# Patient Record
Sex: FEMALE | Race: WHITE | Employment: OTHER
[De-identification: names, ages, dates, MRNs, and addresses within clinical notes are randomized per-mention and may not be internally consistent; named-entity substitution may affect disease eponyms.]

---

## 2017-01-10 ENCOUNTER — PRIOR ORIGINAL RECORDS (OUTPATIENT)
Dept: OTHER | Age: 82
End: 2017-01-10

## 2017-01-17 ENCOUNTER — TELEPHONE (OUTPATIENT)
Dept: INTERNAL MEDICINE CLINIC | Facility: CLINIC | Age: 82
End: 2017-01-17

## 2017-01-17 RX ORDER — POTASSIUM CHLORIDE 750 MG/1
10 TABLET, FILM COATED, EXTENDED RELEASE ORAL 2 TIMES DAILY
Qty: 180 TABLET | Refills: 3 | Status: SHIPPED | OUTPATIENT
Start: 2017-01-17 | End: 2017-12-04

## 2017-01-26 PROBLEM — M75.80 ROTATOR CUFF TENDINITIS, UNSPECIFIED LATERALITY: Status: ACTIVE | Noted: 2017-01-26

## 2017-01-27 RX ORDER — LEVOTHYROXINE SODIUM 0.07 MG/1
75 TABLET ORAL DAILY
Qty: 90 TABLET | Refills: 0 | Status: SHIPPED | OUTPATIENT
Start: 2017-01-27 | End: 2017-04-27

## 2017-02-01 ENCOUNTER — HOSPITAL ENCOUNTER (OUTPATIENT)
Dept: LAB | Facility: HOSPITAL | Age: 82
Discharge: HOME | End: 2017-02-01
Attending: INTERNAL MEDICINE
Payer: MEDICARE

## 2017-02-01 DIAGNOSIS — I48.20 CHRONIC ATRIAL FIBRILLATION (HCC): ICD-10-CM

## 2017-02-01 LAB — POC INR: 2.1 (ref 0.8–1.3)

## 2017-02-01 PROCEDURE — 85610 PROTHROMBIN TIME: CPT

## 2017-02-17 ENCOUNTER — MED REC SCAN ONLY (OUTPATIENT)
Dept: INTERNAL MEDICINE CLINIC | Facility: CLINIC | Age: 82
End: 2017-02-17

## 2017-02-24 ENCOUNTER — TELEPHONE (OUTPATIENT)
Dept: INTERNAL MEDICINE CLINIC | Facility: CLINIC | Age: 82
End: 2017-02-24

## 2017-02-24 NOTE — TELEPHONE ENCOUNTER
Ashwin Bone has to go for some lab work, and she would like to know what the tests are for.   Please call back [phone]

## 2017-02-27 ENCOUNTER — HOSPITAL ENCOUNTER (OUTPATIENT)
Dept: LAB | Facility: HOSPITAL | Age: 82
Discharge: HOME | End: 2017-02-27
Attending: INTERNAL MEDICINE
Payer: MEDICARE

## 2017-02-27 DIAGNOSIS — I48.20 CHRONIC ATRIAL FIBRILLATION (HCC): ICD-10-CM

## 2017-02-27 LAB — POC INR: 2.5 (ref 0.8–1.3)

## 2017-02-27 PROCEDURE — 85610 PROTHROMBIN TIME: CPT

## 2017-03-01 PROBLEM — M75.81 ROTATOR CUFF TENDINITIS, RIGHT: Status: ACTIVE | Noted: 2017-03-01

## 2017-03-01 PROBLEM — M75.82 ROTATOR CUFF TENDONITIS, LEFT: Status: ACTIVE | Noted: 2017-03-01

## 2017-03-09 PROBLEM — R26.2 DIFFICULTY WALKING: Status: ACTIVE | Noted: 2017-03-09

## 2017-03-09 PROBLEM — B35.1 ONYCHOMYCOSIS: Status: ACTIVE | Noted: 2017-03-09

## 2017-03-13 RX ORDER — LOSARTAN POTASSIUM 100 MG/1
TABLET ORAL
Qty: 30 TABLET | Refills: 3 | Status: SHIPPED | OUTPATIENT
Start: 2017-03-13 | End: 2018-04-05

## 2017-03-14 ENCOUNTER — TELEPHONE (OUTPATIENT)
Dept: INTERNAL MEDICINE CLINIC | Facility: CLINIC | Age: 82
End: 2017-03-14

## 2017-03-14 NOTE — TELEPHONE ENCOUNTER
Patient picked up Losartan at [facility] and received 30 pills. She wants to know if Dr. Saravia Few changed something because she has always gotten 90 and that is what she prefers.

## 2017-03-31 ENCOUNTER — HOSPITAL ENCOUNTER (OUTPATIENT)
Dept: LAB | Facility: HOSPITAL | Age: 82
Discharge: HOME | End: 2017-03-31
Attending: INTERNAL MEDICINE
Payer: MEDICARE

## 2017-03-31 DIAGNOSIS — I48.20 CHRONIC ATRIAL FIBRILLATION (HCC): ICD-10-CM

## 2017-03-31 PROCEDURE — 85610 PROTHROMBIN TIME: CPT

## 2017-04-27 RX ORDER — LEVOTHYROXINE SODIUM 0.07 MG/1
TABLET ORAL
Qty: 90 TABLET | Refills: 1 | Status: SHIPPED | OUTPATIENT
Start: 2017-04-27 | End: 2017-11-01

## 2017-05-02 ENCOUNTER — HOSPITAL ENCOUNTER (OUTPATIENT)
Dept: LAB | Facility: HOSPITAL | Age: 82
Discharge: HOME | End: 2017-05-02
Attending: INTERNAL MEDICINE
Payer: MEDICARE

## 2017-05-02 DIAGNOSIS — I48.20 CHRONIC ATRIAL FIBRILLATION (HCC): ICD-10-CM

## 2017-05-02 PROCEDURE — 85610 PROTHROMBIN TIME: CPT

## 2017-05-17 ENCOUNTER — APPOINTMENT (OUTPATIENT)
Dept: LAB | Age: 82
End: 2017-05-17
Attending: PHARMACIST
Payer: MEDICARE

## 2017-05-17 ENCOUNTER — PRIOR ORIGINAL RECORDS (OUTPATIENT)
Dept: OTHER | Age: 82
End: 2017-05-17

## 2017-05-17 DIAGNOSIS — Z00.00 ROUTINE GENERAL MEDICAL EXAMINATION AT A HEALTH CARE FACILITY: ICD-10-CM

## 2017-05-17 DIAGNOSIS — I48.19 PERSISTENT ATRIAL FIBRILLATION (HCC): ICD-10-CM

## 2017-05-17 DIAGNOSIS — I25.2 HISTORY OF MYOCARDIAL INFARCTION: ICD-10-CM

## 2017-05-17 PROCEDURE — 80076 HEPATIC FUNCTION PANEL: CPT

## 2017-05-17 PROCEDURE — 80048 BASIC METABOLIC PNL TOTAL CA: CPT

## 2017-05-17 PROCEDURE — 80061 LIPID PANEL: CPT

## 2017-05-17 PROCEDURE — 36415 COLL VENOUS BLD VENIPUNCTURE: CPT

## 2017-05-22 RX ORDER — SYRINGE-NEEDLE,INSULIN,0.5 ML 31 GX5/16"
SYRINGE, EMPTY DISPOSABLE MISCELLANEOUS
Qty: 100 EACH | Refills: 3 | Status: SHIPPED | OUTPATIENT
Start: 2017-05-22 | End: 2018-06-18

## 2017-06-02 ENCOUNTER — HOSPITAL ENCOUNTER (OUTPATIENT)
Dept: LAB | Facility: HOSPITAL | Age: 82
Discharge: HOME | End: 2017-06-02
Attending: INTERNAL MEDICINE
Payer: MEDICARE

## 2017-06-02 DIAGNOSIS — I48.20 CHRONIC ATRIAL FIBRILLATION (HCC): ICD-10-CM

## 2017-06-02 PROCEDURE — 85610 PROTHROMBIN TIME: CPT

## 2017-06-19 ENCOUNTER — PRIOR ORIGINAL RECORDS (OUTPATIENT)
Dept: OTHER | Age: 82
End: 2017-06-19

## 2017-06-30 ENCOUNTER — HOSPITAL ENCOUNTER (OUTPATIENT)
Dept: LAB | Facility: HOSPITAL | Age: 82
Discharge: HOME | End: 2017-06-30
Attending: INTERNAL MEDICINE
Payer: MEDICARE

## 2017-06-30 DIAGNOSIS — I48.20 CHRONIC ATRIAL FIBRILLATION (HCC): ICD-10-CM

## 2017-06-30 LAB — POC INR: 3.2 (ref 0.8–1.3)

## 2017-06-30 PROCEDURE — 85610 PROTHROMBIN TIME: CPT

## 2017-06-30 RX ORDER — LANCETS
EACH MISCELLANEOUS
Qty: 200 EACH | Refills: 3 | Status: SHIPPED | OUTPATIENT
Start: 2017-06-30 | End: 2018-06-22

## 2017-07-14 ENCOUNTER — HOSPITAL ENCOUNTER (OUTPATIENT)
Dept: LAB | Facility: HOSPITAL | Age: 82
Discharge: HOME | End: 2017-07-14
Attending: INTERNAL MEDICINE
Payer: MEDICARE

## 2017-07-14 DIAGNOSIS — I48.20 CHRONIC ATRIAL FIBRILLATION (HCC): ICD-10-CM

## 2017-07-14 LAB — POC INR: 2.4 (ref 0.8–1.3)

## 2017-07-14 PROCEDURE — 85610 PROTHROMBIN TIME: CPT

## 2017-07-17 ENCOUNTER — MED REC SCAN ONLY (OUTPATIENT)
Dept: INTERNAL MEDICINE CLINIC | Facility: CLINIC | Age: 82
End: 2017-07-17

## 2017-07-18 ENCOUNTER — PRIOR ORIGINAL RECORDS (OUTPATIENT)
Dept: OTHER | Age: 82
End: 2017-07-18

## 2017-08-02 RX ORDER — INSULIN GLARGINE 100 [IU]/ML
INJECTION, SOLUTION SUBCUTANEOUS
Qty: 30 ML | Refills: 3 | Status: SHIPPED | OUTPATIENT
Start: 2017-08-02 | End: 2018-07-06

## 2017-08-10 ENCOUNTER — HOSPITAL ENCOUNTER (OUTPATIENT)
Dept: LAB | Facility: HOSPITAL | Age: 82
Discharge: HOME | End: 2017-08-10
Attending: INTERNAL MEDICINE
Payer: MEDICARE

## 2017-08-10 DIAGNOSIS — I48.20 CHRONIC ATRIAL FIBRILLATION (HCC): ICD-10-CM

## 2017-08-10 LAB — POC INR: 1.9 (ref 0.8–1.3)

## 2017-08-10 PROCEDURE — 85610 PROTHROMBIN TIME: CPT

## 2017-08-14 ENCOUNTER — MED REC SCAN ONLY (OUTPATIENT)
Dept: INTERNAL MEDICINE CLINIC | Facility: CLINIC | Age: 82
End: 2017-08-14

## 2017-08-25 ENCOUNTER — HOSPITAL ENCOUNTER (OUTPATIENT)
Dept: LAB | Facility: HOSPITAL | Age: 82
Discharge: HOME | End: 2017-08-25
Attending: INTERNAL MEDICINE
Payer: MEDICARE

## 2017-08-25 DIAGNOSIS — I48.20 CHRONIC ATRIAL FIBRILLATION (HCC): ICD-10-CM

## 2017-08-25 LAB — POC INR: 2.6 (ref 0.8–1.3)

## 2017-08-25 PROCEDURE — 85610 PROTHROMBIN TIME: CPT

## 2017-09-12 RX ORDER — BLOOD SUGAR DIAGNOSTIC
STRIP MISCELLANEOUS
Qty: 100 EACH | Refills: 3 | Status: SHIPPED | OUTPATIENT
Start: 2017-09-12 | End: 2018-10-26

## 2017-09-19 ENCOUNTER — PRIOR ORIGINAL RECORDS (OUTPATIENT)
Dept: OTHER | Age: 82
End: 2017-09-19

## 2017-09-20 ENCOUNTER — TELEPHONE (OUTPATIENT)
Dept: INTERNAL MEDICINE CLINIC | Facility: CLINIC | Age: 82
End: 2017-09-20

## 2017-09-20 DIAGNOSIS — E11.8 TYPE 2 DIABETES MELLITUS WITH COMPLICATION, WITH LONG-TERM CURRENT USE OF INSULIN (HCC): Primary | ICD-10-CM

## 2017-09-20 DIAGNOSIS — Z79.4 TYPE 2 DIABETES MELLITUS WITH COMPLICATION, WITH LONG-TERM CURRENT USE OF INSULIN (HCC): Primary | ICD-10-CM

## 2017-09-20 NOTE — TELEPHONE ENCOUNTER
Patient tried to make an appointment at the diabetic center but they told her they need a referral from Dr. Edy Pineda first.  Please send referral and then contact patient so she can call them again.

## 2017-09-28 ENCOUNTER — DIABETIC EDUCATION (OUTPATIENT)
Dept: ENDOCRINOLOGY CLINIC | Facility: CLINIC | Age: 82
End: 2017-09-28

## 2017-09-28 VITALS — WEIGHT: 165 LBS | BODY MASS INDEX: 32.39 KG/M2 | HEIGHT: 60 IN

## 2017-09-28 DIAGNOSIS — E11.8 TYPE 2 DIABETES MELLITUS WITH COMPLICATION, WITH LONG-TERM CURRENT USE OF INSULIN (HCC): Primary | ICD-10-CM

## 2017-09-28 DIAGNOSIS — Z79.4 TYPE 2 DIABETES MELLITUS WITH COMPLICATION, WITH LONG-TERM CURRENT USE OF INSULIN (HCC): Primary | ICD-10-CM

## 2017-09-28 PROCEDURE — G0108 DIAB MANAGE TRN  PER INDIV: HCPCS | Performed by: DIETITIAN, REGISTERED

## 2017-09-28 NOTE — PATIENT INSTRUCTIONS
Tomorrow morning decrease Lantus to 22 units. Glucose goal (first thing in the morning) is 130 or less.     Every 2 days, decrease Lantus by 1 more unit until Fasting Blood Glucose consisently ranges between 120-130 mg/dl

## 2017-09-28 NOTE — PROGRESS NOTES
Radha Sommer  : 5/3/1930 attended Diabetic Education:    Date: 2017   Start time: 1:00   End time: 1:30    Ht 60\"   Wt 165 lb   BMI 32.22 kg/m²       HGBA1C (%)   Date Value   2013 8.4 (H)   ----------  HgbA1C (%)   Date Value   2016

## 2017-09-29 ENCOUNTER — TELEPHONE (OUTPATIENT)
Dept: INTERNAL MEDICINE CLINIC | Facility: CLINIC | Age: 82
End: 2017-09-29

## 2017-09-29 ENCOUNTER — HOSPITAL ENCOUNTER (OUTPATIENT)
Dept: LAB | Facility: HOSPITAL | Age: 82
Discharge: HOME | End: 2017-09-29
Attending: INTERNAL MEDICINE
Payer: MEDICARE

## 2017-09-29 DIAGNOSIS — Z78.0 MENOPAUSE: ICD-10-CM

## 2017-09-29 DIAGNOSIS — Z12.39 SCREENING BREAST EXAMINATION: Primary | ICD-10-CM

## 2017-09-29 DIAGNOSIS — I48.20 CHRONIC ATRIAL FIBRILLATION (HCC): ICD-10-CM

## 2017-09-29 PROCEDURE — 85610 PROTHROMBIN TIME: CPT

## 2017-09-29 NOTE — TELEPHONE ENCOUNTER
Patient needs order for mammogram placed in Epic. She also wants to know if Dr. Mumtaz Sibley wants her to have a DEXA. Please let patient know when order(s) is placed so she can schedule.

## 2017-10-18 ENCOUNTER — TELEPHONE (OUTPATIENT)
Dept: ENDOCRINOLOGY CLINIC | Facility: CLINIC | Age: 82
End: 2017-10-18

## 2017-10-18 NOTE — TELEPHONE ENCOUNTER
Marcia Marina called to report her glucose control. Since reducing her Lantus, she is having no hypoglycemia. Currently taking 15 units Lantus. Glucose at 2 AM is 105-110 mg/dl.   Eats b'fast at 5 AM; lunch 11:30; dinner 5 PM.  Has snack approximately 1 hr bef

## 2017-10-26 ENCOUNTER — TELEPHONE (OUTPATIENT)
Dept: INTERNAL MEDICINE CLINIC | Facility: CLINIC | Age: 82
End: 2017-10-26

## 2017-10-26 ENCOUNTER — HOSPITAL ENCOUNTER (OUTPATIENT)
Dept: MAMMOGRAPHY | Age: 82
Discharge: HOME | End: 2017-10-26
Attending: INTERNAL MEDICINE
Payer: MEDICARE

## 2017-10-26 ENCOUNTER — HOSPITAL ENCOUNTER (OUTPATIENT)
Dept: BONE DENSITY | Age: 82
Discharge: HOME | End: 2017-10-26
Attending: INTERNAL MEDICINE
Payer: MEDICARE

## 2017-10-26 DIAGNOSIS — Z78.0 MENOPAUSE: ICD-10-CM

## 2017-10-26 DIAGNOSIS — Z12.39 SCREENING BREAST EXAMINATION: ICD-10-CM

## 2017-10-26 PROCEDURE — 77080 DXA BONE DENSITY AXIAL: CPT | Performed by: INTERNAL MEDICINE

## 2017-10-26 PROCEDURE — 77067 SCR MAMMO BI INCL CAD: CPT | Performed by: INTERNAL MEDICINE

## 2017-10-26 PROCEDURE — 77063 BREAST TOMOSYNTHESIS BI: CPT | Performed by: INTERNAL MEDICINE

## 2017-10-27 NOTE — TELEPHONE ENCOUNTER
Phone call. Spoke with patient. Reminded overdue for Reclast infusion. Order faxed over again to infusion center. Patient states wants to wait until gets CPE labs drawn here in November.

## 2017-11-01 ENCOUNTER — HOSPITAL ENCOUNTER (OUTPATIENT)
Dept: LAB | Facility: HOSPITAL | Age: 82
Discharge: HOME | End: 2017-11-01
Attending: INTERNAL MEDICINE
Payer: MEDICARE

## 2017-11-01 DIAGNOSIS — I48.20 CHRONIC ATRIAL FIBRILLATION (HCC): ICD-10-CM

## 2017-11-01 PROCEDURE — 85610 PROTHROMBIN TIME: CPT

## 2017-11-01 RX ORDER — LEVOTHYROXINE SODIUM 0.07 MG/1
TABLET ORAL
Qty: 90 TABLET | Refills: 0 | Status: SHIPPED | OUTPATIENT
Start: 2017-11-01 | End: 2018-02-27

## 2017-11-09 RX ORDER — FUROSEMIDE 20 MG/1
TABLET ORAL
Qty: 180 TABLET | Refills: 3 | Status: SHIPPED | OUTPATIENT
Start: 2017-11-09 | End: 2017-12-04

## 2017-11-29 ENCOUNTER — APPOINTMENT (OUTPATIENT)
Dept: LAB | Age: 82
End: 2017-11-29
Attending: INTERNAL MEDICINE
Payer: MEDICARE

## 2017-11-29 ENCOUNTER — NURSE ONLY (OUTPATIENT)
Dept: INTERNAL MEDICINE CLINIC | Facility: CLINIC | Age: 82
End: 2017-11-29

## 2017-11-29 DIAGNOSIS — Z00.00 LABORATORY EXAMINATION ORDERED AS PART OF A ROUTINE GENERAL MEDICAL EXAMINATION: Primary | ICD-10-CM

## 2017-11-29 PROCEDURE — 92551 PURE TONE HEARING TEST AIR: CPT | Performed by: INTERNAL MEDICINE

## 2017-11-29 PROCEDURE — 93000 ELECTROCARDIOGRAM COMPLETE: CPT | Performed by: INTERNAL MEDICINE

## 2017-11-29 PROCEDURE — 99173 VISUAL ACUITY SCREEN: CPT | Performed by: INTERNAL MEDICINE

## 2017-11-29 PROCEDURE — 94010 BREATHING CAPACITY TEST: CPT | Performed by: INTERNAL MEDICINE

## 2017-11-29 PROCEDURE — 81001 URINALYSIS AUTO W/SCOPE: CPT | Performed by: INTERNAL MEDICINE

## 2017-11-29 NOTE — PROGRESS NOTES
*LOOKIN' BODY RESULTS    YES:       NO: X      REASON TEST NOT PERFORMED: WAIVED DUE TO BALANCE       HEIGHT: 4'11.5 WITH SHOES    WEIGHT: 169 WITH SHOES  _____________________________________________________________________________  Cecilia Godinez    YES:

## 2017-12-01 ENCOUNTER — HOSPITAL ENCOUNTER (OUTPATIENT)
Dept: LAB | Facility: HOSPITAL | Age: 82
Discharge: HOME | End: 2017-12-01
Attending: INTERNAL MEDICINE
Payer: MEDICARE

## 2017-12-01 DIAGNOSIS — I48.20 CHRONIC ATRIAL FIBRILLATION (HCC): ICD-10-CM

## 2017-12-01 PROCEDURE — 85610 PROTHROMBIN TIME: CPT

## 2017-12-04 ENCOUNTER — TELEPHONE (OUTPATIENT)
Dept: INTERNAL MEDICINE CLINIC | Facility: CLINIC | Age: 82
End: 2017-12-04

## 2017-12-04 ENCOUNTER — LAB ENCOUNTER (OUTPATIENT)
Dept: LAB | Facility: HOSPITAL | Age: 82
End: 2017-12-04
Attending: INTERNAL MEDICINE
Payer: MEDICARE

## 2017-12-04 ENCOUNTER — OFFICE VISIT (OUTPATIENT)
Dept: INTERNAL MEDICINE CLINIC | Facility: CLINIC | Age: 82
End: 2017-12-04

## 2017-12-04 VITALS
BODY MASS INDEX: 33.87 KG/M2 | DIASTOLIC BLOOD PRESSURE: 70 MMHG | HEIGHT: 59 IN | OXYGEN SATURATION: 96 % | TEMPERATURE: 99 F | HEART RATE: 88 BPM | RESPIRATION RATE: 16 BRPM | SYSTOLIC BLOOD PRESSURE: 135 MMHG | WEIGHT: 168 LBS

## 2017-12-04 DIAGNOSIS — E11.8 TYPE 2 DIABETES MELLITUS WITH COMPLICATION, WITH LONG-TERM CURRENT USE OF INSULIN (HCC): ICD-10-CM

## 2017-12-04 DIAGNOSIS — Z79.4 TYPE 2 DIABETES MELLITUS WITH COMPLICATION, WITH LONG-TERM CURRENT USE OF INSULIN (HCC): ICD-10-CM

## 2017-12-04 DIAGNOSIS — R50.9 FEVER, UNSPECIFIED FEVER CAUSE: ICD-10-CM

## 2017-12-04 DIAGNOSIS — E11.43 TYPE 2 DIABETES MELLITUS WITH DIABETIC AUTONOMIC NEUROPATHY, WITHOUT LONG-TERM CURRENT USE OF INSULIN (HCC): Primary | ICD-10-CM

## 2017-12-04 PROCEDURE — 87502 INFLUENZA DNA AMP PROBE: CPT

## 2017-12-04 PROCEDURE — 87999 UNLISTED MICROBIOLOGY PX: CPT

## 2017-12-04 PROCEDURE — 99213 OFFICE O/P EST LOW 20 MIN: CPT | Performed by: INTERNAL MEDICINE

## 2017-12-04 PROCEDURE — 87798 DETECT AGENT NOS DNA AMP: CPT

## 2017-12-04 RX ORDER — OSELTAMIVIR PHOSPHATE 75 MG/1
75 CAPSULE ORAL 2 TIMES DAILY
Qty: 10 CAPSULE | Refills: 0 | Status: SHIPPED | OUTPATIENT
Start: 2017-12-04 | End: 2017-12-11

## 2017-12-04 RX ORDER — INFLUENZA A VIRUSA/MICHIGAN/45/2015 X-275 (H1N1) ANTIGEN (FORMALDEHYDE INACTIVATED), INFLUENZA A VIRUS A/HONG KONG/4801/2014 X-263B (H3N2) ANTIGEN (FORMALDEHYDE INACTIVATED), AND INFLUENZA B VIRUS B/BRISBANE/60/2008 ANTIGEN (FORMALDEHYDE INACTIVATED) 60; 60; 60 UG/.5ML; UG/.5ML; UG/.5ML
INJECTION, SUSPENSION INTRAMUSCULAR
Refills: 0 | COMMUNITY
Start: 2017-09-22 | End: 2017-12-11 | Stop reason: ALTCHOICE

## 2017-12-04 RX ORDER — AZITHROMYCIN 200 MG/5ML
POWDER, FOR SUSPENSION ORAL
Qty: 30 ML | Refills: 0 | Status: SHIPPED | OUTPATIENT
Start: 2017-12-04 | End: 2017-12-11 | Stop reason: ALTCHOICE

## 2017-12-04 NOTE — PROGRESS NOTES
Patient presents with:  Cough: day 3  Body ache and/or chills  Fever      HPI: Please see above. Some sweats. Muscle aches really bother Tylenol she is taking for the fever.   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  Disp: 1 kit Rfl: 0   Needles & Syringes Does not apply Misc 31 g .3cc  #100(1 box) inject daily BD insulin syringes-ultra fi no organomegaly or hernias. Musculoskeletal: Normal range of motion. No edema and no tenderness. No effusions. Lymphadenopathy: No cervical adenopathy. Neurological:No  Confusion  Normal reflexes. No cranial nerve deficit or sensory deficit.  Nor

## 2017-12-04 NOTE — TELEPHONE ENCOUNTER
Left message for patient. Tested positive for Influenza A. Start Tamiflu and Zithromax and call tomorrow with update.

## 2017-12-05 ENCOUNTER — MED REC SCAN ONLY (OUTPATIENT)
Dept: INTERNAL MEDICINE CLINIC | Facility: CLINIC | Age: 82
End: 2017-12-05

## 2017-12-05 NOTE — TELEPHONE ENCOUNTER
Spoke with patient. Started Tamiflu. Feeling slightly better. Discussed rest, fluids, completing meds.

## 2017-12-06 ENCOUNTER — APPOINTMENT (OUTPATIENT)
Dept: HEMATOLOGY/ONCOLOGY | Facility: HOSPITAL | Age: 82
End: 2017-12-06
Attending: INTERNAL MEDICINE
Payer: MEDICARE

## 2017-12-08 NOTE — PROGRESS NOTES
HEALTH MAINTENANCE:        Immunization History  Administered            Date(s) Administered    HIGH DOSE FLU 65 YRS AND OLDER PRSV FREE SINGLE D (88490) FLU CLINIC                          09/13/2016 09/22/2017      Influenza             11/17/2000  10/ hypertrophy  ST & T wave abnormality, consider inferior ischemia  ST & T wave anormality    SPIROMETRY: Normal    HEARING: Patient wears hearing aids

## 2017-12-11 ENCOUNTER — OFFICE VISIT (OUTPATIENT)
Dept: INTERNAL MEDICINE CLINIC | Facility: CLINIC | Age: 82
End: 2017-12-11

## 2017-12-11 VITALS
RESPIRATION RATE: 14 BRPM | SYSTOLIC BLOOD PRESSURE: 135 MMHG | OXYGEN SATURATION: 96 % | TEMPERATURE: 98 F | HEIGHT: 59.5 IN | DIASTOLIC BLOOD PRESSURE: 70 MMHG | HEART RATE: 68 BPM | WEIGHT: 169 LBS | BODY MASS INDEX: 33.62 KG/M2

## 2017-12-11 DIAGNOSIS — Z86.010 HISTORY OF COLON POLYPS: ICD-10-CM

## 2017-12-11 DIAGNOSIS — E78.5 INCREASED SERUM LIPIDS: ICD-10-CM

## 2017-12-11 DIAGNOSIS — Z78.9 STATIN INTOLERANCE: ICD-10-CM

## 2017-12-11 DIAGNOSIS — F43.9 STRESS AT HOME: ICD-10-CM

## 2017-12-11 DIAGNOSIS — Z79.4 TYPE 2 DIABETES MELLITUS WITH COMPLICATION, WITH LONG-TERM CURRENT USE OF INSULIN (HCC): ICD-10-CM

## 2017-12-11 DIAGNOSIS — K22.2 ESOPHAGEAL STRICTURE: ICD-10-CM

## 2017-12-11 DIAGNOSIS — I10 ESSENTIAL HYPERTENSION: ICD-10-CM

## 2017-12-11 DIAGNOSIS — Z79.01 WARFARIN ANTICOAGULATION: ICD-10-CM

## 2017-12-11 DIAGNOSIS — K57.30 DIVERTICULOSIS OF LARGE INTESTINE WITHOUT HEMORRHAGE: ICD-10-CM

## 2017-12-11 DIAGNOSIS — Z86.718 HISTORY OF DVT (DEEP VEIN THROMBOSIS): ICD-10-CM

## 2017-12-11 DIAGNOSIS — Z00.00 ROUTINE GENERAL MEDICAL EXAMINATION AT A HEALTH CARE FACILITY: Primary | ICD-10-CM

## 2017-12-11 DIAGNOSIS — I48.19 PERSISTENT ATRIAL FIBRILLATION (HCC): ICD-10-CM

## 2017-12-11 DIAGNOSIS — E03.9 ACQUIRED HYPOTHYROIDISM: ICD-10-CM

## 2017-12-11 DIAGNOSIS — E11.8 TYPE 2 DIABETES MELLITUS WITH COMPLICATION, WITH LONG-TERM CURRENT USE OF INSULIN (HCC): ICD-10-CM

## 2017-12-11 DIAGNOSIS — I50.32 CHRONIC DIASTOLIC HEART FAILURE (HCC): ICD-10-CM

## 2017-12-11 DIAGNOSIS — M75.82 ROTATOR CUFF TENDONITIS, LEFT: ICD-10-CM

## 2017-12-11 DIAGNOSIS — I25.2 HISTORY OF MYOCARDIAL INFARCTION: ICD-10-CM

## 2017-12-11 DIAGNOSIS — Z85.828 PERSONAL HISTORY OF OTHER MALIGNANT NEOPLASM OF SKIN: ICD-10-CM

## 2017-12-11 DIAGNOSIS — R63.8 INCREASED BMI (BODY MASS INDEX): ICD-10-CM

## 2017-12-11 DIAGNOSIS — D50.9 IRON DEFICIENCY ANEMIA, UNSPECIFIED IRON DEFICIENCY ANEMIA TYPE: ICD-10-CM

## 2017-12-11 PROBLEM — M75.81 ROTATOR CUFF TENDINITIS, RIGHT: Status: RESOLVED | Noted: 2017-03-01 | Resolved: 2017-12-11

## 2017-12-11 PROCEDURE — G0439 PPPS, SUBSEQ VISIT: HCPCS | Performed by: INTERNAL MEDICINE

## 2017-12-11 RX ORDER — ROSUVASTATIN CALCIUM 10 MG/1
10 TABLET, COATED ORAL
Qty: 30 TABLET | Refills: 6 | Status: SHIPPED | OUTPATIENT
Start: 2017-12-11 | End: 2018-04-04 | Stop reason: ALTCHOICE

## 2017-12-11 RX ORDER — FUROSEMIDE 20 MG/1
20 TABLET ORAL 2 TIMES DAILY
Qty: 30 TABLET | Refills: 0 | COMMUNITY
End: 2018-12-13

## 2017-12-11 NOTE — PROGRESS NOTES
HPI:    Patient ID: Fransisco Rizvi is a 80year old female. HPI  DEAR Denae    IT WAS NICE SEEING YOU FOR YOUR WELLNESS VISIT ON 12/11/2017            Review of Systems   HPI:    Patient ID: Fransisco Rizvi is a 80year old female.     History of Present ?: Yes    Do you have a living will?: Yes     Please go to \"Cognitive Assessment\" under Medicare Assessment section in Charting, test patient and document. Then, refresh your progress note to see your input here.   Cognitive Assessment     What complaints. No significant hot flashes. Denies vaginal discharge or bleeding.    Mammogram normal October 2017 will continue to repeat at least through age 80 depending on patient's overall health    Musculoskeletal: Significant degenerative lumbar spine a Pulse: 68   Resp: 14   Temp: 97.9 °F (36.6 °C)   TempSrc: Oral   SpO2: 96%   Weight: 169 lb   Height: 59.5\"     Body mass index is 33.56 kg/m².     Active Problems:  Patient Active Problem List:     Personal history of other malignant neoplasm of skin mild, w/ mildly increased pulmonary vascularity, increased interstitial markings in the mid to lower lung fields, and B/L perihilar opacities is concerning for congestive failure   • Cataract    • Chest pain 8/26/2010   • Constipation 4/26/2012    worsenin Ischemic heart disease    • LAE (left atrial enlargement) 7/13/2010   • Lipid screening 4/19/2012   • LVH (left ventricular hypertrophy) 4/26/2012    hx LVH by ECG, hypertensive hx, sodium restriction   • Menopause    • Mild mitral regurgitation 7/13/2010 MANAGEMENT  3/27/2014: FLUOR GID & [place] Dates NDL/CATH SPI DX/THER POP      Comment: Procedure: LUMBAR EPIDURAL;  Surgeon:                Melvin Pruett MD;  Location: [facility]: FOOT/TOES SURGERY PROC UNLISTED LUMBAR EPIDURAL;  Surgeon:                Margaret Yu MD;  Location: [place] [address] MANAGEMENT  5/19/11: SKIN SURGERY      Comment: 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 See Attached    End of Life Planning:   Still need copy of advance directives      PHYSICAL EXAM:      Vital signs reviewed. BMI 32. Constitutional: Oriented to person, place, and time. No distress.      HENT:  Normocephalic and atrauma any changes. ASSESSMENT/PLAN:       #1.  Wellness visit completed discussed concepts of inflammation. Myeloperoxidase normal CRP elevated 2.7 associated with BMI that CRP is being made in abdominal fat.   Importance of trying to increase activity lo Coumadin. #5.  Coronary artery disease myocardial infarction followed by [facility] no specific issue regarding heart failure. #6.  Stress at home.   Son unstable in state [facility] facilities facilities are poorly run dirty and staff stenosis after interventions. Currently using a cane but does decrease your ability to be active. #18. History of atrial fibrillation. Diastolic heart failure.   Followed by [facility] there approaches not changed too much on your meds generally doing digoxin 0.125 MG Oral Tab 125 mcg 3 (three) times a week. Disp:  Rfl:    metoprolol Tartrate 25 MG Oral Tab 20 mg 2 (two) times daily.    Disp:  Rfl:    Potassium Chloride ER (K-DUR) 10 MEQ Oral Tab CR  Disp:  Rfl:    Fluticasone Propionate (FLONASE) 5 vomiting  Glucophage [Metform*        Comment:\"problematic\"  Meperidine              Nausea only  Statins                 Pain    Comment:Reaction: muscle aches  Sulfa Drugs Cross R*    Nausea and vomiting    Comment:\"Sulfa\"   PHYSICAL EXAM:   Physical

## 2017-12-18 ENCOUNTER — TELEPHONE (OUTPATIENT)
Dept: INTERNAL MEDICINE CLINIC | Facility: CLINIC | Age: 82
End: 2017-12-18

## 2017-12-19 ENCOUNTER — HOSPITAL ENCOUNTER (OUTPATIENT)
Dept: LAB | Facility: HOSPITAL | Age: 82
Discharge: HOME | End: 2017-12-19
Attending: INTERNAL MEDICINE
Payer: MEDICARE

## 2017-12-19 DIAGNOSIS — I48.0 PAROXYSMAL ATRIAL FIBRILLATION (HCC): ICD-10-CM

## 2017-12-19 PROCEDURE — 85610 PROTHROMBIN TIME: CPT

## 2017-12-31 ENCOUNTER — PRIOR ORIGINAL RECORDS (OUTPATIENT)
Dept: OTHER | Age: 82
End: 2017-12-31

## 2018-01-08 ENCOUNTER — PRIOR ORIGINAL RECORDS (OUTPATIENT)
Dept: OTHER | Age: 83
End: 2018-01-08

## 2018-01-09 ENCOUNTER — MED REC SCAN ONLY (OUTPATIENT)
Dept: INTERNAL MEDICINE CLINIC | Facility: CLINIC | Age: 83
End: 2018-01-09

## 2018-01-11 ENCOUNTER — TELEPHONE (OUTPATIENT)
Dept: INTERNAL MEDICINE CLINIC | Facility: CLINIC | Age: 83
End: 2018-01-11

## 2018-01-11 RX ORDER — BLOOD SUGAR DIAGNOSTIC
STRIP MISCELLANEOUS
Qty: 200 STRIP | Refills: 1 | Status: SHIPPED | OUTPATIENT
Start: 2018-01-11 | End: 2018-07-12

## 2018-01-11 NOTE — TELEPHONE ENCOUNTER
Pt calling for refill of her accuchek tg test stripes she tests multiple times a day she needs more then #100 present script says ter 1 to 3 times it needs to be changed she is on insulin daily. Please send to [facility] and tell them to deliver.   Pt ca

## 2018-01-18 ENCOUNTER — DIABETIC EDUCATION (OUTPATIENT)
Dept: ENDOCRINOLOGY CLINIC | Facility: CLINIC | Age: 83
End: 2018-01-18

## 2018-01-18 VITALS — HEIGHT: 59.5 IN | BODY MASS INDEX: 33.42 KG/M2 | WEIGHT: 168 LBS

## 2018-01-18 DIAGNOSIS — E11.8 TYPE 2 DIABETES MELLITUS WITH COMPLICATION, WITH LONG-TERM CURRENT USE OF INSULIN (HCC): Primary | ICD-10-CM

## 2018-01-18 DIAGNOSIS — Z79.4 TYPE 2 DIABETES MELLITUS WITH COMPLICATION, WITH LONG-TERM CURRENT USE OF INSULIN (HCC): Primary | ICD-10-CM

## 2018-01-18 PROCEDURE — 97802 MEDICAL NUTRITION INDIV IN: CPT | Performed by: DIETITIAN, REGISTERED

## 2018-01-18 NOTE — PROGRESS NOTES
Fransisco Rizvi  : 5/3/1930 attended Diabetic Education:    Date: 2018   Start time:  12:45 End time: 1:30    Ht 59.5\"   Wt 168 lb   BMI 33.36 kg/m²       HGBA1C (%)   Date Value   2013 8.4 (H)   ----------  HgbA1C (%)   Date Value   20

## 2018-01-19 ENCOUNTER — HOSPITAL ENCOUNTER (OUTPATIENT)
Dept: LAB | Facility: HOSPITAL | Age: 83
Discharge: HOME | End: 2018-01-19
Attending: INTERNAL MEDICINE
Payer: MEDICARE

## 2018-01-19 DIAGNOSIS — I48.0 PAROXYSMAL ATRIAL FIBRILLATION (HCC): ICD-10-CM

## 2018-01-19 LAB
ALBUMIN: 3.4 G/DL
ALKALINE PHOSPHATATE(ALK PHOS): 68 IU/L
BILIRUBIN TOTAL: 0.4 MG/DL
BUN: 9 MG/DL
CALCIUM: 9.2 MG/DL
CHLORIDE: 107 MEQ/L
CHOLESTEROL, TOTAL: 142 MG/DL
CREATININE, SERUM: 0.82 MG/DL
GLUCOSE: 140 MG/DL
HDL CHOLESTEROL: 37 MG/DL
LDL CHOLESTEROL: 66 MG/DL
POC INR: 2 (ref 0.8–1.3)
POTASSIUM, SERUM: 4.2 MEQ/L
PROTEIN, TOTAL: 6.6 G/DL
SGOT (AST): 13 IU/L
SGPT (ALT): 17 IU/L
SODIUM: 142 MEQ/L
TRIGLYCERIDES: 195 MG/DL

## 2018-01-19 PROCEDURE — 85610 PROTHROMBIN TIME: CPT

## 2018-01-23 ENCOUNTER — PRIOR ORIGINAL RECORDS (OUTPATIENT)
Dept: OTHER | Age: 83
End: 2018-01-23

## 2018-02-06 ENCOUNTER — MED REC SCAN ONLY (OUTPATIENT)
Dept: INTERNAL MEDICINE CLINIC | Facility: CLINIC | Age: 83
End: 2018-02-06

## 2018-02-21 ENCOUNTER — HOSPITAL ENCOUNTER (OUTPATIENT)
Dept: LAB | Facility: HOSPITAL | Age: 83
Discharge: HOME | End: 2018-02-21
Attending: INTERNAL MEDICINE
Payer: MEDICARE

## 2018-02-21 DIAGNOSIS — I48.0 PAROXYSMAL ATRIAL FIBRILLATION (HCC): ICD-10-CM

## 2018-02-21 LAB — POC INR: 1.6 (ref 0.8–1.3)

## 2018-02-21 PROCEDURE — 85610 PROTHROMBIN TIME: CPT

## 2018-02-22 PROBLEM — L84 CALLUS OF FOOT: Status: ACTIVE | Noted: 2018-02-22

## 2018-02-27 RX ORDER — LEVOTHYROXINE SODIUM 0.07 MG/1
TABLET ORAL
Qty: 90 TABLET | Refills: 3 | Status: SHIPPED | OUTPATIENT
Start: 2018-02-27 | End: 2018-10-26

## 2018-03-02 ENCOUNTER — HOSPITAL ENCOUNTER (OUTPATIENT)
Dept: LAB | Facility: HOSPITAL | Age: 83
Discharge: HOME | End: 2018-03-02
Attending: INTERNAL MEDICINE
Payer: MEDICARE

## 2018-03-02 DIAGNOSIS — I48.0 PAROXYSMAL ATRIAL FIBRILLATION (HCC): ICD-10-CM

## 2018-03-02 LAB — POC INR: 2.3 (ref 0.8–1.3)

## 2018-03-02 PROCEDURE — 85610 PROTHROMBIN TIME: CPT

## 2018-03-14 ENCOUNTER — HOSPITAL ENCOUNTER (OUTPATIENT)
Dept: LAB | Facility: HOSPITAL | Age: 83
Discharge: HOME | End: 2018-03-14
Attending: INTERNAL MEDICINE
Payer: MEDICARE

## 2018-03-14 DIAGNOSIS — I48.0 PAROXYSMAL ATRIAL FIBRILLATION (HCC): ICD-10-CM

## 2018-03-14 LAB — POC INR: 2.4 (ref 0.8–1.3)

## 2018-03-14 PROCEDURE — 85610 PROTHROMBIN TIME: CPT

## 2018-03-23 ENCOUNTER — TELEPHONE (OUTPATIENT)
Dept: INTERNAL MEDICINE CLINIC | Facility: CLINIC | Age: 83
End: 2018-03-23

## 2018-03-23 NOTE — TELEPHONE ENCOUNTER
Shawna Mom (patient's daughter) called to let Dr. Vania Boyd know that the patient's son Sergey Wynn  last weekend. The  was yesterday at [facility].  The family is taking good care of Denae.

## 2018-04-04 ENCOUNTER — HOSPITAL ENCOUNTER (OUTPATIENT)
Dept: CT IMAGING | Facility: HOSPITAL | Age: 83
Discharge: HOME | End: 2018-04-04
Attending: INTERNAL MEDICINE
Payer: MEDICARE

## 2018-04-04 ENCOUNTER — OFFICE VISIT (OUTPATIENT)
Dept: INTERNAL MEDICINE CLINIC | Facility: CLINIC | Age: 83
End: 2018-04-04

## 2018-04-04 VITALS
DIASTOLIC BLOOD PRESSURE: 60 MMHG | HEIGHT: 59 IN | SYSTOLIC BLOOD PRESSURE: 148 MMHG | TEMPERATURE: 98 F | RESPIRATION RATE: 14 BRPM | OXYGEN SATURATION: 98 % | BODY MASS INDEX: 33.87 KG/M2 | HEART RATE: 67 BPM | WEIGHT: 168 LBS

## 2018-04-04 DIAGNOSIS — Z87.898 HX OF VERTIGO: ICD-10-CM

## 2018-04-04 DIAGNOSIS — Z79.01 WARFARIN ANTICOAGULATION: ICD-10-CM

## 2018-04-04 DIAGNOSIS — G45.9 TRANSIENT CEREBRAL ISCHEMIA, UNSPECIFIED TYPE: ICD-10-CM

## 2018-04-04 DIAGNOSIS — E11.8 TYPE 2 DIABETES MELLITUS WITH COMPLICATION, WITH LONG-TERM CURRENT USE OF INSULIN (HCC): ICD-10-CM

## 2018-04-04 DIAGNOSIS — M85.80 OSTEOPENIA, UNSPECIFIED LOCATION: ICD-10-CM

## 2018-04-04 DIAGNOSIS — I25.2 HISTORY OF MYOCARDIAL INFARCTION: Primary | ICD-10-CM

## 2018-04-04 DIAGNOSIS — M47.816 ARTHRITIS, LUMBAR SPINE: ICD-10-CM

## 2018-04-04 DIAGNOSIS — I10 ESSENTIAL HYPERTENSION: ICD-10-CM

## 2018-04-04 DIAGNOSIS — Z79.4 TYPE 2 DIABETES MELLITUS WITH COMPLICATION, WITH LONG-TERM CURRENT USE OF INSULIN (HCC): ICD-10-CM

## 2018-04-04 DIAGNOSIS — F43.21 GRIEF: ICD-10-CM

## 2018-04-04 DIAGNOSIS — I48.19 PERSISTENT ATRIAL FIBRILLATION (HCC): ICD-10-CM

## 2018-04-04 PROCEDURE — 99214 OFFICE O/P EST MOD 30 MIN: CPT | Performed by: INTERNAL MEDICINE

## 2018-04-04 PROCEDURE — 70450 CT HEAD/BRAIN W/O DYE: CPT | Performed by: INTERNAL MEDICINE

## 2018-04-04 PROCEDURE — 93000 ELECTROCARDIOGRAM COMPLETE: CPT | Performed by: INTERNAL MEDICINE

## 2018-04-04 RX ORDER — MECLIZINE HCL 12.5 MG/1
12.5 TABLET ORAL 3 TIMES DAILY PRN
COMMUNITY
End: 2018-12-13

## 2018-04-04 NOTE — PROGRESS NOTES
Reviewed EKG because of previous myocardial infarction consistent with atrial fibrillation well controlled diffuse ST changes consider ischemia consistent with ischemia but unchanged from November 2017. Left axis deviation.   ST changes include 1 aVL V4 th

## 2018-04-04 NOTE — PROGRESS NOTES
Patient presents with:  Vertigo      HPI: On Monday was coming downstairs she is she lives at [address] [place] [address]. [place] lightheaded. She had that there was some degree of vertigo which she has had this in the past.  Denies sweats chest pain or syncope.     Sym abdominal pain, diarrhea, blood in stool and abdominal distention. Genitourinary: Negative for dysuria, hematuria and difficulty urinating. Musculoskeletal: Negative for myalgias, back pain, joint swelling, arthralgias and gait problem.    Skin: Neg TO 3 TIMES DAILY Disp: 100 each Rfl: 3   LANTUS 100 UNIT/ML Subcutaneous Solution INJECT 36 UNITS INTO THE SKIN NIGHTLY.  Disp: 30 mL Rfl: 3   ACCU-CHEK SOFTCLIX LANCETS Does not apply Misc USE 1 TO 3 TIMES DAILY Disp: 200 each Rfl: 3   BD INSULIN SYR ULTRA time. No distress. HEENT:  Normocephalic and atraumatic. Hearing and tympanic membranes normal.  Nose normal. Oropharynx is clear and moist.     Eyes: Conjunctivae and EOM are normal. PERRLA. No scleral icterus  . Neck: Normal range of motion.  Neck s encounter.       Meds & Refills for this Visit:  No prescriptions requested or ordered in this encounter       Imaging & Consults:  ELECTROCARDIOGRAM, COMPLETE  OP REFERRAL TO [facility]  CT BRAIN OR HEAD (22634)    No Follow-up on file

## 2018-04-05 RX ORDER — LOSARTAN POTASSIUM 100 MG/1
TABLET ORAL
Qty: 90 TABLET | Refills: 3 | Status: SHIPPED | OUTPATIENT
Start: 2018-04-05 | End: 2018-10-26

## 2018-04-13 ENCOUNTER — TELEPHONE (OUTPATIENT)
Dept: INTERNAL MEDICINE CLINIC | Facility: CLINIC | Age: 83
End: 2018-04-13

## 2018-04-13 ENCOUNTER — HOSPITAL ENCOUNTER (OUTPATIENT)
Dept: LAB | Facility: HOSPITAL | Age: 83
Discharge: HOME | End: 2018-04-13
Attending: INTERNAL MEDICINE
Payer: MEDICARE

## 2018-04-13 DIAGNOSIS — I48.0 PAROXYSMAL ATRIAL FIBRILLATION (HCC): ICD-10-CM

## 2018-04-13 PROCEDURE — 85610 PROTHROMBIN TIME: CPT

## 2018-04-13 NOTE — TELEPHONE ENCOUNTER
Patient was told to have a shingles vaccination, but the pharmacy won't give her the original medication, they want to give her the new brand. Patient would like to know how to proceed.   Please call

## 2018-04-16 ENCOUNTER — HOSPITAL ENCOUNTER (OUTPATIENT)
Dept: PHYSICAL THERAPY | Facility: HOSPITAL | Age: 83
Setting detail: THERAPIES SERIES
Discharge: HOME | End: 2018-04-16
Attending: INTERNAL MEDICINE
Payer: MEDICARE

## 2018-04-16 DIAGNOSIS — Z87.898 HX OF VERTIGO: ICD-10-CM

## 2018-04-16 PROCEDURE — 97110 THERAPEUTIC EXERCISES: CPT

## 2018-04-16 PROCEDURE — 97162 PT EVAL MOD COMPLEX 30 MIN: CPT

## 2018-04-16 NOTE — PROGRESS NOTES
VESTIBULAR AND FALLS EVALUATION:   Referring Physician: Dr. Edy Pineda  Date of Onset: Ongoing for several years, recent flare-up early April, 2018 Date of Service: 4/16/2018   Diagnosis: Vertigo, Imbalance, Gait Disturbance         PATIENT SUMMARY   Ney White antibiotics: None, denies    Dizziness: Denies dizziness, symptoms described as \"off balance\" only. Headache: None, denies.   Pain: reports hx of cervical pain intermittently, lumbar stenosis with constant back and LE pain - has been seen for PT with a in the mid to lower lung fields, and B/L perihilar opacities is concerning for congestive failure   • Cataract    • Chest pain 8/26/2010   • Constipation 4/26/2012    worsening   • Decreased left ventricular function 7/13/2010   • [facility] 4 screening 4/19/2012   • LVH (left ventricular hypertrophy) 4/26/2012    hx LVH by ECG, hypertensive hx, sodium restriction   • Menopause    • Mild mitral regurgitation 7/13/2010   • Neck pain 3/31/2011    head and neck discomfort   • OA (osteoarthritis) that diminishes with smaller HARRIS. LE strength deficits noted throughout.  In agreement with FOTO score and clinical rationale, this evaluation involved Moderate Complexity decision making due to 3+ personal factors/comorbidities, 4+ body structures involved Risk: yes    Gait: Patient ambulates with single point cane with flared base; dec speed, dec step length LAYNE, min fwd lean, slow around turns and in crowded areas.      TUG (AD, time): 21 sec with cane   Fall Risk: yes  [Performance exceeding the upper limi Frequency / Duration: Patient will be seen for 2 x/week or a total of 10 visits over a 90 day period.  Treatment will include: home exercise program development and instruction, patient/family education, balance training, eye/head coordination exercis

## 2018-04-19 RX ORDER — POTASSIUM CHLORIDE 750 MG/1
10 TABLET, EXTENDED RELEASE ORAL 2 TIMES DAILY
Qty: 180 TABLET | Refills: 3 | Status: SHIPPED | OUTPATIENT
Start: 2018-04-19 | End: 2018-10-26

## 2018-04-25 ENCOUNTER — HOSPITAL ENCOUNTER (OUTPATIENT)
Dept: PHYSICAL THERAPY | Facility: HOSPITAL | Age: 83
Setting detail: THERAPIES SERIES
Discharge: HOME | End: 2018-04-25
Attending: INTERNAL MEDICINE
Payer: MEDICARE

## 2018-04-25 PROCEDURE — 97110 THERAPEUTIC EXERCISES: CPT

## 2018-04-25 PROCEDURE — 97112 NEUROMUSCULAR REEDUCATION: CPT

## 2018-04-25 NOTE — PROGRESS NOTES
Dx: Vertigo, Imbalance, Gait Disturbance            Authorized # of Visits:  10 (Medicare)         Next MD visit: none scheduled  Fall Risk: Moderate-High         Precautions: Fall Risk             Subjective: Patient reports that since last seen, she has indicated. Initiate ambulation without obstacle navigation.    Date: 4/25/2018 TX#: 2/10 Date:               TX#: 3/   Date:               TX#: 4/ Date:               TX#: 5/ Date:               TX#: 6/ Date:               TX#: 7/ Date:               TX#: 6

## 2018-04-27 ENCOUNTER — HOSPITAL ENCOUNTER (OUTPATIENT)
Dept: PHYSICAL THERAPY | Facility: HOSPITAL | Age: 83
Setting detail: THERAPIES SERIES
Discharge: HOME | End: 2018-04-27
Attending: INTERNAL MEDICINE
Payer: MEDICARE

## 2018-04-27 PROCEDURE — 97112 NEUROMUSCULAR REEDUCATION: CPT

## 2018-04-27 PROCEDURE — 97110 THERAPEUTIC EXERCISES: CPT

## 2018-04-27 NOTE — ADDENDUM NOTE
Encounter addended by: Marcus Kinney PT on: 4/27/2018 12:18 PM<BR>    Actions taken: Sign clinical note

## 2018-04-27 NOTE — PROGRESS NOTES
Dx: Vertigo, Imbalance, Gait Disturbance            Authorized # of Visits:  10 (Medicare)         Next MD visit: none scheduled  Fall Risk: Moderate-High         Precautions: Fall Risk             Subjective: Patient reports that she did not take her Mecl progress  6. Patient will be independent and compliant in HEP to maintain progress made in PT. - issued     Plan: Continue PT with progression as indicated. Continue ambulation outside // bars. Attempt obstacle course.     Date: 4/25/2018 TX#: 2/10 Date: 4

## 2018-04-30 ENCOUNTER — HOSPITAL ENCOUNTER (OUTPATIENT)
Dept: PHYSICAL THERAPY | Facility: HOSPITAL | Age: 83
Setting detail: THERAPIES SERIES
Discharge: HOME | End: 2018-04-30
Attending: INTERNAL MEDICINE
Payer: MEDICARE

## 2018-04-30 PROCEDURE — 97112 NEUROMUSCULAR REEDUCATION: CPT

## 2018-04-30 PROCEDURE — 97110 THERAPEUTIC EXERCISES: CPT

## 2018-04-30 NOTE — PROGRESS NOTES
Dx: Vertigo, Imbalance, Gait Disturbance            Authorized # of Visits:  10 (Medicare)         Next MD visit: none scheduled  Fall Risk: Moderate-High         Precautions: Fall Risk             Subjective: Patient reports that she was somewhat tired af risk for falls with small HARRIS, on uneven surfaces, and per goal with showering. - progress  4. Patient will demonstrate ability to retrieve small items from the floor without LOB to improve ability to perform household tasks.    5. Patient will demonstrate ankle wts  - fwd march walk x 3 laps  - side step x 3 laps       - - Rockerboard  - AP 2 x 10       - - 6\" step up, 1UE assist x 10 R/L       - - -       Skilled Services: Therex progression as tolerated.  NR functional balance training on uneven surfaces

## 2018-05-04 ENCOUNTER — HOSPITAL ENCOUNTER (OUTPATIENT)
Dept: PHYSICAL THERAPY | Facility: HOSPITAL | Age: 83
Setting detail: THERAPIES SERIES
Discharge: HOME | End: 2018-05-04
Attending: INTERNAL MEDICINE
Payer: MEDICARE

## 2018-05-04 PROCEDURE — 97110 THERAPEUTIC EXERCISES: CPT

## 2018-05-04 PROCEDURE — 97112 NEUROMUSCULAR REEDUCATION: CPT

## 2018-05-04 PROCEDURE — 97530 THERAPEUTIC ACTIVITIES: CPT

## 2018-05-04 NOTE — PROGRESS NOTES
Dx: Vertigo, Imbalance, Gait Disturbance            Authorized # of Visits:  10 (Medicare)         Next MD visit: none scheduled  Fall Risk: Moderate-High         Precautions: Fall Risk             Subjective: Patient reports that she is having a relativel showering. - progress  4. Patient will demonstrate ability to retrieve small items from the floor without LOB to improve ability to perform household tasks.    5. Patient will demonstrate ability to ambulate at least 1000 ft with LRAD IND to return to [facility] - BUEs x 1  - 1UE x 2   - very light 1UE x 1  Curb training, 4\" curb with cane, progressing to no HHA ascent, very difficult/unable descent      DLS feet together with HT  - R/L x 10  - up/down x 10 -1/4 turns in // bars x 5 R/L  -1/2 turn in // bars x

## 2018-05-09 ENCOUNTER — HOSPITAL ENCOUNTER (OUTPATIENT)
Dept: PHYSICAL THERAPY | Facility: HOSPITAL | Age: 83
Setting detail: THERAPIES SERIES
Discharge: HOME | End: 2018-05-09
Attending: INTERNAL MEDICINE
Payer: MEDICARE

## 2018-05-09 ENCOUNTER — APPOINTMENT (OUTPATIENT)
Dept: PHYSICAL THERAPY | Facility: HOSPITAL | Age: 83
End: 2018-05-09
Attending: INTERNAL MEDICINE
Payer: MEDICARE

## 2018-05-09 PROCEDURE — 97530 THERAPEUTIC ACTIVITIES: CPT

## 2018-05-09 PROCEDURE — 97112 NEUROMUSCULAR REEDUCATION: CPT

## 2018-05-09 PROCEDURE — 97110 THERAPEUTIC EXERCISES: CPT

## 2018-05-09 NOTE — PROGRESS NOTES
Dx: Vertigo, Imbalance, Gait Disturbance            Authorized # of Visits:  10 (Medicare)         Next MD visit: none scheduled  Fall Risk: Moderate-High         Precautions: Fall Risk             Subjective: Patient reports that after last session she wa items from the floor without LOB to improve ability to perform household tasks. - progressing, on airex from 8\" step  5. Patient will demonstrate ability to ambulate at least 1000 ft with LRAD IND to return to walking for exercise.  - progress, 350ft, no A with AD, SBA  -fwd with leteral HT x 2 laps  -fwd with verticel HT x 2 laps  -retro x 2 laps Large amplitude walking with cues  - level ground x 50ft CGA  - 150ft lap fading to SBA, dec cueing  - over hurdles (3) CGA x 4 Large amplitude walking without AD 39 mi

## 2018-05-14 ENCOUNTER — PRIOR ORIGINAL RECORDS (OUTPATIENT)
Dept: OTHER | Age: 83
End: 2018-05-14

## 2018-05-14 ENCOUNTER — HOSPITAL ENCOUNTER (OUTPATIENT)
Dept: LAB | Facility: HOSPITAL | Age: 83
Discharge: HOME | End: 2018-05-14
Attending: INTERNAL MEDICINE
Payer: MEDICARE

## 2018-05-14 DIAGNOSIS — I48.0 PAROXYSMAL ATRIAL FIBRILLATION (HCC): ICD-10-CM

## 2018-05-14 PROCEDURE — 85610 PROTHROMBIN TIME: CPT

## 2018-05-15 LAB — INR: 3.2

## 2018-05-23 ENCOUNTER — HOSPITAL ENCOUNTER (OUTPATIENT)
Dept: PHYSICAL THERAPY | Facility: HOSPITAL | Age: 83
Setting detail: THERAPIES SERIES
Discharge: HOME | End: 2018-05-23
Attending: INTERNAL MEDICINE
Payer: MEDICARE

## 2018-05-23 PROCEDURE — 97112 NEUROMUSCULAR REEDUCATION: CPT

## 2018-05-23 PROCEDURE — 97530 THERAPEUTIC ACTIVITIES: CPT

## 2018-05-23 NOTE — PROGRESS NOTES
Dx: Vertigo, Imbalance, Gait Disturbance            Authorized # of Visits:  10 (Medicare)         Next MD visit: none scheduled  Fall Risk: Moderate-High         Precautions: Fall Risk             Subjective: Patient returns after requested 2 week hold fr to patient report room spinning dizziness in bed, assessed Natanael-Hallpike and Supine Roll. Patient was positive for presence of apogeotropic nystagmus with Supine Roll Test, however, she was asymptomatic in regards to dizziness.  Discussed this with patient a TX#: 8/   NuStep L5 x 5 min NuStep L5 x 5 min NuStep L5 x 5 min NuStep L5 x 5 min NuStep L5 x 5 min NuStep L5 x 5 min    Airex  - marching fading BUEs to 1UE x 3 min  - step ups 1UE x 10 R/L  - step ups, no UEs x 5 R/L, CGA  - side step ups, 1U ascent, very difficult/unable descent 4\" curbs in gait, no AD; 3 curbs x 6 SBA    DLS feet together with HT  - R/L x 10  - up/down x 10 -1/4 turns in // bars x 5 R/L  -1/2 turn in // bars x 5 R/L  -full turns in // bars x 2 R/L (mild dizzy) Ballet bar, 3#

## 2018-05-29 ENCOUNTER — HOSPITAL ENCOUNTER (OUTPATIENT)
Dept: LAB | Facility: HOSPITAL | Age: 83
Discharge: HOME | End: 2018-05-29
Attending: INTERNAL MEDICINE
Payer: MEDICARE

## 2018-05-29 DIAGNOSIS — I48.0 PAROXYSMAL ATRIAL FIBRILLATION (HCC): ICD-10-CM

## 2018-05-29 PROCEDURE — 85610 PROTHROMBIN TIME: CPT

## 2018-05-30 ENCOUNTER — HOSPITAL ENCOUNTER (OUTPATIENT)
Dept: PHYSICAL THERAPY | Facility: HOSPITAL | Age: 83
Setting detail: THERAPIES SERIES
Discharge: HOME | End: 2018-05-30
Attending: INTERNAL MEDICINE
Payer: MEDICARE

## 2018-05-30 PROCEDURE — 97112 NEUROMUSCULAR REEDUCATION: CPT

## 2018-05-30 PROCEDURE — 97110 THERAPEUTIC EXERCISES: CPT

## 2018-05-30 PROCEDURE — 97530 THERAPEUTIC ACTIVITIES: CPT

## 2018-05-30 NOTE — PROGRESS NOTES
Progress/Discharge Summary  Dx:  Vertigo, Imbalance, Gait Disturbance            Authorized # of Visits:  10 (Medicare)         Next MD visit: none scheduled  Fall Risk: Low        Precautions: N/A      Pt has attended 8, cancelled 0, and no shown 0 visits forward bend to retrieve items from the floor (limited only by back pain) without LOB, and able to negotiate small obstacles without AD or assist. At this time, she has met all goals and feels confident in hold from PT as noted per plan below.      Objectiv independent and compliant in HEP to maintain progress made in PT. - MET      Discharge G Code: Mobility: Walking and Moving Around CJ: 20-39% impaired, limited, or restricted  Projected G Code:  Mobility: Walking and Moving Around CJ: 20-39% impaired, limit at UEs for truncal control x 2 min   Airex SLS tap to cones (3) x 8 R/L Hurdles in // bars  -fwd x 3 laps  -lateral x 2 laps Hurdles out of // bars (3)  - fwd CGA x 4 Hurdles out of // bars (4)  - fwd CGA progressed to SBA x 6 Obstacle course hurdles, 2\" staggered stance EC 2 x 20s R/L Airex  - DLS feet apart 3 x 13I  - DLS alt head tap x 10 R/L - -   - - 6\" step up, 1UE assist x 10 R/L 4\" fwd step down, retro return x 10 R/L With airex as listed above - -   - - - - Educated on stepping strategy x 5 min

## 2018-06-12 ENCOUNTER — HOSPITAL ENCOUNTER (OUTPATIENT)
Dept: LAB | Facility: HOSPITAL | Age: 83
Discharge: HOME | End: 2018-06-12
Attending: INTERNAL MEDICINE
Payer: MEDICARE

## 2018-06-12 DIAGNOSIS — I48.0 PAROXYSMAL ATRIAL FIBRILLATION (HCC): ICD-10-CM

## 2018-06-12 PROCEDURE — 85610 PROTHROMBIN TIME: CPT

## 2018-06-22 ENCOUNTER — HOSPITAL ENCOUNTER (OUTPATIENT)
Dept: CT IMAGING | Facility: HOSPITAL | Age: 83
Discharge: HOME | End: 2018-06-22
Attending: PHYSICAL MEDICINE & REHABILITATION
Payer: MEDICARE

## 2018-06-22 DIAGNOSIS — M54.50 ACUTE BILATERAL LOW BACK PAIN: ICD-10-CM

## 2018-06-22 PROCEDURE — 72131 CT LUMBAR SPINE W/O DYE: CPT | Performed by: PHYSICAL MEDICINE & REHABILITATION

## 2018-06-22 RX ORDER — LANCETS
EACH MISCELLANEOUS
Qty: 200 EACH | Refills: 3 | Status: SHIPPED | OUTPATIENT
Start: 2018-06-22 | End: 2018-10-26

## 2018-06-26 ENCOUNTER — TELEPHONE (OUTPATIENT)
Dept: INTERNAL MEDICINE CLINIC | Facility: CLINIC | Age: 83
End: 2018-06-26

## 2018-06-26 NOTE — TELEPHONE ENCOUNTER
Patient received an EOB from [facility] that states she had services rendered by Dr. Lou Worthington from 4-16-18 thru 4-30-18, and it lists an amount of $2630.02.   Patient understands she had PT for vertigo, but doesn't understand why it's listed under Dr. Marco Spencer

## 2018-06-26 NOTE — TELEPHONE ENCOUNTER
nsg supervisor spoke with pt regarding billing question.  This is an EOB not a bill Dr. Sujata Hickey did not bill pt. nsg instructed pt to wait for an actual bill then feel free to call if questions pt verbalized understanding

## 2018-07-06 ENCOUNTER — PRIOR ORIGINAL RECORDS (OUTPATIENT)
Dept: OTHER | Age: 83
End: 2018-07-06

## 2018-07-06 ENCOUNTER — TELEPHONE (OUTPATIENT)
Dept: INTERNAL MEDICINE CLINIC | Facility: CLINIC | Age: 83
End: 2018-07-06

## 2018-07-12 RX ORDER — BLOOD SUGAR DIAGNOSTIC
STRIP MISCELLANEOUS
Qty: 200 STRIP | Refills: 3 | Status: SHIPPED | OUTPATIENT
Start: 2018-07-12 | End: 2018-10-26

## 2018-07-13 ENCOUNTER — HOSPITAL ENCOUNTER (OUTPATIENT)
Dept: LAB | Facility: HOSPITAL | Age: 83
Discharge: HOME | End: 2018-07-13
Attending: INTERNAL MEDICINE
Payer: MEDICARE

## 2018-07-13 DIAGNOSIS — I48.0 PAROXYSMAL ATRIAL FIBRILLATION (HCC): ICD-10-CM

## 2018-07-13 LAB — POC INR: 2.8 (ref 0.8–1.3)

## 2018-07-13 PROCEDURE — 85610 PROTHROMBIN TIME: CPT

## 2018-07-16 ENCOUNTER — OFFICE VISIT (OUTPATIENT)
Dept: HEMATOLOGY/ONCOLOGY | Facility: HOSPITAL | Age: 83
End: 2018-07-16
Attending: PHARMACIST
Payer: MEDICARE

## 2018-07-16 VITALS
HEART RATE: 76 BPM | SYSTOLIC BLOOD PRESSURE: 156 MMHG | TEMPERATURE: 98 F | WEIGHT: 169.5 LBS | BODY MASS INDEX: 34.63 KG/M2 | RESPIRATION RATE: 16 BRPM | DIASTOLIC BLOOD PRESSURE: 85 MMHG | HEIGHT: 58.5 IN | OXYGEN SATURATION: 95 %

## 2018-07-16 DIAGNOSIS — M85.80 OSTEOPENIA, UNSPECIFIED LOCATION: Primary | ICD-10-CM

## 2018-07-16 LAB
ALBUMIN SERPL-MCNC: 3.6 G/DL (ref 3.5–4.8)
CALCIUM BLD-MCNC: 9.3 MG/DL (ref 8.3–10.3)
CREAT BLD-MCNC: 0.91 MG/DL (ref 0.55–1.02)

## 2018-07-16 PROCEDURE — 82565 ASSAY OF CREATININE: CPT

## 2018-07-16 PROCEDURE — 36415 COLL VENOUS BLD VENIPUNCTURE: CPT

## 2018-07-16 PROCEDURE — 82040 ASSAY OF SERUM ALBUMIN: CPT

## 2018-07-16 PROCEDURE — 82310 ASSAY OF CALCIUM: CPT

## 2018-07-16 PROCEDURE — 96365 THER/PROPH/DIAG IV INF INIT: CPT

## 2018-07-16 RX ORDER — ZOLEDRONIC ACID 5 MG/100ML
5 INJECTION, SOLUTION INTRAVENOUS ONCE
Status: COMPLETED | OUTPATIENT
Start: 2018-07-16 | End: 2018-07-16

## 2018-07-16 RX ORDER — ZOLEDRONIC ACID 5 MG/100ML
5 INJECTION, SOLUTION INTRAVENOUS ONCE
Status: CANCELLED | OUTPATIENT
Start: 2018-07-16

## 2018-07-16 RX ADMIN — ZOLEDRONIC ACID 5 MG: 5 INJECTION, SOLUTION INTRAVENOUS at 12:26:00

## 2018-07-23 ENCOUNTER — PRIOR ORIGINAL RECORDS (OUTPATIENT)
Dept: OTHER | Age: 83
End: 2018-07-23

## 2018-07-24 ENCOUNTER — PRIOR ORIGINAL RECORDS (OUTPATIENT)
Dept: OTHER | Age: 83
End: 2018-07-24

## 2018-07-26 ENCOUNTER — HOSPITAL ENCOUNTER (OUTPATIENT)
Dept: LAB | Facility: HOSPITAL | Age: 83
Discharge: HOME | End: 2018-07-26
Attending: INTERNAL MEDICINE
Payer: MEDICARE

## 2018-07-26 DIAGNOSIS — I48.0 PAROXYSMAL ATRIAL FIBRILLATION (HCC): ICD-10-CM

## 2018-07-26 LAB — POC INR: 2.5 (ref 0.8–1.3)

## 2018-07-26 PROCEDURE — 85610 PROTHROMBIN TIME: CPT

## 2018-07-30 ENCOUNTER — TELEPHONE (OUTPATIENT)
Dept: INTERNAL MEDICINE CLINIC | Facility: CLINIC | Age: 83
End: 2018-07-30

## 2018-07-30 NOTE — TELEPHONE ENCOUNTER
Spoke with patient. Discussed condition. Feeling well. Just checked glucose level 115. About to have a snack. Will call tomorrow with an update.

## 2018-07-30 NOTE — TELEPHONE ENCOUNTER
Phone call. Spoke with patient. States had upset stomach last night after taking insulin. Feels she should cut back today. Glucose this am 130. Advised to cut back by 3 units. Will call back later today with update.

## 2018-07-31 ENCOUNTER — TELEPHONE (OUTPATIENT)
Dept: INTERNAL MEDICINE CLINIC | Facility: CLINIC | Age: 83
End: 2018-07-31

## 2018-07-31 ENCOUNTER — MED REC SCAN ONLY (OUTPATIENT)
Dept: INTERNAL MEDICINE CLINIC | Facility: CLINIC | Age: 83
End: 2018-07-31

## 2018-07-31 NOTE — TELEPHONE ENCOUNTER
Patient called just wanting to let Leeann know that:    Her blood sugar was at 91 this morning    Her blood pressure was at 138/77 this morning    She also says she is feeling much better

## 2018-08-07 ENCOUNTER — MED REC SCAN ONLY (OUTPATIENT)
Dept: INTERNAL MEDICINE CLINIC | Facility: CLINIC | Age: 83
End: 2018-08-07

## 2018-08-20 ENCOUNTER — TELEPHONE (OUTPATIENT)
Dept: INTERNAL MEDICINE CLINIC | Facility: CLINIC | Age: 83
End: 2018-08-20

## 2018-08-24 ENCOUNTER — HOSPITAL ENCOUNTER (OUTPATIENT)
Dept: LAB | Facility: HOSPITAL | Age: 83
Discharge: HOME | End: 2018-08-24
Attending: INTERNAL MEDICINE
Payer: MEDICARE

## 2018-08-24 LAB
INR BLD: 2.65 (ref 0.9–1.1)
PSA SERPL DL<=0.01 NG/ML-MCNC: 29.1 SECONDS (ref 12.4–14.7)

## 2018-08-24 PROCEDURE — 36415 COLL VENOUS BLD VENIPUNCTURE: CPT | Performed by: INTERNAL MEDICINE

## 2018-08-24 PROCEDURE — 85610 PROTHROMBIN TIME: CPT | Performed by: INTERNAL MEDICINE

## 2018-09-25 RX ORDER — INSULIN GLARGINE 100 [IU]/ML
INJECTION, SOLUTION SUBCUTANEOUS
Qty: 10 ML | Refills: 3 | Status: SHIPPED | OUTPATIENT
Start: 2018-09-25 | End: 2018-09-26

## 2018-09-26 ENCOUNTER — TELEPHONE (OUTPATIENT)
Dept: INTERNAL MEDICINE CLINIC | Facility: CLINIC | Age: 83
End: 2018-09-26

## 2018-09-26 NOTE — TELEPHONE ENCOUNTER
PC to pt. Pt taking Lantus 20 units daily. MAR and future refill scripts to reflect this.    Pt verbalized understanding

## 2018-09-26 NOTE — TELEPHONE ENCOUNTER
Patient called and says she just got a refill on her insulin but the directions are incorrect. She says the directions should be 20 units daily rather than 36 units daily.  She'd like it corrected just in case any family has to give it to her and she doesn'

## 2018-09-27 ENCOUNTER — HOSPITAL ENCOUNTER (OUTPATIENT)
Dept: LAB | Facility: HOSPITAL | Age: 83
Discharge: HOME | End: 2018-09-27
Attending: INTERNAL MEDICINE
Payer: MEDICARE

## 2018-09-27 ENCOUNTER — PRIOR ORIGINAL RECORDS (OUTPATIENT)
Dept: OTHER | Age: 83
End: 2018-09-27

## 2018-09-27 LAB — INR: 2.16

## 2018-09-27 PROCEDURE — 85610 PROTHROMBIN TIME: CPT | Performed by: INTERNAL MEDICINE

## 2018-09-27 PROCEDURE — 36415 COLL VENOUS BLD VENIPUNCTURE: CPT | Performed by: INTERNAL MEDICINE

## 2018-10-03 ENCOUNTER — TELEPHONE (OUTPATIENT)
Dept: INTERNAL MEDICINE CLINIC | Facility: CLINIC | Age: 83
End: 2018-10-03

## 2018-10-03 DIAGNOSIS — Z12.39 SCREENING FOR BREAST CANCER: Primary | ICD-10-CM

## 2018-10-03 DIAGNOSIS — Z12.31 VISIT FOR SCREENING MAMMOGRAM: ICD-10-CM

## 2018-10-03 NOTE — TELEPHONE ENCOUNTER
Patient called and says she received a message stating she is due for her mammogram. She asks that a nurse please look into it and enter order if she is due.

## 2018-10-25 ENCOUNTER — HOSPITAL ENCOUNTER (OUTPATIENT)
Dept: LAB | Facility: HOSPITAL | Age: 83
Discharge: HOME | End: 2018-10-25
Attending: INTERNAL MEDICINE
Payer: MEDICARE

## 2018-10-25 DIAGNOSIS — I48.0 PAROXYSMAL ATRIAL FIBRILLATION (HCC): ICD-10-CM

## 2018-10-25 LAB — INR: 2.7

## 2018-10-25 PROCEDURE — 85610 PROTHROMBIN TIME: CPT

## 2018-10-26 ENCOUNTER — APPOINTMENT (OUTPATIENT)
Dept: CT IMAGING | Facility: HOSPITAL | Age: 83
End: 2018-10-26
Attending: EMERGENCY MEDICINE
Payer: MEDICARE

## 2018-10-26 ENCOUNTER — APPOINTMENT (OUTPATIENT)
Dept: GENERAL RADIOLOGY | Facility: HOSPITAL | Age: 83
End: 2018-10-26
Attending: EMERGENCY MEDICINE
Payer: MEDICARE

## 2018-10-26 ENCOUNTER — APPOINTMENT (OUTPATIENT)
Dept: CV DIAGNOSTICS | Facility: HOSPITAL | Age: 83
End: 2018-10-26
Attending: INTERNAL MEDICINE
Payer: MEDICARE

## 2018-10-26 ENCOUNTER — HOSPITAL ENCOUNTER (OUTPATIENT)
Facility: HOSPITAL | Age: 83
Setting detail: OBSERVATION
Discharge: HOME | End: 2018-10-27
Attending: EMERGENCY MEDICINE | Admitting: INTERNAL MEDICINE
Payer: MEDICARE

## 2018-10-26 ENCOUNTER — PRIOR ORIGINAL RECORDS (OUTPATIENT)
Dept: OTHER | Age: 83
End: 2018-10-26

## 2018-10-26 DIAGNOSIS — R10.13 EPIGASTRIC PAIN: ICD-10-CM

## 2018-10-26 DIAGNOSIS — R14.0 BLOATING: ICD-10-CM

## 2018-10-26 DIAGNOSIS — R07.89 CHEST PAIN, ATYPICAL: Primary | ICD-10-CM

## 2018-10-26 DIAGNOSIS — R19.7 DIARRHEA, UNSPECIFIED TYPE: ICD-10-CM

## 2018-10-26 PROCEDURE — 93306 TTE W/DOPPLER COMPLETE: CPT | Performed by: INTERNAL MEDICINE

## 2018-10-26 PROCEDURE — 71045 X-RAY EXAM CHEST 1 VIEW: CPT | Performed by: EMERGENCY MEDICINE

## 2018-10-26 PROCEDURE — 99220 INITIAL OBSERVATION CARE,LEVL III: CPT | Performed by: INTERNAL MEDICINE

## 2018-10-26 PROCEDURE — 74177 CT ABD & PELVIS W/CONTRAST: CPT | Performed by: EMERGENCY MEDICINE

## 2018-10-26 RX ORDER — ACETAMINOPHEN 500 MG
500 TABLET ORAL EVERY 6 HOURS PRN
Status: DISCONTINUED | OUTPATIENT
Start: 2018-10-26 | End: 2018-10-27

## 2018-10-26 RX ORDER — LEVOTHYROXINE SODIUM 0.07 MG/1
75 TABLET ORAL
Status: DISCONTINUED | OUTPATIENT
Start: 2018-10-26 | End: 2018-10-27

## 2018-10-26 RX ORDER — FUROSEMIDE 20 MG/1
20 TABLET ORAL
Status: DISCONTINUED | OUTPATIENT
Start: 2018-10-26 | End: 2018-10-27

## 2018-10-26 RX ORDER — SODIUM CHLORIDE 9 MG/ML
INJECTION, SOLUTION INTRAVENOUS CONTINUOUS
Status: DISCONTINUED | OUTPATIENT
Start: 2018-10-26 | End: 2018-10-26

## 2018-10-26 RX ORDER — LOSARTAN POTASSIUM 100 MG/1
100 TABLET ORAL DAILY
COMMUNITY
End: 2020-03-16

## 2018-10-26 RX ORDER — ASPIRIN 81 MG/1
81 TABLET, CHEWABLE ORAL DAILY
COMMUNITY
End: 2021-04-23

## 2018-10-26 RX ORDER — WARFARIN SODIUM 2.5 MG/1
2.5 TABLET ORAL
Status: DISCONTINUED | OUTPATIENT
Start: 2018-10-28 | End: 2018-10-27

## 2018-10-26 RX ORDER — LOSARTAN POTASSIUM 100 MG/1
100 TABLET ORAL DAILY
Status: DISCONTINUED | OUTPATIENT
Start: 2018-10-26 | End: 2018-10-27

## 2018-10-26 RX ORDER — SIMETHICONE 80 MG
80 TABLET,CHEWABLE ORAL 4 TIMES DAILY PRN
Status: DISCONTINUED | OUTPATIENT
Start: 2018-10-26 | End: 2018-10-27

## 2018-10-26 RX ORDER — MECLIZINE HCL 12.5 MG/1
12.5 TABLET ORAL 3 TIMES DAILY PRN
Status: DISCONTINUED | OUTPATIENT
Start: 2018-10-26 | End: 2018-10-27

## 2018-10-26 RX ORDER — PANTOPRAZOLE SODIUM 40 MG/1
40 TABLET, DELAYED RELEASE ORAL DAILY
Status: DISCONTINUED | OUTPATIENT
Start: 2018-10-26 | End: 2018-10-26

## 2018-10-26 RX ORDER — ACETAMINOPHEN 500 MG
500 TABLET ORAL EVERY 6 HOURS PRN
Status: ON HOLD | COMMUNITY
End: 2018-10-26

## 2018-10-26 RX ORDER — SIMETHICONE 80 MG
80 TABLET,CHEWABLE ORAL
Status: DISCONTINUED | OUTPATIENT
Start: 2018-10-26 | End: 2018-10-27

## 2018-10-26 RX ORDER — ASPIRIN 81 MG/1
81 TABLET, CHEWABLE ORAL DAILY
Status: DISCONTINUED | OUTPATIENT
Start: 2018-10-27 | End: 2018-10-27

## 2018-10-26 RX ORDER — LEVOTHYROXINE SODIUM 0.07 MG/1
75 TABLET ORAL
COMMUNITY
End: 2020-03-31

## 2018-10-26 RX ORDER — WARFARIN SODIUM 5 MG/1
5 TABLET ORAL
Status: DISCONTINUED | OUTPATIENT
Start: 2018-10-26 | End: 2018-10-27

## 2018-10-26 RX ORDER — POTASSIUM CHLORIDE 750 MG/1
10 TABLET, EXTENDED RELEASE ORAL 2 TIMES DAILY
Status: DISCONTINUED | OUTPATIENT
Start: 2018-10-26 | End: 2018-10-27

## 2018-10-26 RX ORDER — WARFARIN SODIUM 2.5 MG/1
2.5 TABLET ORAL SEE ADMIN INSTRUCTIONS
COMMUNITY
End: 2021-06-22 | Stop reason: DRUGHIGH

## 2018-10-26 RX ORDER — DIGOXIN 125 MCG
125 TABLET ORAL
Status: DISCONTINUED | OUTPATIENT
Start: 2018-10-26 | End: 2018-10-27

## 2018-10-26 RX ORDER — ASPIRIN 81 MG/1
324 TABLET, CHEWABLE ORAL ONCE
Status: COMPLETED | OUTPATIENT
Start: 2018-10-26 | End: 2018-10-26

## 2018-10-26 RX ORDER — DEXTROSE MONOHYDRATE 25 G/50ML
50 INJECTION, SOLUTION INTRAVENOUS
Status: DISCONTINUED | OUTPATIENT
Start: 2018-10-26 | End: 2018-10-27

## 2018-10-26 RX ORDER — ONDANSETRON 2 MG/ML
4 INJECTION INTRAMUSCULAR; INTRAVENOUS EVERY 4 HOURS PRN
Status: DISCONTINUED | OUTPATIENT
Start: 2018-10-26 | End: 2018-10-27

## 2018-10-26 NOTE — CONSULTS
[facility]  Cardiology Consultation    Angeles Gaona Patient Status:  Emergency    5/3/1930 MRN [id number]   Location [address] [facility] Attending Niesha Ace MD   Hosp Day # 0 PCP Malorie Olivera MD     [facility] for 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   • Anemia     microcytic   • Anxiety 12/7/2010   • Arrhythmia     atrial fib History of bone density study 5/10/2011   • History of colon polyps    • History of DVT (deep vein thrombosis) 4/26/2012   • History of hemorrhoids    • History of hypercholesterolemia 8/26/2010    hx hyperlipidemia   • History of Meniere's disease 4/26/20 • ANGIOPLASTY (CORONARY)  7/14/2010    PTCA, bare-metal stent to the R coronary artery, catheterization, subtotal stenosis of the mid to distal R coronary artery   • APPENDECTOMY     • CATARACT      B/L   • CATH BARE METAL STENT (BMS)  2010   • CHOLECYST JOINT INJECTION;  Surgeon: Ariana Mitchell MD;  Location: [address] [place] [address]   • PATIENT DOCUMENTED NOT TO HAVE EXPERIENCED ANY OF THE FOLLOWING EVENTS  3/27/2014    Procedure: LUMBAR EPIDURAL;  Surgeon: Ariana Mitchell MD;  Location: [facility] has never smoked. she has never used smokeless tobacco. She reports that she does not drink alcohol.     Allergies:    Penicillins             HIVES  Rosuvastatin            OTHER (SEE COMMENTS)    Comment:Reaction: muscle aches  Acyclovir               KEVIN pulses are 2+. Neurologic: Alert and oriented, normal affect. Skin: Warm and dry.      Laboratory Data:  Lab Results   Component Value Date    WBC 12.2 10/26/2018    HGB 14.3 10/26/2018    HCT 43.1 10/26/2018    .0 10/26/2018    CREATSERUM 0.82 10/

## 2018-10-26 NOTE — PROGRESS NOTES
[facility]:    I spoke with the patient and reviewed her consult notes from 2010 when she had her inferior mi out of hospital. She had severe chest pressure with burning and nausea then.  She has had only abdominal bloating today- in the periumbilic

## 2018-10-26 NOTE — ED INITIAL ASSESSMENT (HPI)
Pt here with mid abdominal pain since 0100, +nausea but no vomiting, zofran given per EMS. Pt denies vomiting or fevers.

## 2018-10-26 NOTE — H&P
History and Physical    Silvana Ridu Patient Status:  Observation    5/3/1930 MRN [id number]   [facility] 8NE-A Attending Saige Allen MD   Hosp Day # 0 PCP Ricardo Ford MD         Assessment and Plan:    1.   Abdominal discomfort secondary to normal LV function   • Abnormal ventricular wall motion 7/13/2010    severe wall motion abnormality of the inferior wall   • AC joint arthropathy 12/1/2011   • Anemia     microcytic   • Anxiety 12/7/2010   • Arrhythmia     atrial fib   • Ather bone density study 5/10/2011   • History of colon polyps    • History of DVT (deep vein thrombosis) 4/26/2012   • History of hemorrhoids    • History of hypercholesterolemia 8/26/2010    hx hyperlipidemia   • History of Meniere's disease 4/26/2012   • Hist ANGIOPLASTY (CORONARY)  7/14/2010    PTCA, bare-metal stent to the R coronary artery, catheterization, subtotal stenosis of the mid to distal R coronary artery   • APPENDECTOMY     • CATARACT      B/L   • CATH BARE METAL STENT (BMS)  2010   • CHOLECYSTECTO INJECTION;  Surgeon: Guerline Dhillon MD;  Location: [address] [place] [address]   • PATIENT DOCUMENTED NOT TO HAVE EXPERIENCED ANY OF THE FOLLOWING EVENTS  3/27/2014    Procedure: LUMBAR EPIDURAL;  Surgeon: Guerline Dhillon MD;  Location: [address] [place] [address]  OTHER (SEE COMMENTS)    Comment:Reaction: muscle aches  Acyclovir               DIARRHEA  Allergy                     Comment:BETA LACTAMS  Carbapenems             UNKNOWN  Cardizem                RASH  Cephalosporins          UNKNOWN  Codeine irregular heart beats, syncope, orthopnea, paroxysmal nocturnal dyspnea, lower extremity edema. Gastrointestinal please see above otherwise note: Negative for dysphagia, reflux symptoms, nausea, vomiting, change in bowel habits.   No  diarrhea, constipat 12. 2   HGB  14.3   PLT  174.0       Chem:  Recent Labs   Lab  10/26/18   0629   NA  141   K  4.0   CL  106   CO2  24.0   BUN  10   CREATSERUM  0.82   CA  9.2   GLU  191*       Recent Labs   Lab  10/26/18   0629   ALT  23   AST  14*   ALB  3.7         Recen

## 2018-10-26 NOTE — PROGRESS NOTES
10/26/18 1806   Clinical Encounter Type   Visited With Patient   Routine Visit Introduction   Continue Visiting No   Patient's Supportive Strategies/Resources ( honored patient by acknowledging her spiritual resources.)   Voodoo Encounters

## 2018-10-26 NOTE — HISTORICAL OFFICE NOTE
Naomi Goss  : 1930  ACCOUNT:  [id number]  [phone]  PCP: Dr. Kenzie Medina     TODAY'S DATE: 2018  DICTATED BY:  [Dr. José Hogue      CHIEF COMPLAINT: [Followup of .  CAD, of native vessels, Followup of Atrial fibrillation, chronic, F cataract surgery and knee replacement L 2007    PAST CV HISTORY: 2011 cars normal; 2017 no further stress tests unless symps, afib first episode 2009, hypertension, inf mi as outpt  july 2010 and PTCA w/bare metal stent to RCA 7/10    FAMILY HISTORY: Signi comfortable remaining on warfarin.]    ASSESSMENT:  1. . CAD, of native vessels  2. Atrial fibrillation, chronic  3. Coumadin Management,MHS  4. Heart failure, diastolic, chronic left  5. History of PCI  6.  Hypertension, benign      PLAN:  [1.  Okay to hol

## 2018-10-26 NOTE — ED PROVIDER NOTES
Patient Seen in: [facility] Emergency Department    History   Patient presents with:  Abdomen/Flank Pain (GI/)    Stated Complaint: abd pain, nausea    HPI    The patient is an 51-year-old female who presents emergency room with a history of mid a interstitial markings in the mid to lower lung fields, and B/L perihilar opacities is concerning for congestive failure   • Cataract    • Chest pain 8/26/2010   • Constipation 4/26/2012    worsening   • Decreased left ventricular function 7/13/2010   • Jeanne 7/13/2010   • Lipid screening 4/19/2012   • LVH (left ventricular hypertrophy) 4/26/2012    hx LVH by ECG, hypertensive hx, sodium restriction   • Menopause    • Mild mitral regurgitation 7/13/2010   • Neck pain 3/31/2011    head and neck discomfort   • OA MANAGEMENT   • FOOT/TOES SURGERY PROC UNLISTED  1991, 1998    salina B/L, anitha B/L   • GASTROSCOPY N/A 8/27/2013    Performed by Jojo Weir MD at [place] [address]    partial   • INJECTION, W/WO CONTRAST, DX/THERAPEUTIC SUBS LUMBAR EPIDURAL;  Surgeon: Jose Parada MD;  Location: [facility]  PAIN MANAGEMENT   • SKIN SURGERY  5/19/11    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 EXTREMITIES: There is no cyanosis, clubbing, or edema appreciated. Pulses are 2+ and equal in all 4 extremities. NEURO: Patient is awake, alert and oriented to time place and person. Motor strength is 5 over 5 in all 4 extremities.  There are no g cardiomegaly with probable mild volume overload. If clinical symptoms persist then consider dedicated chest CT.      Dictated by: Fabio Herring MD on 10/26/2018 at 6:42     Approved by: Fabio Herring MD            Ct Abdomen Pelvis Iv Contrast, No Oral (er)    R ER.  Patient was admitted for further care at this time.           Disposition and Plan     Clinical Impression:  Chest pain, atypical  (primary encounter diagnosis)  Epigastric pain    Disposition:  Admit  10/26/2018  9:09 am    Follow-up:  No follow-up pr

## 2018-10-27 VITALS
BODY MASS INDEX: 35 KG/M2 | DIASTOLIC BLOOD PRESSURE: 75 MMHG | TEMPERATURE: 98 F | RESPIRATION RATE: 18 BRPM | WEIGHT: 168 LBS | OXYGEN SATURATION: 98 % | HEART RATE: 56 BPM | SYSTOLIC BLOOD PRESSURE: 144 MMHG

## 2018-10-27 PROCEDURE — 99217 OBSERVATION CARE DISCHARGE: CPT | Performed by: INTERNAL MEDICINE

## 2018-10-27 NOTE — DISCHARGE SUMMARY
[facility]  Discharge Summary    Charissa Cisneros Patient Status:  Observation    5/3/1930 MRN [id number]   [facility] 8NE-A Attending Julián Arellano MD   Hosp Day # 0 PCP Collette Richmond, MD     Date of Admission: 10/26/2018    Date 18 Units into the skin every morning. Qty: 30 mL Refills: 3    furosemide 20 MG Oral Tab  Take 20 mg by mouth 2 (two) times daily.  If weight >4 pound call md   Qty: 30 tablet Refills: 0  Associated Diagnoses:Chronic diastolic heart failure (HCC)    digo may have some IBS issues and going to address this at home and at their office. Regarding diabetes was placed on sliding scale. Lantus was held but given 8 units on day of discharge.       CT suggested or cannot rule out enterocolitis but clinical sym

## 2018-10-27 NOTE — PLAN OF CARE
Pt a/ox4. RA. Lungs clear. No cough. A-fib. Rate controlled. Pt is on Coumadin. INR is 1.81 today. Per Dr. Eric Yeboah, patient is read for DC and will continue to take her current dose of Coumadin. Pt will re-check her INR on Monday. Ef = 75-80%. UA negative.

## 2018-10-27 NOTE — PROGRESS NOTES
Gastroenterology Progress Note  Patient Name: Cheryle Richter  Chief Complaint: abdominal pain  S: Pt denies abdominal pain today.  She reports that she had a loose stool this am.   O: /75   Pulse 56   Temp 98 °F (36.7 °C) (Oral)   Resp 18   Wt 168 lb

## 2018-10-27 NOTE — PROGRESS NOTES
NURSING DISCHARGE NOTE    Discharged Home via Wheelchair. Accompanied by Family member and Support staff  Belongings Taken by patient/family. VSS. Telemetry monitoring and saline lock removed. [id number] Edu[address] beth DC per cardiology and Dr. Sandra Hardy.  Per Dr. Sidra Fothergill

## 2018-10-29 ENCOUNTER — HOSPITAL ENCOUNTER (OUTPATIENT)
Dept: LAB | Facility: HOSPITAL | Age: 83
Discharge: HOME | End: 2018-10-29
Attending: INTERNAL MEDICINE
Payer: MEDICARE

## 2018-10-29 ENCOUNTER — PATIENT OUTREACH (OUTPATIENT)
Dept: CASE MANAGEMENT | Age: 83
End: 2018-10-29

## 2018-10-29 ENCOUNTER — PRIOR ORIGINAL RECORDS (OUTPATIENT)
Dept: OTHER | Age: 83
End: 2018-10-29

## 2018-10-29 ENCOUNTER — OFFICE VISIT (OUTPATIENT)
Dept: INTERNAL MEDICINE CLINIC | Facility: CLINIC | Age: 83
End: 2018-10-29
Payer: MEDICARE

## 2018-10-29 VITALS
TEMPERATURE: 98 F | WEIGHT: 169 LBS | DIASTOLIC BLOOD PRESSURE: 70 MMHG | HEART RATE: 70 BPM | OXYGEN SATURATION: 95 % | BODY MASS INDEX: 34.53 KG/M2 | SYSTOLIC BLOOD PRESSURE: 130 MMHG | HEIGHT: 58.5 IN | RESPIRATION RATE: 16 BRPM

## 2018-10-29 DIAGNOSIS — R10.9 ABDOMINAL DISTRESS: Primary | ICD-10-CM

## 2018-10-29 DIAGNOSIS — E11.8 TYPE 2 DIABETES MELLITUS WITH COMPLICATION, WITH LONG-TERM CURRENT USE OF INSULIN (HCC): ICD-10-CM

## 2018-10-29 DIAGNOSIS — Z79.01 WARFARIN ANTICOAGULATION: ICD-10-CM

## 2018-10-29 DIAGNOSIS — I48.0 PAROXYSMAL ATRIAL FIBRILLATION (HCC): ICD-10-CM

## 2018-10-29 DIAGNOSIS — Z02.9 ENCOUNTERS FOR ADMINISTRATIVE PURPOSE: ICD-10-CM

## 2018-10-29 DIAGNOSIS — Z79.4 TYPE 2 DIABETES MELLITUS WITH COMPLICATION, WITH LONG-TERM CURRENT USE OF INSULIN (HCC): ICD-10-CM

## 2018-10-29 PROBLEM — R93.5 ABNORMAL ABDOMINAL CT SCAN: Status: ACTIVE | Noted: 2018-10-29

## 2018-10-29 LAB — INR: 2.8

## 2018-10-29 PROCEDURE — 99496 TRANSJ CARE MGMT HIGH F2F 7D: CPT | Performed by: INTERNAL MEDICINE

## 2018-10-29 PROCEDURE — 85610 PROTHROMBIN TIME: CPT

## 2018-10-29 PROCEDURE — 1111F DSCHRG MED/CURRENT MED MERGE: CPT | Performed by: INTERNAL MEDICINE

## 2018-10-29 NOTE — PROGRESS NOTES
Patient presents with:  TCM (Transition Of Care Management)  Hospital F/U: chest pain      HPI: Patient admitted Friday discharge Saturday, October 27 she had nonspecific abdominal distress CAT scans suggested perhaps some enterocolitis.   GI service looked weakness, numbness, tingling and headaches. Hematological: Negative for adenopathy. Does not bruise/bleed easily. Psychiatric/Behavioral: The patient is not nervous/anxious. No depression.     Patient Active Problem List:     Personal history of oth times daily. If weight >4 pound call md  Disp: 30 tablet Rfl: 0   digoxin 0.125 MG Oral Tab 125 mcg 3 (three) times a week.  Monday, Wednesday and Friday  Disp:  Rfl:    Potassium Chloride ER (K-DUR) 10 MEQ Oral Tab CR Take 10 mEq by mouth 2 (two) times tia See above.   Patient back to baseline with her insulin warfarin and will follow up with GI service regarding her low-grade abdominal issues which seem to be IBS related the abnormal abdominal CT issue we do not think fits with any active symptoms that she h

## 2018-10-31 ENCOUNTER — TELEPHONE (OUTPATIENT)
Dept: INTERNAL MEDICINE CLINIC | Facility: CLINIC | Age: 83
End: 2018-10-31

## 2018-10-31 ENCOUNTER — HOSPITAL ENCOUNTER (OUTPATIENT)
Dept: MAMMOGRAPHY | Facility: HOSPITAL | Age: 83
Discharge: HOME | End: 2018-10-31
Attending: INTERNAL MEDICINE
Payer: MEDICARE

## 2018-10-31 DIAGNOSIS — Z12.39 SCREENING FOR BREAST CANCER: ICD-10-CM

## 2018-10-31 DIAGNOSIS — Z12.31 VISIT FOR SCREENING MAMMOGRAM: ICD-10-CM

## 2018-10-31 PROCEDURE — 77063 BREAST TOMOSYNTHESIS BI: CPT | Performed by: INTERNAL MEDICINE

## 2018-10-31 PROCEDURE — 77067 SCR MAMMO BI INCL CAD: CPT | Performed by: INTERNAL MEDICINE

## 2018-10-31 NOTE — TELEPHONE ENCOUNTER
PC to pt to review tet results.  Pt verbalized understanding.       ----- Message from Melanie Kilpatrick MD sent at 10/31/2018  2:59 PM CDT -----  NORMAL CALL  PATIENT  WITH  RESULTS

## 2018-11-19 ENCOUNTER — MED REC SCAN ONLY (OUTPATIENT)
Dept: INTERNAL MEDICINE CLINIC | Facility: CLINIC | Age: 83
End: 2018-11-19

## 2018-11-30 ENCOUNTER — HOSPITAL ENCOUNTER (OUTPATIENT)
Dept: LAB | Facility: HOSPITAL | Age: 83
Discharge: HOME | End: 2018-11-30
Attending: INTERNAL MEDICINE
Payer: MEDICARE

## 2018-11-30 ENCOUNTER — APPOINTMENT (OUTPATIENT)
Dept: LAB | Facility: HOSPITAL | Age: 83
End: 2018-11-30
Attending: INTERNAL MEDICINE
Payer: MEDICARE

## 2018-11-30 ENCOUNTER — PRIOR ORIGINAL RECORDS (OUTPATIENT)
Dept: OTHER | Age: 83
End: 2018-11-30

## 2018-11-30 DIAGNOSIS — I48.0 PAROXYSMAL ATRIAL FIBRILLATION (HCC): ICD-10-CM

## 2018-11-30 DIAGNOSIS — R19.7 DIARRHEA, UNSPECIFIED TYPE: ICD-10-CM

## 2018-11-30 LAB — INR: 3

## 2018-11-30 PROCEDURE — 85610 PROTHROMBIN TIME: CPT

## 2018-11-30 PROCEDURE — 87493 C DIFF AMPLIFIED PROBE: CPT

## 2018-12-05 ENCOUNTER — LAB ENCOUNTER (OUTPATIENT)
Dept: LAB | Age: 83
End: 2018-12-05
Attending: INTERNAL MEDICINE
Payer: MEDICARE

## 2018-12-05 ENCOUNTER — NURSE ONLY (OUTPATIENT)
Dept: INTERNAL MEDICINE CLINIC | Facility: CLINIC | Age: 83
End: 2018-12-05

## 2018-12-05 DIAGNOSIS — E11.8 DIABETIC COMPLICATION (HCC): ICD-10-CM

## 2018-12-05 DIAGNOSIS — Z00.00 ROUTINE GENERAL MEDICAL EXAMINATION AT A HEALTH CARE FACILITY: Primary | ICD-10-CM

## 2018-12-05 DIAGNOSIS — R69 DIAGNOSIS UNKNOWN: Primary | ICD-10-CM

## 2018-12-05 PROCEDURE — 99173 VISUAL ACUITY SCREEN: CPT | Performed by: INTERNAL MEDICINE

## 2018-12-05 PROCEDURE — 82570 ASSAY OF URINE CREATININE: CPT | Performed by: INTERNAL MEDICINE

## 2018-12-05 PROCEDURE — 94010 BREATHING CAPACITY TEST: CPT | Performed by: INTERNAL MEDICINE

## 2018-12-05 PROCEDURE — 82043 UR ALBUMIN QUANTITATIVE: CPT | Performed by: INTERNAL MEDICINE

## 2018-12-05 PROCEDURE — 92551 PURE TONE HEARING TEST AIR: CPT | Performed by: INTERNAL MEDICINE

## 2018-12-05 PROCEDURE — 81001 URINALYSIS AUTO W/SCOPE: CPT | Performed by: INTERNAL MEDICINE

## 2018-12-05 PROCEDURE — 93000 ELECTROCARDIOGRAM COMPLETE: CPT | Performed by: INTERNAL MEDICINE

## 2018-12-05 NOTE — PROGRESS NOTES
.*LOOKIN' BODY RESULTS    YES:       NO: X      REASON TEST NOT PERFORMED:UNSTEADY        HEIGHT: 4'11  WEIGHT:170  _____________________________________________________________________________  *VENIPUNCTURE    YES:       NO:  X      REASON VENIPUNCTURE N

## 2018-12-11 ENCOUNTER — MED REC SCAN ONLY (OUTPATIENT)
Dept: INTERNAL MEDICINE CLINIC | Facility: CLINIC | Age: 83
End: 2018-12-11

## 2018-12-11 NOTE — PROGRESS NOTES
HEALTH MAINTENANCE:      Immunization History   Administered Date(s) Administered   • Depo-Medrol 40mg Inj 11/17/2016, 01/26/2017   • FLU VACC High Dose 65 YRS & Older PRSV Free (59680) 10/17/2014   • HIGH DOSE FLU 65 YRS AND OLDER PRSV FREE SINGLE D (3142

## 2018-12-13 ENCOUNTER — OFFICE VISIT (OUTPATIENT)
Dept: INTERNAL MEDICINE CLINIC | Facility: CLINIC | Age: 83
End: 2018-12-13
Payer: MEDICARE

## 2018-12-13 VITALS
BODY MASS INDEX: 35.04 KG/M2 | HEIGHT: 58.5 IN | DIASTOLIC BLOOD PRESSURE: 84 MMHG | RESPIRATION RATE: 16 BRPM | OXYGEN SATURATION: 96 % | TEMPERATURE: 97 F | SYSTOLIC BLOOD PRESSURE: 138 MMHG | HEART RATE: 85 BPM | WEIGHT: 171.5 LBS

## 2018-12-13 DIAGNOSIS — Z78.9 STATIN INTOLERANCE: ICD-10-CM

## 2018-12-13 DIAGNOSIS — E78.5 INCREASED SERUM LIPIDS: ICD-10-CM

## 2018-12-13 DIAGNOSIS — I51.7 LVH (LEFT VENTRICULAR HYPERTROPHY): ICD-10-CM

## 2018-12-13 DIAGNOSIS — Z86.718 HISTORY OF DVT (DEEP VEIN THROMBOSIS): ICD-10-CM

## 2018-12-13 DIAGNOSIS — I87.9 VENOUS DISEASE: ICD-10-CM

## 2018-12-13 DIAGNOSIS — Z23 VACCINE FOR DIPHTHERIA-TETANUS: ICD-10-CM

## 2018-12-13 DIAGNOSIS — K22.2 ESOPHAGEAL STRICTURE: ICD-10-CM

## 2018-12-13 DIAGNOSIS — I10 ESSENTIAL HYPERTENSION: ICD-10-CM

## 2018-12-13 DIAGNOSIS — Z79.4 TYPE 2 DIABETES MELLITUS WITH COMPLICATION, WITH LONG-TERM CURRENT USE OF INSULIN (HCC): ICD-10-CM

## 2018-12-13 DIAGNOSIS — Z98.61 HISTORY OF PTCA: ICD-10-CM

## 2018-12-13 DIAGNOSIS — D50.9 IRON DEFICIENCY ANEMIA, UNSPECIFIED IRON DEFICIENCY ANEMIA TYPE: ICD-10-CM

## 2018-12-13 DIAGNOSIS — E11.8 TYPE 2 DIABETES MELLITUS WITH COMPLICATION, WITH LONG-TERM CURRENT USE OF INSULIN (HCC): ICD-10-CM

## 2018-12-13 DIAGNOSIS — Z86.010 HISTORY OF COLON POLYPS: ICD-10-CM

## 2018-12-13 DIAGNOSIS — E03.9 ACQUIRED HYPOTHYROIDISM: ICD-10-CM

## 2018-12-13 DIAGNOSIS — K57.30 DIVERTICULOSIS OF LARGE INTESTINE WITHOUT HEMORRHAGE: ICD-10-CM

## 2018-12-13 DIAGNOSIS — Z00.00 ROUTINE GENERAL MEDICAL EXAMINATION AT A HEALTH CARE FACILITY: Primary | ICD-10-CM

## 2018-12-13 DIAGNOSIS — L84 CALLUS OF FOOT: ICD-10-CM

## 2018-12-13 DIAGNOSIS — Z79.01 WARFARIN ANTICOAGULATION: ICD-10-CM

## 2018-12-13 DIAGNOSIS — I50.32 CHRONIC DIASTOLIC HEART FAILURE (HCC): ICD-10-CM

## 2018-12-13 DIAGNOSIS — M48.061 SPINAL STENOSIS OF LUMBAR REGION, UNSPECIFIED WHETHER NEUROGENIC CLAUDICATION PRESENT: ICD-10-CM

## 2018-12-13 DIAGNOSIS — I25.2 HISTORY OF MYOCARDIAL INFARCTION: ICD-10-CM

## 2018-12-13 DIAGNOSIS — M47.816 ARTHRITIS, LUMBAR SPINE: ICD-10-CM

## 2018-12-13 DIAGNOSIS — R10.13 EPIGASTRIC PAIN: ICD-10-CM

## 2018-12-13 PROBLEM — E53.8 VITAMIN B 12 DEFICIENCY: Status: ACTIVE | Noted: 2018-12-13

## 2018-12-13 PROCEDURE — G0439 PPPS, SUBSEQ VISIT: HCPCS | Performed by: INTERNAL MEDICINE

## 2018-12-13 PROCEDURE — 90471 IMMUNIZATION ADMIN: CPT | Performed by: INTERNAL MEDICINE

## 2018-12-13 PROCEDURE — 90714 TD VACC NO PRESV 7 YRS+ IM: CPT | Performed by: INTERNAL MEDICINE

## 2018-12-13 RX ORDER — FUROSEMIDE 20 MG/1
TABLET ORAL
Qty: 30 TABLET | Refills: 0 | COMMUNITY
Start: 2018-12-13 | End: 2018-12-19

## 2018-12-13 NOTE — PROGRESS NOTES
HPI:    Patient ID: Vitaliy Matthews is a 80year old female. HPI DEAR Denae    IT WAS NICE SEEING YOU FOR YOUR WELLNESS VISIT ON 12/13/2018           Review of Systems    HPI:    Patient ID: Vitaliy Matthews is a 80year old female.     History of Present ?: Yes    Do you have a living will?: Yes     Please go to \"Cognitive Assessment\" under Medicare Assessment section in Charting, test patient and document. Then, refresh your progress note to see your input here.   Cognitive Assessment     What Negative for cough, chest tightness, shortness of breath and wheezing. No active smoking. Cardiovascular: Please see history of A. fib Coumadin therapy history of MI [facility] cardiology. Negative for chest pain, palpitations, syncope, and edema.   No s basically hospitalized poor facilities etc.  My patient is continued to readjust.  The patient is not nervous. .    Endocrine: Reclast yearly. On Lantus. Currently down to 18 units. No hypoglycemia insulin dependent osteopenia thyroid replacement. joint arthropathy 12/1/2011   • Anemia     microcytic   • Anxiety 12/7/2010   • Arrhythmia     atrial fib   • Atherosclerosis of coronary artery    • Autonomic neuropathy 2/27/2012   • Back pain 6/28/2012   • Basal cell carcinoma    • Bilateral leg pain 12 History of hypercholesterolemia 8/26/2010    hx hyperlipidemia   • History of Meniere's disease 4/26/2012   • History of myocardial infarction    • History of PTCA 4/26/2012   • HTN (hypertension) 4/26/2012   • Hypothyroidism 4/26/2012   • IBS (irritable b of the mid to distal R coronary artery   • APPENDECTOMY     • CATARACT      B/L   • CATH BARE METAL STENT (BMS)  2010   • CHOLECYSTECTOMY  1994   • DIAGNOSTIC ANOSCOPY     • FOOT/TOES SURGERY PROC UNLISTED  1991, 1998    salina B/L, anitha B/L   • MARCOS Disorder Other         heart condition, mat grandparen   • Heart Attack Other         mat grandparent   • Stroke Other         mat grandparent   • Other (Other) Other    • Gastro-Intestinal Disorder Other         IBS, fam hx   • Blood Disorder Other Hearing and tympanic membranes normal.  Nose normal. Oropharynx is clear and moist. Dentition adequate. Hearing aids minor wax bilateral     Eyes: Conjunctivae and EOM are normal. PERRLA. No scleral icterus.    Visual Acuity                           Neck He remained healthy through spirituality. Currently in [place] and we did talk about loneliness as being a risk factor for her health. You do read and again very active with your Mormon at [facility].     We did talk about processed food a l unchanged. #6.  Did on diabetic eye visits. But being watched carefully by a possible retinal issue could be a macular pucker.     #7.  Osteopenia please see ongoing bone density test.  Completed August 2017 bone mineral density -2.3 on Reclast. therapy not really requiring any meclizine at the present time. #22 Gas-X for irritable bowel syndrome. .                 Current Outpatient Medications:  Levothyroxine Sodium 75 MCG Oral Tab Take 75 mcg by mouth before breakfast. Disp:  Rfl:    brad UNKNOWN  Cardizem                RASH  Cephalosporins          UNKNOWN  Codeine                 NAUSEA ONLY  Codeine [Opioid Courtney*    NAUSEA AND VOMITING  Demerol                 NAUSEA AND VOMITING  Glucophage [Metform*        Comment:\"problematic\"  Mepe

## 2018-12-19 RX ORDER — FUROSEMIDE 20 MG/1
TABLET ORAL
Qty: 180 TABLET | Refills: 3 | Status: SHIPPED | OUTPATIENT
Start: 2018-12-19 | End: 2019-12-23

## 2018-12-31 ENCOUNTER — HOSPITAL ENCOUNTER (OUTPATIENT)
Dept: LAB | Facility: HOSPITAL | Age: 83
Discharge: HOME | End: 2018-12-31
Attending: INTERNAL MEDICINE
Payer: MEDICARE

## 2018-12-31 ENCOUNTER — PRIOR ORIGINAL RECORDS (OUTPATIENT)
Dept: OTHER | Age: 83
End: 2018-12-31

## 2018-12-31 LAB — INR: 2.5

## 2019-01-01 ENCOUNTER — EXTERNAL RECORD (OUTPATIENT)
Dept: CARDIOLOGY | Age: 84
End: 2019-01-01

## 2019-01-02 ENCOUNTER — LABORATORY ENCOUNTER (OUTPATIENT)
Dept: LAB | Age: 84
End: 2019-01-02
Attending: INTERNAL MEDICINE
Payer: MEDICARE

## 2019-01-02 DIAGNOSIS — D50.9 IRON DEFICIENCY ANEMIA, UNSPECIFIED IRON DEFICIENCY ANEMIA TYPE: ICD-10-CM

## 2019-01-02 LAB — HAV AB SERPL IA-ACNC: 260 PG/ML (ref 193–986)

## 2019-01-02 PROCEDURE — 86340 INTRINSIC FACTOR ANTIBODY: CPT

## 2019-01-02 PROCEDURE — 83921 ORGANIC ACID SINGLE QUANT: CPT

## 2019-01-02 PROCEDURE — 82607 VITAMIN B-12: CPT

## 2019-01-02 PROCEDURE — 36415 COLL VENOUS BLD VENIPUNCTURE: CPT

## 2019-01-02 RX ORDER — INSULIN GLARGINE 100 [IU]/ML
INJECTION, SOLUTION SUBCUTANEOUS
Qty: 10 ML | Refills: 3 | Status: SHIPPED | OUTPATIENT
Start: 2019-01-02 | End: 2019-04-01

## 2019-01-02 NOTE — TELEPHONE ENCOUNTER
Requesting Lantus   LOV: 12/13/18 (CPX)   Last Relevant Labs: 12/5/18 scanned   Filled: 7/6/18 #30 with 3 refills    Future Appointments   Date Time Provider Seth Mcqueen   1/2/2019  1:15 PM REF MOB REF MOB EDW Ref MOB   2/14/2019  1:30 PM Kelvin

## 2019-01-03 LAB
INTRINSIC FACTOR BLOCKING AB: NEGATIVE
MMA: 0.62 UMOL/L

## 2019-01-04 DIAGNOSIS — E53.8 CYANOCOBALAMIN DEFICIENCY: Primary | ICD-10-CM

## 2019-01-04 RX ORDER — CYANOCOBALAMIN 1000 UG/ML
1000 INJECTION INTRAMUSCULAR; SUBCUTANEOUS WEEKLY
Qty: 6 ML | Refills: 0 | Status: SHIPPED | OUTPATIENT
Start: 2019-01-04 | End: 2019-04-04

## 2019-01-04 NOTE — PROGRESS NOTES
Begin B12 shots.   10 mcg weekly times 4 weeks then every other week for 3 weeks and check a B12 level right before shot is due

## 2019-01-10 NOTE — PROGRESS NOTES
Begin vitamin B12 shots thousand micrograms every week for 4 weeks after 1 or 2 times consider having daughter give her the B12 the daughter is a diabetic.   And I believe the patient may also have expertise being a diabetic also that we can do most of thes

## 2019-01-15 ENCOUNTER — PRIOR ORIGINAL RECORDS (OUTPATIENT)
Dept: OTHER | Age: 84
End: 2019-01-15

## 2019-01-21 ENCOUNTER — NURSE ONLY (OUTPATIENT)
Dept: INTERNAL MEDICINE CLINIC | Facility: CLINIC | Age: 84
End: 2019-01-21
Payer: MEDICARE

## 2019-01-21 ENCOUNTER — TELEPHONE (OUTPATIENT)
Dept: INTERNAL MEDICINE CLINIC | Facility: CLINIC | Age: 84
End: 2019-01-21

## 2019-01-21 PROCEDURE — 96372 THER/PROPH/DIAG INJ SC/IM: CPT | Performed by: INTERNAL MEDICINE

## 2019-01-21 RX ORDER — CYANOCOBALAMIN 1000 UG/ML
1000 INJECTION INTRAMUSCULAR; SUBCUTANEOUS
Status: DISCONTINUED | OUTPATIENT
Start: 2019-01-21 | End: 2021-05-21

## 2019-01-21 RX ADMIN — CYANOCOBALAMIN 1000 MCG: 1000 INJECTION INTRAMUSCULAR; SUBCUTANEOUS at 14:11:00

## 2019-01-21 NOTE — TELEPHONE ENCOUNTER
Called patient back to discuss, she states her hematologist checked her levels and they were normal.     #[phone] Dr Mary Cobos. Called and requested copy of results. Vitamin B12 265.  Notified patient that is considered normal range but we would

## 2019-01-21 NOTE — TELEPHONE ENCOUNTER
Patient says she is supposed to come into office so she can learn how to give B12 shot. She would like a nurse to return her call regarding it. She also has questions about lab results that were supposed to be sent over from a specialist's office.

## 2019-01-22 ENCOUNTER — MED REC SCAN ONLY (OUTPATIENT)
Dept: INTERNAL MEDICINE CLINIC | Facility: CLINIC | Age: 84
End: 2019-01-22

## 2019-01-24 ENCOUNTER — MED REC SCAN ONLY (OUTPATIENT)
Dept: INTERNAL MEDICINE CLINIC | Facility: CLINIC | Age: 84
End: 2019-01-24

## 2019-02-04 ENCOUNTER — PRIOR ORIGINAL RECORDS (OUTPATIENT)
Dept: OTHER | Age: 84
End: 2019-02-04

## 2019-02-04 ENCOUNTER — HOSPITAL ENCOUNTER (OUTPATIENT)
Dept: LAB | Facility: HOSPITAL | Age: 84
Discharge: HOME | End: 2019-02-04
Attending: INTERNAL MEDICINE
Payer: MEDICARE

## 2019-02-04 ENCOUNTER — MYAURORA ACCOUNT LINK (OUTPATIENT)
Dept: OTHER | Age: 84
End: 2019-02-04

## 2019-02-04 DIAGNOSIS — I48.0 PAROXYSMAL ATRIAL FIBRILLATION (HCC): ICD-10-CM

## 2019-02-04 LAB
INR: 2.5
POC INR: 2.5 (ref 0.8–1.3)

## 2019-02-04 PROCEDURE — 85610 PROTHROMBIN TIME: CPT

## 2019-02-05 LAB
ALBUMIN: 3.7 G/DL
ALKALINE PHOSPHATATE(ALK PHOS): 57 IU/L
BILIRUBIN TOTAL: 0.4 MG/DL
BUN: 10 MG/DL
CALCIUM: 9.2 MG/DL
CHLORIDE: 106 MEQ/L
CREATININE, SERUM: 0.82 MG/DL
GLOBULIN: 3.3 G/DL
GLUCOSE: 191 MG/DL
POTASSIUM, SERUM: 4 MEQ/L
PROTEIN, TOTAL: 7 G/DL
SGOT (AST): 14 IU/L
SGPT (ALT): 23 IU/L
SODIUM: 141 MEQ/L
THYROID STIMULATING HORMONE: 3.61 MLU/L

## 2019-02-28 VITALS
SYSTOLIC BLOOD PRESSURE: 122 MMHG | BODY MASS INDEX: 34.16 KG/M2 | HEART RATE: 70 BPM | WEIGHT: 174 LBS | DIASTOLIC BLOOD PRESSURE: 62 MMHG | HEIGHT: 60 IN

## 2019-02-28 VITALS
DIASTOLIC BLOOD PRESSURE: 70 MMHG | HEIGHT: 60 IN | HEART RATE: 79 BPM | SYSTOLIC BLOOD PRESSURE: 130 MMHG | WEIGHT: 170 LBS | BODY MASS INDEX: 33.38 KG/M2

## 2019-02-28 VITALS — WEIGHT: 170 LBS | HEART RATE: 72 BPM | SYSTOLIC BLOOD PRESSURE: 133 MMHG | DIASTOLIC BLOOD PRESSURE: 70 MMHG

## 2019-02-28 VITALS
DIASTOLIC BLOOD PRESSURE: 66 MMHG | SYSTOLIC BLOOD PRESSURE: 126 MMHG | BODY MASS INDEX: 33.38 KG/M2 | WEIGHT: 170 LBS | HEIGHT: 60 IN | HEART RATE: 70 BPM

## 2019-03-01 ENCOUNTER — ANTI-COAG (OUTPATIENT)
Dept: CARDIOLOGY | Age: 84
End: 2019-03-01

## 2019-03-01 ENCOUNTER — PRIOR ORIGINAL RECORDS (OUTPATIENT)
Dept: OTHER | Age: 84
End: 2019-03-01

## 2019-03-01 ENCOUNTER — HOSPITAL ENCOUNTER (OUTPATIENT)
Dept: LAB | Facility: HOSPITAL | Age: 84
Discharge: HOME | End: 2019-03-01
Attending: INTERNAL MEDICINE
Payer: MEDICARE

## 2019-03-01 DIAGNOSIS — I48.0 PAROXYSMAL ATRIAL FIBRILLATION (HCC): ICD-10-CM

## 2019-03-01 DIAGNOSIS — I48.0 PAROXYSMAL ATRIAL FIBRILLATION (CMD): ICD-10-CM

## 2019-03-01 LAB
INR: 1.9
POC INR: 1.9 (ref 0.8–1.3)

## 2019-03-01 PROCEDURE — 85610 PROTHROMBIN TIME: CPT

## 2019-03-02 PROBLEM — I48.0 PAROXYSMAL ATRIAL FIBRILLATION (CMD): Status: ACTIVE | Noted: 2019-03-02

## 2019-03-04 ENCOUNTER — OFFICE VISIT (OUTPATIENT)
Dept: INTERNAL MEDICINE CLINIC | Facility: CLINIC | Age: 84
End: 2019-03-04
Payer: MEDICARE

## 2019-03-04 VITALS
HEART RATE: 99 BPM | WEIGHT: 165 LBS | HEIGHT: 58.5 IN | DIASTOLIC BLOOD PRESSURE: 80 MMHG | BODY MASS INDEX: 33.71 KG/M2 | SYSTOLIC BLOOD PRESSURE: 135 MMHG | RESPIRATION RATE: 14 BRPM | TEMPERATURE: 98 F | OXYGEN SATURATION: 98 %

## 2019-03-04 DIAGNOSIS — L30.4 INTERTRIGO: Primary | ICD-10-CM

## 2019-03-04 PROCEDURE — 99212 OFFICE O/P EST SF 10 MIN: CPT | Performed by: INTERNAL MEDICINE

## 2019-03-04 RX ORDER — CLARITHROMYCIN 125 MG/5ML
FOR SUSPENSION ORAL
COMMUNITY
Start: 2019-02-19 | End: 2019-08-05 | Stop reason: ALTCHOICE

## 2019-03-04 RX ORDER — PEN NEEDLE, DIABETIC 29 G X1/2"
NEEDLE, DISPOSABLE MISCELLANEOUS
COMMUNITY
Start: 2019-01-14

## 2019-03-04 RX ORDER — NYSTATIN 100000 [USP'U]/G
POWDER TOPICAL 4 TIMES DAILY
COMMUNITY
End: 2019-03-04

## 2019-03-04 RX ORDER — NYSTATIN 100000 [USP'U]/G
POWDER TOPICAL
Qty: 60 G | Refills: 1 | Status: SHIPPED | OUTPATIENT
Start: 2019-03-04 | End: 2021-06-22 | Stop reason: ALTCHOICE

## 2019-03-04 NOTE — PROGRESS NOTES
Brief OV in this complicated pt-elderly, insulin diabetic (sugars controlled), AF on Coumadin last INR said to be 1.9. Has yeast rash under breasts, under pannus-using old topical nystatin from 2015-renewed.  Considered a 7day course of diflucan but dont wa

## 2019-03-11 ENCOUNTER — TELEPHONE (OUTPATIENT)
Dept: INTERNAL MEDICINE CLINIC | Facility: CLINIC | Age: 84
End: 2019-03-11

## 2019-03-11 NOTE — TELEPHONE ENCOUNTER
Per last test results patient was to recheck when injections are completed. Patient notified, verbalized understanding.

## 2019-03-11 NOTE — TELEPHONE ENCOUNTER
Patient called and says she has completed all of her B12 shots at home. She asks if there is anything else she needs to do now that those are done. She asks that a nurse please call her.

## 2019-03-19 RX ORDER — WARFARIN SODIUM 5 MG/1
TABLET ORAL
Qty: 90 TABLET | Refills: 1 | Status: SHIPPED | OUTPATIENT
Start: 2019-03-19 | End: 2019-10-01 | Stop reason: SDUPTHER

## 2019-03-26 ENCOUNTER — MED REC SCAN ONLY (OUTPATIENT)
Dept: INTERNAL MEDICINE CLINIC | Facility: CLINIC | Age: 84
End: 2019-03-26

## 2019-03-29 ENCOUNTER — HOSPITAL ENCOUNTER (OUTPATIENT)
Dept: LAB | Facility: HOSPITAL | Age: 84
Discharge: HOME | End: 2019-03-29
Attending: INTERNAL MEDICINE
Payer: MEDICARE

## 2019-03-29 DIAGNOSIS — E53.8 CYANOCOBALAMIN DEFICIENCY: ICD-10-CM

## 2019-03-29 DIAGNOSIS — I48.0 PAROXYSMAL ATRIAL FIBRILLATION (HCC): ICD-10-CM

## 2019-03-29 PROCEDURE — 36415 COLL VENOUS BLD VENIPUNCTURE: CPT | Performed by: INTERNAL MEDICINE

## 2019-03-29 PROCEDURE — 82607 VITAMIN B-12: CPT | Performed by: INTERNAL MEDICINE

## 2019-03-29 PROCEDURE — 85610 PROTHROMBIN TIME: CPT

## 2019-04-01 ENCOUNTER — ANTI-COAG (OUTPATIENT)
Dept: CARDIOLOGY | Age: 84
End: 2019-04-01

## 2019-04-01 DIAGNOSIS — I48.0 PAROXYSMAL ATRIAL FIBRILLATION (CMD): ICD-10-CM

## 2019-04-01 LAB — INR PPP: 2.1

## 2019-04-01 RX ORDER — INSULIN GLARGINE 100 [IU]/ML
INJECTION, SOLUTION SUBCUTANEOUS
Qty: 10 ML | Refills: 3 | Status: SHIPPED | OUTPATIENT
Start: 2019-04-01 | End: 2019-05-09 | Stop reason: CLARIF

## 2019-04-01 RX ORDER — LEVOTHYROXINE SODIUM 0.07 MG/1
TABLET ORAL
Qty: 90 TABLET | Refills: 3 | Status: SHIPPED | OUTPATIENT
Start: 2019-04-01 | End: 2019-07-18

## 2019-04-01 RX ORDER — LOSARTAN POTASSIUM 100 MG/1
TABLET ORAL
Qty: 90 TABLET | Refills: 3 | Status: SHIPPED | OUTPATIENT
Start: 2019-04-01 | End: 2019-07-18

## 2019-04-04 DIAGNOSIS — E53.8 B12 DEFICIENCY: Primary | ICD-10-CM

## 2019-04-04 RX ORDER — CYANOCOBALAMIN 1000 UG/ML
1000 INJECTION INTRAMUSCULAR; SUBCUTANEOUS
Qty: 6 ML | Refills: 0 | Status: SHIPPED | OUTPATIENT
Start: 2019-04-04 | End: 2021-04-23 | Stop reason: ALTCHOICE

## 2019-04-11 RX ORDER — DIGOXIN 125 MCG
TABLET ORAL
COMMUNITY
Start: 2019-01-04 | End: 2019-12-11 | Stop reason: SDUPTHER

## 2019-04-11 RX ORDER — LEVOTHYROXINE SODIUM 0.07 MG/1
TABLET ORAL
COMMUNITY

## 2019-04-11 RX ORDER — POTASSIUM CHLORIDE 750 MG/1
TABLET, EXTENDED RELEASE ORAL
COMMUNITY

## 2019-04-11 RX ORDER — INSULIN GLARGINE 100 [IU]/ML
INJECTION, SOLUTION SUBCUTANEOUS DAILY
COMMUNITY

## 2019-04-11 RX ORDER — FUROSEMIDE 20 MG/1
TABLET ORAL
COMMUNITY
Start: 2015-06-22

## 2019-04-11 RX ORDER — LOSARTAN POTASSIUM 100 MG/1
100 TABLET ORAL DAILY
COMMUNITY

## 2019-05-09 ENCOUNTER — HOSPITAL ENCOUNTER (OUTPATIENT)
Dept: LAB | Facility: HOSPITAL | Age: 84
Discharge: HOME | End: 2019-05-09
Attending: INTERNAL MEDICINE
Payer: MEDICARE

## 2019-05-09 ENCOUNTER — TELEPHONE (OUTPATIENT)
Dept: INTERNAL MEDICINE CLINIC | Facility: CLINIC | Age: 84
End: 2019-05-09

## 2019-05-09 ENCOUNTER — HOSPITAL ENCOUNTER (OUTPATIENT)
Dept: GENERAL RADIOLOGY | Facility: HOSPITAL | Age: 84
Discharge: HOME | End: 2019-05-09
Attending: INTERNAL MEDICINE
Payer: MEDICARE

## 2019-05-09 ENCOUNTER — OFFICE VISIT (OUTPATIENT)
Dept: INTERNAL MEDICINE CLINIC | Facility: CLINIC | Age: 84
End: 2019-05-09
Payer: MEDICARE

## 2019-05-09 VITALS
SYSTOLIC BLOOD PRESSURE: 138 MMHG | HEART RATE: 80 BPM | DIASTOLIC BLOOD PRESSURE: 62 MMHG | WEIGHT: 165 LBS | BODY MASS INDEX: 34 KG/M2 | TEMPERATURE: 98 F

## 2019-05-09 DIAGNOSIS — Z79.4 TYPE 2 DIABETES MELLITUS WITH COMPLICATION, WITH LONG-TERM CURRENT USE OF INSULIN (HCC): ICD-10-CM

## 2019-05-09 DIAGNOSIS — E11.8 TYPE 2 DIABETES MELLITUS WITH COMPLICATION, WITH LONG-TERM CURRENT USE OF INSULIN (HCC): ICD-10-CM

## 2019-05-09 DIAGNOSIS — Z79.01 WARFARIN ANTICOAGULATION: ICD-10-CM

## 2019-05-09 DIAGNOSIS — S70.02XA CONTUSION OF LEFT HIP, INITIAL ENCOUNTER: ICD-10-CM

## 2019-05-09 DIAGNOSIS — I48.0 PAROXYSMAL ATRIAL FIBRILLATION (HCC): ICD-10-CM
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
## 2019-05-09 DIAGNOSIS — M48.061 SPINAL STENOSIS OF LUMBAR REGION, UNSPECIFIED WHETHER NEUROGENIC CLAUDICATION PRESENT: ICD-10-CM
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
## 2019-05-09 LAB — INR PPP: 2.3

## 2019-05-09 PROCEDURE — 73502 X-RAY EXAM HIP UNI 2-3 VIEWS: CPT | Performed by: INTERNAL MEDICINE

## 2019-05-09 PROCEDURE — 85610 PROTHROMBIN TIME: CPT

## 2019-05-09 PROCEDURE — 99213 OFFICE O/P EST LOW 20 MIN: CPT | Performed by: INTERNAL MEDICINE

## 2019-05-09 NOTE — PROGRESS NOTES
Patient presents with:  Pain: Dog knocked patient over. HPI: Patient at pharmacy outside. With a large dog leached jumped on her. Patient fell backwards on her left hip. She is does not think that she had a read she is on systemic anticoagulation. Personal history of other malignant neoplasm of skin     Iron deficiency anemia     Hypothyroidism     Osteopenia     LVH (left ventricular hypertrophy)     Esophageal stricture     Diverticulosis     History of DVT (deep vein thrombosis)     Atrial fibril 180 tablet Rfl: 3   Levothyroxine Sodium 75 MCG Oral Tab Take 75 mcg by mouth before breakfast. Disp:  Rfl:    aspirin 81 MG Oral Chew Tab Chew 81 mg by mouth daily. Disp:  Rfl:    losartan 100 MG Oral Tab Take 100 mg by mouth daily.  Disp:  Rfl:    metopro Normal rate, regular rhythm and intact distal pulses. No murmur, rubs or gallops. No  JVD      Pulmonary/Chest: Effort normal and breath sounds normal. No respiratory distress. No wheezes, rhonchi or rales    Abdominal: Soft.  Bowel sounds are normal. Non no Patient Instructions on file for this visit.

## 2019-05-09 NOTE — TELEPHONE ENCOUNTER
Patient feel at [facility] and hit her left hip, pain is in the hip area and radiating down left leg. Patient is able to bear weight.  Patient scheduled for the following OV, tripp per Dr. Hamilton [place]:    Future Appointments   Date Time Provider Seth Mcqueen

## 2019-05-10 ENCOUNTER — TELEPHONE (OUTPATIENT)
Dept: INTERNAL MEDICINE CLINIC | Facility: CLINIC | Age: 84
End: 2019-05-10

## 2019-05-10 ENCOUNTER — ANTI-COAG (OUTPATIENT)
Dept: CARDIOLOGY | Age: 84
End: 2019-05-10

## 2019-05-10 DIAGNOSIS — E11.9 TYPE 2 DIABETES MELLITUS WITHOUT COMPLICATION, UNSPECIFIED WHETHER LONG TERM INSULIN USE (HCC): Primary | ICD-10-CM

## 2019-05-10 DIAGNOSIS — I48.0 PAROXYSMAL ATRIAL FIBRILLATION (CMD): ICD-10-CM

## 2019-05-10 NOTE — TELEPHONE ENCOUNTER
Called and spoke with patient re: results and also provided patient with Diabetic clinic number [phone].

## 2019-05-10 NOTE — TELEPHONE ENCOUNTER
Patient wants to speak to the nurse about xray results and orders for the diabetic clinic. Patient is feeling better from her fall. Also another has medication question.

## 2019-05-14 ENCOUNTER — DIABETIC EDUCATION (OUTPATIENT)
Dept: ENDOCRINOLOGY CLINIC | Facility: CLINIC | Age: 84
End: 2019-05-14
Payer: MEDICARE

## 2019-05-14 VITALS — HEIGHT: 58.5 IN | BODY MASS INDEX: 33.71 KG/M2 | WEIGHT: 165 LBS

## 2019-05-14 DIAGNOSIS — Z79.4 TYPE 2 DIABETES MELLITUS WITH COMPLICATION, WITH LONG-TERM CURRENT USE OF INSULIN (HCC): Primary | ICD-10-CM

## 2019-05-14 DIAGNOSIS — E11.8 TYPE 2 DIABETES MELLITUS WITH COMPLICATION, WITH LONG-TERM CURRENT USE OF INSULIN (HCC): Primary | ICD-10-CM

## 2019-05-14 PROCEDURE — G0108 DIAB MANAGE TRN  PER INDIV: HCPCS | Performed by: DIETITIAN, REGISTERED

## 2019-05-14 NOTE — PATIENT INSTRUCTIONS
If blood sugar at bedtime is less than 200, have a snack from your snack list.    If you aren't very hungry, here are some good snack ideas for bedtime:    1 8 oz glass of milk    1 8 oz glass of carnation instant breakfast    1 4 oz juice box

## 2019-05-15 NOTE — PROGRESS NOTES
Sean Daianagloria  [id number] was seen for Diabetic Medical Nutrition Therapy:    Date: 5/15/2019  Referring Provider: Dr. Rah Blakely Start time: 3:00 End time: 3:30    Assessment: Ht 58.5\"   Wt 165 lb   BMI 33.90 kg/m²   HGBA1C (%)   Date Value   07/26/2013 [address], LDN, [facility]

## 2019-05-21 PROBLEM — L57.0 ACTINIC KERATOSIS: Status: ACTIVE | Noted: 2019-03-21

## 2019-05-30 ENCOUNTER — HOSPITAL ENCOUNTER (OUTPATIENT)
Dept: LAB | Facility: HOSPITAL | Age: 84
Discharge: HOME | End: 2019-05-30
Attending: INTERNAL MEDICINE
Payer: MEDICARE

## 2019-05-30 DIAGNOSIS — I48.0 PAROXYSMAL ATRIAL FIBRILLATION (HCC): ICD-10-CM

## 2019-05-30 LAB — INR PPP: 2.5

## 2019-05-30 PROCEDURE — 85610 PROTHROMBIN TIME: CPT

## 2019-05-31 ENCOUNTER — ANTI-COAG (OUTPATIENT)
Dept: CARDIOLOGY | Age: 84
End: 2019-05-31

## 2019-05-31 DIAGNOSIS — I48.0 PAROXYSMAL ATRIAL FIBRILLATION (CMD): ICD-10-CM

## 2019-06-13 RX ORDER — LANCETS
EACH MISCELLANEOUS
Qty: 200 EACH | Refills: 2 | Status: SHIPPED | OUTPATIENT
Start: 2019-06-13 | End: 2019-09-03

## 2019-06-13 NOTE — TELEPHONE ENCOUNTER
Requesting Accucheck   LOV: 12/13/18 cpe   Last Relevant Labs: pp      Future Appointments   Date Time Provider Seth Charisse   6/20/2019  2:00 PM MARY Herrmann POD [place]   8/27/2019  1:30 PM MARY Herrmann POD [place]

## 2019-06-28 ENCOUNTER — HOSPITAL ENCOUNTER (OUTPATIENT)
Dept: LAB | Facility: HOSPITAL | Age: 84
Discharge: HOME | End: 2019-06-28
Attending: INTERNAL MEDICINE
Payer: MEDICARE

## 2019-06-28 DIAGNOSIS — I48.0 PAROXYSMAL ATRIAL FIBRILLATION (HCC): ICD-10-CM

## 2019-06-28 LAB
INR PPP: 2.5
POC INR: 2.5 (ref 0.8–1.3)

## 2019-06-28 PROCEDURE — 85610 PROTHROMBIN TIME: CPT

## 2019-07-01 ENCOUNTER — ANTI-COAG (OUTPATIENT)
Dept: CARDIOLOGY | Age: 84
End: 2019-07-01

## 2019-07-01 DIAGNOSIS — I48.0 PAROXYSMAL ATRIAL FIBRILLATION (CMD): ICD-10-CM

## 2019-07-05 RX ORDER — POTASSIUM CHLORIDE 750 MG/1
10 TABLET, EXTENDED RELEASE ORAL 2 TIMES DAILY
Qty: 180 TABLET | Refills: 1 | Status: SHIPPED | OUTPATIENT
Start: 2019-07-05 | End: 2019-07-18

## 2019-07-05 RX ORDER — INSULIN GLARGINE 100 [IU]/ML
INJECTION, SOLUTION SUBCUTANEOUS
Qty: 11 PEN | Refills: 1 | Status: SHIPPED | OUTPATIENT
Start: 2019-07-05 | End: 2019-08-05

## 2019-07-05 NOTE — TELEPHONE ENCOUNTER
Requesting Lantus and Potassium   LOV: 12/13/18 cpe  Last Relevant Labs: 12/6/18      Future Appointments   Date Time Provider Seth Mcqueen   8/27/2019  1:30 PM Samantha Mejia DPM SP POD [place]   10/28/2019  4:30 PM Samantha Mejia DPM MM

## 2019-07-09 RX ORDER — BLOOD SUGAR DIAGNOSTIC
STRIP MISCELLANEOUS
Qty: 400 STRIP | Refills: 1 | Status: SHIPPED | OUTPATIENT
Start: 2019-07-09 | End: 2020-06-15

## 2019-07-09 NOTE — TELEPHONE ENCOUNTER
Requesting Accucheck   LOV: 12/13/18 cpe   Last Relevant Labs: pp  Filled: 6/13/19 #200 with 2 refills    Future Appointments   Date Time Provider Seth Mcqueen   7/18/2019  1:20 PM IRMA [place] SGINP ECC SUB GI   8/27/2019  1:30 PM Kelvin

## 2019-07-19 ENCOUNTER — TELEPHONE (OUTPATIENT)
Dept: INTERNAL MEDICINE CLINIC | Facility: CLINIC | Age: 84
End: 2019-07-19

## 2019-07-19 NOTE — TELEPHONE ENCOUNTER
Patient will call DM clinic for f/u visit to discuss insulin regime prior to and day of her procedure.

## 2019-07-22 RX ORDER — PEN NEEDLE, DIABETIC 29 G X1/2"
NEEDLE, DISPOSABLE MISCELLANEOUS
Qty: 6 EACH | Refills: 0 | Status: SHIPPED | OUTPATIENT
Start: 2019-07-22 | End: 2019-07-22

## 2019-07-22 RX ORDER — BLOOD SUGAR DIAGNOSTIC
STRIP MISCELLANEOUS
Qty: 90 EACH | Refills: 1 | Status: SHIPPED | OUTPATIENT
Start: 2019-07-22 | End: 2020-01-15

## 2019-07-22 NOTE — TELEPHONE ENCOUNTER
Requesting syringes   LOV: 12/13/18 cpe  Last Relevant Labs: pp  Filled: 4/4/19 #6 with 0 refills    Future Appointments   Date Time Provider Seth Mcqueen   7/23/2019  1:30 PM Angelo Fonseca RD,AURELION,CDE EMGDIABCTRNA [facility] [place]   8/27/2019  1:3

## 2019-07-23 ENCOUNTER — DIABETIC EDUCATION (OUTPATIENT)
Dept: ENDOCRINOLOGY CLINIC | Facility: CLINIC | Age: 84
End: 2019-07-23
Payer: MEDICARE

## 2019-07-23 ENCOUNTER — TELEPHONE (OUTPATIENT)
Dept: ENDOCRINOLOGY CLINIC | Facility: CLINIC | Age: 84
End: 2019-07-23

## 2019-07-23 VITALS — HEIGHT: 58.5 IN | WEIGHT: 162 LBS | BODY MASS INDEX: 33.1 KG/M2

## 2019-07-23 DIAGNOSIS — E11.8 TYPE 2 DIABETES MELLITUS WITH COMPLICATION, WITH LONG-TERM CURRENT USE OF INSULIN (HCC): Primary | ICD-10-CM

## 2019-07-23 DIAGNOSIS — Z79.4 TYPE 2 DIABETES MELLITUS WITH COMPLICATION, WITH LONG-TERM CURRENT USE OF INSULIN (HCC): Primary | ICD-10-CM

## 2019-07-23 PROCEDURE — G0108 DIAB MANAGE TRN  PER INDIV: HCPCS | Performed by: DIETITIAN, REGISTERED

## 2019-07-23 NOTE — PROGRESS NOTES
Etta Timceferino  : 5/3/1930 attended Diabetic Education:    Date: 2019    Start time: 1:30  End time: 2:00    Ht 58.5\"   Wt 162 lb   BMI 33.28 kg/m²     HGBA1C (%)   Date Value   2013 8.4 (H)     HgbA1C (%)   Date Value   2018 7.7

## 2019-07-23 NOTE — PATIENT INSTRUCTIONS
How to manage insulin the day before and on the day of the procedure: The day before: Take only 9 units in the morning (1/2 the regular dose)    The day of the procedure:  No insulin. The day after the procedure:   Take only 9 units    Two days aft

## 2019-07-29 ENCOUNTER — ANTI-COAG (OUTPATIENT)
Dept: CARDIOLOGY | Age: 84
End: 2019-07-29

## 2019-07-29 ENCOUNTER — HOSPITAL ENCOUNTER (OUTPATIENT)
Dept: LAB | Facility: HOSPITAL | Age: 84
Discharge: HOME | End: 2019-07-29
Attending: INTERNAL MEDICINE
Payer: MEDICARE

## 2019-07-29 DIAGNOSIS — I48.0 PAROXYSMAL ATRIAL FIBRILLATION (CMD): ICD-10-CM

## 2019-07-29 DIAGNOSIS — I48.0 PAROXYSMAL ATRIAL FIBRILLATION (HCC): ICD-10-CM

## 2019-07-29 LAB
INR PPP: 3.4
POC INR: 3.4 (ref 0.8–1.3)

## 2019-07-29 PROCEDURE — 85610 PROTHROMBIN TIME: CPT

## 2019-07-30 ENCOUNTER — TELEPHONE (OUTPATIENT)
Dept: CARDIOLOGY | Age: 84
End: 2019-07-30

## 2019-07-31 ENCOUNTER — TELEPHONE (OUTPATIENT)
Dept: CARDIOLOGY | Age: 84
End: 2019-07-31

## 2019-08-02 ENCOUNTER — OFFICE VISIT (OUTPATIENT)
Dept: CARDIOLOGY | Age: 84
End: 2019-08-02

## 2019-08-02 DIAGNOSIS — I48.0 PAROXYSMAL ATRIAL FIBRILLATION (CMD): Primary | ICD-10-CM

## 2019-08-02 PROCEDURE — 99211 OFF/OP EST MAY X REQ PHY/QHP: CPT | Performed by: INTERNAL MEDICINE

## 2019-08-02 RX ORDER — ENOXAPARIN SODIUM 100 MG/ML
70 INJECTION SUBCUTANEOUS EVERY 12 HOURS SCHEDULED
Qty: 4 SYRINGE | Refills: 0 | Status: SHIPPED | OUTPATIENT
Start: 2019-08-02 | End: 2019-08-26 | Stop reason: ALTCHOICE

## 2019-08-05 ENCOUNTER — ANTI-COAG (OUTPATIENT)
Dept: CARDIOLOGY | Age: 84
End: 2019-08-05

## 2019-08-05 DIAGNOSIS — I48.0 PAROXYSMAL ATRIAL FIBRILLATION (CMD): ICD-10-CM

## 2019-08-05 RX ORDER — ENOXAPARIN SODIUM 150 MG/ML
70 INJECTION SUBCUTANEOUS DAILY
COMMUNITY
End: 2019-12-16

## 2019-08-06 ENCOUNTER — TELEPHONE (OUTPATIENT)
Dept: CARDIOLOGY | Age: 84
End: 2019-08-06

## 2019-08-06 ENCOUNTER — HOSPITAL ENCOUNTER (OUTPATIENT)
Dept: LAB | Facility: HOSPITAL | Age: 84
Discharge: HOME | End: 2019-08-06
Attending: INTERNAL MEDICINE
Payer: MEDICARE

## 2019-08-06 ENCOUNTER — ANTI-COAG (OUTPATIENT)
Dept: CARDIOLOGY | Age: 84
End: 2019-08-06

## 2019-08-06 DIAGNOSIS — I48.0 PAROXYSMAL ATRIAL FIBRILLATION (HCC): ICD-10-CM

## 2019-08-06 DIAGNOSIS — I48.0 PAROXYSMAL ATRIAL FIBRILLATION (CMD): ICD-10-CM

## 2019-08-06 LAB
INR PPP: 2.4
POC INR: 2.4 (ref 0.8–1.3)

## 2019-08-06 PROCEDURE — 85610 PROTHROMBIN TIME: CPT

## 2019-08-07 ENCOUNTER — TELEPHONE (OUTPATIENT)
Dept: CARDIOLOGY | Age: 84
End: 2019-08-07

## 2019-08-11 ENCOUNTER — APPOINTMENT (OUTPATIENT)
Dept: LAB | Facility: HOSPITAL | Age: 84
End: 2019-08-11
Attending: INTERNAL MEDICINE
Payer: MEDICARE

## 2019-08-11 ENCOUNTER — TELEPHONE (OUTPATIENT)
Dept: CARDIOLOGY | Age: 84
End: 2019-08-11

## 2019-08-11 ENCOUNTER — LAB ENCOUNTER (OUTPATIENT)
Dept: LAB | Age: 84
End: 2019-08-11
Attending: INTERNAL MEDICINE
Payer: MEDICARE

## 2019-08-11 DIAGNOSIS — Z92.29 HISTORY OF COUMADIN THERAPY: Primary | ICD-10-CM

## 2019-08-11 LAB
INR BLD: 1.87 (ref 0.9–1.1)
INR PPP: 1.87
PSA SERPL DL<=0.01 NG/ML-MCNC: 22.6 SECONDS (ref 12.5–14.7)

## 2019-08-11 PROCEDURE — 85610 PROTHROMBIN TIME: CPT

## 2019-08-11 PROCEDURE — 36415 COLL VENOUS BLD VENIPUNCTURE: CPT

## 2019-08-12 ENCOUNTER — ANTI-COAG (OUTPATIENT)
Dept: CARDIOLOGY | Age: 84
End: 2019-08-12

## 2019-08-12 DIAGNOSIS — I48.0 PAROXYSMAL ATRIAL FIBRILLATION (CMD): ICD-10-CM

## 2019-08-14 ENCOUNTER — ANESTHESIA EVENT (OUTPATIENT)
Dept: ENDOSCOPY | Facility: HOSPITAL | Age: 84
End: 2019-08-14

## 2019-08-14 ENCOUNTER — ANESTHESIA (OUTPATIENT)
Dept: ENDOSCOPY | Facility: HOSPITAL | Age: 84
End: 2019-08-14

## 2019-08-14 ENCOUNTER — ANTI-COAG (OUTPATIENT)
Dept: CARDIOLOGY | Age: 84
End: 2019-08-14

## 2019-08-14 ENCOUNTER — HOSPITAL ENCOUNTER (OUTPATIENT)
Facility: HOSPITAL | Age: 84
Setting detail: HOSPITAL OUTPATIENT SURGERY
Discharge: HOME | End: 2019-08-14
Attending: INTERNAL MEDICINE | Admitting: INTERNAL MEDICINE
Payer: MEDICARE

## 2019-08-14 VITALS
RESPIRATION RATE: 18 BRPM | WEIGHT: 160 LBS | HEART RATE: 82 BPM | BODY MASS INDEX: 31.41 KG/M2 | SYSTOLIC BLOOD PRESSURE: 126 MMHG | TEMPERATURE: 98 F | HEIGHT: 60 IN | DIASTOLIC BLOOD PRESSURE: 77 MMHG | OXYGEN SATURATION: 95 %

## 2019-08-14 DIAGNOSIS — R13.14 DYSPHAGIA, PHARYNGOESOPHAGEAL PHASE: ICD-10-CM

## 2019-08-14 DIAGNOSIS — I48.0 PAROXYSMAL ATRIAL FIBRILLATION (CMD): ICD-10-CM

## 2019-08-14 DIAGNOSIS — R47.02 DYSPHASIA: Primary | ICD-10-CM

## 2019-08-14 LAB
CHLORIDE SERPL-SCNC: 106 MMOL/L (ref 98–112)
CO2 SERPL-SCNC: 28 MMOL/L (ref 21–32)
GLUCOSE BLD-MCNC: 147 MG/DL (ref 70–99)
INR: 1.1 (ref 0.8–1.3)
POTASSIUM SERPL-SCNC: 3.9 MMOL/L (ref 3.5–5.1)
SODIUM SERPL-SCNC: 139 MMOL/L (ref 136–145)

## 2019-08-14 PROCEDURE — 80051 ELECTROLYTE PANEL: CPT

## 2019-08-14 PROCEDURE — 82962 GLUCOSE BLOOD TEST: CPT

## 2019-08-14 PROCEDURE — 0DB58ZX EXCISION OF ESOPHAGUS, VIA NATURAL OR ARTIFICIAL OPENING ENDOSCOPIC, DIAGNOSTIC: ICD-10-PCS | Performed by: INTERNAL MEDICINE

## 2019-08-14 PROCEDURE — 88305 TISSUE EXAM BY PATHOLOGIST: CPT | Performed by: INTERNAL MEDICINE

## 2019-08-14 PROCEDURE — 85610 PROTHROMBIN TIME: CPT | Performed by: INTERNAL MEDICINE

## 2019-08-14 RX ORDER — SODIUM CHLORIDE, SODIUM LACTATE, POTASSIUM CHLORIDE, CALCIUM CHLORIDE 600; 310; 30; 20 MG/100ML; MG/100ML; MG/100ML; MG/100ML
INJECTION, SOLUTION INTRAVENOUS CONTINUOUS
Status: DISCONTINUED | OUTPATIENT
Start: 2019-08-14 | End: 2019-08-14

## 2019-08-14 NOTE — ANESTHESIA POSTPROCEDURE EVALUATION
[facility] Patient Status:  Hospital Outpatient Surgery   Age/Gender 80year old female MRN [id number]   Location [facility]. Attending Britta Lorenz, [address] [facility] Day # 0 PCP Ricardo Ford MD       Anesthesia Post-op

## 2019-08-14 NOTE — OPERATIVE REPORT
Norberto Hacrystal Patient Status:  Hospital Outpatient Surgery    5/3/1930 MRN [id number]   [facility] ENDOSCOPY Attending Micheal Macario,    Hosp Day # 0 PCP Gay Tinoco MD       PREOPERATIVE DIAGNOSIS/INDICATION: Dysphagia Small bites, chew thoroughly.        Hina See  Gastroenterology/Advanced Endoscopy  [facility]

## 2019-08-14 NOTE — H&P
History & Physical Examination    Patient Name: Angeles Gaona  MRN: [id number]  CSN: [id number]  YOB: 1930    Diagnosis: dysphagia    Present Illness: 79 y/o F history as above presents for egd.        Facility-Administered Medications Prior to 10 MEQ Oral Tab CR Take 10 mEq by mouth 2 (two) times daily. Disp:  Rfl:  8/13/2019 at Unknown time   Cholecalciferol (VITAMIN D3) 3000 UNITS Oral Tab Take 3,000 Units by mouth daily.  Disp: 30 tablet Rfl: 0 8/13/2019 at Unknown time   Warfarin Sodium (CO ONLY  Codeine [Opioid Courtney*    NAUSEA AND VOMITING  Demerol                 NAUSEA AND VOMITING  Glucophage [Metform*        Comment:\"problematic\"  Meperidine              NAUSEA ONLY, NAUSEA AND VOMITING  Penicillin G            HIVES  Statins Fatigue    • Fecal impaction in rectum (HCC) 2/19/2011   • Flatulence/gas pain/belching    • Frequent urination    • Gastric polyp 1/12/2011   • Gastroparesis 4/26/2012    primarily effecting insulin treatment   • GERD (gastroesophageal reflux disease) • Other and unspecified hyperlipidemia    • Perirectal discomfort 2/18/2011    perirectus discomfort   • Personal history of other malignant neoplasm of skin 5/19/2011   • Presbyesophagus 10/12/2010   • Problems with swallowing    • Rash    • Sinusitis polyp bx   • OTHER SURGICAL HISTORY      esophageal dilatation   • SKIN SURGERY  5/19/11    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 Any changes noted above.     Rajan Powers  8/14/2019  7:31 AM

## 2019-08-14 NOTE — ANESTHESIA PREPROCEDURE EVALUATION
PRE-OP EVALUATION    Patient Name: John Amin    Pre-op Diagnosis: Dysphagia, pharyngoesophageal phase [R13.14]    Procedure(s):  ESOPHAGOGASTRODUODENOSCOPY (EGD)    Surgeon(s) and Role:     * Rajan Powers, DO - Primary    Pre-op vitals reviewed. (three) times a week. Monday, Wednesday and Friday  Disp:  Rfl:    Potassium Chloride ER (K-DUR) 10 MEQ Oral Tab CR Take 10 mEq by mouth 2 (two) times daily. Disp:  Rfl:    Cholecalciferol (VITAMIN D3) 3000 UNITS Oral Tab Take 3,000 Units by mouth daily. HYSTERECTOMY  1967    partial   • KNEE REPLACEMENT SURGERY      R TKR-1995, L TKR-5/2007   • LUMBAR EPIDURAL N/A 3/27/2014    Performed by Tatum Carter MD at [address] [facility] N/A 2/7/2014    Performed by Tatum Carter,

## 2019-08-19 ENCOUNTER — HOSPITAL ENCOUNTER (OUTPATIENT)
Dept: LAB | Facility: HOSPITAL | Age: 84
Discharge: HOME | End: 2019-08-19
Attending: INTERNAL MEDICINE
Payer: MEDICARE

## 2019-08-19 ENCOUNTER — ANTI-COAG (OUTPATIENT)
Dept: CARDIOLOGY | Age: 84
End: 2019-08-19

## 2019-08-19 DIAGNOSIS — I48.0 PAROXYSMAL ATRIAL FIBRILLATION (HCC): ICD-10-CM

## 2019-08-19 DIAGNOSIS — I48.0 PAROXYSMAL ATRIAL FIBRILLATION (CMD): ICD-10-CM

## 2019-08-19 LAB
INR PPP: 1.5
POC INR: 1.5 (ref 0.8–1.3)

## 2019-08-19 PROCEDURE — 85610 PROTHROMBIN TIME: CPT

## 2019-08-23 ENCOUNTER — HOSPITAL ENCOUNTER (OUTPATIENT)
Dept: LAB | Facility: HOSPITAL | Age: 84
Discharge: HOME | End: 2019-08-23
Attending: INTERNAL MEDICINE
Payer: MEDICARE

## 2019-08-23 ENCOUNTER — ANTI-COAG (OUTPATIENT)
Dept: CARDIOLOGY | Age: 84
End: 2019-08-23

## 2019-08-23 DIAGNOSIS — I48.0 PAROXYSMAL ATRIAL FIBRILLATION (CMD): ICD-10-CM

## 2019-08-23 DIAGNOSIS — I48.0 PAROXYSMAL ATRIAL FIBRILLATION (HCC): ICD-10-CM

## 2019-08-23 LAB
INR PPP: 2.4
POC INR: 2.2 (ref 0.8–1.3)

## 2019-08-23 PROCEDURE — 85610 PROTHROMBIN TIME: CPT

## 2019-08-26 ENCOUNTER — OFFICE VISIT (OUTPATIENT)
Dept: CARDIOLOGY | Age: 84
End: 2019-08-26

## 2019-08-26 VITALS
HEIGHT: 60 IN | WEIGHT: 164 LBS | HEART RATE: 70 BPM | SYSTOLIC BLOOD PRESSURE: 130 MMHG | DIASTOLIC BLOOD PRESSURE: 70 MMHG | BODY MASS INDEX: 32.2 KG/M2

## 2019-08-26 DIAGNOSIS — Z95.5 HISTORY OF HEART ARTERY STENT: ICD-10-CM

## 2019-08-26 DIAGNOSIS — I10 HYPERTENSION, BENIGN: ICD-10-CM

## 2019-08-26 DIAGNOSIS — I25.10 CORONARY ARTERY DISEASE INVOLVING NATIVE CORONARY ARTERY OF NATIVE HEART WITHOUT ANGINA PECTORIS: Primary | ICD-10-CM

## 2019-08-26 DIAGNOSIS — Z79.01 ANTICOAGULATED ON COUMADIN: ICD-10-CM

## 2019-08-26 DIAGNOSIS — I48.20 CHRONIC ATRIAL FIBRILLATION (CMD): ICD-10-CM

## 2019-08-26 PROBLEM — I50.32 CHRONIC CONGESTIVE HEART FAILURE WITH LEFT VENTRICULAR DIASTOLIC DYSFUNCTION (CMD): Status: ACTIVE | Noted: 2018-01-08

## 2019-08-26 PROCEDURE — 99214 OFFICE O/P EST MOD 30 MIN: CPT | Performed by: INTERNAL MEDICINE

## 2019-08-28 ENCOUNTER — MED REC SCAN ONLY (OUTPATIENT)
Dept: INTERNAL MEDICINE CLINIC | Facility: CLINIC | Age: 84
End: 2019-08-28

## 2019-09-03 RX ORDER — LANCETS
EACH MISCELLANEOUS
Qty: 200 EACH | Refills: 1 | Status: SHIPPED | OUTPATIENT
Start: 2019-09-03 | End: 2020-02-17

## 2019-09-03 NOTE — TELEPHONE ENCOUNTER
Sotero Ochoa from [facility] called and says she sent a refill request over for patient for lancets. She says patient does have one refill left but they are missing the diagnosis code. Please send diagnosis code to pharmacy.

## 2019-09-06 ENCOUNTER — TELEPHONE (OUTPATIENT)
Dept: INTERNAL MEDICINE CLINIC | Facility: CLINIC | Age: 84
End: 2019-09-06

## 2019-09-06 ENCOUNTER — LAB ENCOUNTER (OUTPATIENT)
Dept: LAB | Age: 84
End: 2019-09-06
Attending: INTERNAL MEDICINE
Payer: MEDICARE

## 2019-09-06 ENCOUNTER — PRIOR ORIGINAL RECORDS (OUTPATIENT)
Dept: OTHER | Age: 84
End: 2019-09-06

## 2019-09-06 DIAGNOSIS — Z79.4 TYPE 2 DIABETES MELLITUS WITH COMPLICATION, WITH LONG-TERM CURRENT USE OF INSULIN (HCC): ICD-10-CM

## 2019-09-06 DIAGNOSIS — D50.9 IRON DEFICIENCY ANEMIA, UNSPECIFIED: Primary | ICD-10-CM

## 2019-09-06 DIAGNOSIS — K90.2 POSTOPERATIVE BLIND LOOP SYNDROME: ICD-10-CM

## 2019-09-06 DIAGNOSIS — I10 HTN (HYPERTENSION): ICD-10-CM

## 2019-09-06 DIAGNOSIS — E11.8 TYPE 2 DIABETES MELLITUS WITH COMPLICATION, WITH LONG-TERM CURRENT USE OF INSULIN (HCC): ICD-10-CM

## 2019-09-06 DIAGNOSIS — K90.9 IDIOPATHIC STEATORRHEA: ICD-10-CM

## 2019-09-06 LAB
ALBUMIN SERPL-MCNC: 3.7 G/DL (ref 3.4–5)
ALBUMIN/GLOB SERPL: 1.1 {RATIO} (ref 1–2)
ALP LIVER SERPL-CCNC: 79 U/L (ref 55–142)
ALT SERPL-CCNC: 22 U/L (ref 13–56)
ANION GAP SERPL CALC-SCNC: 7 MMOL/L (ref 0–18)
AST SERPL-CCNC: 17 U/L (ref 15–37)
BASOPHILS # BLD AUTO: 0.06 X10(3) UL (ref 0–0.2)
BASOPHILS NFR BLD AUTO: 0.7 %
BILIRUB SERPL-MCNC: 0.4 MG/DL (ref 0.1–2)
BUN BLD-MCNC: 16 MG/DL (ref 7–18)
BUN/CREAT SERPL: 18.4 (ref 10–20)
CALCIUM BLD-MCNC: 9.4 MG/DL (ref 8.5–10.1)
CHLORIDE SERPL-SCNC: 105 MMOL/L (ref 98–112)
CO2 SERPL-SCNC: 26 MMOL/L (ref 21–32)
CREAT BLD-MCNC: 0.87 MG/DL (ref 0.55–1.02)
DEPRECATED HBV CORE AB SER IA-ACNC: 65.3 NG/ML (ref 18–340)
DEPRECATED RDW RBC AUTO: 44.2 FL (ref 35.1–46.3)
EOSINOPHIL # BLD AUTO: 0.19 X10(3) UL (ref 0–0.7)
EOSINOPHIL NFR BLD AUTO: 2.2 %
ERYTHROCYTE [DISTWIDTH] IN BLOOD BY AUTOMATED COUNT: 14.1 % (ref 11–15)
GLOBULIN PLAS-MCNC: 3.5 G/DL (ref 2.8–4.4)
GLUCOSE BLD-MCNC: 181 MG/DL (ref 70–99)
HCT VFR BLD AUTO: 43.4 % (ref 35–48)
HGB BLD-MCNC: 14.3 G/DL (ref 12–16)
IMM GRANULOCYTES # BLD AUTO: 0.04 X10(3) UL (ref 0–1)
IMM GRANULOCYTES NFR BLD: 0.5 %
IRON SATURATION: 15 % (ref 15–50)
IRON SERPL-MCNC: 60 UG/DL (ref 50–170)
LYMPHOCYTES # BLD AUTO: 1.49 X10(3) UL (ref 1–4)
LYMPHOCYTES NFR BLD AUTO: 16.9 %
M PROTEIN MFR SERPL ELPH: 7.2 G/DL (ref 6.4–8.2)
MCH RBC QN AUTO: 28.6 PG (ref 26–34)
MCHC RBC AUTO-ENTMCNC: 32.9 G/DL (ref 31–37)
MCV RBC AUTO: 86.8 FL (ref 80–100)
MONOCYTES # BLD AUTO: 0.72 X10(3) UL (ref 0.1–1)
MONOCYTES NFR BLD AUTO: 8.2 %
NEUTROPHILS # BLD AUTO: 6.32 X10 (3) UL (ref 1.5–7.7)
NEUTROPHILS # BLD AUTO: 6.32 X10(3) UL (ref 1.5–7.7)
NEUTROPHILS NFR BLD AUTO: 71.5 %
OSMOLALITY SERPL CALC.SUM OF ELEC: 292 MOSM/KG (ref 275–295)
PLATELET # BLD AUTO: 213 10(3)UL (ref 150–450)
POTASSIUM SERPL-SCNC: 4.2 MMOL/L (ref 3.5–5.1)
RBC # BLD AUTO: 5 X10(6)UL (ref 3.8–5.3)
SODIUM SERPL-SCNC: 138 MMOL/L (ref 136–145)
TOTAL IRON BINDING CAPACITY: 401 UG/DL (ref 240–450)
TRANSFERRIN SERPL-MCNC: 269 MG/DL (ref 200–360)
VIT B12 SERPL-MCNC: 429 PG/ML (ref 193–986)
WBC # BLD AUTO: 8.8 X10(3) UL (ref 4–11)

## 2019-09-06 PROCEDURE — 80053 COMPREHEN METABOLIC PANEL: CPT

## 2019-09-06 PROCEDURE — 83540 ASSAY OF IRON: CPT

## 2019-09-06 PROCEDURE — 83921 ORGANIC ACID SINGLE QUANT: CPT

## 2019-09-06 PROCEDURE — 83550 IRON BINDING TEST: CPT

## 2019-09-06 PROCEDURE — 82607 VITAMIN B-12: CPT

## 2019-09-06 PROCEDURE — 36415 COLL VENOUS BLD VENIPUNCTURE: CPT

## 2019-09-06 PROCEDURE — 85025 COMPLETE CBC W/AUTO DIFF WBC: CPT

## 2019-09-06 PROCEDURE — 82728 ASSAY OF FERRITIN: CPT

## 2019-09-06 NOTE — TELEPHONE ENCOUNTER
Patient would like to speak to the nurse regarding her insulin doses. She states that every doctor has something different, and she would like to confirm what we have on file.

## 2019-09-06 NOTE — TELEPHONE ENCOUNTER
Confirmed with patient dose, it is correct in our system. Sent refill to pharmacy so that they will have correct dose on file.

## 2019-09-06 NOTE — TELEPHONE ENCOUNTER
Addi's pharmacy called and says they received a rx for insulin pens for patient but the patient uses the Lantus vials.  She asks that a nurse please send the correct rx over

## 2019-09-10 LAB — MMA: 0.52 UMOL/L

## 2019-09-26 ENCOUNTER — HOSPITAL ENCOUNTER (OUTPATIENT)
Dept: LAB | Facility: HOSPITAL | Age: 84
Discharge: HOME | End: 2019-09-26
Attending: INTERNAL MEDICINE
Payer: MEDICARE

## 2019-09-26 ENCOUNTER — ANTI-COAG (OUTPATIENT)
Dept: CARDIOLOGY | Age: 84
End: 2019-09-26

## 2019-09-26 DIAGNOSIS — I48.20 CHRONIC ATRIAL FIBRILLATION (CMD): ICD-10-CM

## 2019-09-26 DIAGNOSIS — I48.0 PAROXYSMAL ATRIAL FIBRILLATION (HCC): ICD-10-CM

## 2019-09-26 LAB
INR PPP: 2.7
POC INR: 2.7 (ref 0.8–1.3)

## 2019-09-26 PROCEDURE — 85610 PROTHROMBIN TIME: CPT

## 2019-10-01 RX ORDER — WARFARIN SODIUM 5 MG/1
TABLET ORAL
Qty: 90 TABLET | Refills: 1 | Status: SHIPPED | OUTPATIENT
Start: 2019-10-01 | End: 2020-03-16

## 2019-10-07 ENCOUNTER — TELEPHONE (OUTPATIENT)
Dept: INTERNAL MEDICINE CLINIC | Facility: CLINIC | Age: 84
End: 2019-10-07

## 2019-10-07 DIAGNOSIS — Z78.0 POSTMENOPAUSAL ESTROGEN DEFICIENCY: ICD-10-CM

## 2019-10-07 DIAGNOSIS — Z12.31 ENCOUNTER FOR SCREENING MAMMOGRAM FOR MALIGNANT NEOPLASM OF BREAST: Primary | ICD-10-CM

## 2019-10-25 ENCOUNTER — HOSPITAL ENCOUNTER (OUTPATIENT)
Dept: LAB | Facility: HOSPITAL | Age: 84
Discharge: HOME | End: 2019-10-25
Attending: INTERNAL MEDICINE
Payer: MEDICARE

## 2019-10-25 ENCOUNTER — ANTI-COAG (OUTPATIENT)
Dept: CARDIOLOGY | Age: 84
End: 2019-10-25

## 2019-10-25 DIAGNOSIS — I48.20 CHRONIC ATRIAL FIBRILLATION (CMD): ICD-10-CM

## 2019-10-25 DIAGNOSIS — I48.0 PAROXYSMAL ATRIAL FIBRILLATION (HCC): ICD-10-CM

## 2019-10-25 LAB — INR PPP: 2.4

## 2019-10-25 PROCEDURE — 85610 PROTHROMBIN TIME: CPT

## 2019-11-08 ENCOUNTER — HOSPITAL ENCOUNTER (OUTPATIENT)
Dept: MAMMOGRAPHY | Age: 84
Discharge: HOME | End: 2019-11-08
Attending: INTERNAL MEDICINE
Payer: MEDICARE

## 2019-11-08 ENCOUNTER — HOSPITAL ENCOUNTER (OUTPATIENT)
Dept: BONE DENSITY | Age: 84
Discharge: HOME | End: 2019-11-08
Attending: INTERNAL MEDICINE
Payer: MEDICARE

## 2019-11-08 DIAGNOSIS — Z12.31 ENCOUNTER FOR SCREENING MAMMOGRAM FOR MALIGNANT NEOPLASM OF BREAST: ICD-10-CM

## 2019-11-08 DIAGNOSIS — Z78.0 POSTMENOPAUSAL ESTROGEN DEFICIENCY: ICD-10-CM

## 2019-11-08 PROCEDURE — 77067 SCR MAMMO BI INCL CAD: CPT | Performed by: INTERNAL MEDICINE

## 2019-11-08 PROCEDURE — 77080 DXA BONE DENSITY AXIAL: CPT | Performed by: INTERNAL MEDICINE

## 2019-11-08 PROCEDURE — 77063 BREAST TOMOSYNTHESIS BI: CPT | Performed by: INTERNAL MEDICINE

## 2019-12-02 ENCOUNTER — ANTI-COAG (OUTPATIENT)
Dept: CARDIOLOGY | Age: 84
End: 2019-12-02

## 2019-12-02 ENCOUNTER — HOSPITAL ENCOUNTER (OUTPATIENT)
Dept: LAB | Facility: HOSPITAL | Age: 84
Discharge: HOME | End: 2019-12-02
Attending: INTERNAL MEDICINE
Payer: MEDICARE

## 2019-12-02 DIAGNOSIS — I48.20 CHRONIC ATRIAL FIBRILLATION (CMD): ICD-10-CM

## 2019-12-02 DIAGNOSIS — I48.0 PAROXYSMAL ATRIAL FIBRILLATION (HCC): ICD-10-CM

## 2019-12-02 LAB — INR PPP: 1.9

## 2019-12-02 PROCEDURE — 85610 PROTHROMBIN TIME: CPT

## 2019-12-04 ENCOUNTER — NURSE ONLY (OUTPATIENT)
Dept: INTERNAL MEDICINE CLINIC | Facility: CLINIC | Age: 84
End: 2019-12-04
Payer: MEDICARE

## 2019-12-04 ENCOUNTER — LAB ENCOUNTER (OUTPATIENT)
Dept: LAB | Age: 84
End: 2019-12-04
Attending: INTERNAL MEDICINE
Payer: MEDICARE

## 2019-12-04 DIAGNOSIS — Z00.00 LABORATORY EXAMINATION ORDERED AS PART OF A ROUTINE GENERAL MEDICAL EXAMINATION: Primary | ICD-10-CM

## 2019-12-04 DIAGNOSIS — E11.8 TYPE 2 DIABETES MELLITUS WITH COMPLICATION, WITH LONG-TERM CURRENT USE OF INSULIN (HCC): ICD-10-CM

## 2019-12-04 DIAGNOSIS — R69 DIAGNOSIS UNKNOWN: Primary | ICD-10-CM

## 2019-12-04 DIAGNOSIS — Z79.4 TYPE 2 DIABETES MELLITUS WITH COMPLICATION, WITH LONG-TERM CURRENT USE OF INSULIN (HCC): ICD-10-CM

## 2019-12-04 PROCEDURE — 94010 BREATHING CAPACITY TEST: CPT | Performed by: INTERNAL MEDICINE

## 2019-12-04 PROCEDURE — 82570 ASSAY OF URINE CREATININE: CPT | Performed by: INTERNAL MEDICINE

## 2019-12-04 PROCEDURE — 92551 PURE TONE HEARING TEST AIR: CPT | Performed by: INTERNAL MEDICINE

## 2019-12-04 PROCEDURE — 99173 VISUAL ACUITY SCREEN: CPT | Performed by: INTERNAL MEDICINE

## 2019-12-04 PROCEDURE — 93000 ELECTROCARDIOGRAM COMPLETE: CPT | Performed by: INTERNAL MEDICINE

## 2019-12-04 PROCEDURE — 82043 UR ALBUMIN QUANTITATIVE: CPT | Performed by: INTERNAL MEDICINE

## 2019-12-04 PROCEDURE — 81001 URINALYSIS AUTO W/SCOPE: CPT | Performed by: INTERNAL MEDICINE

## 2019-12-04 NOTE — PROGRESS NOTES
*LOOKIN' BODY RESULTS    YES:       NO: X      REASON TEST NOT PERFORMED: WAIVED       HEIGHT: 4'11 PER PT   WEIGHT: 160 PER PT  _____________________________________________________________________________  *VENIPUNCTURE    YES:       NO:  X      REASON V

## 2019-12-11 ENCOUNTER — MED REC SCAN ONLY (OUTPATIENT)
Dept: INTERNAL MEDICINE CLINIC | Facility: CLINIC | Age: 84
End: 2019-12-11

## 2019-12-11 RX ORDER — DIGOXIN 125 MCG
TABLET ORAL
Qty: 36 TABLET | Refills: 2 | Status: SHIPPED | OUTPATIENT
Start: 2019-12-11 | End: 2020-08-11

## 2019-12-12 ENCOUNTER — HOSPITAL ENCOUNTER (OUTPATIENT)
Dept: LAB | Facility: HOSPITAL | Age: 84
Discharge: HOME | End: 2019-12-12
Attending: INTERNAL MEDICINE
Payer: MEDICARE

## 2019-12-12 ENCOUNTER — ANTI-COAG (OUTPATIENT)
Dept: CARDIOLOGY | Age: 84
End: 2019-12-12

## 2019-12-12 DIAGNOSIS — I48.20 CHRONIC ATRIAL FIBRILLATION (CMD): ICD-10-CM

## 2019-12-12 LAB — INR PPP: 2.2

## 2019-12-14 LAB
AMB EXT CHOLESTEROL, TOTAL: 146 MG/DL
AMB EXT HDL CHOLESTEROL: 38 MG/DL
AMB EXT HGBA1C: 7.4 %
AMB EXT LDL CHOLESTEROL, DIRECT: 73 MG/DL
AMB EXT NON HDL CHOL: 108 MG/DL
AMB EXT TRIGLYCERIDES: 268 MG/DL

## 2019-12-14 NOTE — PROGRESS NOTES
HEALTH MAINTENANCE:      Immunization History   Administered Date(s) Administered   • Depo-Medrol 40mg Inj 11/17/2016, 01/26/2017   • FLU VACC High Dose 65 YRS & Older PRSV Free (36358) 10/17/2014, 09/23/2018   • HIGH DOSE FLU 65 YRS AND OLDER PRSV FREE SI

## 2019-12-16 ENCOUNTER — OFFICE VISIT (OUTPATIENT)
Dept: INTERNAL MEDICINE CLINIC | Facility: CLINIC | Age: 84
End: 2019-12-16
Payer: MEDICARE

## 2019-12-16 ENCOUNTER — TELEPHONE (OUTPATIENT)
Dept: INTERNAL MEDICINE CLINIC | Facility: CLINIC | Age: 84
End: 2019-12-16

## 2019-12-16 VITALS
BODY MASS INDEX: 32.25 KG/M2 | DIASTOLIC BLOOD PRESSURE: 80 MMHG | TEMPERATURE: 97 F | SYSTOLIC BLOOD PRESSURE: 127 MMHG | RESPIRATION RATE: 14 BRPM | HEIGHT: 59 IN | HEART RATE: 74 BPM | WEIGHT: 160 LBS | OXYGEN SATURATION: 95 %

## 2019-12-16 DIAGNOSIS — R73.09 ELEVATED HEMOGLOBIN A1C: ICD-10-CM

## 2019-12-16 DIAGNOSIS — I50.32 CHRONIC DIASTOLIC HEART FAILURE (HCC): ICD-10-CM

## 2019-12-16 DIAGNOSIS — Z00.00 ROUTINE GENERAL MEDICAL EXAMINATION AT A HEALTH CARE FACILITY: Primary | ICD-10-CM

## 2019-12-16 DIAGNOSIS — L57.0 ACTINIC KERATOSIS: ICD-10-CM

## 2019-12-16 DIAGNOSIS — Z86.010 HISTORY OF COLON POLYPS: ICD-10-CM

## 2019-12-16 DIAGNOSIS — F43.9 STRESS AT HOME: ICD-10-CM

## 2019-12-16 DIAGNOSIS — K22.2 ESOPHAGEAL STRICTURE: ICD-10-CM

## 2019-12-16 DIAGNOSIS — Z98.61 HISTORY OF PTCA: ICD-10-CM

## 2019-12-16 DIAGNOSIS — Z79.01 WARFARIN ANTICOAGULATION: ICD-10-CM

## 2019-12-16 DIAGNOSIS — I25.2 HISTORY OF MYOCARDIAL INFARCTION: ICD-10-CM

## 2019-12-16 DIAGNOSIS — R26.89 BALANCE DISORDER: ICD-10-CM

## 2019-12-16 DIAGNOSIS — Z85.828 PERSONAL HISTORY OF OTHER MALIGNANT NEOPLASM OF SKIN: ICD-10-CM

## 2019-12-16 DIAGNOSIS — I48.91 ATRIAL FIBRILLATION, UNSPECIFIED TYPE (HCC): ICD-10-CM

## 2019-12-16 DIAGNOSIS — R63.8 INCREASED BMI (BODY MASS INDEX): ICD-10-CM

## 2019-12-16 DIAGNOSIS — Z78.9 STATIN INTOLERANCE: ICD-10-CM

## 2019-12-16 DIAGNOSIS — M48.061 SPINAL STENOSIS OF LUMBAR REGION, UNSPECIFIED WHETHER NEUROGENIC CLAUDICATION PRESENT: ICD-10-CM

## 2019-12-16 DIAGNOSIS — Z79.4 TYPE 2 DIABETES MELLITUS WITH COMPLICATION, WITH LONG-TERM CURRENT USE OF INSULIN (HCC): ICD-10-CM

## 2019-12-16 DIAGNOSIS — I87.9 VENOUS DISEASE: ICD-10-CM

## 2019-12-16 DIAGNOSIS — R43.0 ANOSMIA: ICD-10-CM

## 2019-12-16 DIAGNOSIS — M46.96 UNSPECIFIED INFLAMMATORY SPONDYLOPATHY, LUMBAR REGION (HCC): ICD-10-CM

## 2019-12-16 DIAGNOSIS — E03.9 ACQUIRED HYPOTHYROIDISM: ICD-10-CM

## 2019-12-16 DIAGNOSIS — E11.8 TYPE 2 DIABETES MELLITUS WITH COMPLICATION, WITH LONG-TERM CURRENT USE OF INSULIN (HCC): ICD-10-CM

## 2019-12-16 DIAGNOSIS — Z86.718 HISTORY OF DVT (DEEP VEIN THROMBOSIS): ICD-10-CM

## 2019-12-16 DIAGNOSIS — I10 ESSENTIAL HYPERTENSION: ICD-10-CM

## 2019-12-16 PROBLEM — K76.0 FATTY LIVER: Status: ACTIVE | Noted: 2019-12-16

## 2019-12-16 PROBLEM — R16.0 HEPATOMEGALY: Status: ACTIVE | Noted: 2019-12-16

## 2019-12-16 PROCEDURE — G0439 PPPS, SUBSEQ VISIT: HCPCS | Performed by: INTERNAL MEDICINE

## 2019-12-16 RX ORDER — PAROXETINE 10 MG/1
5 TABLET, FILM COATED ORAL EVERY MORNING
Qty: 45 TABLET | Refills: 1 | Status: SHIPPED | OUTPATIENT
Start: 2019-12-16 | End: 2021-04-23

## 2019-12-16 NOTE — PROGRESS NOTES
HPI:    Patient ID: Etta Lemos is a 80year old female. HPI DEAR Denae    IT WAS NICE SEEING YOU FOR YOUR WELLNESS VISIT ON 12/16/2019           Review of Systems   HPI:    Patient ID: Etta Lemos is a 80year old female.     History of Present I \"Cognitive Assessment\" under Medicare Assessment section in Charting, test patient and document. Then, refresh your progress note to see your input here.   Cognitive Assessment                                     Immunizations  Immunization History   A esophageal dysfunction fax certain meds. History of polyps. Negative for nausea, vomiting, abdominal pain, diarrhea, blood in stool and abdominal distention. Denies GERD. No current liver disorder. No constipation.     Genitourinary: Negative for dysuria History of PTCA     Dermatophytosis, nail     History of colon polyps     Other hammer toe (acquired)     Spinal stenosis, lumbar     Warfarin anticoagulation     Stress at home     Statin intolerance     Type 2 diabetes mellitus with complication, with l Diverticulosis    • DVT of leg (deep venous thrombosis) ([facility] [address] 2007    post knee replacement   • Dysphagia 1/12/2011   • Endocrine disorder    • Esophageal dilatation 2011   • Esophageal reflux    • Esophageal stricture 4/26/2012    w/ variability in terms grade arthritis   • Obstipation 2/18/2012   • Onychomycosis 4/26/2012    severe, toenails, received podiatric treatment 2/29/2012 w/ Dr. Jazmyn Santos   • Osteoarthrosis, unspecified whether generalized or localized, unspecified site    • Osteoporosis 4/26/2012 Jenn Nascimento MD at [facility] N/A 2/7/2014    Performed by Katia Conrad MD at [address] [facility] ([facility])      kael bunions   • OTHER SURGICAL HISTORY      thyroid nodule; benign tumor on thyr Not on file      Number of children: Not on file      Years of education: Not on file      Highest education level: Not on file    Occupational History      Not on file    Social Needs      Financial resource strain: Not on file      Food insecurity: reviewed. Constitutional: Oriented to person, place, and time. No distress. HENT:  Normocephalic and atraumatic. Hearing and tympanic membranes normal.  Nose normal. Oropharynx is clear and moist. Dentition adequate.    There is hearing aids     Eyes aware of BMI of 32. Short stature makes weight issues difficult. I recommended more of a vegetable based diet also almonds avocado and olive oil and low-density foods. Try to work on that I know it is hard. I am aware that you are also on warfarin. and about 20% of the time. #4.  New complaint anosmia nothing on my exam is particularly helpful you will see Dr. Churchill Monday who you know well to be evaluated.       #5.  Chronic heart failure with preserved ejection fraction -followed by advocat order for that.       #18 other labs glucose 136 7.4 hemoglobin A1c 6    Vitamin D vitamin B12 normal glucose 136    All liver enzymes are normal despite effective hepatomegaly and known imaging of fatty liver excellent renal function creatinine 0.84 no sergo (thirty) days.  6 mL 0   • hydrocortisone 2.5 % External Cream Apply to the abodmen each evening 28 g 3   • BD INSULIN SYRINGE U/F 31G X 5/16\" 0.5 ML Does not apply Misc      • Nystatin (NYSTOP) 286131 UNIT/GM External Powder Apply  bid 60 g 1   • FUROSEMI NAUSEA ONLY, NAUSEA AND VOMITING  Penicillin G            HIVES  Statins                 PAIN    Comment:Reaction: muscle aches  Sulfa Drugs Cross R*    NAUSEA AND VOMITING    Comment:\"Sulfa\"  Cardizem                RASH   PHYSICAL EXAM:   Physical Ex

## 2019-12-16 NOTE — TELEPHONE ENCOUNTER
Pt was seen in our office today 12/16/2019. She has a question about the shots of the hepatitis series.

## 2019-12-20 ENCOUNTER — TELEPHONE (OUTPATIENT)
Dept: INTERNAL MEDICINE CLINIC | Facility: CLINIC | Age: 84
End: 2019-12-20

## 2019-12-20 NOTE — TELEPHONE ENCOUNTER
Patient has concerns about a new medication from Dr. Vania Boyd interacting with Coumadin, which she also takes. Please call patient.

## 2019-12-20 NOTE — TELEPHONE ENCOUNTER
Spoke with patient. Placed on low dose of Paxil 5mg. Concerned about interaction yovany Coumadin. Wants reassurance okay to continue.

## 2019-12-23 RX ORDER — FUROSEMIDE 20 MG/1
TABLET ORAL
Qty: 180 TABLET | Refills: 3 | Status: SHIPPED | OUTPATIENT
Start: 2019-12-23 | End: 2020-12-21

## 2019-12-23 NOTE — TELEPHONE ENCOUNTER
Requesting Furosemide   LOV: 12/16/19 cpe   Last Relevant Labs: pp  Filled: 12/19/18 #180 with 3 refills    Future Appointments   Date Time Provider Seth Mcqueen   12/30/2019 12:30 PM Marii Dyson [facility]   1/7/20

## 2019-12-31 ENCOUNTER — PRIOR ORIGINAL RECORDS (OUTPATIENT)
Dept: OTHER | Age: 84
End: 2019-12-31

## 2020-01-03 ENCOUNTER — TELEPHONE (OUTPATIENT)
Dept: INTERNAL MEDICINE CLINIC | Facility: CLINIC | Age: 85
End: 2020-01-03

## 2020-01-03 NOTE — TELEPHONE ENCOUNTER
Patient called and wants to know if we give the Hepatitis vaccines here in the office. She asks that a nurse please call her and let her know.

## 2020-01-10 ENCOUNTER — ANTI-COAG (OUTPATIENT)
Dept: CARDIOLOGY | Age: 85
End: 2020-01-10

## 2020-01-10 ENCOUNTER — HOSPITAL ENCOUNTER (OUTPATIENT)
Dept: LAB | Facility: HOSPITAL | Age: 85
Discharge: HOME | End: 2020-01-10
Attending: INTERNAL MEDICINE
Payer: MEDICARE

## 2020-01-10 DIAGNOSIS — I48.20 CHRONIC ATRIAL FIBRILLATION (CMD): ICD-10-CM

## 2020-01-10 DIAGNOSIS — Z79.01 LONG TERM (CURRENT) USE OF ANTICOAGULANTS: ICD-10-CM

## 2020-01-10 LAB
INR PPP: 2.1
POC INR: 2.1 (ref 0.8–1.3)

## 2020-01-10 PROCEDURE — 85610 PROTHROMBIN TIME: CPT

## 2020-01-14 ENCOUNTER — TELEPHONE (OUTPATIENT)
Dept: INTERNAL MEDICINE CLINIC | Facility: CLINIC | Age: 85
End: 2020-01-14

## 2020-01-15 RX ORDER — PEN NEEDLE, DIABETIC 29 G X1/2"
NEEDLE, DISPOSABLE MISCELLANEOUS
Qty: 90 EACH | Refills: 3 | Status: SHIPPED | OUTPATIENT
Start: 2020-01-15 | End: 2021-01-04

## 2020-01-15 NOTE — TELEPHONE ENCOUNTER
Requesting BD syringe   LOV: 12/16/19 cpe   Last Relevant Labs: pp  Filled: 7/22/19 #6ea with 0 refills    Future Appointments   Date Time Provider Seth Mcqueen   1/22/2020  1:00 PM Marii Dyson [facility]   3/9/2020

## 2020-01-22 ENCOUNTER — DIABETIC EDUCATION (OUTPATIENT)
Dept: ENDOCRINOLOGY CLINIC | Facility: CLINIC | Age: 85
End: 2020-01-22
Payer: MEDICARE

## 2020-01-22 VITALS — BODY MASS INDEX: 32.25 KG/M2 | HEIGHT: 59 IN | WEIGHT: 160 LBS

## 2020-01-22 DIAGNOSIS — Z79.4 TYPE 2 DIABETES MELLITUS WITH COMPLICATION, WITH LONG-TERM CURRENT USE OF INSULIN (HCC): Primary | ICD-10-CM

## 2020-01-22 DIAGNOSIS — E11.8 TYPE 2 DIABETES MELLITUS WITH COMPLICATION, WITH LONG-TERM CURRENT USE OF INSULIN (HCC): Primary | ICD-10-CM

## 2020-01-22 NOTE — PROGRESS NOTES
Manju Grullon  [id number] was seen for Diabetic Medical Nutrition Therapy:    Date: 1/22/2020  Referring Provider: Dr. Manjit Lamar  Start time: 1:00 End time: 1:30    Assessment: Ht 59\"   Wt 160 lb (72.6 kg)   BMI 32.32 kg/m²   HGBA1C (%)   Date Value   07/

## 2020-02-17 DIAGNOSIS — E11.8 DIABETES MELLITUS TYPE 2 WITH COMPLICATIONS (HCC): ICD-10-CM

## 2020-02-17 DIAGNOSIS — B35.1 ONYCHOMYCOSIS: ICD-10-CM

## 2020-02-17 RX ORDER — POTASSIUM CHLORIDE 750 MG/1
TABLET, EXTENDED RELEASE ORAL
Qty: 180 TABLET | Refills: 0 | Status: SHIPPED | OUTPATIENT
Start: 2020-02-17 | End: 2020-06-19

## 2020-02-17 RX ORDER — LANCETS
EACH MISCELLANEOUS
Qty: 200 EACH | Refills: 1 | Status: SHIPPED | OUTPATIENT
Start: 2020-02-17 | End: 2020-08-17

## 2020-02-21 ENCOUNTER — ANTI-COAG (OUTPATIENT)
Dept: CARDIOLOGY | Age: 85
End: 2020-02-21

## 2020-02-21 DIAGNOSIS — I48.20 CHRONIC ATRIAL FIBRILLATION (CMD): ICD-10-CM

## 2020-02-24 ENCOUNTER — ANTI-COAG (OUTPATIENT)
Dept: CARDIOLOGY | Age: 85
End: 2020-02-24

## 2020-02-24 ENCOUNTER — HOSPITAL ENCOUNTER (OUTPATIENT)
Dept: LAB | Facility: HOSPITAL | Age: 85
Discharge: HOME | End: 2020-02-24
Attending: INTERNAL MEDICINE
Payer: MEDICARE

## 2020-02-24 ENCOUNTER — OFFICE VISIT (OUTPATIENT)
Dept: CARDIOLOGY | Age: 85
End: 2020-02-24

## 2020-02-24 VITALS
HEIGHT: 59 IN | BODY MASS INDEX: 33.47 KG/M2 | WEIGHT: 166 LBS | SYSTOLIC BLOOD PRESSURE: 132 MMHG | HEART RATE: 64 BPM | DIASTOLIC BLOOD PRESSURE: 68 MMHG

## 2020-02-24 DIAGNOSIS — I48.20 CHRONIC ATRIAL FIBRILLATION (CMD): ICD-10-CM

## 2020-02-24 DIAGNOSIS — Z79.01 ANTICOAGULATED ON COUMADIN: ICD-10-CM

## 2020-02-24 DIAGNOSIS — Z95.5 HISTORY OF HEART ARTERY STENT: ICD-10-CM

## 2020-02-24 DIAGNOSIS — I25.10 CORONARY ARTERY DISEASE INVOLVING NATIVE CORONARY ARTERY OF NATIVE HEART WITHOUT ANGINA PECTORIS: Primary | ICD-10-CM

## 2020-02-24 DIAGNOSIS — Z79.01 LONG TERM (CURRENT) USE OF ANTICOAGULANTS: ICD-10-CM

## 2020-02-24 DIAGNOSIS — E78.2 MIXED HYPERLIPIDEMIA: ICD-10-CM

## 2020-02-24 DIAGNOSIS — I50.32 CHRONIC DIASTOLIC HEART FAILURE (CMD): ICD-10-CM

## 2020-02-24 DIAGNOSIS — I10 HYPERTENSION, BENIGN: ICD-10-CM

## 2020-02-24 LAB
INR PPP: 1.6 (ref 2–3)
POC INR: 1.6 (ref 0.8–1.3)

## 2020-02-24 PROCEDURE — 85610 PROTHROMBIN TIME: CPT

## 2020-02-24 PROCEDURE — 99214 OFFICE O/P EST MOD 30 MIN: CPT | Performed by: INTERNAL MEDICINE

## 2020-02-24 SDOH — HEALTH STABILITY: PHYSICAL HEALTH: ON AVERAGE, HOW MANY MINUTES DO YOU ENGAGE IN EXERCISE AT THIS LEVEL?: 10 MIN

## 2020-02-24 SDOH — HEALTH STABILITY: PHYSICAL HEALTH: ON AVERAGE, HOW MANY DAYS PER WEEK DO YOU ENGAGE IN MODERATE TO STRENUOUS EXERCISE (LIKE A BRISK WALK)?: 5 DAYS

## 2020-03-02 ENCOUNTER — HOSPITAL ENCOUNTER (OUTPATIENT)
Dept: LAB | Facility: HOSPITAL | Age: 85
Discharge: HOME | End: 2020-03-02
Attending: INTERNAL MEDICINE
Payer: MEDICARE

## 2020-03-02 ENCOUNTER — ANTI-COAG (OUTPATIENT)
Dept: CARDIOLOGY | Age: 85
End: 2020-03-02

## 2020-03-02 DIAGNOSIS — I48.20 CHRONIC ATRIAL FIBRILLATION (CMD): ICD-10-CM

## 2020-03-02 DIAGNOSIS — Z79.01 LONG TERM (CURRENT) USE OF ANTICOAGULANTS: ICD-10-CM

## 2020-03-02 LAB
INR PPP: 1.6 (ref 2–3)
POC INR: 1.6 (ref 0.8–1.3)

## 2020-03-02 PROCEDURE — 85610 PROTHROMBIN TIME: CPT

## 2020-03-09 ENCOUNTER — HOSPITAL ENCOUNTER (OUTPATIENT)
Dept: LAB | Facility: HOSPITAL | Age: 85
Discharge: HOME | End: 2020-03-09
Attending: INTERNAL MEDICINE
Payer: MEDICARE

## 2020-03-09 ENCOUNTER — ANTI-COAG (OUTPATIENT)
Dept: CARDIOLOGY | Age: 85
End: 2020-03-09

## 2020-03-09 DIAGNOSIS — Z79.01 LONG TERM (CURRENT) USE OF ANTICOAGULANTS: ICD-10-CM

## 2020-03-09 DIAGNOSIS — I48.20 CHRONIC ATRIAL FIBRILLATION (CMD): ICD-10-CM

## 2020-03-09 LAB
INR PPP: 2.4
POC INR: 2.4 (ref 0.8–1.3)

## 2020-03-09 PROCEDURE — 85610 PROTHROMBIN TIME: CPT

## 2020-03-16 RX ORDER — WARFARIN SODIUM 5 MG/1
TABLET ORAL
Qty: 90 TABLET | Refills: 1 | Status: SHIPPED | OUTPATIENT
Start: 2020-03-16 | End: 2020-09-09 | Stop reason: SDUPTHER

## 2020-03-16 RX ORDER — LOSARTAN POTASSIUM 100 MG/1
TABLET ORAL
Qty: 90 TABLET | Refills: 3 | Status: SHIPPED | OUTPATIENT
Start: 2020-03-16 | End: 2021-03-23

## 2020-03-16 NOTE — TELEPHONE ENCOUNTER
Requesting Losartan   LOV: 12/16/19 cpe   Last Relevant Labs: pp  Filled: 4/1/19 #90 with 3 refills    Future Appointments   Date Time Provider Seth Mcqueen   3/20/2020  1:00 PM [address]   5/11/2020  1:10 PM René

## 2020-03-19 RX ORDER — INSULIN GLARGINE 100 [IU]/ML
INJECTION, SOLUTION SUBCUTANEOUS
Qty: 20 ML | Refills: 2 | Status: SHIPPED | OUTPATIENT
Start: 2020-03-19 | End: 2020-10-06

## 2020-03-20 ENCOUNTER — HOSPITAL ENCOUNTER (OUTPATIENT)
Dept: LAB | Facility: HOSPITAL | Age: 85
Discharge: HOME | End: 2020-03-20
Attending: INTERNAL MEDICINE
Payer: MEDICARE

## 2020-03-20 ENCOUNTER — ANTI-COAG (OUTPATIENT)
Dept: CARDIOLOGY | Age: 85
End: 2020-03-20

## 2020-03-20 ENCOUNTER — HOSPITAL (OUTPATIENT)
Dept: OTHER | Age: 85
End: 2020-03-20

## 2020-03-20 DIAGNOSIS — I48.20 CHRONIC ATRIAL FIBRILLATION (CMD): ICD-10-CM

## 2020-03-20 DIAGNOSIS — Z79.01 LONG TERM (CURRENT) USE OF ANTICOAGULANTS: ICD-10-CM

## 2020-03-20 LAB
INR PPP: 2.2
POC INR: 2.2 (ref 0.8–1.3)

## 2020-03-20 PROCEDURE — 85610 PROTHROMBIN TIME: CPT

## 2020-03-31 ENCOUNTER — TELEPHONE (OUTPATIENT)
Dept: INTERNAL MEDICINE CLINIC | Facility: CLINIC | Age: 85
End: 2020-03-31

## 2020-03-31 RX ORDER — LEVOTHYROXINE SODIUM 0.07 MG/1
75 TABLET ORAL
Qty: 90 TABLET | Refills: 3 | Status: SHIPPED | OUTPATIENT
Start: 2020-03-31 | End: 2021-06-07

## 2020-04-01 ENCOUNTER — HOSPITAL (OUTPATIENT)
Dept: OTHER | Age: 85
End: 2020-04-01

## 2020-04-17 ENCOUNTER — ANTI-COAG (OUTPATIENT)
Dept: CARDIOLOGY | Age: 85
End: 2020-04-17

## 2020-04-17 DIAGNOSIS — I48.20 CHRONIC ATRIAL FIBRILLATION (CMD): ICD-10-CM

## 2020-04-20 ENCOUNTER — VIRTUAL PHONE E/M (OUTPATIENT)
Dept: INTERNAL MEDICINE CLINIC | Facility: CLINIC | Age: 85
End: 2020-04-20
Payer: MEDICARE

## 2020-04-20 DIAGNOSIS — Z79.01 WARFARIN ANTICOAGULATION: ICD-10-CM

## 2020-04-20 DIAGNOSIS — Z78.9 STATIN INTOLERANCE: ICD-10-CM

## 2020-04-20 DIAGNOSIS — I87.9 VENOUS DISEASE: ICD-10-CM

## 2020-04-20 DIAGNOSIS — E11.8 TYPE 2 DIABETES MELLITUS WITH COMPLICATION, WITH LONG-TERM CURRENT USE OF INSULIN (HCC): Primary | ICD-10-CM

## 2020-04-20 DIAGNOSIS — I48.91 ATRIAL FIBRILLATION, UNSPECIFIED TYPE (HCC): ICD-10-CM

## 2020-04-20 DIAGNOSIS — R16.0 HEPATOMEGALY: ICD-10-CM

## 2020-04-20 DIAGNOSIS — M47.816 ARTHRITIS, LUMBAR SPINE: ICD-10-CM

## 2020-04-20 DIAGNOSIS — Z79.4 TYPE 2 DIABETES MELLITUS WITH COMPLICATION, WITH LONG-TERM CURRENT USE OF INSULIN (HCC): Primary | ICD-10-CM

## 2020-04-20 DIAGNOSIS — K22.2 ESOPHAGEAL STRICTURE: ICD-10-CM

## 2020-04-20 DIAGNOSIS — I50.32 CHRONIC HEART FAILURE WITH PRESERVED EJECTION FRACTION (HCC): ICD-10-CM

## 2020-04-20 DIAGNOSIS — K76.0 FATTY LIVER: ICD-10-CM

## 2020-04-20 PROCEDURE — 99442 PHONE E/M BY PHYS 11-20 MIN: CPT | Performed by: INTERNAL MEDICINE

## 2020-04-20 NOTE — PROGRESS NOTES
Please note that the following visit was completed using two-way, real-time interactive audio and/or video communication.   This has been done in good venkat to provide continuity of care in the best interest of the provider-patient relationship, due to the can do later. Generally doing okay good ventricle.   Dr. hooper will have to change will give her some ideas history of iron deficiency anemia      Oratory data before visit in June will include CBC hemoglobin A1c thyroid etc. and also renal function

## 2020-04-21 ENCOUNTER — HOSPITAL ENCOUNTER (OUTPATIENT)
Dept: LAB | Facility: HOSPITAL | Age: 85
Discharge: HOME | End: 2020-04-21
Attending: INTERNAL MEDICINE
Payer: MEDICARE

## 2020-04-21 DIAGNOSIS — Z79.01 LONG TERM (CURRENT) USE OF ANTICOAGULANTS: ICD-10-CM

## 2020-04-21 LAB — INR PPP: 2.2

## 2020-04-21 PROCEDURE — 85610 PROTHROMBIN TIME: CPT

## 2020-04-23 ENCOUNTER — ANTI-COAG (OUTPATIENT)
Dept: CARDIOLOGY | Age: 85
End: 2020-04-23

## 2020-04-23 DIAGNOSIS — I48.20 CHRONIC ATRIAL FIBRILLATION (CMD): ICD-10-CM

## 2020-05-01 ENCOUNTER — HOSPITAL (OUTPATIENT)
Dept: OTHER | Age: 85
End: 2020-05-01

## 2020-05-20 ENCOUNTER — TELEPHONE (OUTPATIENT)
Dept: CARDIOLOGY | Age: 85
End: 2020-05-20

## 2020-05-22 ENCOUNTER — ANTI-COAG (OUTPATIENT)
Dept: CARDIOLOGY | Age: 85
End: 2020-05-22

## 2020-05-22 ENCOUNTER — HOSPITAL ENCOUNTER (OUTPATIENT)
Dept: LAB | Facility: HOSPITAL | Age: 85
Discharge: HOME | End: 2020-05-22
Attending: INTERNAL MEDICINE
Payer: MEDICARE

## 2020-05-22 DIAGNOSIS — Z79.01 LONG TERM (CURRENT) USE OF ANTICOAGULANTS: ICD-10-CM

## 2020-05-22 DIAGNOSIS — I48.20 CHRONIC ATRIAL FIBRILLATION (CMD): ICD-10-CM

## 2020-05-22 LAB — INR PPP: 2.1

## 2020-05-22 PROCEDURE — 85610 PROTHROMBIN TIME: CPT

## 2020-06-01 ENCOUNTER — HOSPITAL (OUTPATIENT)
Dept: OTHER | Age: 85
End: 2020-06-01

## 2020-06-15 RX ORDER — BLOOD SUGAR DIAGNOSTIC
STRIP MISCELLANEOUS
Qty: 400 STRIP | Refills: 1 | Status: SHIPPED | OUTPATIENT
Start: 2020-06-15 | End: 2021-05-12

## 2020-06-19 DIAGNOSIS — B35.1 ONYCHOMYCOSIS: ICD-10-CM

## 2020-06-19 DIAGNOSIS — E11.8 DIABETES MELLITUS TYPE 2 WITH COMPLICATIONS (HCC): ICD-10-CM

## 2020-06-19 RX ORDER — POTASSIUM CHLORIDE 750 MG/1
TABLET, EXTENDED RELEASE ORAL
Qty: 180 TABLET | Refills: 3 | Status: SHIPPED | OUTPATIENT
Start: 2020-06-19 | End: 2021-06-22

## 2020-06-22 ENCOUNTER — HOSPITAL ENCOUNTER (OUTPATIENT)
Dept: LAB | Facility: HOSPITAL | Age: 85
Discharge: HOME | End: 2020-06-22
Attending: INTERNAL MEDICINE
Payer: MEDICARE

## 2020-06-22 DIAGNOSIS — Z79.01 LONG TERM (CURRENT) USE OF ANTICOAGULANTS: ICD-10-CM

## 2020-06-22 LAB — INR PPP: 2.1

## 2020-06-22 PROCEDURE — 85610 PROTHROMBIN TIME: CPT

## 2020-06-23 ENCOUNTER — ANTI-COAG (OUTPATIENT)
Dept: CARDIOLOGY | Age: 85
End: 2020-06-23

## 2020-06-23 DIAGNOSIS — I48.20 CHRONIC ATRIAL FIBRILLATION (CMD): ICD-10-CM

## 2020-07-01 ENCOUNTER — HOSPITAL (OUTPATIENT)
Dept: OTHER | Age: 85
End: 2020-07-01
Attending: INTERNAL MEDICINE

## 2020-07-22 ENCOUNTER — TELEPHONE (OUTPATIENT)
Dept: INTERNAL MEDICINE CLINIC | Facility: CLINIC | Age: 85
End: 2020-07-22

## 2020-07-23 ENCOUNTER — TELEPHONE (OUTPATIENT)
Dept: INTERNAL MEDICINE CLINIC | Facility: CLINIC | Age: 85
End: 2020-07-23

## 2020-07-23 DIAGNOSIS — R73.09 ELEVATED HEMOGLOBIN A1C: ICD-10-CM

## 2020-07-23 DIAGNOSIS — I87.9 VENOUS DISEASE: Primary | ICD-10-CM

## 2020-07-23 DIAGNOSIS — D50.9 IRON DEFICIENCY ANEMIA, UNSPECIFIED IRON DEFICIENCY ANEMIA TYPE: ICD-10-CM

## 2020-07-23 DIAGNOSIS — E03.9 ACQUIRED HYPOTHYROIDISM: ICD-10-CM

## 2020-07-23 NOTE — TELEPHONE ENCOUNTER
Patient Moreno Rosen wanted to know did Dr. Crocker Hint order labs for her?  Or does she need labs> Please call

## 2020-07-27 ENCOUNTER — HOSPITAL ENCOUNTER (OUTPATIENT)
Dept: LAB | Facility: HOSPITAL | Age: 85
Discharge: HOME | End: 2020-07-27
Attending: INTERNAL MEDICINE
Payer: MEDICARE

## 2020-07-27 DIAGNOSIS — Z79.01 LONG TERM (CURRENT) USE OF ANTICOAGULANTS: ICD-10-CM

## 2020-07-27 LAB
INR PPP: 2.3
POC INR: 2.3 (ref 0.8–1.3)

## 2020-07-27 PROCEDURE — 85610 PROTHROMBIN TIME: CPT

## 2020-07-28 ENCOUNTER — ANTI-COAG (OUTPATIENT)
Dept: CARDIOLOGY | Age: 85
End: 2020-07-28

## 2020-07-28 DIAGNOSIS — I48.20 CHRONIC ATRIAL FIBRILLATION (CMD): ICD-10-CM

## 2020-08-04 ENCOUNTER — APPOINTMENT (OUTPATIENT)
Dept: LAB | Age: 85
End: 2020-08-04
Attending: PHARMACIST
Payer: MEDICARE

## 2020-08-04 DIAGNOSIS — D50.9 IRON DEFICIENCY ANEMIA, UNSPECIFIED IRON DEFICIENCY ANEMIA TYPE: ICD-10-CM

## 2020-08-04 DIAGNOSIS — I87.9 VENOUS DISEASE: ICD-10-CM

## 2020-08-04 DIAGNOSIS — I10 ESSENTIAL HYPERTENSION: ICD-10-CM

## 2020-08-04 DIAGNOSIS — R73.09 ELEVATED HEMOGLOBIN A1C: ICD-10-CM

## 2020-08-04 DIAGNOSIS — E03.9 ACQUIRED HYPOTHYROIDISM: ICD-10-CM

## 2020-08-04 LAB
ANION GAP SERPL CALC-SCNC: 3 MMOL/L (ref 0–18)
BASOPHILS # BLD AUTO: 0.07 X10(3) UL (ref 0–0.2)
BASOPHILS NFR BLD AUTO: 0.7 %
BUN BLD-MCNC: 12 MG/DL (ref 7–18)
BUN/CREAT SERPL: 14 (ref 10–20)
CALCIUM BLD-MCNC: 9.7 MG/DL (ref 8.5–10.1)
CHLORIDE SERPL-SCNC: 105 MMOL/L (ref 98–112)
CO2 SERPL-SCNC: 29 MMOL/L (ref 21–32)
CREAT BLD-MCNC: 0.86 MG/DL (ref 0.55–1.02)
DEPRECATED RDW RBC AUTO: 45.5 FL (ref 35.1–46.3)
EOSINOPHIL # BLD AUTO: 0.21 X10(3) UL (ref 0–0.7)
EOSINOPHIL NFR BLD AUTO: 2.2 %
ERYTHROCYTE [DISTWIDTH] IN BLOOD BY AUTOMATED COUNT: 14.1 % (ref 11–15)
EST. AVERAGE GLUCOSE BLD GHB EST-MCNC: 163 MG/DL (ref 68–126)
GLUCOSE BLD-MCNC: 171 MG/DL (ref 70–99)
HBA1C MFR BLD HPLC: 7.3 % (ref ?–5.7)
HCT VFR BLD AUTO: 43.5 % (ref 35–48)
HGB BLD-MCNC: 13.6 G/DL (ref 12–16)
IMM GRANULOCYTES # BLD AUTO: 0.05 X10(3) UL (ref 0–1)
IMM GRANULOCYTES NFR BLD: 0.5 %
LYMPHOCYTES # BLD AUTO: 1.33 X10(3) UL (ref 1–4)
LYMPHOCYTES NFR BLD AUTO: 14.2 %
MCH RBC QN AUTO: 27.4 PG (ref 26–34)
MCHC RBC AUTO-ENTMCNC: 31.3 G/DL (ref 31–37)
MCV RBC AUTO: 87.7 FL (ref 80–100)
MONOCYTES # BLD AUTO: 0.67 X10(3) UL (ref 0.1–1)
MONOCYTES NFR BLD AUTO: 7.2 %
NEUTROPHILS # BLD AUTO: 7.01 X10 (3) UL (ref 1.5–7.7)
NEUTROPHILS # BLD AUTO: 7.01 X10(3) UL (ref 1.5–7.7)
NEUTROPHILS NFR BLD AUTO: 75.2 %
OSMOLALITY SERPL CALC.SUM OF ELEC: 288 MOSM/KG (ref 275–295)
PATIENT FASTING Y/N/NP: NO
PLATELET # BLD AUTO: 212 10(3)UL (ref 150–450)
POTASSIUM SERPL-SCNC: 4.4 MMOL/L (ref 3.5–5.1)
RBC # BLD AUTO: 4.96 X10(6)UL (ref 3.8–5.3)
SODIUM SERPL-SCNC: 137 MMOL/L (ref 136–145)
T4 FREE SERPL-MCNC: 1 NG/DL (ref 0.8–1.7)
TSI SER-ACNC: 2.43 MIU/ML (ref 0.36–3.74)
WBC # BLD AUTO: 9.3 X10(3) UL (ref 4–11)

## 2020-08-04 PROCEDURE — 83036 HEMOGLOBIN GLYCOSYLATED A1C: CPT

## 2020-08-04 PROCEDURE — 84439 ASSAY OF FREE THYROXINE: CPT

## 2020-08-04 PROCEDURE — 80048 BASIC METABOLIC PNL TOTAL CA: CPT

## 2020-08-04 PROCEDURE — 36415 COLL VENOUS BLD VENIPUNCTURE: CPT

## 2020-08-04 PROCEDURE — 85025 COMPLETE CBC W/AUTO DIFF WBC: CPT

## 2020-08-04 PROCEDURE — 84443 ASSAY THYROID STIM HORMONE: CPT

## 2020-08-05 NOTE — PROGRESS NOTES
Is looking good at the present time.   Hemoglobin A1c 7.3 actually over the last 4 years is the best 1 you have had based on her cardiac issues I am not cannot push anymore your blood count looks good your chemistries kidney excellent function

## 2020-08-11 RX ORDER — DIGOXIN 125 MCG
TABLET ORAL
Qty: 36 TABLET | Refills: 2 | Status: SHIPPED | OUTPATIENT
Start: 2020-08-11 | End: 2020-09-09 | Stop reason: SDUPTHER

## 2020-08-17 ENCOUNTER — TELEPHONE (OUTPATIENT)
Dept: INTERNAL MEDICINE CLINIC | Facility: CLINIC | Age: 85
End: 2020-08-17

## 2020-08-17 RX ORDER — HYDROCORTISONE 10 MG/G
1 CREAM TOPICAL 3 TIMES DAILY
Qty: 28.4 G | Refills: 0 | Status: SHIPPED | OUTPATIENT
Start: 2020-08-17 | End: 2021-06-22 | Stop reason: ALTCHOICE

## 2020-08-17 RX ORDER — LANCETS
EACH MISCELLANEOUS
Qty: 200 EACH | Refills: 1 | Status: SHIPPED | OUTPATIENT
Start: 2020-08-17 | End: 2021-01-19

## 2020-08-17 NOTE — TELEPHONE ENCOUNTER
PC to p t    Advised to get 1% hydrocortisone and 1% lidocaine hemorrhoid cream OTC    Pt verbalized understanding.

## 2020-08-17 NOTE — TELEPHONE ENCOUNTER
Patient called and asks that a nurse please return her phone call. She has questions about her medications.

## 2020-08-17 NOTE — TELEPHONE ENCOUNTER
Patient called again and says she was told by pharmacy that a prescription for hydrocortisone would have to be sent in. Because it is 2.5%, it requires a rx and cannot be purchased over the counter.

## 2020-08-17 NOTE — TELEPHONE ENCOUNTER
Patient called again. Says she spoke to pharmacy and pharmacist seemed confused. Patient asks that a nurse please call pharmacy to clarify what medication patient is trying to request for a refill.  Viridiana, the pharmacist, can be reached at [phone]

## 2020-08-24 ENCOUNTER — OFFICE VISIT (OUTPATIENT)
Dept: CARDIOLOGY | Age: 85
End: 2020-08-24

## 2020-08-24 VITALS — WEIGHT: 159 LBS | SYSTOLIC BLOOD PRESSURE: 124 MMHG | DIASTOLIC BLOOD PRESSURE: 60 MMHG | BODY MASS INDEX: 32.11 KG/M2

## 2020-08-24 DIAGNOSIS — I25.10 CORONARY ARTERY DISEASE INVOLVING NATIVE CORONARY ARTERY OF NATIVE HEART WITHOUT ANGINA PECTORIS: Primary | ICD-10-CM

## 2020-08-24 DIAGNOSIS — E78.2 MIXED HYPERLIPIDEMIA: ICD-10-CM

## 2020-08-24 DIAGNOSIS — I50.32 CHRONIC DIASTOLIC HEART FAILURE (CMD): ICD-10-CM

## 2020-08-24 DIAGNOSIS — Z79.01 ANTICOAGULATED ON COUMADIN: ICD-10-CM

## 2020-08-24 DIAGNOSIS — I48.20 CHRONIC ATRIAL FIBRILLATION (CMD): ICD-10-CM

## 2020-08-24 DIAGNOSIS — I10 HYPERTENSION, BENIGN: ICD-10-CM

## 2020-08-24 DIAGNOSIS — Z95.5 HISTORY OF HEART ARTERY STENT: ICD-10-CM

## 2020-08-24 PROCEDURE — 99442 TELEPHONE E&M BY PHYSICIAN EST PT NOT ORIG PREV 7 DAYS 11-20 MIN: CPT | Performed by: INTERNAL MEDICINE

## 2020-08-24 SDOH — HEALTH STABILITY: PHYSICAL HEALTH: ON AVERAGE, HOW MANY MINUTES DO YOU ENGAGE IN EXERCISE AT THIS LEVEL?: 10 MIN

## 2020-08-24 SDOH — HEALTH STABILITY: PHYSICAL HEALTH: ON AVERAGE, HOW MANY DAYS PER WEEK DO YOU ENGAGE IN MODERATE TO STRENUOUS EXERCISE (LIKE A BRISK WALK)?: 5 DAYS

## 2020-09-02 DIAGNOSIS — I48.20 CHRONIC ATRIAL FIBRILLATION (CMD): Primary | ICD-10-CM

## 2020-09-02 DIAGNOSIS — Z79.01 ANTICOAGULATED ON COUMADIN: ICD-10-CM

## 2020-09-03 ENCOUNTER — ANTI-COAG (OUTPATIENT)
Dept: CARDIOLOGY | Age: 85
End: 2020-09-03

## 2020-09-03 ENCOUNTER — HOSPITAL ENCOUNTER (OUTPATIENT)
Dept: LAB | Facility: HOSPITAL | Age: 85
Discharge: HOME | End: 2020-09-03
Attending: INTERNAL MEDICINE
Payer: MEDICARE

## 2020-09-03 DIAGNOSIS — I48.20 CHRONIC ATRIAL FIBRILLATION (CMD): ICD-10-CM

## 2020-09-03 LAB — INR PPP: 3

## 2020-09-10 ENCOUNTER — TELEPHONE (OUTPATIENT)
Dept: CARDIOLOGY | Age: 85
End: 2020-09-10

## 2020-09-10 ENCOUNTER — VIRTUAL PHONE E/M (OUTPATIENT)
Dept: INTERNAL MEDICINE CLINIC | Facility: CLINIC | Age: 85
End: 2020-09-10
Payer: MEDICARE

## 2020-09-10 DIAGNOSIS — R16.0 HEPATOMEGALY: Primary | ICD-10-CM

## 2020-09-10 PROCEDURE — G2012 BRIEF CHECK IN BY MD/QHP: HCPCS | Performed by: INTERNAL MEDICINE

## 2020-09-10 RX ORDER — WARFARIN SODIUM 5 MG/1
5 TABLET ORAL DAILY
Qty: 90 TABLET | Refills: 1 | Status: SHIPPED | OUTPATIENT
Start: 2020-09-10 | End: 2021-04-05

## 2020-09-10 RX ORDER — DIGOXIN 125 MCG
125 TABLET ORAL
Qty: 36 TABLET | Refills: 3 | Status: SHIPPED | OUTPATIENT
Start: 2020-09-11 | End: 2021-05-11 | Stop reason: ALTCHOICE

## 2020-09-10 NOTE — PROGRESS NOTES
Virtual Telephone Check-In        Please note that the following visit was completed using two-way, real-time interactive audio and/or video communication.   This has been done in good venkat to provide continuity of care in the best interest of the provider

## 2020-10-02 ENCOUNTER — ANTI-COAG (OUTPATIENT)
Dept: CARDIOLOGY | Age: 85
End: 2020-10-02

## 2020-10-02 ENCOUNTER — HOSPITAL ENCOUNTER (OUTPATIENT)
Dept: LAB | Facility: HOSPITAL | Age: 85
Discharge: HOME | End: 2020-10-02
Attending: INTERNAL MEDICINE
Payer: MEDICARE

## 2020-10-02 DIAGNOSIS — I48.20 CHRONIC ATRIAL FIBRILLATION (CMD): ICD-10-CM

## 2020-10-02 DIAGNOSIS — I48.20 CHRONIC ATRIAL FIBRILLATION (HCC): ICD-10-CM

## 2020-10-02 LAB — INR PPP: 2.5

## 2020-10-02 PROCEDURE — 85610 PROTHROMBIN TIME: CPT

## 2020-10-06 RX ORDER — INSULIN GLARGINE 100 [IU]/ML
INJECTION, SOLUTION SUBCUTANEOUS
Qty: 20 ML | Refills: 2 | Status: SHIPPED | OUTPATIENT
Start: 2020-10-06 | End: 2021-01-05

## 2020-10-09 LAB
% SATURATION: 19 % (CALC) (ref 11–50)
% SATURATION: 19 % (CALC) (ref 11–50)
% SATURATION: 21 % (CALC) (ref 11–50)
% SATURATION: 28 % (CALC) (ref 11–50)
% SATURATION: 9 % (CALC) (ref 11–50)
ALBUMIN/GLOB SERPL: 1.6 (CALC) (ref 1–2.5)
ALBUMIN/GLOB SERPL: 1.6 (CALC) (ref 1–2.5)
ALBUMIN/GLOB SERPL: 1.7 (CALC) (ref 1–2.5)
ALBUMIN/GLOB SERPL: 1.8 (CALC) (ref 1–2.5)
ALBUMIN/GLOB SERPL: 2 (CALC) (ref 1–2.5)
ALBUMIN: 3.9 G/DL (ref 3.6–5.1)
ALBUMIN: 4 G/DL (ref 3.6–5.1)
ALBUMIN: 4 G/DL (ref 3.6–5.1)
ALBUMIN: 4.1 G/DL (ref 3.6–5.1)
ALBUMIN: 4.1 G/DL (ref 3.6–5.1)
ALKALINE PHOSPHATASE: 55 UNIT/L (ref 33–130)
ALKALINE PHOSPHATASE: 57 UNIT/L (ref 33–130)
ALKALINE PHOSPHATASE: 58 UNIT/L (ref 33–130)
ALKALINE PHOSPHATASE: 71 UNIT/L (ref 33–130)
ALKALINE PHOSPHATASE: 74 UNIT/L (ref 33–130)
ALT: 12 UNIT/L (ref 6–29)
ALT: 16 UNIT/L (ref 6–29)
ALT: 17 UNIT/L (ref 6–29)
AST: 18 UNIT/L (ref 10–35)
AST: 18 UNIT/L (ref 10–35)
AST: 19 UNIT/L (ref 10–35)
AST: 20 UNIT/L (ref 10–35)
AST: 20 UNIT/L (ref 10–35)
BASO%: 0.4 %
BASO%: 0.4 %
BASO%: 0.5 %
BASO: 0 10^3/UL
BILIRUBIN, TOTAL: 0.4 MG/DL (ref 0.2–1.2)
BILIRUBIN, TOTAL: 0.5 MG/DL (ref 0.2–1.2)
BILIRUBIN, TOTAL: 0.5 MG/DL (ref 0.2–1.2)
BUN/CREATININE RATIO: 13 (CALC) (ref 6–22)
BUN/CREATININE RATIO: 15 (CALC) (ref 6–22)
BUN/CREATININE RATIO: ABNORMAL (CALC) (ref 6–22)
BUN/CREATININE RATIO: ABNORMAL (CALC) (ref 6–22)
BUN/CREATININE RATIO: NORMAL (CALC) (ref 6–22)
CALCIUM: 9.3 MG/DL (ref 8.6–10.4)
CALCIUM: 9.3 MG/DL (ref 8.6–10.4)
CALCIUM: 9.5 MG/DL (ref 8.6–10.4)
CALCIUM: 9.5 MG/DL (ref 8.6–10.4)
CALCIUM: 9.7 MG/DL (ref 8.6–10.4)
CARBON DIOXIDE: 20 MMOL/L (ref 20–31)
CARBON DIOXIDE: 22 MMOL/L (ref 20–31)
CARBON DIOXIDE: 24 MMOL/L (ref 20–31)
CARBON DIOXIDE: 24 MMOL/L (ref 20–31)
CARBON DIOXIDE: 24 MMOL/L (ref 20–32)
CHLORIDE: 103 MMOL/L (ref 98–110)
CHLORIDE: 103 MMOL/L (ref 98–110)
CHLORIDE: 104 MMOL/L (ref 98–110)
CHLORIDE: 104 MMOL/L (ref 98–110)
CHLORIDE: 105 MMOL/L (ref 98–110)
CRCL (C&G) (MOSAIQ HL): 50.6 ML/MIN
CRCL (C&G) (MOSAIQ HL): 51.83 ML/MIN
CRCL (C&G) (MOSAIQ HL): 58.11 ML/MIN
CRCL (C&G) (MOSAIQ HL): 59.9 ML/MIN
CRCL (C&G) (MOSAIQ HL): 61.12 ML/MIN
CREATININE CLEARANCE (MOSAIQ HL): 42.1 ML/MIN
CREATININE CLEARANCE (MOSAIQ HL): 42.2 ML/MIN
CREATININE CLEARANCE (MOSAIQ HL): 47.6 ML/MIN
CREATININE CLEARANCE (MOSAIQ HL): 48.4 ML/MIN
CREATININE CLEARANCE (MOSAIQ HL): 51 ML/MIN
CREATININE: 0.77 MG/DL (ref 0.6–0.88)
CREATININE: 0.84 MG/DL (ref 0.6–0.88)
CREATININE: 0.84 MG/DL (ref 0.6–0.88)
CREATININE: 0.93 MG/DL (ref 0.6–0.88)
CREATININE: 0.95 MG/DL (ref 0.6–0.88)
EGFR AFRICAN AMERICAN: 62 ML/MIN/1.73M2
EGFR AFRICAN AMERICAN: 64 ML/MIN/1.73M2
EGFR AFRICAN AMERICAN: 72 ML/MIN/1.73M2
EGFR AFRICAN AMERICAN: 73 ML/MIN/1.73M2
EGFR AFRICAN AMERICAN: 80 ML/MIN/1.73M2
EGFR NON-AFR. AMERICAN: 54 ML/MIN/1.73M2
EGFR NON-AFR. AMERICAN: 55 ML/MIN/1.73M2
EGFR NON-AFR. AMERICAN: 62 ML/MIN/1.73M2
EGFR NON-AFR. AMERICAN: 63 ML/MIN/1.73M2
EGFR NON-AFR. AMERICAN: 69 ML/MIN/1.73M2
EOS%: 2.3 %
EOS%: 2.6 %
EOS%: 2.7 %
EOS%: 2.7 %
EOS%: 2.9 %
EOS%: 2.9 %
EOS: 0.2 10^3/UL
FERRITIN: 10 NG/ML (ref 20–288)
FERRITIN: 112 NG/ML (ref 20–288)
FERRITIN: 147 NG/ML (ref 20–288)
FERRITIN: 16 NG/ML (ref 20–288)
FERRITIN: 161 NG/ML (ref 20–288)
FOLATE, SERUM: >24 NG/ML
GLOBULIN: 2.1 G/DL (CALC) (ref 1.9–3.7)
GLOBULIN: 2.3 G/DL (CALC) (ref 1.9–3.7)
GLOBULIN: 2.3 G/DL (CALC) (ref 1.9–3.7)
GLOBULIN: 2.5 G/DL (CALC) (ref 1.9–3.7)
GLOBULIN: 2.5 G/DL (CALC) (ref 1.9–3.7)
GLUCOSE: 110 MG/DL (ref 65–99)
GLUCOSE: 116 MG/DL (ref 65–99)
GLUCOSE: 173 MG/DL (ref 65–99)
GLUCOSE: 206 MG/DL (ref 65–99)
GLUCOSE: 97 MG/DL (ref 65–99)
HCT: 33.5 % (ref 38–54)
HCT: 37.5 % (ref 38–54)
HCT: 40.4 % (ref 38–54)
HCT: 40.6 % (ref 38–54)
HCT: 40.6 % (ref 38–54)
HCT: 43.3 % (ref 38–54)
HGB: 10.5 G/DL (ref 12–18)
HGB: 12.3 G/DL (ref 12–18)
HGB: 13.3 G/DL (ref 12–18)
HGB: 13.6 G/DL (ref 12–18)
HGB: 13.8 G/DL (ref 12–18)
HGB: 14.6 G/DL (ref 12–18)
IRON BINDING CAPACITY: 289 MCG/DL (CALC) (ref 250–450)
IRON BINDING CAPACITY: 312 MCG/DL (CALC) (ref 250–450)
IRON BINDING CAPACITY: 319 MCG/DL (CALC) (ref 250–450)
IRON BINDING CAPACITY: 413 MCG/DL (CALC) (ref 250–450)
IRON BINDING CAPACITY: 431 MCG/DL (CALC) (ref 250–450)
IRON, TOTAL: 39 MCG/DL (ref 45–160)
IRON, TOTAL: 62 MCG/DL (ref 45–160)
IRON, TOTAL: 64 MCG/DL (ref 45–160)
IRON, TOTAL: 78 MCG/DL (ref 45–160)
IRON, TOTAL: 82 MCG/DL (ref 45–160)
LYMPH%: 14.8 % (ref 12–44)
LYMPH%: 14.8 % (ref 12–44)
LYMPH%: 15.7 % (ref 12–44)
LYMPH%: 15.8 % (ref 12–44)
LYMPH%: 15.9 % (ref 12–44)
LYMPH%: 16 % (ref 12–44)
LYMPH: 1.2 10^3/UL (ref 0.8–2.8)
LYMPH: 1.2 10^3/UL (ref 0.8–2.8)
LYMPH: 1.3 10^3/UL (ref 0.8–2.8)
Lab: NEGATIVE
MCH: 23.3 PG (ref 26–33)
MCH: 27.1 PG (ref 26–33)
MCH: 27.1 PG (ref 26–33)
MCH: 29 PG (ref 26–33)
MCH: 29.6 PG (ref 26–33)
MCH: 31.2 PG (ref 26–33)
MCHC: 31.3 G/DL (ref 31–36)
MCHC: 32.8 G/DL (ref 31–36)
MCHC: 32.9 G/DL (ref 31–36)
MCHC: 33.5 G/DL (ref 31–36)
MCHC: 33.7 G/DL (ref 31–36)
MCHC: 34 G/DL (ref 31–36)
MCV: 74.3 FML (ref 82–100)
MCV: 80.3 FML (ref 82–100)
MCV: 82.6 FML (ref 82–100)
MCV: 88 FML (ref 82–100)
MCV: 88.3 FML (ref 82–100)
MCV: 91.6 FML (ref 82–100)
METHYLMALONIC ACID: 471 NMOL/L (ref 87–318)
MONO%: 6.9 % (ref 2–12)
MONO%: 7.2 % (ref 2–12)
MONO%: 7.8 % (ref 2–12)
MONO%: 7.8 % (ref 2–12)
MONO%: 7.9 % (ref 2–12)
MONO%: 8.5 % (ref 2–12)
MONO: 0.6 10^3/UL (ref 0.2–1)
MONO: 0.7 10^3/UL (ref 0.2–1)
MONO: 0.7 10^3/UL (ref 0.2–1)
MPV: 10 FML (ref 8.6–11.7)
MPV: 10.2 FML (ref 8.6–11.7)
MPV: 10.4 FML (ref 8.6–11.7)
MPV: 9.1 FML (ref 8.6–11.7)
MPV: 9.6 FML (ref 8.6–11.7)
MPV: 9.9 FML (ref 8.6–11.7)
NEUT%: 73 % (ref 47–76)
NEUT%: 73 % (ref 47–76)
NEUT%: 73.6 % (ref 47–76)
NEUT%: 73.7 % (ref 47–76)
NEUT%: 74.2 % (ref 47–76)
NEUT%: 74.5 % (ref 47–76)
NEUT: 5.6 10^3/UL (ref 1.5–7.1)
NEUT: 5.7 10^3/UL (ref 1.5–7.1)
NEUT: 6 10^3/UL (ref 1.5–7.1)
NEUT: 6.2 10^3/UL (ref 1.5–7.1)
NEUT: 6.3 10^3/UL (ref 1.5–7.1)
NEUT: 6.3 10^3/UL (ref 1.5–7.1)
PLT: 189 10^3/UL (ref 150–375)
PLT: 190 10^3/UL (ref 150–375)
PLT: 196 10^3/UL (ref 150–375)
PLT: 199 10^3/UL (ref 150–375)
PLT: 199 10^3/UL (ref 150–375)
PLT: 233 10^3/UL (ref 150–375)
POTASSIUM: 4 MMOL/L (ref 3.5–5.3)
POTASSIUM: 4 MMOL/L (ref 3.5–5.3)
POTASSIUM: 4.2 MMOL/L (ref 3.5–5.3)
POTASSIUM: 4.4 MMOL/L (ref 3.5–5.3)
POTASSIUM: 4.4 MMOL/L (ref 3.5–5.3)
PROTEIN, TOTAL: 6.2 G/DL (ref 6.1–8.1)
PROTEIN, TOTAL: 6.3 G/DL (ref 6.1–8.1)
PROTEIN, TOTAL: 6.4 G/DL (ref 6.1–8.1)
PROTEIN, TOTAL: 6.4 G/DL (ref 6.1–8.1)
PROTEIN, TOTAL: 6.5 G/DL (ref 6.1–8.1)
RBC: 4.43 10^6/UL (ref 4.2–6.2)
RBC: 4.51 10^6/UL (ref 4.2–6.2)
RBC: 4.54 10^6/UL (ref 4.2–6.2)
RBC: 4.59 10^6/UL (ref 4.2–6.2)
RBC: 4.6 10^6/UL (ref 4.2–6.2)
RBC: 5.39 10^6/UL (ref 4.2–6.2)
RDW-CV: 13.4 %
RDW-CV: 13.7 %
RDW-CV: 13.9 %
RDW-CV: 15.3 %
RDW-CV: 15.4 %
RDW-CV: 22.8 %
RDW-SD: 41.6 FML (ref 36–50)
RDW-SD: 43.8 FML (ref 36–50)
RDW-SD: 44.1 FML (ref 36–50)
RDW-SD: 44.5 FML (ref 36–50)
RDW-SD: 45.9 FML (ref 36–50)
RDW-SD: 65.8 FML (ref 36–50)
SODIUM: 137 MMOL/L (ref 135–146)
SODIUM: 139 MMOL/L (ref 135–146)
UREA NITROGEN (BUN): 10 MG/DL (ref 7–25)
UREA NITROGEN (BUN): 11 MG/DL (ref 7–25)
UREA NITROGEN (BUN): 12 MG/DL (ref 7–25)
UREA NITROGEN (BUN): 12 MG/DL (ref 7–25)
UREA NITROGEN (BUN): 14 MG/DL (ref 7–25)
VITAMIN B12: 265 PG/ML (ref 200–1100)
WBC: 7.7 10^3/UL (ref 4.3–11)
WBC: 7.8 10^3/UL (ref 4.3–11)
WBC: 8.2 10^3/UL (ref 4.3–11)
WBC: 8.4 10^3/UL (ref 4.3–11)
WBC: 8.4 10^3/UL (ref 4.3–11)
WBC: 8.5 10^3/UL (ref 4.3–11)

## 2020-10-11 VITALS — BODY MASS INDEX: 32.03 KG/M2 | SYSTOLIC BLOOD PRESSURE: 153 MMHG | WEIGHT: 164 LBS | DIASTOLIC BLOOD PRESSURE: 83 MMHG

## 2020-10-11 VITALS
BODY MASS INDEX: 35.08 KG/M2 | DIASTOLIC BLOOD PRESSURE: 80 MMHG | HEIGHT: 59 IN | SYSTOLIC BLOOD PRESSURE: 136 MMHG | WEIGHT: 174.01 LBS

## 2020-10-11 VITALS
WEIGHT: 169 LBS | DIASTOLIC BLOOD PRESSURE: 77 MMHG | SYSTOLIC BLOOD PRESSURE: 158 MMHG | HEIGHT: 59 IN | BODY MASS INDEX: 34.07 KG/M2

## 2020-10-11 VITALS — DIASTOLIC BLOOD PRESSURE: 84 MMHG | BODY MASS INDEX: 33.88 KG/M2 | WEIGHT: 173.5 LBS | SYSTOLIC BLOOD PRESSURE: 154 MMHG

## 2020-10-11 VITALS
WEIGHT: 169 LBS | BODY MASS INDEX: 34.07 KG/M2 | DIASTOLIC BLOOD PRESSURE: 80 MMHG | HEIGHT: 59 IN | SYSTOLIC BLOOD PRESSURE: 142 MMHG

## 2020-10-11 VITALS — BODY MASS INDEX: 33.59 KG/M2 | DIASTOLIC BLOOD PRESSURE: 88 MMHG | SYSTOLIC BLOOD PRESSURE: 142 MMHG | WEIGHT: 172 LBS

## 2020-11-11 ENCOUNTER — HOSPITAL ENCOUNTER (OUTPATIENT)
Dept: LAB | Facility: HOSPITAL | Age: 85
Discharge: HOME | End: 2020-11-11
Attending: INTERNAL MEDICINE
Payer: MEDICARE

## 2020-11-11 DIAGNOSIS — I48.20 CHRONIC ATRIAL FIBRILLATION (HCC): ICD-10-CM

## 2020-11-11 PROCEDURE — 85610 PROTHROMBIN TIME: CPT

## 2020-11-12 ENCOUNTER — ANTI-COAG (OUTPATIENT)
Dept: CARDIOLOGY | Age: 85
End: 2020-11-12

## 2020-11-12 DIAGNOSIS — I48.20 CHRONIC ATRIAL FIBRILLATION (CMD): ICD-10-CM

## 2020-11-12 LAB — INR PPP: 2.9

## 2020-12-07 ENCOUNTER — TELEPHONE (OUTPATIENT)
Dept: ENDOCRINOLOGY CLINIC | Facility: CLINIC | Age: 85
End: 2020-12-07

## 2020-12-07 ENCOUNTER — TELEPHONE (OUTPATIENT)
Dept: INTERNAL MEDICINE CLINIC | Facility: CLINIC | Age: 85
End: 2020-12-07

## 2020-12-07 DIAGNOSIS — E11.8 DIABETIC COMPLICATION (HCC): Primary | ICD-10-CM

## 2020-12-07 NOTE — TELEPHONE ENCOUNTER
PT called to see if she could make an appt. She had a referral. It is going to  12/15/2020 .  Called patient back to let her know she needs to call and get new referall

## 2020-12-07 NOTE — TELEPHONE ENCOUNTER
Pt called asking for a referral to see Nikia Mayberry in [facility]. She sees her a few times a year, but a referral is needed to schedule appointment.      Please fax referral to [phone]

## 2020-12-11 ENCOUNTER — TELEPHONE (OUTPATIENT)
Dept: ENDOCRINOLOGY CLINIC | Facility: CLINIC | Age: 85
End: 2020-12-11

## 2020-12-11 ENCOUNTER — VIRTUAL PHONE E/M (OUTPATIENT)
Dept: ENDOCRINOLOGY CLINIC | Facility: CLINIC | Age: 85
End: 2020-12-11
Payer: MEDICARE

## 2020-12-11 DIAGNOSIS — E11.8 TYPE 2 DIABETES MELLITUS WITH COMPLICATION, WITH LONG-TERM CURRENT USE OF INSULIN (HCC): Primary | ICD-10-CM

## 2020-12-11 DIAGNOSIS — Z79.4 TYPE 2 DIABETES MELLITUS WITH COMPLICATION, WITH LONG-TERM CURRENT USE OF INSULIN (HCC): Primary | ICD-10-CM

## 2020-12-11 NOTE — TELEPHONE ENCOUNTER
Pt is taking 16 units of Lantus each morning (she self-reduced from 18). She will reduce to 14 units (she was hesitant to go below 15 units on her own) as she sometimes wakes up with BG's 70-95. She knows how to treat with 4 oz OJ.  Had lost 5# but now gain

## 2020-12-14 ENCOUNTER — ANTI-COAG (OUTPATIENT)
Dept: CARDIOLOGY | Age: 85
End: 2020-12-14

## 2020-12-14 ENCOUNTER — HOSPITAL ENCOUNTER (OUTPATIENT)
Dept: LAB | Facility: HOSPITAL | Age: 85
Discharge: HOME | End: 2020-12-14
Attending: INTERNAL MEDICINE
Payer: MEDICARE

## 2020-12-14 DIAGNOSIS — I48.20 CHRONIC ATRIAL FIBRILLATION (HCC): ICD-10-CM

## 2020-12-14 DIAGNOSIS — I48.20 CHRONIC ATRIAL FIBRILLATION (CMD): ICD-10-CM

## 2020-12-14 LAB — INR PPP: 2.4

## 2020-12-14 PROCEDURE — 85610 PROTHROMBIN TIME: CPT

## 2020-12-18 ENCOUNTER — TELEPHONE (OUTPATIENT)
Dept: ENDOCRINOLOGY CLINIC | Facility: CLINIC | Age: 85
End: 2020-12-18

## 2020-12-18 NOTE — TELEPHONE ENCOUNTER
Pt on 14 units of Lantus (reduced from 16) and still FBS's 90 (and this is with having 1/2 or 1 entire can of Ensure at HS + sometimes 3 oz coke). She declined reducing from 14 to 10 units in one step. Will reduce to 12 and call her back on 12/24.

## 2020-12-21 RX ORDER — FUROSEMIDE 20 MG/1
TABLET ORAL
Qty: 180 TABLET | Refills: 3 | Status: SHIPPED | OUTPATIENT
Start: 2020-12-21 | End: 2021-10-05

## 2020-12-24 NOTE — TELEPHONE ENCOUNTER
Pt taking 12 units Lantus. Still has to eat something at HS to keep BG's at 200 before she goes to bed. She has gained weight and would like to avoid having to eat. She will reduce Lantus to 10 units and will call her back on 12/31.

## 2020-12-31 ENCOUNTER — PRIOR ORIGINAL RECORDS (OUTPATIENT)
Dept: OTHER | Age: 85
End: 2020-12-31

## 2020-12-31 NOTE — TELEPHONE ENCOUNTER
Pt taking 10 units of Lantus at HS. FBS's 116, 110. She will stay with 10 units and will call pcp office or [address] for any questions or problems.

## 2021-01-01 ENCOUNTER — EXTERNAL RECORD (OUTPATIENT)
Dept: HEALTH INFORMATION MANAGEMENT | Facility: OTHER | Age: 86
End: 2021-01-01

## 2021-01-04 RX ORDER — PEN NEEDLE, DIABETIC 29 G X1/2"
NEEDLE, DISPOSABLE MISCELLANEOUS
Qty: 90 EACH | Refills: 3 | Status: SHIPPED | OUTPATIENT
Start: 2021-01-04 | End: 2021-04-23

## 2021-01-05 RX ORDER — INSULIN GLARGINE 100 [IU]/ML
10 INJECTION, SOLUTION SUBCUTANEOUS NIGHTLY
Qty: 20 ML | Refills: 2 | Status: SHIPPED | OUTPATIENT
Start: 2021-01-05 | End: 2021-04-23

## 2021-01-05 NOTE — TELEPHONE ENCOUNTER
Pending prescription w instructions of last e-script:  \"inject 18u daily at night time\"    Per TE dated 12/18/20 from diabetic educator, pt was reduced down to 10u of lantus at HS. Ok to switch order w new directions?    E-script w new directions pend

## 2021-01-14 ENCOUNTER — HOSPITAL ENCOUNTER (OUTPATIENT)
Dept: LAB | Facility: HOSPITAL | Age: 86
Discharge: HOME | End: 2021-01-14
Attending: INTERNAL MEDICINE
Payer: MEDICARE

## 2021-01-14 DIAGNOSIS — I48.20 CHRONIC ATRIAL FIBRILLATION (HCC): ICD-10-CM

## 2021-01-14 LAB
INR PPP: 2.6
POC INR: 2.6 (ref 0.8–1.3)

## 2021-01-14 PROCEDURE — 85610 PROTHROMBIN TIME: CPT

## 2021-01-15 ENCOUNTER — ANTI-COAG (OUTPATIENT)
Dept: CARDIOLOGY | Age: 86
End: 2021-01-15

## 2021-01-15 DIAGNOSIS — I48.20 CHRONIC ATRIAL FIBRILLATION (CMD): ICD-10-CM

## 2021-01-19 DIAGNOSIS — E11.8 TYPE 2 DIABETES MELLITUS WITH UNSPECIFIED COMPLICATIONS (HCC): ICD-10-CM

## 2021-01-19 RX ORDER — LANCETS
EACH MISCELLANEOUS
Qty: 200 EACH | Refills: 3 | Status: SHIPPED | OUTPATIENT
Start: 2021-01-19 | End: 2021-11-29

## 2021-02-18 ENCOUNTER — ANTI-COAG (OUTPATIENT)
Dept: CARDIOLOGY | Age: 86
End: 2021-02-18

## 2021-02-18 ENCOUNTER — HOSPITAL ENCOUNTER (OUTPATIENT)
Dept: LAB | Facility: HOSPITAL | Age: 86
Discharge: HOME | End: 2021-02-18
Attending: INTERNAL MEDICINE
Payer: MEDICARE

## 2021-02-18 DIAGNOSIS — I48.20 CHRONIC ATRIAL FIBRILLATION (HCC): ICD-10-CM

## 2021-02-18 DIAGNOSIS — I48.20 CHRONIC ATRIAL FIBRILLATION (CMD): ICD-10-CM

## 2021-02-18 LAB
INR PPP: 2.4
POC INR: 2.4 (ref 0.8–1.3)

## 2021-02-18 PROCEDURE — 85610 PROTHROMBIN TIME: CPT

## 2021-03-01 ENCOUNTER — MED REC SCAN ONLY (OUTPATIENT)
Dept: INTERNAL MEDICINE CLINIC | Facility: CLINIC | Age: 86
End: 2021-03-01

## 2021-03-03 DIAGNOSIS — Z23 NEED FOR VACCINATION: ICD-10-CM

## 2021-03-11 ENCOUNTER — TELEPHONE (OUTPATIENT)
Dept: INTERNAL MEDICINE CLINIC | Facility: CLINIC | Age: 86
End: 2021-03-11

## 2021-03-12 ENCOUNTER — TELEPHONE (OUTPATIENT)
Dept: CARDIOLOGY | Age: 86
End: 2021-03-12

## 2021-03-23 RX ORDER — LOSARTAN POTASSIUM 100 MG/1
TABLET ORAL
Qty: 90 TABLET | Refills: 3 | Status: SHIPPED | OUTPATIENT
Start: 2021-03-23

## 2021-04-01 ENCOUNTER — ANTI-COAG (OUTPATIENT)
Dept: CARDIOLOGY | Age: 86
End: 2021-04-01

## 2021-04-01 ENCOUNTER — HOSPITAL ENCOUNTER (OUTPATIENT)
Dept: LAB | Facility: HOSPITAL | Age: 86
Discharge: HOME | End: 2021-04-01
Attending: INTERNAL MEDICINE
Payer: MEDICARE

## 2021-04-01 DIAGNOSIS — I48.20 CHRONIC ATRIAL FIBRILLATION (HCC): ICD-10-CM

## 2021-04-01 DIAGNOSIS — I48.20 CHRONIC ATRIAL FIBRILLATION (CMD): ICD-10-CM

## 2021-04-01 LAB — INR PPP: 2.7

## 2021-04-01 PROCEDURE — 85610 PROTHROMBIN TIME: CPT

## 2021-04-05 ENCOUNTER — TELEPHONE (OUTPATIENT)
Dept: INTERNAL MEDICINE CLINIC | Facility: CLINIC | Age: 86
End: 2021-04-05

## 2021-04-05 RX ORDER — WARFARIN SODIUM 5 MG/1
TABLET ORAL
Qty: 90 TABLET | Refills: 1 | Status: SHIPPED | OUTPATIENT
Start: 2021-04-05

## 2021-04-05 RX ORDER — INSULIN GLARGINE 100 [IU]/ML
10 INJECTION, SOLUTION SUBCUTANEOUS NIGHTLY
Qty: 1 BOX | Refills: 0 | Status: SHIPPED | OUTPATIENT
Start: 2021-04-05 | End: 2021-07-07

## 2021-04-05 NOTE — TELEPHONE ENCOUNTER
Pt called in states gives herself insulin shots but she previously has not been able to do them because of the arthritis in her hands.  She is wondering if she can try out the insulin pen but she does not want to start with a large quantity of them because

## 2021-04-05 NOTE — TELEPHONE ENCOUNTER
Spoke to pt and advise insulin pen boxes are dispensed w 5 pens each box, and per pharmacist are unable to split boxes. Pt agreeable and ok to try Lantus pens, as vials have been hard to administered due to arthritis. Med pending.

## 2021-04-12 ENCOUNTER — APPOINTMENT (OUTPATIENT)
Dept: CARDIOLOGY | Age: 86
End: 2021-04-12

## 2021-04-17 ENCOUNTER — TELEPHONE (OUTPATIENT)
Dept: INTERNAL MEDICINE CLINIC | Facility: CLINIC | Age: 86
End: 2021-04-17

## 2021-04-17 DIAGNOSIS — I48.91 ATRIAL FIBRILLATION, UNSPECIFIED TYPE (HCC): ICD-10-CM

## 2021-04-17 DIAGNOSIS — Z51.81 ENCOUNTER FOR MONITORING DIGOXIN THERAPY: Primary | ICD-10-CM

## 2021-04-17 DIAGNOSIS — Z79.899 ENCOUNTER FOR MONITORING DIGOXIN THERAPY: Primary | ICD-10-CM

## 2021-04-17 RX ORDER — ALPRAZOLAM 0.25 MG/1
0.25 TABLET ORAL NIGHTLY PRN
Qty: 30 TABLET | Refills: 1 | Status: SHIPPED | OUTPATIENT
Start: 2021-04-17 | End: 2021-06-22 | Stop reason: ALTCHOICE

## 2021-04-17 NOTE — TELEPHONE ENCOUNTER
BPs in 160s and HR up to 90s. Hx HTN, CHF, DM, Afib. Will double metoprolol 25 to 50 bid, add Xanax prn for functional overlay(on Paxil). Told monitor BP target BP <576 systolic. Followup  upcoming week.

## 2021-04-19 NOTE — TELEPHONE ENCOUNTER
Per pt bp readings are \"better\". After taking meds this morning bp reading 142/70    Pt also informed taking Digoxin due to a-fib  Pt asking if ok to continue taking it or hold it as HR has been at 50 the last few days.      No sob or trouble breathing

## 2021-04-19 NOTE — TELEPHONE ENCOUNTER
Per Dr Carl Molina to hold digoxin and have pt serum levels drawn Friday, 1 hr prior to apt w Dr Jaswinder Rachel. Left message on pt's ph to call our office back and inform above.

## 2021-04-23 ENCOUNTER — NURSE ONLY (OUTPATIENT)
Dept: INTERNAL MEDICINE CLINIC | Facility: CLINIC | Age: 86
End: 2021-04-23
Payer: MEDICARE

## 2021-04-23 ENCOUNTER — OFFICE VISIT (OUTPATIENT)
Dept: INTERNAL MEDICINE CLINIC | Facility: CLINIC | Age: 86
End: 2021-04-23
Payer: MEDICARE

## 2021-04-23 ENCOUNTER — LAB ENCOUNTER (OUTPATIENT)
Dept: LAB | Age: 86
End: 2021-04-23
Attending: INTERNAL MEDICINE
Payer: MEDICARE

## 2021-04-23 VITALS
TEMPERATURE: 97 F | OXYGEN SATURATION: 96 % | HEART RATE: 79 BPM | DIASTOLIC BLOOD PRESSURE: 80 MMHG | RESPIRATION RATE: 16 BRPM | BODY MASS INDEX: 34.69 KG/M2 | HEIGHT: 57.5 IN | SYSTOLIC BLOOD PRESSURE: 152 MMHG | WEIGHT: 163 LBS

## 2021-04-23 DIAGNOSIS — K22.2 ESOPHAGEAL STRICTURE: ICD-10-CM

## 2021-04-23 DIAGNOSIS — Z79.01 WARFARIN ANTICOAGULATION: ICD-10-CM

## 2021-04-23 DIAGNOSIS — I25.10 CORONARY ARTERY DISEASE INVOLVING NATIVE CORONARY ARTERY OF NATIVE HEART WITHOUT ANGINA PECTORIS: ICD-10-CM

## 2021-04-23 DIAGNOSIS — I10 ESSENTIAL HYPERTENSION: ICD-10-CM

## 2021-04-23 DIAGNOSIS — E53.8 VITAMIN B 12 DEFICIENCY: ICD-10-CM

## 2021-04-23 DIAGNOSIS — I72.8 SPLENIC ARTERY ANEURYSM (HCC): ICD-10-CM

## 2021-04-23 DIAGNOSIS — Z79.4 UNCONTROLLED TYPE 2 DIABETES MELLITUS WITH HYPERGLYCEMIA, WITH LONG-TERM CURRENT USE OF INSULIN (HCC): ICD-10-CM

## 2021-04-23 DIAGNOSIS — Z97.4 WEARS HEARING AID: ICD-10-CM

## 2021-04-23 DIAGNOSIS — Z13.31 DEPRESSION SCREENING: ICD-10-CM

## 2021-04-23 DIAGNOSIS — E78.00 HYPERCHOLESTEROLEMIA: ICD-10-CM

## 2021-04-23 DIAGNOSIS — Z78.9 STATIN INTOLERANCE: ICD-10-CM

## 2021-04-23 DIAGNOSIS — E03.9 ACQUIRED HYPOTHYROIDISM: ICD-10-CM

## 2021-04-23 DIAGNOSIS — Z51.81 ENCOUNTER FOR MONITORING DIGOXIN THERAPY: ICD-10-CM

## 2021-04-23 DIAGNOSIS — I48.91 ATRIAL FIBRILLATION, UNSPECIFIED TYPE (HCC): ICD-10-CM

## 2021-04-23 DIAGNOSIS — K21.9 GASTROESOPHAGEAL REFLUX DISEASE WITHOUT ESOPHAGITIS: ICD-10-CM

## 2021-04-23 DIAGNOSIS — I50.32 CHRONIC HEART FAILURE WITH PRESERVED EJECTION FRACTION (HCC): ICD-10-CM

## 2021-04-23 DIAGNOSIS — Z79.899 ENCOUNTER FOR MONITORING DIGOXIN THERAPY: ICD-10-CM

## 2021-04-23 DIAGNOSIS — Z86.718 HISTORY OF DVT (DEEP VEIN THROMBOSIS): ICD-10-CM

## 2021-04-23 DIAGNOSIS — M46.96 INFLAMMATORY SPONDYLOPATHY OF LUMBAR REGION (HCC): ICD-10-CM

## 2021-04-23 DIAGNOSIS — R26.89 FUNCTIONAL GAIT ABNORMALITY: ICD-10-CM

## 2021-04-23 DIAGNOSIS — Z00.00 ENCOUNTER FOR ANNUAL HEALTH EXAMINATION: Primary | ICD-10-CM

## 2021-04-23 DIAGNOSIS — M85.89 OSTEOPENIA OF MULTIPLE SITES: ICD-10-CM

## 2021-04-23 DIAGNOSIS — Z86.010 HISTORY OF COLON POLYPS: ICD-10-CM

## 2021-04-23 DIAGNOSIS — K57.90 DIVERTICULOSIS: ICD-10-CM

## 2021-04-23 DIAGNOSIS — Z91.81 AT RISK FOR FALLS: ICD-10-CM

## 2021-04-23 DIAGNOSIS — K76.0 NAFLD (NONALCOHOLIC FATTY LIVER DISEASE): ICD-10-CM

## 2021-04-23 DIAGNOSIS — E66.09 CLASS 1 OBESITY DUE TO EXCESS CALORIES WITH SERIOUS COMORBIDITY AND BODY MASS INDEX (BMI) OF 34.0 TO 34.9 IN ADULT: ICD-10-CM

## 2021-04-23 DIAGNOSIS — E11.65 UNCONTROLLED TYPE 2 DIABETES MELLITUS WITH HYPERGLYCEMIA, WITH LONG-TERM CURRENT USE OF INSULIN (HCC): ICD-10-CM

## 2021-04-23 DIAGNOSIS — R69 DIAGNOSIS UNKNOWN: Primary | ICD-10-CM

## 2021-04-23 DIAGNOSIS — M62.81 HAND MUSCLE WEAKNESS: ICD-10-CM

## 2021-04-23 DIAGNOSIS — Z85.828 PERSONAL HISTORY OF OTHER MALIGNANT NEOPLASM OF SKIN: ICD-10-CM

## 2021-04-23 PROCEDURE — 93000 ELECTROCARDIOGRAM COMPLETE: CPT | Performed by: INTERNAL MEDICINE

## 2021-04-23 PROCEDURE — 99215 OFFICE O/P EST HI 40 MIN: CPT | Performed by: INTERNAL MEDICINE

## 2021-04-23 PROCEDURE — 82043 UR ALBUMIN QUANTITATIVE: CPT | Performed by: INTERNAL MEDICINE

## 2021-04-23 PROCEDURE — 36415 COLL VENOUS BLD VENIPUNCTURE: CPT

## 2021-04-23 PROCEDURE — G0444 DEPRESSION SCREEN ANNUAL: HCPCS | Performed by: INTERNAL MEDICINE

## 2021-04-23 PROCEDURE — 81001 URINALYSIS AUTO W/SCOPE: CPT | Performed by: INTERNAL MEDICINE

## 2021-04-23 PROCEDURE — 82570 ASSAY OF URINE CREATININE: CPT | Performed by: INTERNAL MEDICINE

## 2021-04-23 PROCEDURE — G0439 PPPS, SUBSEQ VISIT: HCPCS | Performed by: INTERNAL MEDICINE

## 2021-04-23 PROCEDURE — 80162 ASSAY OF DIGOXIN TOTAL: CPT

## 2021-04-23 NOTE — PROGRESS NOTES
Patient presents with:   Well Adult: AWV       HPI:   Charis Man is a [address]year old female who presents for a Medicare Subsequent Annual Wellness visit (Pt already had Initial Annual Wellness), [facility] Annual Wellness Program (AWP), and 6-month f/u chronic spinal stenosis - Stable. Has had many injections (ESIs) in the past. No new issues. 10. Osteopenia of multiple sites - Last DEXA was on 11/8/19. No new issues. 11. Hand muscle weakness - Ongoing hx. Interested in rehab.   12. Functional gait abnormality/ She has difficulties Taking Meds as Rx'd based on screening of functional status. Taking medications as prescribed: Need some help   She has Hearing problems based on screening of functional status.    Hearing Problems?: Yes  She has Vision problems bas stricture     Diverticulosis     History of DVT (deep vein thrombosis)     Atrial fibrillation (HCC)     Wears hearing aid     Class 1 obesity due to excess calories with serious comorbidity and body mass index (BMI) of 34.0 to 34.9 in adult     History of cross reactors, and cardizem. CURRENT MEDICATIONS:   metoprolol Tartrate 25 MG Oral Tab, Take 2 tablets (50 mg total) by mouth 2 (two) times daily. ALPRAZolam (XANAX) 0.25 MG Oral Tab, Take 1 tablet (0.25 mg total) by mouth nightly as needed.   insulin by mouth daily. Calcium Carbonate-Vitamin D 600-125 MG-UNIT Oral Tab, Take 1 tablet by mouth daily.     cyanocobalamin (VITAMIN B12) 1000 MCG/ML injection 1,000 mcg       MEDICAL INFORMATION:   She  has a past medical history of A-fib ([facility] [address] (4/26/2012), bowel syndrome), Increased BMI (body mass index) (4/26/2012), Iron deficiency (4/26/2012), Irregular bowel habits, Ischemic heart disease, LAE (left atrial enlargement) (7/13/2010), Lipid screening (4/19/2012), LVH (left ventricular hypertrophy) (4/26/2012 loclzj ndl/cath spi dx/ther njx (3/27/2014); patient withough preoperative order for iv antibiotic surgical site infection prophylaxis.  (3/27/2014); patient documented not to have experienced any of the following events (3/27/2014); hysterectomy (1967); co above per HPI  ALL/ASTHMA: denies hx of allergy or asthma    EXAM:   /80 (BP Location: Left arm, Patient Position: Sitting, Cuff Size: adult)   Pulse 79   Temp 97.3 °F (36.3 °C) (Temporal)   Resp 16   Ht 4' 9.5\" (1.461 m)   Wt 163 lb (73.9 kg)   SpO 4/23/21:  Time to walk 4 meters = 14 seconds. Walking device, Y/N? = Y.  Strength Assessment with Gilbert Plus Hand Dynamometer done on 4/23/21:  Dominant Hand, R or L?: R  Left Hand Average: 20.1 lbs. Right Hand Average: 29.4 lbs.   [Normal Range f unspecified type ([facility]    Warfarin anticoagulation    Hypercholesterolemia    Statin intolerance    Class 1 obesity due to excess calories with serious comorbidity and body mass index (BMI) of 34.0 to 34.9 in adult    Acquired hypothyroidism    Chronic low fibrillation on long term anticoagulation w/ Warfarin - Stable on prescription medication + lifestyle measures. Continues to follow up with Cardiology (she'll be establishing w/ Dr. Sebas Torres soon as her previous cardiologyist Dr. Sherice Tubbs retired). MD, 4/26/2021     Pre-charting, Face-to-face, orders (diagnostics and/or therapeutics), & counseling, Documentation & coordination of care:  Start time = 2 PM on DOS. End time = 2:53 PM on DOS. Total encounter time = 53 minutes.     General Health     In DENSITOMETRY (CPT=77080) 11/08/2019   No flowsheet data found. Pap and Pelvic      Pap: Every 3 yrs age 21-68 or Pap+HPV every 5 yrs age 33-67, age 72 and older at high risk There are no preventive care reminders to display for this patient.  Update Heal (mg/dL)   Date Value   01/23/2014 0.58     Creatinine (mg/dL)   Date Value   08/04/2020 0.86    No flowsheet data found.     Drug Serum Conc  Annually DIGOXIN (ng/mL)   Date Value   07/27/2013 0.6 (L)     Digoxin (ng/mL)   Date Value   04/23/2021 0.37 (L)

## 2021-04-23 NOTE — PROGRESS NOTES
*BODY COMPOSITION:    YES:       NO: X      REASON TEST NOT PERFORMED: WEIGHT ONLY PER PT  _____________________________________________________________________________  *VENIPUNCTURE    YES:       NO:  X      REASON VENIPUNCTURE NOT PERFORMED: PER PT'S RE

## 2021-04-24 LAB
ALBUMIN SERPL-MCNC: 4.1 G/DL (ref 3.5–5.5)
ALP SERPL-CCNC: 80 U/L
ALT SERPL-CCNC: 11 U/L (ref 6–29)
AST SERPL-CCNC: 14 U/L (ref 10–35)
BILIRUB SERPL-MCNC: 0.6 MG/DL
BUN SERPL-MCNC: 8 MG/DL (ref 8–23)
BUN/CREAT SERPL: NORMAL CALC (ref 6–22)
CALCIUM SERPL-MCNC: 9.6 MG/DL (ref 8.5–10.5)
CHLORIDE SERPL-SCNC: 101 MMOL/L (ref 95–108)
CHOLEST SERPL-MCNC: 126 MG/DL
CHOLEST/HDLC SERPL: 4.1 CALC
CO2 SERPL-SCNC: 25 MMOL/L (ref 21–33)
CREAT SERPL-MCNC: 0.74 MG/DL (ref 0.6–0.88)
EST. AVERAGE GLUCOSE BLD GHB EST-MCNC: 154 MG/DL
GLOBULIN SER-MCNC: 2.5 G/DL (ref 1.8–3.8)
GLUCOSE SERPL-MCNC: 96 MG/DL (ref 65–99)
HBA1C MFR BLD: 7 %
HDLC SERPL-MCNC: 31 MG/DL
LDLC SERPL CALC-MCNC: 69 MG/DL (CALC)
LENGTH OF FAST TIME PATIENT: ABNORMAL H
LENGTH OF FAST TIME PATIENT: NORMAL H
NONHDLC SERPL-MCNC: 95 MG/DL (CALC)
POTASSIUM SERPL-SCNC: 4.2 MMOL/L (ref 3.5–5.1)
PROT SERPL-MCNC: 6.6 G/DL (ref 6.1–8)
SODIUM SERPL-SCNC: 139 MMOL/L (ref 136–145)
TRIGL SERPL-MCNC: 181 MG/DL

## 2021-04-26 PROBLEM — I25.10 CORONARY ARTERY DISEASE INVOLVING NATIVE CORONARY ARTERY OF NATIVE HEART WITHOUT ANGINA PECTORIS: Status: ACTIVE | Noted: 2021-04-26

## 2021-04-26 PROBLEM — M47.816 ARTHRITIS, LUMBAR SPINE: Status: RESOLVED | Noted: 2018-04-04 | Resolved: 2021-04-26

## 2021-04-26 PROBLEM — M75.82 ROTATOR CUFF TENDONITIS, LEFT: Status: RESOLVED | Noted: 2017-03-01 | Resolved: 2021-04-26

## 2021-04-26 PROBLEM — L57.0 ACTINIC KERATOSIS: Status: RESOLVED | Noted: 2019-03-21 | Resolved: 2021-04-26

## 2021-04-26 PROBLEM — M62.81 HAND MUSCLE WEAKNESS: Status: ACTIVE | Noted: 2021-04-26

## 2021-04-26 PROBLEM — R26.89 FUNCTIONAL GAIT ABNORMALITY: Status: ACTIVE | Noted: 2021-04-26

## 2021-04-26 PROBLEM — R93.5 ABNORMAL ABDOMINAL CT SCAN: Status: RESOLVED | Noted: 2018-10-29 | Resolved: 2021-04-26

## 2021-04-26 PROBLEM — R43.0 ANOSMIA: Status: RESOLVED | Noted: 2019-12-16 | Resolved: 2021-04-26

## 2021-04-26 PROBLEM — K76.0 NAFLD (NONALCOHOLIC FATTY LIVER DISEASE): Status: ACTIVE | Noted: 2019-12-16

## 2021-04-26 PROBLEM — E78.00 HYPERCHOLESTEROLEMIA: Status: ACTIVE | Noted: 2017-12-11

## 2021-04-26 PROBLEM — M46.96 INFLAMMATORY SPONDYLOPATHY OF LUMBAR REGION (HCC): Status: ACTIVE | Noted: 2019-12-16

## 2021-04-26 PROBLEM — K21.9 GERD (GASTROESOPHAGEAL REFLUX DISEASE): Status: ACTIVE | Noted: 2021-04-26

## 2021-04-26 PROBLEM — Z91.81 AT RISK FOR FALLS: Status: ACTIVE | Noted: 2021-04-26

## 2021-04-26 PROBLEM — I72.8 SPLENIC ARTERY ANEURYSM (HCC): Status: ACTIVE | Noted: 2021-04-26

## 2021-04-26 PROBLEM — R16.0 HEPATOMEGALY: Status: RESOLVED | Noted: 2019-12-16 | Resolved: 2021-04-26

## 2021-04-27 NOTE — PATIENT INSTRUCTIONS
Denae Kern's SCREENING SCHEDULE   Tests on this list are recommended by your physician but may not be covered, or covered at this frequency, by your insurer. Please check with your insurance carrier before scheduling to verify coverage.    PREVENTATIV any previous visit.  Limited to patients who meet one of the following criteria:   • Men who are 73-68 years old and have smoked more than 100 cigarettes in their lifetime   • Anyone with a family history    Colorectal Cancer Screening  Covered up to Age 76 preventive care reminders to display for this patient.  Please get this Mammogram regularly   Immunizations      Influenza  Covered Annually Orders placed or performed in visit on 10/17/14   • FLU VACC PRSV FREE INC ANTIG    Please get every year    Pneumoc available in [address])  [email or web address]  This link also has information from the [address] [place] [address] regarding Advance Directives.

## 2021-05-07 ENCOUNTER — VIRTUAL PHONE E/M (OUTPATIENT)
Dept: INTERNAL MEDICINE CLINIC | Facility: CLINIC | Age: 86
End: 2021-05-07
Payer: MEDICARE

## 2021-05-07 DIAGNOSIS — Z71.2 ENCOUNTER TO DISCUSS TEST RESULTS: Primary | ICD-10-CM

## 2021-05-07 PROCEDURE — 99443 PHONE E/M BY PHYS 21-30 MIN: CPT | Performed by: INTERNAL MEDICINE

## 2021-05-07 NOTE — PROGRESS NOTES
Virtual Telephone Check-In    Zahirajessica Guillen verbally consents to a Virtual/Telephone Check-In visit on 05/07/21. Patient has been referred to the [facility] website at [email or web address] to review the yearly Consent to Treat document.     Patient Gabino Erps involving native coronary artery of native heart without angina pectoris     GERD (gastroesophageal reflux disease)     Functional gait abnormality     At risk for falls     Hand muscle weakness     Multiple splenic artery aneurysms, largest at 1.5 cm ([address] •  Syringe/Needle, Disp, (BD SYRINGE/NEEDLE) 22G X 1-1/2\" 3 ML Does not apply Misc, Use 1 needle and 1 syringe for IM B12 administration as directed, Disp: 6 each, Rfl: 0  •  hydrocortisone 2.5 % External Cream, Apply to the abodmen each evening, Disp: possibility that this medication would no longer be needed given her age and health. She's set to see Dr. Sen Reilly from Cardiology on 5/10/21.  2. RTC PRN or at next regularly scheduled OV.  In the meantime, we'll get her on a B12 repletion protocol thru o

## 2021-05-10 ENCOUNTER — HOSPITAL ENCOUNTER (OUTPATIENT)
Dept: LAB | Facility: HOSPITAL | Age: 86
Discharge: HOME | End: 2021-05-10
Attending: INTERNAL MEDICINE
Payer: MEDICARE

## 2021-05-10 DIAGNOSIS — I48.20 CHRONIC ATRIAL FIBRILLATION (HCC): ICD-10-CM

## 2021-05-10 LAB — INR PPP: 2.5

## 2021-05-10 PROCEDURE — 85610 PROTHROMBIN TIME: CPT

## 2021-05-11 ENCOUNTER — TELEPHONE (OUTPATIENT)
Dept: INTERNAL MEDICINE CLINIC | Facility: CLINIC | Age: 86
End: 2021-05-11

## 2021-05-11 ENCOUNTER — OFFICE VISIT (OUTPATIENT)
Dept: CARDIOLOGY | Age: 86
End: 2021-05-11

## 2021-05-11 ENCOUNTER — ANTI-COAG (OUTPATIENT)
Dept: CARDIOLOGY | Age: 86
End: 2021-05-11

## 2021-05-11 VITALS
SYSTOLIC BLOOD PRESSURE: 142 MMHG | HEIGHT: 58 IN | BODY MASS INDEX: 33.58 KG/M2 | HEART RATE: 76 BPM | WEIGHT: 160 LBS | DIASTOLIC BLOOD PRESSURE: 80 MMHG

## 2021-05-11 DIAGNOSIS — I48.20 CHRONIC ATRIAL FIBRILLATION (CMD): ICD-10-CM

## 2021-05-11 DIAGNOSIS — R53.83 FATIGUE, UNSPECIFIED TYPE: ICD-10-CM

## 2021-05-11 DIAGNOSIS — Z95.5 HISTORY OF HEART ARTERY STENT: Primary | ICD-10-CM

## 2021-05-11 DIAGNOSIS — I10 HYPERTENSION, BENIGN: ICD-10-CM

## 2021-05-11 DIAGNOSIS — Z79.01 ANTICOAGULATED ON COUMADIN: ICD-10-CM

## 2021-05-11 DIAGNOSIS — E78.2 MIXED HYPERLIPIDEMIA: ICD-10-CM

## 2021-05-11 DIAGNOSIS — I50.32 CHRONIC DIASTOLIC HEART FAILURE (CMD): ICD-10-CM

## 2021-05-11 DIAGNOSIS — R06.02 SHORTNESS OF BREATH: ICD-10-CM

## 2021-05-11 DIAGNOSIS — I25.10 CORONARY ARTERY DISEASE INVOLVING NATIVE CORONARY ARTERY OF NATIVE HEART WITHOUT ANGINA PECTORIS: ICD-10-CM

## 2021-05-11 PROCEDURE — 99214 OFFICE O/P EST MOD 30 MIN: CPT | Performed by: INTERNAL MEDICINE

## 2021-05-11 RX ORDER — MECLIZINE HCL 12.5 MG/1
12.5 TABLET ORAL 3 TIMES DAILY PRN
COMMUNITY

## 2021-05-11 ASSESSMENT — PATIENT HEALTH QUESTIONNAIRE - PHQ9
CLINICAL INTERPRETATION OF PHQ9 SCORE: NO FURTHER SCREENING NEEDED
1. LITTLE INTEREST OR PLEASURE IN DOING THINGS: NOT AT ALL
CLINICAL INTERPRETATION OF PHQ2 SCORE: NO FURTHER SCREENING NEEDED
2. FEELING DOWN, DEPRESSED OR HOPELESS: NOT AT ALL
SUM OF ALL RESPONSES TO PHQ9 QUESTIONS 1 AND 2: 0
SUM OF ALL RESPONSES TO PHQ9 QUESTIONS 1 AND 2: 0

## 2021-05-11 NOTE — TELEPHONE ENCOUNTER
Per Dr Lawson Garcia set her up for B12 IM repletion protocol.  She did mention to me that her daughter may be willing to assist.\"    Pt stated, daughter will assist w administration of injections at home, and will call our office this week to get furth

## 2021-05-12 DIAGNOSIS — E11.8 TYPE 2 DIABETES MELLITUS WITH UNSPECIFIED COMPLICATIONS (HCC): ICD-10-CM

## 2021-05-12 RX ORDER — BLOOD SUGAR DIAGNOSTIC
STRIP MISCELLANEOUS
Qty: 400 STRIP | Refills: 1 | Status: SHIPPED | OUTPATIENT
Start: 2021-05-12 | End: 2021-11-29

## 2021-05-13 ENCOUNTER — TELEPHONE (OUTPATIENT)
Dept: INTERNAL MEDICINE CLINIC | Facility: CLINIC | Age: 86
End: 2021-05-13

## 2021-05-13 ENCOUNTER — TELEPHONE (OUTPATIENT)
Dept: CARDIOLOGY | Age: 86
End: 2021-05-13

## 2021-05-17 RX ORDER — METOPROLOL TARTRATE 50 MG/1
75 TABLET, FILM COATED ORAL 2 TIMES DAILY
Qty: 90 TABLET | Refills: 0 | Status: SHIPPED | OUTPATIENT
Start: 2021-05-17 | End: 2021-06-15 | Stop reason: SDUPTHER

## 2021-05-18 ENCOUNTER — TELEPHONE (OUTPATIENT)
Dept: CARDIOLOGY | Age: 86
End: 2021-05-18

## 2021-05-19 ENCOUNTER — TELEPHONE (OUTPATIENT)
Dept: INTERNAL MEDICINE CLINIC | Facility: CLINIC | Age: 86
End: 2021-05-19

## 2021-05-19 NOTE — TELEPHONE ENCOUNTER
Phoned [facility]. Spoke with Hal Buckner, pharmacist. Ordered BD SafetyGlide 1ml syringe, 27G x 1/2\" needle to use for B-12 injections.  Order shoud be ready tomorrow

## 2021-05-19 NOTE — TELEPHONE ENCOUNTER
Called patient back and notified her we do not carry those vaccines. Patient states [facility] does not either. Encouraged her to try larger pharmacies like [facility] or [facility]. Patient verbalized understanding. 3

## 2021-05-21 RX ORDER — CYANOCOBALAMIN 1000 UG/ML
INJECTION INTRAMUSCULAR; SUBCUTANEOUS
Qty: 7 ML | Refills: 0 | Status: SHIPPED | OUTPATIENT
Start: 2021-05-21 | End: 2021-09-18

## 2021-05-21 NOTE — TELEPHONE ENCOUNTER
Patient's daughter, Venkat Rich, called states B12 injections were ordered for patient but when they picked it up at the pharmacy only the syringes had been ordered. Venkat Rich states she needs a rx sent to [facility] for the B12.  Venkat Rich is at work and might not be

## 2021-05-27 ENCOUNTER — HOSPITAL ENCOUNTER (EMERGENCY)
Facility: HOSPITAL | Age: 86
Discharge: HOME | End: 2021-05-27
Attending: EMERGENCY MEDICINE
Payer: MEDICARE

## 2021-05-27 ENCOUNTER — APPOINTMENT (OUTPATIENT)
Dept: GENERAL RADIOLOGY | Facility: HOSPITAL | Age: 86
End: 2021-05-27
Attending: EMERGENCY MEDICINE
Payer: MEDICARE

## 2021-05-27 ENCOUNTER — APPOINTMENT (OUTPATIENT)
Dept: CT IMAGING | Facility: HOSPITAL | Age: 86
End: 2021-05-27
Attending: EMERGENCY MEDICINE
Payer: MEDICARE

## 2021-05-27 VITALS
DIASTOLIC BLOOD PRESSURE: 77 MMHG | WEIGHT: 159 LBS | HEIGHT: 59 IN | SYSTOLIC BLOOD PRESSURE: 170 MMHG | RESPIRATION RATE: 16 BRPM | BODY MASS INDEX: 32.05 KG/M2 | OXYGEN SATURATION: 96 % | HEART RATE: 70 BPM | TEMPERATURE: 97 F

## 2021-05-27 DIAGNOSIS — S60.219A CONTUSION OF WRIST, UNSPECIFIED LATERALITY, INITIAL ENCOUNTER: ICD-10-CM
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
## 2021-05-27 PROCEDURE — 99284 EMERGENCY DEPT VISIT MOD MDM: CPT

## 2021-05-27 PROCEDURE — 73110 X-RAY EXAM OF WRIST: CPT | Performed by: EMERGENCY MEDICINE

## 2021-05-27 PROCEDURE — 70450 CT HEAD/BRAIN W/O DYE: CPT | Performed by: EMERGENCY MEDICINE

## 2021-05-27 PROCEDURE — 82962 GLUCOSE BLOOD TEST: CPT

## 2021-05-27 RX ORDER — ACETAMINOPHEN 500 MG
1000 TABLET ORAL ONCE
Status: COMPLETED | OUTPATIENT
Start: 2021-05-27 | End: 2021-05-27

## 2021-05-28 ENCOUNTER — TELEPHONE (OUTPATIENT)
Dept: INTERNAL MEDICINE CLINIC | Facility: CLINIC | Age: 86
End: 2021-05-28

## 2021-05-28 NOTE — TELEPHONE ENCOUNTER
If she's interested, we have an excellent Orthopedic Hand specialist on staff with [facility]. His name is Dr. Ino Tellez. She can easily follow up with him if desired. Otherwise, she can let us know if she needs anything else. Fabi Gant.  Rockne Najjar, MD

## 2021-05-28 NOTE — TELEPHONE ENCOUNTER
Pt called back and verbalized to have ongoing wrist pain, as well as some bruising. However, overall pt ok and Tylenol does help to reduce pain.

## 2021-05-28 NOTE — ED PROVIDER NOTES
Patient Seen in: [facility] Emergency Department      History   Patient presents with:  Fall    Stated Complaint: FALL     HPI/Subjective:   HPI    This is a 80-year-old woman, history of atrial fibrillation on anticoagulation, here for evaluation o venous thrombosis) ([facility] [address] 2007    post knee replacement   • Dysphagia 1/12/2011   • Endocrine disorder    • Esophageal dilatation 2011   • Esophageal reflux    • Esophageal stricture 4/26/2012    w/ variability in terms of tolerating that and tolerating med 2/18/2012   • Onychomycosis 4/26/2012    severe, toenails, received podiatric treatment 2/29/2012 w/ Dr. Tarun Bazan   • Osteoarthrosis, unspecified whether generalized or localized, unspecified site    • Osteoporosis 4/26/2012    and osteopenia; 3/2009 \"Reclas DX/THERAPEUTIC SUBSTANCE, EPIDURAL/SUBARACHNOID; LUMBAR/SACRAL  2/7/2014    Procedure: LUMBAR EPIDURAL;  Surgeon: Nikkie Culp MD;  Location: [facility]   • INJECTION, W/WO CONTRAST, DX/THERAPEUTIC SUBSTANCE, EPIDURAL/SUBARACHNOID; L Social History    Tobacco Use      Smoking status: Never Smoker      Smokeless tobacco: Never Used    Vaping Use      Vaping Use: Never used    Alcohol use: No      Alcohol/week: 0.0 standard drinks      Comment: NONE    Drug use: Never 162 (*)     All other components within normal limits          XR WRIST COMPLETE (MIN 3 VIEWS), LEFT (CPT=73110)    Result Date: 5/27/2021  PROCEDURE:  XR WRIST COMPLETE (MIN 3 VIEWS), LEFT (CPT=73110)  TECHNIQUE:  Three views were obtained.   COMPARISON: 5/27/2021  PROCEDURE:  CT BRAIN OR HEAD (18246)  COMPARISON:  [facility] , CT, CT BRAIN OR HEAD ([id number]), 4/04/2018, 5:59 PM.  INDICATIONS:  FALL  TECHNIQUE:  Noncontrast CT scanning is performed through the brain. Dose reduction techniques were used.  Dose info well.  Discharged home with wrist splints for comfort, and that she follow-up with PMD for further evaluation, return since discussed with patient and family.                              Disposition and Plan     Clinical Impression:  Fall, initial encounte

## 2021-06-07 RX ORDER — LEVOTHYROXINE SODIUM 0.07 MG/1
TABLET ORAL
Qty: 90 TABLET | Refills: 3 | Status: SHIPPED | OUTPATIENT
Start: 2021-06-07 | End: 2021-11-29

## 2021-06-07 NOTE — TELEPHONE ENCOUNTER
Per [facility] labs from May 2021 thyroid labs normal  TSH: 2.89  T4: 1.2    Med refill sent.

## 2021-06-08 ENCOUNTER — ANTI-COAG (OUTPATIENT)
Dept: CARDIOLOGY | Age: 86
End: 2021-06-08

## 2021-06-08 DIAGNOSIS — I48.20 CHRONIC ATRIAL FIBRILLATION (CMD): ICD-10-CM

## 2021-06-11 ENCOUNTER — HOSPITAL ENCOUNTER (OUTPATIENT)
Dept: LAB | Facility: HOSPITAL | Age: 86
Discharge: HOME | End: 2021-06-11
Attending: INTERNAL MEDICINE
Payer: MEDICARE

## 2021-06-11 DIAGNOSIS — I48.20 CHRONIC ATRIAL FIBRILLATION (HCC): ICD-10-CM

## 2021-06-11 LAB — INR PPP: 2.3

## 2021-06-11 PROCEDURE — 85610 PROTHROMBIN TIME: CPT

## 2021-06-14 ENCOUNTER — TELEPHONE (OUTPATIENT)
Dept: CARDIOLOGY | Age: 86
End: 2021-06-14

## 2021-06-14 ENCOUNTER — ANTI-COAG (OUTPATIENT)
Dept: CARDIOLOGY | Age: 86
End: 2021-06-14

## 2021-06-14 DIAGNOSIS — I48.20 CHRONIC ATRIAL FIBRILLATION (CMD): ICD-10-CM

## 2021-06-15 RX ORDER — METOPROLOL TARTRATE 50 MG/1
75 TABLET, FILM COATED ORAL 2 TIMES DAILY
Qty: 270 TABLET | Refills: 3 | Status: SHIPPED | OUTPATIENT
Start: 2021-06-15 | End: 2021-07-15

## 2021-06-22 ENCOUNTER — OFFICE VISIT (OUTPATIENT)
Dept: INTERNAL MEDICINE CLINIC | Facility: CLINIC | Age: 86
End: 2021-06-22
Payer: MEDICARE

## 2021-06-22 VITALS
SYSTOLIC BLOOD PRESSURE: 144 MMHG | BODY MASS INDEX: 31.85 KG/M2 | WEIGHT: 158 LBS | DIASTOLIC BLOOD PRESSURE: 80 MMHG | HEIGHT: 59 IN | OXYGEN SATURATION: 97 % | RESPIRATION RATE: 16 BRPM | HEART RATE: 85 BPM | TEMPERATURE: 97 F

## 2021-06-22 DIAGNOSIS — Z79.4 TYPE 2 DIABETES MELLITUS WITH OTHER CIRCULATORY COMPLICATION, WITH LONG-TERM CURRENT USE OF INSULIN (HCC): Primary | ICD-10-CM

## 2021-06-22 DIAGNOSIS — I48.21 PERMANENT ATRIAL FIBRILLATION (HCC): ICD-10-CM

## 2021-06-22 DIAGNOSIS — Z11.1 SCREENING-PULMONARY TB: ICD-10-CM

## 2021-06-22 DIAGNOSIS — E11.59 TYPE 2 DIABETES MELLITUS WITH OTHER CIRCULATORY COMPLICATION, WITH LONG-TERM CURRENT USE OF INSULIN (HCC): Primary | ICD-10-CM

## 2021-06-22 PROCEDURE — 99213 OFFICE O/P EST LOW 20 MIN: CPT | Performed by: INTERNAL MEDICINE

## 2021-06-22 RX ORDER — MECLIZINE HCL 12.5 MG/1
12.5 TABLET ORAL
COMMUNITY
End: 2022-01-03

## 2021-06-22 RX ORDER — WARFARIN SODIUM 5 MG/1
5 TABLET ORAL DAILY
Refills: 0 | COMMUNITY
Start: 2021-06-22

## 2021-06-22 RX ORDER — ARIPIPRAZOLE 15 MG/1
20 TABLET ORAL DAILY
Qty: 1350 ML | Refills: 1 | Status: SHIPPED | OUTPATIENT
Start: 2021-06-22 | End: 2021-12-22

## 2021-06-22 RX ORDER — METOPROLOL TARTRATE 50 MG/1
75 TABLET, FILM COATED ORAL 2 TIMES DAILY
COMMUNITY
Start: 2021-06-15 | End: 2021-07-15

## 2021-06-23 NOTE — PROGRESS NOTES
30min to fill out forms for assisted living, sign DNAR, reconcile meds including changing Potassium pill to liquid. Recent CPE with (old [facility] pt). Told radha Lindsey in 6 weeks.     06/22/21  1404   BP: 144/80   Pulse: 85   Resp: 16   Temp: 96.8 °F (

## 2021-07-01 ENCOUNTER — MED REC SCAN ONLY (OUTPATIENT)
Dept: INTERNAL MEDICINE CLINIC | Facility: CLINIC | Age: 86
End: 2021-07-01

## 2021-07-06 ENCOUNTER — TELEPHONE (OUTPATIENT)
Dept: INTERNAL MEDICINE CLINIC | Facility: CLINIC | Age: 86
End: 2021-07-06

## 2021-07-06 ENCOUNTER — TELEPHONE (OUTPATIENT)
Dept: CARDIOLOGY | Age: 86
End: 2021-07-06

## 2021-07-06 DIAGNOSIS — E11.65 UNCONTROLLED TYPE 2 DIABETES MELLITUS WITH HYPERGLYCEMIA, WITH LONG-TERM CURRENT USE OF INSULIN (HCC): Primary | ICD-10-CM

## 2021-07-06 DIAGNOSIS — Z79.4 UNCONTROLLED TYPE 2 DIABETES MELLITUS WITH HYPERGLYCEMIA, WITH LONG-TERM CURRENT USE OF INSULIN (HCC): Primary | ICD-10-CM

## 2021-07-06 NOTE — TELEPHONE ENCOUNTER
Kath James from [facility] in [place] called and says patient will be going into assisted living today.  Kath James would like to review patient's meds with nurse

## 2021-07-06 NOTE — TELEPHONE ENCOUNTER
Vito Omalley is calling again, waiting for a call back regarding an order for the patient to take liquid Multi-Vitamin Centrum. Please make sure the order states that the patient will take 15ml one time per day.  Vito Omalley received the order for potassium, so he will ha

## 2021-07-06 NOTE — TELEPHONE ENCOUNTER
Nurse Katharine Canas called to clarify, per pt takes the following meds as follows:    - Lantus in the morning (ok to do am instead of pm)? - Meclizine 12.5mg prn. Per EMR we have med scheduled as one tab daily. Ok to take prn?  - Need parameters for BG.  When to ca

## 2021-07-07 RX ORDER — INSULIN GLARGINE 100 [IU]/ML
10 INJECTION, SOLUTION SUBCUTANEOUS NIGHTLY
Qty: 15 ML | Refills: 3 | Status: SHIPPED | OUTPATIENT
Start: 2021-07-07 | End: 2021-10-29

## 2021-07-07 NOTE — TELEPHONE ENCOUNTER
OK to do liquid multivitamin once daily. Jyl Aid.  Krista Hill MD  Diplomate, [place] Board of Internal Medicine; Member, [employer] [facility] [address], [address] [place] [address],[address], [place], [address]  [phone]

## 2021-07-07 NOTE — TELEPHONE ENCOUNTER
Nurse Jeffrey Blanco informed and she requested to fax the written clarification from Dr Aleksandra Lindsey. Faxed. Also, nurse Jeffrey Blanco requested clarification if ok to to update multivitamin tabs to liquid form in the [facility], as pt currently taking liquid form.    Also, gissel

## 2021-07-07 NOTE — TELEPHONE ENCOUNTER
1. OK to do Lantus in AM.  2. Meclizine once daily PRN is fine. 3. BG parameters --> Call MD if fasting glucose in < 70 and if any random BG is > 400.  4. BP parameters --> Hold Metoprolol if systolic BP is < 90 mm Hg.   5. HR parameters --> Hold Metoprolo

## 2021-07-08 ENCOUNTER — HOSPITAL ENCOUNTER (OUTPATIENT)
Dept: LAB | Facility: HOSPITAL | Age: 86
Discharge: HOME | End: 2021-07-08
Attending: INTERNAL MEDICINE
Payer: MEDICARE

## 2021-07-08 DIAGNOSIS — I48.20 CHRONIC ATRIAL FIBRILLATION (HCC): ICD-10-CM

## 2021-07-08 LAB — POC INR: 2 (ref 0.9–1.1)

## 2021-07-08 PROCEDURE — 85610 PROTHROMBIN TIME: CPT

## 2021-07-08 RX ORDER — PEN NEEDLE, DIABETIC, SAFETY 30 GX3/16"
NEEDLE, DISPOSABLE MISCELLANEOUS
Qty: 120 EACH | Refills: 3 | Status: SHIPPED | OUTPATIENT
Start: 2021-07-08

## 2021-07-09 ENCOUNTER — TELEPHONE (OUTPATIENT)
Dept: CARDIOLOGY | Age: 86
End: 2021-07-09

## 2021-07-12 ENCOUNTER — ANTI-COAG (OUTPATIENT)
Dept: CARDIOLOGY | Age: 86
End: 2021-07-12

## 2021-07-12 DIAGNOSIS — I48.20 CHRONIC ATRIAL FIBRILLATION (CMD): Primary | ICD-10-CM

## 2021-07-20 ENCOUNTER — TELEPHONE (OUTPATIENT)
Dept: INTERNAL MEDICINE CLINIC | Facility: CLINIC | Age: 86
End: 2021-07-20

## 2021-07-20 NOTE — TELEPHONE ENCOUNTER
Letter printed. Radha Green.  Jaswinder Rachel MD  Diplomate, American Board of Internal Medicine; Member, [facility] of Lifestyle Medicine  [address] [facility] [address], [address], [facility], [address]  [phone] (phone); [phone]

## 2021-07-20 NOTE — TELEPHONE ENCOUNTER
Pt called w c/o finger pain & increase bleeding from finger pricks when checking blood glucose, as the skilled nursing facility (snf) where she currently resides uses their glucose-monitor system, and does not allow pt to use her own.      Pt requested a l

## 2021-07-22 ENCOUNTER — TELEPHONE (OUTPATIENT)
Dept: INTERNAL MEDICINE CLINIC | Facility: CLINIC | Age: 86
End: 2021-07-22

## 2021-07-22 RX ORDER — BLOOD SUGAR DIAGNOSTIC
1 STRIP MISCELLANEOUS 3 TIMES DAILY
Qty: 50 EACH | Refills: 0 | Status: SHIPPED | OUTPATIENT
Start: 2021-07-22 | End: 2021-11-29

## 2021-07-22 NOTE — TELEPHONE ENCOUNTER
Jennifer Emerson, from [facility] patient gets accucheck performed 3-4 times per day. They use their own lancets which is causing bruising and pain. Recommending safety lancets less invasive. Please send to [address]. Please call: [phone].

## 2021-07-22 NOTE — TELEPHONE ENCOUNTER
Per AMARILIS MIJARES ok to send generic brand for safety lancets.      Sent to [facility]

## 2021-08-01 ENCOUNTER — MED REC SCAN ONLY (OUTPATIENT)
Dept: INTERNAL MEDICINE CLINIC | Facility: CLINIC | Age: 86
End: 2021-08-01

## 2021-08-03 ENCOUNTER — TELEPHONE (OUTPATIENT)
Dept: INTERNAL MEDICINE CLINIC | Facility: CLINIC | Age: 86
End: 2021-08-03

## 2021-08-03 NOTE — TELEPHONE ENCOUNTER
Please call Matthias Townsend, he needs clarification on dosing for potassium chloride 40 meq/30 ml (10%) Oral Solution

## 2021-08-03 NOTE — TELEPHONE ENCOUNTER
Nurse looking for clarification as Rx was written for 40meq/30ml. However, instructions are to: Take 15 mL (20 mEq total) by mouth daily of Potassium chloride    Nurse Rachel Garzon was advised to follow instructions as written and had no further questions.

## 2021-08-13 ENCOUNTER — HOSPITAL ENCOUNTER (OUTPATIENT)
Dept: LAB | Facility: HOSPITAL | Age: 86
Discharge: HOME | End: 2021-08-13
Attending: INTERNAL MEDICINE
Payer: MEDICARE

## 2021-08-13 DIAGNOSIS — I48.20 CHRONIC ATRIAL FIBRILLATION (HCC): ICD-10-CM

## 2021-08-13 LAB — POC INR: 1.7 (ref 0.9–1.1)

## 2021-08-13 PROCEDURE — 85610 PROTHROMBIN TIME: CPT

## 2021-08-27 ENCOUNTER — HOSPITAL ENCOUNTER (OUTPATIENT)
Dept: LAB | Facility: HOSPITAL | Age: 86
Discharge: HOME | End: 2021-08-27
Attending: INTERNAL MEDICINE
Payer: MEDICARE

## 2021-08-27 DIAGNOSIS — I48.20 CHRONIC ATRIAL FIBRILLATION (HCC): ICD-10-CM

## 2021-08-27 LAB — POC INR: 1.9 (ref 0.9–1.1)

## 2021-08-27 PROCEDURE — 85610 PROTHROMBIN TIME: CPT

## 2021-09-10 ENCOUNTER — HOSPITAL ENCOUNTER (OUTPATIENT)
Dept: LAB | Facility: HOSPITAL | Age: 86
Discharge: HOME | End: 2021-09-10
Attending: INTERNAL MEDICINE
Payer: MEDICARE

## 2021-09-10 DIAGNOSIS — Z51.81 ENCOUNTER FOR THERAPEUTIC DRUG MONITORING: ICD-10-CM

## 2021-09-10 DIAGNOSIS — I48.21 ATRIAL FIBRILLATION, PERMANENT (HCC): ICD-10-CM

## 2021-09-10 LAB — POC INR: 2.6 (ref 0.9–1.1)

## 2021-09-10 PROCEDURE — 85610 PROTHROMBIN TIME: CPT

## 2021-09-21 ENCOUNTER — TELEPHONE (OUTPATIENT)
Dept: INTERNAL MEDICINE CLINIC | Facility: CLINIC | Age: 86
End: 2021-09-21

## 2021-09-21 RX ORDER — CYANOCOBALAMIN 1000 UG/ML
1000 INJECTION INTRAMUSCULAR; SUBCUTANEOUS
Qty: 3 ML | Refills: 0 | Status: SHIPPED | OUTPATIENT
Start: 2021-09-21

## 2021-09-21 RX ORDER — NEEDLES, SAFETY 22GX1 1/2"
NEEDLE, DISPOSABLE MISCELLANEOUS
Qty: 3 EACH | Refills: 0 | Status: SHIPPED | OUTPATIENT
Start: 2021-09-21

## 2021-09-21 NOTE — TELEPHONE ENCOUNTER
Patient's nursing home called and asks that a rx for Vitamin B12 injection be sent to [facility].  Please call Praveen Patient with any questions [phone]

## 2021-09-21 NOTE — TELEPHONE ENCOUNTER
CPE due     Future Appointments   Date Time Provider Seth Charisse   11/3/2021  1:00 PM Leona Cates DPM MMO NP POD [facility]   1/5/2022  1:00 PM Dav Warren, MARY MMO NP POD [facility]     Refilled, asked patient to schedule CPE

## 2021-09-24 ENCOUNTER — HOSPITAL ENCOUNTER (OUTPATIENT)
Dept: LAB | Facility: HOSPITAL | Age: 86
Discharge: HOME | End: 2021-09-24
Attending: INTERNAL MEDICINE
Payer: MEDICARE

## 2021-09-24 DIAGNOSIS — I48.21 ATRIAL FIBRILLATION, PERMANENT (HCC): ICD-10-CM

## 2021-09-24 DIAGNOSIS — Z51.81 ENCOUNTER FOR THERAPEUTIC DRUG MONITORING: ICD-10-CM

## 2021-09-24 LAB — POC INR: 2.4 (ref 0.9–1.1)

## 2021-09-24 PROCEDURE — 85610 PROTHROMBIN TIME: CPT

## 2021-09-27 ENCOUNTER — TELEPHONE (OUTPATIENT)
Dept: INTERNAL MEDICINE CLINIC | Facility: CLINIC | Age: 86
End: 2021-09-27

## 2021-09-27 NOTE — TELEPHONE ENCOUNTER
- Per pt currently taking warfarin 5mg - one tab every day & 1 1/2 tab (7.5mg) on Friday only. - [address] [place] and spoke w coumadin clinic RN.  - RN informed cardiologist managing warfarin and will take care of med refill.

## 2021-09-27 NOTE — TELEPHONE ENCOUNTER
Patient states that a request was made for a new prescription for warfarin 5 MG Oral Tab, but her pharmacy has not gotten the prescription yet. She is worried about running out of medication  Please check in this and call the patient back.

## 2021-09-28 DIAGNOSIS — E11.8 DIABETIC COMPLICATION (HCC): Primary | ICD-10-CM

## 2021-10-04 ENCOUNTER — TELEPHONE (OUTPATIENT)
Dept: INTERNAL MEDICINE CLINIC | Facility: CLINIC | Age: 86
End: 2021-10-04

## 2021-10-04 NOTE — TELEPHONE ENCOUNTER
Patient would like to talk to the nurse about her medication   Furosemide (Tab) LASIX 20 MG      Please call.

## 2021-10-04 NOTE — TELEPHONE ENCOUNTER
Pt called to inquire if Dr Pat Obando would be ok w her taking one additional tablet of Laxis 20mg prn, due to increase swelling of bilateral lower extremities - by the ankles. Pt stated previous pcp, recommended this.  However, pt is currently living at a

## 2021-10-05 RX ORDER — FUROSEMIDE 20 MG/1
20 TABLET ORAL 2 TIMES DAILY
Qty: 180 TABLET | Refills: 3 | COMMUNITY
Start: 2021-10-05 | End: 2022-01-03

## 2021-10-05 NOTE — TELEPHONE ENCOUNTER
Faxed to [facility] in [place] (f) [phone] & left vm at pt's cell informing ok per pcp to take additional lasix 20mg when ankle swelling is present.

## 2021-10-05 NOTE — TELEPHONE ENCOUNTER
I am fine with this. Toni Addison.  Debbie Macias MD  Diplomate, [address] [place] [address] Board of Internal Medicine; Member, [address] [place] [address], [address] [place] [address],[address], [address] [place] [address], [address]  [phone] (phone); [phone]

## 2021-10-14 ENCOUNTER — HOSPITAL ENCOUNTER (OUTPATIENT)
Dept: LAB | Facility: HOSPITAL | Age: 86
Discharge: HOME | End: 2021-10-14
Attending: INTERNAL MEDICINE
Payer: MEDICARE

## 2021-10-14 DIAGNOSIS — Z51.81 ENCOUNTER FOR THERAPEUTIC DRUG MONITORING: ICD-10-CM

## 2021-10-14 DIAGNOSIS — I48.21 ATRIAL FIBRILLATION, PERMANENT (HCC): ICD-10-CM

## 2021-10-14 PROCEDURE — 85610 PROTHROMBIN TIME: CPT

## 2021-10-28 ENCOUNTER — DIABETIC EDUCATION (OUTPATIENT)
Dept: ENDOCRINOLOGY CLINIC | Facility: CLINIC | Age: 86
End: 2021-10-28
Payer: MEDICARE

## 2021-10-28 ENCOUNTER — TELEPHONE (OUTPATIENT)
Dept: INTERNAL MEDICINE CLINIC | Facility: CLINIC | Age: 86
End: 2021-10-28

## 2021-10-28 DIAGNOSIS — E11.8 TYPE 2 DIABETES MELLITUS WITH COMPLICATION, WITH LONG-TERM CURRENT USE OF INSULIN (HCC): Primary | ICD-10-CM

## 2021-10-28 DIAGNOSIS — Z79.4 TYPE 2 DIABETES MELLITUS WITH COMPLICATION, WITH LONG-TERM CURRENT USE OF INSULIN (HCC): Primary | ICD-10-CM

## 2021-10-28 PROCEDURE — G0108 DIAB MANAGE TRN  PER INDIV: HCPCS | Performed by: DIETITIAN, REGISTERED

## 2021-10-28 NOTE — PROGRESS NOTES
Mariya Mark  5/3/1930 was seen for Continuous Glucose Sensor & Insulin Pump Follow-up Visit:    10/28/2021   Referring Provider: Dr. Alejandra Zafar   Start time: 2:00 End time: 2:30    Pt is taking 10 units Lantus and is having her BG's tested QID.  Is at a new

## 2021-10-28 NOTE — TELEPHONE ENCOUNTER
A day to 5units a day. Also is advising patient decreases her glucose testing from 4 times a day to 3 times. Will need an order sent to [facility] on [address] where patient resides.  Terrell Louis will forward her progress notes from tod

## 2021-10-28 NOTE — TELEPHONE ENCOUNTER
OK to send or do a verbal as written. Kacey Melo.  Edson Hays MD  Diplomate, American Board of Internal Medicine; Member, [facility] [address], [address] [place] [address],[address], [place], [address]  [phone] (

## 2021-10-28 NOTE — TELEPHONE ENCOUNTER
Per Leaenn MIJARES  \"[facility] and is advising that her Lantus dose be decreased from 10units a day to 5units a day. Also is advising patient decreases her glucose testing from 4 times a day to 3 times.  Will

## 2021-10-29 RX ORDER — INSULIN GLARGINE 100 [IU]/ML
5 INJECTION, SOLUTION SUBCUTANEOUS NIGHTLY
Qty: 15 ML | Refills: 3 | COMMUNITY
Start: 2021-07-07

## 2021-10-29 NOTE — TELEPHONE ENCOUNTER
Lantus decrease change updated in EMR.    Faxed to [facility] in [place]) [phone]

## 2021-11-16 ENCOUNTER — HOSPITAL ENCOUNTER (OUTPATIENT)
Dept: LAB | Facility: HOSPITAL | Age: 86
Discharge: HOME | End: 2021-11-16
Attending: INTERNAL MEDICINE
Payer: MEDICARE

## 2021-11-16 ENCOUNTER — APPOINTMENT (OUTPATIENT)
Dept: CARDIOLOGY | Age: 86
End: 2021-11-16

## 2021-11-16 DIAGNOSIS — I48.21 ATRIAL FIBRILLATION, PERMANENT (HCC): ICD-10-CM

## 2021-11-16 DIAGNOSIS — Z51.81 ENCOUNTER FOR THERAPEUTIC DRUG MONITORING: ICD-10-CM

## 2021-11-16 PROCEDURE — 85610 PROTHROMBIN TIME: CPT

## 2021-11-27 DIAGNOSIS — E11.8 TYPE 2 DIABETES MELLITUS WITH UNSPECIFIED COMPLICATIONS (HCC): ICD-10-CM

## 2021-11-29 ENCOUNTER — TELEPHONE (OUTPATIENT)
Dept: INTERNAL MEDICINE CLINIC | Facility: CLINIC | Age: 86
End: 2021-11-29

## 2021-11-29 RX ORDER — BLOOD SUGAR DIAGNOSTIC
STRIP MISCELLANEOUS
Qty: 400 STRIP | Refills: 3 | Status: SHIPPED | OUTPATIENT
Start: 2021-11-29

## 2021-11-29 RX ORDER — BLOOD SUGAR DIAGNOSTIC
1 STRIP MISCELLANEOUS 3 TIMES DAILY
Qty: 50 EACH | Refills: 0 | Status: SHIPPED | OUTPATIENT
Start: 2021-11-29 | End: 2021-11-29

## 2021-11-29 RX ORDER — LANCETS
EACH MISCELLANEOUS
Qty: 200 EACH | Refills: 3 | Status: SHIPPED | OUTPATIENT
Start: 2021-11-29

## 2021-11-29 RX ORDER — LEVOTHYROXINE SODIUM 0.07 MG/1
75 TABLET ORAL
Qty: 90 TABLET | Refills: 3 | Status: SHIPPED | OUTPATIENT
Start: 2021-11-29

## 2021-11-29 NOTE — TELEPHONE ENCOUNTER
Pt called and asks for a refill on the following rx's:    Safety Lancets 28G     And diabetic strips as well, please.     LEVOTHYROXINE SODIUM 75 MCG     Please send to [facility]

## 2021-12-16 ENCOUNTER — HOSPITAL ENCOUNTER (OUTPATIENT)
Dept: LAB | Facility: HOSPITAL | Age: 86
Discharge: HOME | End: 2021-12-16
Attending: INTERNAL MEDICINE
Payer: MEDICARE

## 2021-12-16 DIAGNOSIS — I48.21 ATRIAL FIBRILLATION, PERMANENT (HCC): ICD-10-CM

## 2021-12-16 DIAGNOSIS — Z51.81 ENCOUNTER FOR THERAPEUTIC DRUG MONITORING: ICD-10-CM

## 2021-12-16 PROCEDURE — 85610 PROTHROMBIN TIME: CPT

## 2021-12-20 ENCOUNTER — TELEPHONE (OUTPATIENT)
Dept: INTERNAL MEDICINE CLINIC | Facility: CLINIC | Age: 86
End: 2021-12-20

## 2021-12-20 NOTE — TELEPHONE ENCOUNTER
Patient called and says a rx for   Syringe/Needle, Disp, (BD SYRINGE/NEEDLE) 22G X 1-1/2\" 3 ML     Was sent in to her pharmacy but she never asked for it and was charged for it. She asks that a nurse please call her.

## 2021-12-21 NOTE — TELEPHONE ENCOUNTER
Spoke with Joann. Prescription was called in by nurse on 11/8/2021. Patient is not on auto-refill. Will notify patient unable to return or refund. Left message for patient to call back.  Spoke with patient who states she misunderstood why she re

## 2021-12-22 ENCOUNTER — TELEPHONE (OUTPATIENT)
Dept: INTERNAL MEDICINE CLINIC | Facility: CLINIC | Age: 86
End: 2021-12-22

## 2021-12-22 RX ORDER — ARIPIPRAZOLE 15 MG/1
20 TABLET ORAL DAILY
Qty: 1350 ML | Refills: 1 | Status: SHIPPED | OUTPATIENT
Start: 2021-12-22

## 2021-12-22 RX ORDER — ARIPIPRAZOLE 15 MG/1
TABLET ORAL
Qty: 450 ML | Refills: 1 | OUTPATIENT
Start: 2021-12-22

## 2021-12-22 NOTE — TELEPHONE ENCOUNTER
Please call Steven Morrow regarding a denied prescription for Potassium Chloride. They say we denied it, but want make sure.

## 2021-12-27 NOTE — TELEPHONE ENCOUNTER
Patient states she received a3 month supply of the prescription listed in the previous message. She always only gets one month at a time, and she wants to know who ordered a 90 day supply for her that cost well over $200.00.   She states [facility] should kn

## 2021-12-27 NOTE — TELEPHONE ENCOUNTER
Pt was informed request was received from the pharmacy. Pt was advised to speak w a nurse directly every time there's a need for a refill to avoid any incorrect quantity of medications. Pt agreeable.

## 2022-01-03 RX ORDER — MECLIZINE HCL 12.5 MG/1
12.5 TABLET ORAL 3 TIMES DAILY PRN
Qty: 30 TABLET | Refills: 0 | Status: SHIPPED | OUTPATIENT
Start: 2022-01-03

## 2022-01-03 RX ORDER — FUROSEMIDE 20 MG/1
TABLET ORAL
Qty: 180 TABLET | Refills: 3 | Status: SHIPPED | OUTPATIENT
Start: 2022-01-03

## 2022-01-17 ENCOUNTER — HOSPITAL ENCOUNTER (OUTPATIENT)
Dept: LAB | Facility: HOSPITAL | Age: 87
Discharge: HOME | End: 2022-01-17
Attending: INTERNAL MEDICINE
Payer: MEDICARE

## 2022-01-17 DIAGNOSIS — Z51.81 ENCOUNTER FOR THERAPEUTIC DRUG MONITORING: ICD-10-CM

## 2022-01-17 DIAGNOSIS — I48.21 ATRIAL FIBRILLATION, PERMANENT (HCC): ICD-10-CM

## 2022-01-17 LAB — INR BLDC: 2.3 (ref 0.9–1.1)

## 2022-01-17 PROCEDURE — 85610 PROTHROMBIN TIME: CPT

## 2022-02-14 ENCOUNTER — HOSPITAL ENCOUNTER (OUTPATIENT)
Dept: LAB | Facility: HOSPITAL | Age: 87
Discharge: HOME | End: 2022-02-14
Attending: INTERNAL MEDICINE
Payer: MEDICARE

## 2022-02-14 DIAGNOSIS — Z51.81 ENCOUNTER FOR THERAPEUTIC DRUG MONITORING: ICD-10-CM

## 2022-02-14 DIAGNOSIS — I48.21 ATRIAL FIBRILLATION, PERMANENT (HCC): ICD-10-CM

## 2022-02-14 LAB — INR BLDC: 3.4 (ref 0.9–1.1)

## 2022-02-14 PROCEDURE — 85610 PROTHROMBIN TIME: CPT

## 2022-02-28 ENCOUNTER — HOSPITAL ENCOUNTER (OUTPATIENT)
Dept: LAB | Facility: HOSPITAL | Age: 87
Discharge: HOME | End: 2022-02-28
Attending: INTERNAL MEDICINE
Payer: MEDICARE

## 2022-02-28 DIAGNOSIS — Z51.81 ENCOUNTER FOR THERAPEUTIC DRUG MONITORING: ICD-10-CM

## 2022-02-28 DIAGNOSIS — I48.21 ATRIAL FIBRILLATION, PERMANENT (HCC): ICD-10-CM

## 2022-02-28 LAB — INR BLDC: 2.7 (ref 0.9–1.1)

## 2022-02-28 PROCEDURE — 85610 PROTHROMBIN TIME: CPT

## 2022-03-10 ENCOUNTER — TELEPHONE (OUTPATIENT)
Dept: INTERNAL MEDICINE CLINIC | Facility: CLINIC | Age: 87
End: 2022-03-10

## 2022-03-11 ENCOUNTER — MED REC SCAN ONLY (OUTPATIENT)
Dept: INTERNAL MEDICINE CLINIC | Facility: CLINIC | Age: 87
End: 2022-03-11

## 2022-03-15 ENCOUNTER — HOSPITAL ENCOUNTER (OUTPATIENT)
Dept: LAB | Facility: HOSPITAL | Age: 87
Discharge: HOME | End: 2022-03-15
Attending: INTERNAL MEDICINE
Payer: MEDICARE

## 2022-03-15 DIAGNOSIS — Z51.81 ENCOUNTER FOR THERAPEUTIC DRUG MONITORING: ICD-10-CM

## 2022-03-15 DIAGNOSIS — I48.21 ATRIAL FIBRILLATION, PERMANENT (HCC): ICD-10-CM

## 2022-03-15 LAB — INR BLDC: 1.9 (ref 0.9–1.1)

## 2022-03-15 PROCEDURE — 85610 PROTHROMBIN TIME: CPT

## 2022-03-18 RX ORDER — LOSARTAN POTASSIUM 100 MG/1
TABLET ORAL
Qty: 90 TABLET | Refills: 3 | Status: SHIPPED | OUTPATIENT
Start: 2022-03-18

## 2022-03-18 NOTE — TELEPHONE ENCOUNTER
Losartan 100 mg 1 tab daily filled 3/23/21 90 with 3 refills     LOV 6/22/21  No upcoming apt on file   Labs

## 2022-03-22 ENCOUNTER — HOSPITAL ENCOUNTER (OUTPATIENT)
Dept: LAB | Facility: HOSPITAL | Age: 87
Discharge: HOME | End: 2022-03-22
Attending: INTERNAL MEDICINE
Payer: MEDICARE

## 2022-03-22 DIAGNOSIS — Z51.81 ENCOUNTER FOR THERAPEUTIC DRUG MONITORING: ICD-10-CM

## 2022-03-22 DIAGNOSIS — I48.21 ATRIAL FIBRILLATION, PERMANENT (HCC): ICD-10-CM

## 2022-03-22 LAB — INR BLDC: 2 (ref 0.9–1.1)

## 2022-03-22 PROCEDURE — 85610 PROTHROMBIN TIME: CPT

## 2022-04-12 ENCOUNTER — LAB ENCOUNTER (OUTPATIENT)
Dept: LAB | Age: 87
End: 2022-04-12
Attending: INTERNAL MEDICINE
Payer: MEDICARE

## 2022-04-12 ENCOUNTER — NURSE ONLY (OUTPATIENT)
Dept: INTERNAL MEDICINE CLINIC | Facility: CLINIC | Age: 87
End: 2022-04-12
Payer: MEDICARE

## 2022-04-12 DIAGNOSIS — Z00.00 LABORATORY EXAMINATION ORDERED AS PART OF A ROUTINE GENERAL MEDICAL EXAMINATION: Primary | ICD-10-CM

## 2022-04-12 DIAGNOSIS — R69 DIAGNOSIS UNKNOWN: Primary | ICD-10-CM

## 2022-04-12 LAB
AMB EXT CHLORIDE: 105
AMB EXT CHOL/HDL RATIO: 3.4
AMB EXT CHOLESTEROL, TOTAL: 124 MG/DL
AMB EXT CMP ALT: 14 U/L
AMB EXT CMP AST: 16 U/L
AMB EXT GLUCOSE: 127 MG/DL
AMB EXT HDL CHOLESTEROL: 36 MG/DL
AMB EXT HEMATOCRIT: 37.9
AMB EXT HEMOGLOBIN: 12.3
AMB EXT HGBA1C: 7.3 %
AMB EXT LDL CHOLESTEROL, DIRECT: 64 MG/DL
AMB EXT MCV: 81.3
AMB EXT NON HDL CHOL: 88 MG/DL
AMB EXT POSTASSIUM: 4.3 MMOL/L
AMB EXT SODIUM: 139 MMOL/L
AMB EXT TRIGLYCERIDES: 163 MG/DL
AMB EXT TSH: 3.61 MIU/ML
AMB EXT WBC: 8.4 X10(3)UL

## 2022-04-12 PROCEDURE — 99173 VISUAL ACUITY SCREEN: CPT | Performed by: INTERNAL MEDICINE

## 2022-04-12 NOTE — PROGRESS NOTES
*BODY COMPOSITION:    YES:x       NO:       REASON TEST NOT PERFORMED: The printer is not working properly today No body comp results   _____________________________________________________________________________  *VENIPUNCTURE    YES: x      NO:        REASON VENIPUNCTURE NOT PERFORMED: [facility] (hard stick)      LEFT A/C:     LEFT HAND:        RIGHT A/C:     RIGHT HAND:   __________________________________________________________________  *VISION    YES: x    NO:    REASON TEST NOT PERFORMED:  ________________________________________________________________________  *ANKLE BRACHIAL INDEX    YES;      NO:x    REASON TEST NOT PERFORMED: Patient has a history of blood clots and ankles are swollen  ________________________________________________________________________  *URINE SAMPLE    YES:    NO:x    REASON NOT COLLECTED: Collect UA & MAB @ CPE

## 2022-04-14 ENCOUNTER — HOSPITAL ENCOUNTER (OUTPATIENT)
Dept: LAB | Facility: HOSPITAL | Age: 87
Discharge: HOME | End: 2022-04-14
Attending: INTERNAL MEDICINE
Payer: MEDICARE

## 2022-04-14 DIAGNOSIS — Z51.81 ENCOUNTER FOR THERAPEUTIC DRUG MONITORING: ICD-10-CM

## 2022-04-14 DIAGNOSIS — I48.21 ATRIAL FIBRILLATION, PERMANENT (HCC): ICD-10-CM

## 2022-04-14 LAB — INR BLDC: 2.7 (ref 0.9–1.1)

## 2022-04-14 PROCEDURE — 85610 PROTHROMBIN TIME: CPT

## 2022-04-25 ENCOUNTER — OFFICE VISIT (OUTPATIENT)
Dept: INTERNAL MEDICINE CLINIC | Facility: CLINIC | Age: 87
End: 2022-04-25
Payer: MEDICARE

## 2022-04-25 VITALS
BODY MASS INDEX: 36.92 KG/M2 | WEIGHT: 171.13 LBS | TEMPERATURE: 97 F | DIASTOLIC BLOOD PRESSURE: 70 MMHG | SYSTOLIC BLOOD PRESSURE: 142 MMHG | HEIGHT: 57 IN | RESPIRATION RATE: 16 BRPM | OXYGEN SATURATION: 97 % | HEART RATE: 76 BPM

## 2022-04-25 DIAGNOSIS — Z00.00 ENCOUNTER FOR ANNUAL HEALTH EXAMINATION: Primary | ICD-10-CM

## 2022-04-25 DIAGNOSIS — K22.2 ESOPHAGEAL STRICTURE: ICD-10-CM

## 2022-04-25 DIAGNOSIS — I50.32 CHRONIC HEART FAILURE WITH PRESERVED EJECTION FRACTION (HCC): ICD-10-CM

## 2022-04-25 DIAGNOSIS — Z86.010 HISTORY OF COLON POLYPS: ICD-10-CM

## 2022-04-25 DIAGNOSIS — Z79.4 UNCONTROLLED TYPE 2 DIABETES MELLITUS WITH HYPERGLYCEMIA, WITH LONG-TERM CURRENT USE OF INSULIN (HCC): ICD-10-CM

## 2022-04-25 DIAGNOSIS — I48.21 PERMANENT ATRIAL FIBRILLATION (HCC): ICD-10-CM

## 2022-04-25 DIAGNOSIS — Z78.9 STATIN INTOLERANCE: ICD-10-CM

## 2022-04-25 DIAGNOSIS — Z97.4 WEARS HEARING AID: ICD-10-CM

## 2022-04-25 DIAGNOSIS — E53.8 VITAMIN B 12 DEFICIENCY: ICD-10-CM

## 2022-04-25 DIAGNOSIS — Z79.01 WARFARIN ANTICOAGULATION: ICD-10-CM

## 2022-04-25 DIAGNOSIS — Z13.31 DEPRESSION SCREENING: ICD-10-CM

## 2022-04-25 DIAGNOSIS — E11.65 UNCONTROLLED TYPE 2 DIABETES MELLITUS WITH HYPERGLYCEMIA, WITH LONG-TERM CURRENT USE OF INSULIN (HCC): ICD-10-CM

## 2022-04-25 DIAGNOSIS — K21.9 GASTROESOPHAGEAL REFLUX DISEASE WITHOUT ESOPHAGITIS: ICD-10-CM

## 2022-04-25 DIAGNOSIS — Z85.828 PERSONAL HISTORY OF OTHER MALIGNANT NEOPLASM OF SKIN: ICD-10-CM

## 2022-04-25 DIAGNOSIS — E78.00 HYPERCHOLESTEROLEMIA: ICD-10-CM

## 2022-04-25 DIAGNOSIS — I10 ESSENTIAL HYPERTENSION: ICD-10-CM

## 2022-04-25 DIAGNOSIS — E66.01 CLASS 2 SEVERE OBESITY DUE TO EXCESS CALORIES WITH SERIOUS COMORBIDITY AND BODY MASS INDEX (BMI) OF 37.0 TO 37.9 IN ADULT (HCC): ICD-10-CM

## 2022-04-25 DIAGNOSIS — K57.90 DIVERTICULOSIS: ICD-10-CM

## 2022-04-25 DIAGNOSIS — R26.89 FUNCTIONAL GAIT ABNORMALITY: ICD-10-CM

## 2022-04-25 DIAGNOSIS — I72.8 SPLENIC ARTERY ANEURYSM (HCC): ICD-10-CM

## 2022-04-25 DIAGNOSIS — M46.96 INFLAMMATORY SPONDYLOPATHY OF LUMBAR REGION (HCC): ICD-10-CM

## 2022-04-25 DIAGNOSIS — I25.10 CORONARY ARTERY DISEASE INVOLVING NATIVE CORONARY ARTERY OF NATIVE HEART WITHOUT ANGINA PECTORIS: ICD-10-CM

## 2022-04-25 DIAGNOSIS — M85.89 OSTEOPENIA OF MULTIPLE SITES: ICD-10-CM

## 2022-04-25 DIAGNOSIS — E03.9 ACQUIRED HYPOTHYROIDISM: ICD-10-CM

## 2022-04-25 DIAGNOSIS — R42 VERTIGO: ICD-10-CM

## 2022-04-25 DIAGNOSIS — M62.81 HAND MUSCLE WEAKNESS: ICD-10-CM

## 2022-04-25 DIAGNOSIS — Z86.718 HISTORY OF DVT (DEEP VEIN THROMBOSIS): ICD-10-CM

## 2022-04-25 DIAGNOSIS — K76.0 NAFLD (NONALCOHOLIC FATTY LIVER DISEASE): ICD-10-CM

## 2022-04-25 DIAGNOSIS — Z91.81 AT RISK FOR FALLS: ICD-10-CM

## 2022-04-25 PROCEDURE — 99215 OFFICE O/P EST HI 40 MIN: CPT | Performed by: INTERNAL MEDICINE

## 2022-04-25 PROCEDURE — G0439 PPPS, SUBSEQ VISIT: HCPCS | Performed by: INTERNAL MEDICINE

## 2022-04-25 PROCEDURE — 93000 ELECTROCARDIOGRAM COMPLETE: CPT | Performed by: INTERNAL MEDICINE

## 2022-04-26 ENCOUNTER — TELEPHONE (OUTPATIENT)
Dept: ENDOCRINOLOGY CLINIC | Facility: CLINIC | Age: 87
End: 2022-04-26

## 2022-04-26 LAB
BILIRUB UR QL STRIP.AUTO: NEGATIVE
COLOR UR AUTO: YELLOW
CREAT UR-SCNC: 106 MG/DL
GLUCOSE UR STRIP.AUTO-MCNC: NEGATIVE MG/DL
HYALINE CASTS #/AREA URNS AUTO: PRESENT /LPF
KETONES UR STRIP.AUTO-MCNC: NEGATIVE MG/DL
MICROALBUMIN UR-MCNC: 1.35 MG/DL
MICROALBUMIN/CREAT 24H UR-RTO: 12.7 UG/MG (ref ?–30)
NITRITE UR QL STRIP.AUTO: NEGATIVE
PH UR STRIP.AUTO: 5 [PH] (ref 5–8)
PROT UR STRIP.AUTO-MCNC: NEGATIVE MG/DL
RBC UR QL AUTO: NEGATIVE
SP GR UR STRIP.AUTO: 1.02 (ref 1–1.03)
UROBILINOGEN UR STRIP.AUTO-MCNC: <2 MG/DL

## 2022-04-26 PROCEDURE — 82570 ASSAY OF URINE CREATININE: CPT | Performed by: INTERNAL MEDICINE

## 2022-04-26 PROCEDURE — 82043 UR ALBUMIN QUANTITATIVE: CPT | Performed by: INTERNAL MEDICINE

## 2022-04-26 PROCEDURE — 81001 URINALYSIS AUTO W/SCOPE: CPT | Performed by: INTERNAL MEDICINE

## 2022-04-29 PROBLEM — R42 VERTIGO: Status: ACTIVE | Noted: 2022-04-29

## 2022-05-03 ENCOUNTER — TELEPHONE (OUTPATIENT)
Dept: INTERNAL MEDICINE CLINIC | Facility: CLINIC | Age: 87
End: 2022-05-03

## 2022-05-03 NOTE — TELEPHONE ENCOUNTER
OV from 4/25/22 faxed to pt's daughter Kaushal Guadarrama who is pt's POA.  Per pt and Dr. Trista Gamboa' request.   Jazmín Khan RN

## 2022-05-12 ENCOUNTER — HOSPITAL ENCOUNTER (OUTPATIENT)
Dept: LAB | Facility: HOSPITAL | Age: 87
Discharge: HOME | End: 2022-05-12
Attending: INTERNAL MEDICINE
Payer: MEDICARE

## 2022-05-12 ENCOUNTER — APPOINTMENT (OUTPATIENT)
Dept: BONE DENSITY | Age: 87
End: 2022-05-12
Attending: INTERNAL MEDICINE
Payer: MEDICARE

## 2022-05-12 DIAGNOSIS — I48.21 ATRIAL FIBRILLATION, PERMANENT (HCC): ICD-10-CM

## 2022-05-12 DIAGNOSIS — Z51.81 ENCOUNTER FOR THERAPEUTIC DRUG MONITORING: ICD-10-CM

## 2022-05-12 LAB — INR BLDC: 1.7 (ref 0.9–1.1)

## 2022-05-12 PROCEDURE — 85610 PROTHROMBIN TIME: CPT

## 2022-05-17 ENCOUNTER — HOSPITAL ENCOUNTER (OUTPATIENT)
Dept: LAB | Facility: HOSPITAL | Age: 87
Discharge: HOME | End: 2022-05-17
Attending: INTERNAL MEDICINE
Payer: MEDICARE

## 2022-05-17 DIAGNOSIS — Z51.81 ENCOUNTER FOR THERAPEUTIC DRUG MONITORING: ICD-10-CM

## 2022-05-17 DIAGNOSIS — I48.21 ATRIAL FIBRILLATION, PERMANENT (HCC): ICD-10-CM

## 2022-05-17 LAB — INR BLDC: 2.4 (ref 0.9–1.1)

## 2022-05-17 PROCEDURE — 85610 PROTHROMBIN TIME: CPT

## 2022-05-19 RX ORDER — ARIPIPRAZOLE 15 MG/1
TABLET ORAL
Qty: 1350 ML | Refills: 1 | Status: SHIPPED | OUTPATIENT
Start: 2022-05-19

## 2022-05-26 ENCOUNTER — OFFICE VISIT (OUTPATIENT)
Dept: INTERNAL MEDICINE CLINIC | Facility: CLINIC | Age: 87
End: 2022-05-26
Payer: MEDICARE

## 2022-05-26 VITALS
TEMPERATURE: 97 F | DIASTOLIC BLOOD PRESSURE: 82 MMHG | OXYGEN SATURATION: 98 % | BODY MASS INDEX: 34 KG/M2 | HEIGHT: 58 IN | WEIGHT: 162 LBS | HEART RATE: 83 BPM | RESPIRATION RATE: 16 BRPM | SYSTOLIC BLOOD PRESSURE: 154 MMHG

## 2022-05-26 DIAGNOSIS — R23.8 EASY BRUISING: ICD-10-CM

## 2022-05-26 DIAGNOSIS — I48.20 CHRONIC A-FIB (HCC): ICD-10-CM

## 2022-05-26 DIAGNOSIS — S41.111A SKIN TEAR OF RIGHT UPPER ARM WITHOUT COMPLICATION, INITIAL ENCOUNTER: Primary | ICD-10-CM

## 2022-05-26 DIAGNOSIS — Z79.01 WARFARIN ANTICOAGULATION: ICD-10-CM

## 2022-05-26 PROCEDURE — 99214 OFFICE O/P EST MOD 30 MIN: CPT | Performed by: INTERNAL MEDICINE

## 2022-06-06 ENCOUNTER — TELEPHONE (OUTPATIENT)
Dept: INTERNAL MEDICINE CLINIC | Facility: CLINIC | Age: 87
End: 2022-06-06

## 2022-06-06 NOTE — TELEPHONE ENCOUNTER
Patient called and says she has a bone density scan scheduled for this Thursday. She says Dr Lorena Vyas ordered it when she was his patient but now that she is Dr Maritza Hancock patient, she asks if he would still like for her to have it done.

## 2022-06-09 ENCOUNTER — HOSPITAL ENCOUNTER (OUTPATIENT)
Dept: BONE DENSITY | Age: 87
Discharge: HOME | End: 2022-06-09
Attending: INTERNAL MEDICINE
Payer: MEDICARE

## 2022-06-09 DIAGNOSIS — M85.89 OSTEOPENIA OF MULTIPLE SITES: ICD-10-CM

## 2022-06-09 PROCEDURE — 77080 DXA BONE DENSITY AXIAL: CPT | Performed by: INTERNAL MEDICINE

## 2022-06-13 ENCOUNTER — TELEPHONE (OUTPATIENT)
Dept: INTERNAL MEDICINE CLINIC | Facility: CLINIC | Age: 87
End: 2022-06-13

## 2022-06-13 ENCOUNTER — MED REC SCAN ONLY (OUTPATIENT)
Dept: INTERNAL MEDICINE CLINIC | Facility: CLINIC | Age: 87
End: 2022-06-13

## 2022-06-13 NOTE — TELEPHONE ENCOUNTER
Patient's daughter called back and scheduled a nurse visit for prolia injection on Thursday 06/23 at 1:30pm.

## 2022-06-13 NOTE — TELEPHONE ENCOUNTER
Summary of benefits came and she does have benefits for the injection met her Deductible of $233. Melinda Montague will call and set up apt for injection.

## 2022-06-14 ENCOUNTER — HOSPITAL ENCOUNTER (OUTPATIENT)
Dept: LAB | Facility: HOSPITAL | Age: 87
Discharge: HOME | End: 2022-06-14
Attending: INTERNAL MEDICINE
Payer: MEDICARE

## 2022-06-14 DIAGNOSIS — Z51.81 ENCOUNTER FOR THERAPEUTIC DRUG MONITORING: ICD-10-CM

## 2022-06-14 DIAGNOSIS — I48.21 ATRIAL FIBRILLATION, PERMANENT (HCC): ICD-10-CM

## 2022-06-14 LAB — INR BLDC: 2.2 (ref 0.9–1.1)

## 2022-06-14 PROCEDURE — 85610 PROTHROMBIN TIME: CPT

## 2022-06-23 ENCOUNTER — NURSE ONLY (OUTPATIENT)
Dept: INTERNAL MEDICINE CLINIC | Facility: CLINIC | Age: 87
End: 2022-06-23
Payer: MEDICARE

## 2022-06-23 DIAGNOSIS — M81.0 OSTEOPOROSIS, UNSPECIFIED OSTEOPOROSIS TYPE, UNSPECIFIED PATHOLOGICAL FRACTURE PRESENCE: Primary | ICD-10-CM

## 2022-06-23 PROCEDURE — 96372 THER/PROPH/DIAG INJ SC/IM: CPT | Performed by: INTERNAL MEDICINE

## 2022-06-24 ENCOUNTER — HOSPITAL ENCOUNTER (OUTPATIENT)
Dept: LAB | Facility: HOSPITAL | Age: 87
Discharge: HOME | End: 2022-06-24
Attending: INTERNAL MEDICINE
Payer: MEDICARE

## 2022-06-24 LAB
ALBUMIN SERPL-MCNC: 3.6 G/DL (ref 3.4–5)
ALBUMIN/GLOB SERPL: 1 {RATIO} (ref 1–2)
ALP LIVER SERPL-CCNC: 85 U/L
ALT SERPL-CCNC: 16 U/L
ANION GAP SERPL CALC-SCNC: 5 MMOL/L (ref 0–18)
AST SERPL-CCNC: 17 U/L (ref 15–37)
BILIRUB SERPL-MCNC: 0.6 MG/DL (ref 0.1–2)
BUN BLD-MCNC: 9 MG/DL (ref 7–18)
CALCIUM BLD-MCNC: 9 MG/DL (ref 8.5–10.1)
CHLORIDE SERPL-SCNC: 104 MMOL/L (ref 98–112)
CHOLEST SERPL-MCNC: 133 MG/DL (ref ?–200)
CO2 SERPL-SCNC: 28 MMOL/L (ref 21–32)
CREAT BLD-MCNC: 0.91 MG/DL
ERYTHROCYTE [DISTWIDTH] IN BLOOD BY AUTOMATED COUNT: 14.8 %
FASTING PATIENT LIPID ANSWER: YES
FASTING STATUS PATIENT QL REPORTED: YES
GLOBULIN PLAS-MCNC: 3.7 G/DL (ref 2.8–4.4)
GLUCOSE BLD-MCNC: 123 MG/DL (ref 70–99)
HCT VFR BLD AUTO: 39.4 %
HDLC SERPL-MCNC: 42 MG/DL (ref 40–59)
HGB BLD-MCNC: 12.2 G/DL
LDLC SERPL CALC-MCNC: 65 MG/DL (ref ?–100)
MCH RBC QN AUTO: 25 PG (ref 26–34)
MCHC RBC AUTO-ENTMCNC: 31 G/DL (ref 31–37)
MCV RBC AUTO: 80.7 FL
NONHDLC SERPL-MCNC: 91 MG/DL (ref ?–130)
NT-PROBNP SERPL-MCNC: 1137 PG/ML (ref ?–450)
OSMOLALITY SERPL CALC.SUM OF ELEC: 284 MOSM/KG (ref 275–295)
PLATELET # BLD AUTO: 244 10(3)UL (ref 150–450)
POTASSIUM SERPL-SCNC: 3.7 MMOL/L (ref 3.5–5.1)
PROT SERPL-MCNC: 7.3 G/DL (ref 6.4–8.2)
RBC # BLD AUTO: 4.88 X10(6)UL
SODIUM SERPL-SCNC: 137 MMOL/L (ref 136–145)
TRIGL SERPL-MCNC: 154 MG/DL (ref 30–149)
VLDLC SERPL CALC-MCNC: 23 MG/DL (ref 0–30)
WBC # BLD AUTO: 9.2 X10(3) UL (ref 4–11)

## 2022-06-24 PROCEDURE — 80061 LIPID PANEL: CPT | Performed by: INTERNAL MEDICINE

## 2022-06-24 PROCEDURE — 36415 COLL VENOUS BLD VENIPUNCTURE: CPT | Performed by: INTERNAL MEDICINE

## 2022-06-24 PROCEDURE — 85027 COMPLETE CBC AUTOMATED: CPT | Performed by: INTERNAL MEDICINE

## 2022-06-24 PROCEDURE — 83880 ASSAY OF NATRIURETIC PEPTIDE: CPT | Performed by: INTERNAL MEDICINE

## 2022-06-24 PROCEDURE — 80053 COMPREHEN METABOLIC PANEL: CPT | Performed by: INTERNAL MEDICINE

## 2022-07-12 ENCOUNTER — HOSPITAL ENCOUNTER (OUTPATIENT)
Dept: LAB | Facility: HOSPITAL | Age: 87
Discharge: HOME | End: 2022-07-12
Attending: INTERNAL MEDICINE
Payer: MEDICARE

## 2022-07-12 DIAGNOSIS — I48.21 ATRIAL FIBRILLATION, PERMANENT (HCC): ICD-10-CM

## 2022-07-12 DIAGNOSIS — Z51.81 ENCOUNTER FOR THERAPEUTIC DRUG MONITORING: ICD-10-CM

## 2022-07-12 LAB — INR BLDC: 2.9 (ref 0.9–1.1)

## 2022-07-12 PROCEDURE — 85610 PROTHROMBIN TIME: CPT

## 2022-07-21 ENCOUNTER — HOSPITAL ENCOUNTER (OUTPATIENT)
Dept: CV DIAGNOSTICS | Facility: HOSPITAL | Age: 87
Discharge: HOME | End: 2022-07-21
Attending: INTERNAL MEDICINE
Payer: MEDICARE

## 2022-07-21 DIAGNOSIS — I50.32 CHRONIC DIASTOLIC HEART FAILURE (HCC): ICD-10-CM

## 2022-07-21 DIAGNOSIS — I25.10 CAD (CORONARY ARTERY DISEASE): ICD-10-CM

## 2022-07-21 DIAGNOSIS — I48.21 ATRIAL FIBRILLATION, PERMANENT (HCC): ICD-10-CM

## 2022-07-21 DIAGNOSIS — I10 HYPERTENSION, BENIGN: ICD-10-CM

## 2022-07-21 DIAGNOSIS — R60.9 PERIPHERAL EDEMA: ICD-10-CM

## 2022-07-21 PROCEDURE — 93306 TTE W/DOPPLER COMPLETE: CPT | Performed by: INTERNAL MEDICINE

## 2022-08-08 ENCOUNTER — HOSPITAL ENCOUNTER (OUTPATIENT)
Dept: LAB | Facility: HOSPITAL | Age: 87
Discharge: HOME | End: 2022-08-08
Attending: INTERNAL MEDICINE
Payer: MEDICARE

## 2022-08-08 DIAGNOSIS — Z79.01 LONG TERM (CURRENT) USE OF ANTICOAGULANTS: ICD-10-CM

## 2022-08-08 LAB
ANION GAP SERPL CALC-SCNC: 6 MMOL/L (ref 0–18)
BUN BLD-MCNC: 11 MG/DL (ref 7–18)
CALCIUM BLD-MCNC: 9.3 MG/DL (ref 8.5–10.1)
CHLORIDE SERPL-SCNC: 106 MMOL/L (ref 98–112)
CO2 SERPL-SCNC: 28 MMOL/L (ref 21–32)
CREAT BLD-MCNC: 0.84 MG/DL
GFR SERPLBLD BASED ON 1.73 SQ M-ARVRAT: 65 ML/MIN/1.73M2 (ref 60–?)
GLUCOSE BLD-MCNC: 123 MG/DL (ref 70–99)
INR BLDC: 2.3 (ref 0.9–1.1)
NT-PROBNP SERPL-MCNC: 1341 PG/ML (ref ?–450)
OSMOLALITY SERPL CALC.SUM OF ELEC: 291 MOSM/KG (ref 275–295)
POTASSIUM SERPL-SCNC: 4.2 MMOL/L (ref 3.5–5.1)
SODIUM SERPL-SCNC: 140 MMOL/L (ref 136–145)

## 2022-08-08 PROCEDURE — 80048 BASIC METABOLIC PNL TOTAL CA: CPT | Performed by: INTERNAL MEDICINE

## 2022-08-08 PROCEDURE — 83880 ASSAY OF NATRIURETIC PEPTIDE: CPT | Performed by: INTERNAL MEDICINE

## 2022-08-08 PROCEDURE — 85610 PROTHROMBIN TIME: CPT

## 2022-08-08 PROCEDURE — 36415 COLL VENOUS BLD VENIPUNCTURE: CPT | Performed by: INTERNAL MEDICINE

## 2022-08-10 ENCOUNTER — TELEPHONE (OUTPATIENT)
Dept: INTERNAL MEDICINE CLINIC | Facility: CLINIC | Age: 87
End: 2022-08-10

## 2022-08-10 RX ORDER — POTASSIUM CHLORIDE 1.5 G/1.77G
20 POWDER, FOR SOLUTION ORAL 2 TIMES DAILY
Qty: 180 PACKET | Refills: 3 | Status: SHIPPED | OUTPATIENT
Start: 2022-08-10

## 2022-08-10 NOTE — TELEPHONE ENCOUNTER
Patient states that her prescription for POTASSIUM CHLORIDE 40 meq/30 ml (10%) Oral Solution has become too expensive. She would like us to send a prescription for potassium powder packs to [facility]. She states they will run that through insurance and see what the price is. This will determine is she fills the prescription or not.

## 2022-08-15 ENCOUNTER — TELEPHONE (OUTPATIENT)
Dept: INTERNAL MEDICINE CLINIC | Facility: CLINIC | Age: 87
End: 2022-08-15

## 2022-08-15 DIAGNOSIS — K22.2 ESOPHAGEAL STRICTURE: Primary | ICD-10-CM

## 2022-08-16 ENCOUNTER — TELEPHONE (OUTPATIENT)
Dept: INTERNAL MEDICINE CLINIC | Facility: CLINIC | Age: 87
End: 2022-08-16

## 2022-08-16 RX ORDER — TORSEMIDE 20 MG/1
20 TABLET ORAL 2 TIMES DAILY
Qty: 60 TABLET | Refills: 5 | COMMUNITY
Start: 2022-08-16

## 2022-08-16 RX ORDER — FUROSEMIDE 20 MG/1
20 TABLET ORAL 2 TIMES DAILY
Qty: 180 TABLET | Refills: 3 | Status: SHIPPED | OUTPATIENT
Start: 2022-08-16 | End: 2022-08-16 | Stop reason: CLARIF

## 2022-08-16 RX ORDER — FUROSEMIDE 20 MG/1
TABLET ORAL
Qty: 180 TABLET | Refills: 3 | Status: SHIPPED | OUTPATIENT
Start: 2022-08-16 | End: 2022-08-16 | Stop reason: CLARIF

## 2022-08-16 NOTE — TELEPHONE ENCOUNTER
Spoke with Carlos Alberto Newman. Furosemide was cancelled by cardiologist and replaced with Torsemide.  Made changes to patients med list.

## 2022-08-16 NOTE — TELEPHONE ENCOUNTER
Pharmacist called asking to speak to nurse to clarify patient's most recent rx sent over for furosemide

## 2022-08-23 ENCOUNTER — TELEPHONE (OUTPATIENT)
Dept: INTERNAL MEDICINE CLINIC | Facility: CLINIC | Age: 87
End: 2022-08-23

## 2022-08-23 NOTE — TELEPHONE ENCOUNTER
Patient received a letter from [facility] [employer] giving approval for Potassium CL 20 MEQ, powdered Potassium. She has decided not to take this medication, and will stay with the liquid oral medication, even though the medications are equally expensive. Patient would like the nurse to call her to talk about this letter.

## 2022-08-30 DIAGNOSIS — E11.8 TYPE 2 DIABETES MELLITUS WITH UNSPECIFIED COMPLICATIONS (HCC): ICD-10-CM

## 2022-08-30 RX ORDER — LANCETS
EACH MISCELLANEOUS
Qty: 200 EACH | Refills: 3 | Status: SHIPPED | OUTPATIENT
Start: 2022-08-30

## 2022-08-30 NOTE — TELEPHONE ENCOUNTER
Accu-chek lancets up to three times a day filled 11/29/21 200 each with 3 refills     LOV 4/25/22  Uncontrolled DM2 w/ hyperglycemia and w/ insulin - Last A1c was 7.3% done thru [facility]  Return in about 1 year (around 4/25/2023)  No upcoming apt on file   Labs .  Last A1c was 7.3%

## 2022-09-01 ENCOUNTER — LAB REQUISITION (OUTPATIENT)
Dept: LAB | Facility: HOSPITAL | Age: 87
End: 2022-09-01
Payer: MEDICARE

## 2022-09-01 ENCOUNTER — TELEPHONE (OUTPATIENT)
Dept: INTERNAL MEDICINE CLINIC | Facility: CLINIC | Age: 87
End: 2022-09-01

## 2022-09-01 DIAGNOSIS — I25.10 ATHEROSCLEROTIC HEART DISEASE OF NATIVE CORONARY ARTERY WITHOUT ANGINA PECTORIS: ICD-10-CM

## 2022-09-01 DIAGNOSIS — U07.1 COVID-19 VIRUS INFECTION: Primary | ICD-10-CM

## 2022-09-01 LAB
ANION GAP SERPL CALC-SCNC: 5 MMOL/L (ref 0–18)
BUN BLD-MCNC: 12 MG/DL (ref 7–18)
CALCIUM BLD-MCNC: 9 MG/DL (ref 8.5–10.1)
CHLORIDE SERPL-SCNC: 103 MMOL/L (ref 98–112)
CO2 SERPL-SCNC: 28 MMOL/L (ref 21–32)
CREAT BLD-MCNC: 1 MG/DL
GFR SERPLBLD BASED ON 1.73 SQ M-ARVRAT: 53 ML/MIN/1.73M2 (ref 60–?)
GLUCOSE BLD-MCNC: 207 MG/DL (ref 70–99)
NT-PROBNP SERPL-MCNC: 2259 PG/ML (ref ?–450)
OSMOLALITY SERPL CALC.SUM OF ELEC: 288 MOSM/KG (ref 275–295)
POTASSIUM SERPL-SCNC: 3.6 MMOL/L (ref 3.5–5.1)
SODIUM SERPL-SCNC: 136 MMOL/L (ref 136–145)

## 2022-09-01 PROCEDURE — 83880 ASSAY OF NATRIURETIC PEPTIDE: CPT | Performed by: INTERNAL MEDICINE

## 2022-09-01 PROCEDURE — 80048 BASIC METABOLIC PNL TOTAL CA: CPT | Performed by: INTERNAL MEDICINE

## 2022-09-02 ENCOUNTER — VIRTUAL PHONE E/M (OUTPATIENT)
Dept: INTERNAL MEDICINE CLINIC | Facility: CLINIC | Age: 87
End: 2022-09-02
Payer: MEDICARE

## 2022-09-02 ENCOUNTER — TELEPHONE (OUTPATIENT)
Dept: INTERNAL MEDICINE CLINIC | Facility: CLINIC | Age: 87
End: 2022-09-02

## 2022-09-02 DIAGNOSIS — U07.1 COVID-19 VIRUS INFECTION: Primary | ICD-10-CM

## 2022-09-02 NOTE — TELEPHONE ENCOUNTER
[facility] called and stated that they don't have the molnupiravir 200 MG Oral capsule in stock. Pharmacist wants to know if Dr. Trista Gamboa can prescribe PAXLOVID instead.

## 2022-09-02 NOTE — TELEPHONE ENCOUNTER
I was paged by nursing staff at patient's assisted living facility [facility]). Denae tested positive for COVID-19 this evening. She has mild symptoms. Oxygen saturation is 97%. Will prescribe Molnupiravir 4, 200 mg capsules q 12 hours x 5 days. I'm holding off Paxlovid b/c of the significant interaction w/ Warfarin. Please set up virtual TE w/ Dr. Marzetta Libman. Family will  [address] from pharmacy and there is no detectable interaction w/ this medication when run against her current meds. Baljit Velasco. Kiya Alvarez MD  Diplomate, American Board of Internal Medicine  Member, American College of Lifestyle Medicine  Member, American Association for [facility]  [address] [place] [address], [address] [place] [address],[address], [place], [address]  [phone] (phone); [phone] (fax)  Lisbeth Sterling. [email or web address]

## 2022-09-03 NOTE — PROGRESS NOTES
Virtual Telephone Check-In    Carlito Vera verbally consents to a Virtual/Telephone Check-In visit on 09/02/22. Patient has been referred to the [facility] website at [email or web address] to review the yearly Consent to Treat document. Patient understands and accepts financial responsibility for any deductible, co-insurance and/or co-pays associated with this service. Duration of the service: 30 minutes audio only-three-way with pt and daughter Gil Handing. Summary of topics discussed:     Pt in residential facility [address] [place] [address] with med admin. Developed  Cough, sinus, HA, sore throat 9/1, was tested +Covid in her facility today and isolated. Has Tylenol for aches and pains, some nausea and loss taste. Is frail at 80 high risk. On MV, told add C,D, Zinc, Kaden DM. Has swallowing problems, though Paxlovid would be too hard on her and will try Molnupiravir opened up into her Ensure. Have return call 9/6. Will ask facility to do O2 sats bid as well and send to ER if SOB or O2 <90.       Zettie Klinefelter, MD

## 2022-09-06 RX ORDER — ARIPIPRAZOLE 15 MG/1
TABLET ORAL
Qty: 1350 ML | Refills: 1 | Status: CANCELLED | OUTPATIENT
Start: 2022-09-06

## 2022-09-06 NOTE — TELEPHONE ENCOUNTER
Potassium chloride 40 meq 30 ml oral solution filled 5/19/22 1350 ml with 1 refill     LOV 5/26/22  Return in about 4 weeks (around 6/23/2022).   Apt 9/7/22  Labs 9/1/22   Potassium   3.5 - 5.1 mmol/L 3.6

## 2022-09-07 ENCOUNTER — VIRTUAL PHONE E/M (OUTPATIENT)
Dept: INTERNAL MEDICINE CLINIC | Facility: CLINIC | Age: 87
End: 2022-09-07
Payer: MEDICARE

## 2022-09-07 DIAGNOSIS — U07.1 COVID-19 VIRUS INFECTION: Primary | ICD-10-CM

## 2022-09-07 DIAGNOSIS — E87.6 HYPOKALEMIA: ICD-10-CM

## 2022-09-07 PROCEDURE — 99442 PHONE E/M BY PHYS 11-20 MIN: CPT | Performed by: INTERNAL MEDICINE

## 2022-09-07 RX ORDER — ARIPIPRAZOLE 15 MG/1
20 TABLET ORAL DAILY
Qty: 1350 ML | Refills: 1 | Status: SHIPPED | OUTPATIENT
Start: 2022-09-07

## 2022-09-07 NOTE — PROGRESS NOTES
Virtual Telephone Check-In    Lorena Tavera verbally consents to a Virtual/Telephone Check-In visit on 09/07/22. Patient has been referred to the [facility] website at [email or web address] to review the yearly Consent to Treat document. Patient understands and accepts financial responsibility for any deductible, co-insurance and/or co-pays associated with this service. Duration of the service: 15 minutes audio only 3-way with dCindy and patient       Summary of topics discussed:     Covid virus infection better-cough gone, day 6 from +test. Oxygen sat 97. Residence [facility] [employer] and [employer]. Finished Molnupiravir no adverse effects. Sounds strong on phone.  [employer] will quarantine for 10 days total. RTC 1mo for routine exam and lab-potassium recheck (3.6)        Daniel Santamaria MD

## 2022-09-12 ENCOUNTER — HOSPITAL ENCOUNTER (OUTPATIENT)
Dept: LAB | Facility: HOSPITAL | Age: 87
Discharge: HOME | End: 2022-09-12
Attending: INTERNAL MEDICINE
Payer: MEDICARE

## 2022-09-12 DIAGNOSIS — Z79.01 LONG TERM (CURRENT) USE OF ANTICOAGULANTS: ICD-10-CM

## 2022-09-12 LAB — INR BLDC: 3.5 (ref 0.9–1.1)

## 2022-09-12 PROCEDURE — 85610 PROTHROMBIN TIME: CPT

## 2022-09-19 ENCOUNTER — HOSPITAL ENCOUNTER (OUTPATIENT)
Dept: LAB | Facility: HOSPITAL | Age: 87
Discharge: HOME | End: 2022-09-19
Attending: INTERNAL MEDICINE

## 2022-09-19 DIAGNOSIS — Z79.01 ANTICOAGULATED ON WARFARIN: ICD-10-CM

## 2022-09-19 DIAGNOSIS — I48.91 ATRIAL FIBRILLATION, UNSPECIFIED TYPE (HCC): ICD-10-CM

## 2022-09-19 DIAGNOSIS — Z86.718 PERSONAL HISTORY OF DVT (DEEP VEIN THROMBOSIS): ICD-10-CM

## 2022-09-19 LAB — INR BLDC: 3.1 (ref 0.9–1.1)

## 2022-09-19 PROCEDURE — 85610 PROTHROMBIN TIME: CPT | Performed by: INTERNAL MEDICINE

## 2022-09-26 ENCOUNTER — HOSPITAL ENCOUNTER (OUTPATIENT)
Dept: LAB | Facility: HOSPITAL | Age: 87
Discharge: HOME | End: 2022-09-26
Attending: INTERNAL MEDICINE

## 2022-09-26 DIAGNOSIS — Z79.01 LONG TERM (CURRENT) USE OF ANTICOAGULANTS: ICD-10-CM

## 2022-09-26 LAB — INR BLDC: 2.7 (ref 0.9–1.1)

## 2022-09-26 PROCEDURE — 85610 PROTHROMBIN TIME: CPT

## 2022-09-26 NOTE — PROGRESS NOTES
Assuming patient is not on prescription meds we can repeat in 2 years if on prescription meds let me know Niacinamide Pregnancy And Lactation Text: These medications are considered safe during pregnancy.

## 2022-09-29 PROBLEM — G47.8 DIFFICULTY WAKING: Status: ACTIVE | Noted: 2022-07-07

## 2022-09-29 PROBLEM — R60.9 PERIPHERAL EDEMA: Status: ACTIVE | Noted: 2022-06-22

## 2022-09-29 PROBLEM — R60.0 PERIPHERAL EDEMA: Status: ACTIVE | Noted: 2022-06-22

## 2022-09-29 PROBLEM — L85.1 KERATODERMA OF FOOT, ACQUIRED: Status: ACTIVE | Noted: 2022-05-05

## 2022-10-07 ENCOUNTER — HOSPITAL ENCOUNTER (OUTPATIENT)
Dept: LAB | Facility: HOSPITAL | Age: 87
Discharge: HOME | End: 2022-10-07
Attending: INTERNAL MEDICINE
Payer: MEDICARE

## 2022-10-07 DIAGNOSIS — Z79.01 LONG TERM (CURRENT) USE OF ANTICOAGULANTS: ICD-10-CM

## 2022-10-07 LAB — INR BLDC: 2.1 (ref 0.9–1.1)

## 2022-10-07 PROCEDURE — 85610 PROTHROMBIN TIME: CPT

## 2022-10-10 NOTE — PROGRESS NOTES
Seen with daughter for periods of anxiety. BP little up but ok at her residence. Blood sugars ok  range for her age 80. Seem sharp but anxious. Chest clear, heart -afib, normal Abdomen/Extremities ok. 10/07/22  1400   BP: 150/70   Pulse:    Resp:    Temp:      Plan trial Buspar prn. Has used Xanax before but this seem like a safer start. RTC 2 mos.

## 2022-10-20 ENCOUNTER — LAB REQUISITION (OUTPATIENT)
Dept: LAB | Facility: HOSPITAL | Age: 87
End: 2022-10-20
Payer: MEDICARE

## 2022-10-20 DIAGNOSIS — Z86.718 PERSONAL HISTORY OF OTHER VENOUS THROMBOSIS AND EMBOLISM: ICD-10-CM

## 2022-10-20 DIAGNOSIS — I50.32 CHRONIC DIASTOLIC (CONGESTIVE) HEART FAILURE (HCC): ICD-10-CM

## 2022-10-20 DIAGNOSIS — I10 ESSENTIAL (PRIMARY) HYPERTENSION: ICD-10-CM

## 2022-10-20 DIAGNOSIS — R60.9 EDEMA, UNSPECIFIED: ICD-10-CM

## 2022-10-20 LAB
ANION GAP SERPL CALC-SCNC: 7 MMOL/L (ref 0–18)
BUN BLD-MCNC: 12 MG/DL (ref 7–18)
CALCIUM BLD-MCNC: 9.4 MG/DL (ref 8.5–10.1)
CHLORIDE SERPL-SCNC: 105 MMOL/L (ref 98–112)
CO2 SERPL-SCNC: 24 MMOL/L (ref 21–32)
CREAT BLD-MCNC: 0.99 MG/DL
GFR SERPLBLD BASED ON 1.73 SQ M-ARVRAT: 53 ML/MIN/1.73M2 (ref 60–?)
GLUCOSE BLD-MCNC: 229 MG/DL (ref 70–99)
NT-PROBNP SERPL-MCNC: 1378 PG/ML (ref ?–450)
OSMOLALITY SERPL CALC.SUM OF ELEC: 289 MOSM/KG (ref 275–295)
POTASSIUM SERPL-SCNC: 3.9 MMOL/L (ref 3.5–5.1)
SODIUM SERPL-SCNC: 136 MMOL/L (ref 136–145)

## 2022-10-20 PROCEDURE — 80048 BASIC METABOLIC PNL TOTAL CA: CPT | Performed by: INTERNAL MEDICINE

## 2022-10-20 PROCEDURE — 83880 ASSAY OF NATRIURETIC PEPTIDE: CPT | Performed by: INTERNAL MEDICINE

## 2022-10-23 DIAGNOSIS — E11.65 UNCONTROLLED TYPE 2 DIABETES MELLITUS WITH HYPERGLYCEMIA, WITH LONG-TERM CURRENT USE OF INSULIN (HCC): ICD-10-CM

## 2022-10-23 DIAGNOSIS — Z79.4 UNCONTROLLED TYPE 2 DIABETES MELLITUS WITH HYPERGLYCEMIA, WITH LONG-TERM CURRENT USE OF INSULIN (HCC): ICD-10-CM

## 2022-10-24 RX ORDER — INSULIN GLARGINE 100 [IU]/ML
5 INJECTION, SOLUTION SUBCUTANEOUS NIGHTLY
Qty: 15 ML | Refills: 3 | Status: CANCELLED | OUTPATIENT
Start: 2022-10-24

## 2022-10-24 RX ORDER — PEN NEEDLE, DIABETIC, SAFETY 30 GX3/16"
NEEDLE, DISPOSABLE MISCELLANEOUS
Qty: 120 EACH | Refills: 3 | Status: SHIPPED | OUTPATIENT
Start: 2022-10-24

## 2022-10-24 RX ORDER — INSULIN GLARGINE 100 [IU]/ML
INJECTION, SOLUTION SUBCUTANEOUS
Qty: 15 ML | Refills: 3 | OUTPATIENT
Start: 2022-10-24

## 2022-10-24 NOTE — TELEPHONE ENCOUNTER
Lantus solostar inject 5 units nightly filled     LOV 10/7/22  RTC 2 mos.   Next apt 12/7/22  Labs 4/12/22  HgbA1C   % 7.3

## 2022-10-24 NOTE — TELEPHONE ENCOUNTER
Patient called and asks for a refill on the following rx:    LANTUS SOLOSTAR) 100 UNIT/ML- 5 units    Please send to [facility]

## 2022-10-24 NOTE — TELEPHONE ENCOUNTER
BD autoshield Duo 30G x5 mm use tid daily filled 7/8/21 120 each with 3 refills     LOV 10/7/22  RTC 2 mos.    Next apt 12/7/22  Labs 4/12/22  HgbA1C   % 7.3

## 2022-10-26 RX ORDER — INSULIN GLARGINE 100 [IU]/ML
INJECTION, SOLUTION SUBCUTANEOUS
Qty: 15 ML | Refills: 3 | OUTPATIENT
Start: 2022-10-26

## 2022-10-26 RX ORDER — INSULIN GLARGINE 100 [IU]/ML
5 INJECTION, SOLUTION SUBCUTANEOUS NIGHTLY
Qty: 15 ML | Refills: 3 | Status: SHIPPED | OUTPATIENT
Start: 2022-10-26

## 2022-10-27 ENCOUNTER — TELEPHONE (OUTPATIENT)
Dept: INTERNAL MEDICINE CLINIC | Facility: CLINIC | Age: 87
End: 2022-10-27

## 2022-10-27 NOTE — TELEPHONE ENCOUNTER
VM left on pt's cell phone. [phone] with carotid US results. Mild plaque normal for pt's age  No new orders   Recheck in 3yrs. Pt to call back with any questions.    Terese Bennett RN

## 2022-10-28 ENCOUNTER — HOSPITAL ENCOUNTER (OUTPATIENT)
Dept: LAB | Facility: HOSPITAL | Age: 87
Discharge: HOME | End: 2022-10-28
Attending: INTERNAL MEDICINE
Payer: MEDICARE

## 2022-10-28 DIAGNOSIS — Z79.01 ANTICOAGULATED ON WARFARIN: ICD-10-CM

## 2022-10-28 DIAGNOSIS — I48.91 ATRIAL FIBRILLATION, UNSPECIFIED TYPE (HCC): ICD-10-CM

## 2022-10-28 DIAGNOSIS — Z86.718 PERSONAL HISTORY OF DVT (DEEP VEIN THROMBOSIS): ICD-10-CM

## 2022-10-28 LAB — INR BLDC: 2.5 (ref 0.9–1.1)

## 2022-10-28 PROCEDURE — 85610 PROTHROMBIN TIME: CPT | Performed by: INTERNAL MEDICINE

## 2022-10-28 NOTE — TELEPHONE ENCOUNTER
S/w pt   Reviewed US results with pt. Pt asking about Right thyroid nodules noted on report. It says to follow up with PCP for dedicated imaging if clinically indicated. Any further imaging needed?     To Dr Shanta Ambriz RN

## 2022-10-28 NOTE — TELEPHONE ENCOUNTER
VM left for pt  No further imaging needed per Dr Rupert Whyte for thyroid nodules.     Lencho Elias RN

## 2022-11-28 ENCOUNTER — HOSPITAL ENCOUNTER (OUTPATIENT)
Dept: LAB | Facility: HOSPITAL | Age: 87
Discharge: HOME | End: 2022-11-28
Attending: INTERNAL MEDICINE
Payer: MEDICARE

## 2022-11-28 DIAGNOSIS — Z79.01 ANTICOAGULATED ON WARFARIN: ICD-10-CM

## 2022-11-28 DIAGNOSIS — I48.91 ATRIAL FIBRILLATION, UNSPECIFIED TYPE (HCC): ICD-10-CM

## 2022-11-28 DIAGNOSIS — Z86.718 PERSONAL HISTORY OF DVT (DEEP VEIN THROMBOSIS): ICD-10-CM

## 2022-11-28 LAB — INR BLDC: 1.9 (ref 0.9–1.1)

## 2022-11-28 PROCEDURE — 85610 PROTHROMBIN TIME: CPT | Performed by: INTERNAL MEDICINE

## 2022-12-02 ENCOUNTER — TELEPHONE (OUTPATIENT)
Dept: INTERNAL MEDICINE CLINIC | Facility: CLINIC | Age: 87
End: 2022-12-02

## 2022-12-05 ENCOUNTER — HOSPITAL ENCOUNTER (OUTPATIENT)
Dept: LAB | Facility: HOSPITAL | Age: 87
Discharge: HOME | End: 2022-12-05
Attending: INTERNAL MEDICINE
Payer: MEDICARE

## 2022-12-05 DIAGNOSIS — Z79.01 LONG TERM (CURRENT) USE OF ANTICOAGULANTS: ICD-10-CM

## 2022-12-05 LAB — INR BLDC: 2.7 (ref 0.9–1.1)

## 2022-12-05 PROCEDURE — 85610 PROTHROMBIN TIME: CPT

## 2022-12-12 ENCOUNTER — TELEPHONE (OUTPATIENT)
Dept: INTERNAL MEDICINE CLINIC | Facility: CLINIC | Age: 87
End: 2022-12-12

## 2022-12-12 NOTE — TELEPHONE ENCOUNTER
Patient states she would like to know if Dr. Jovita Aguilar would still want her to have the covid booster because he wanted her to wait until December. Please advise.

## 2022-12-19 ENCOUNTER — HOSPITAL ENCOUNTER (OUTPATIENT)
Dept: LAB | Facility: HOSPITAL | Age: 87
Discharge: HOME | End: 2022-12-19
Attending: INTERNAL MEDICINE
Payer: MEDICARE

## 2022-12-19 DIAGNOSIS — Z79.01 LONG TERM (CURRENT) USE OF ANTICOAGULANTS: ICD-10-CM

## 2022-12-19 LAB — INR BLDC: 3.4 (ref 0.9–1.1)

## 2022-12-19 PROCEDURE — 85610 PROTHROMBIN TIME: CPT

## 2022-12-28 ENCOUNTER — HOSPITAL ENCOUNTER (OUTPATIENT)
Dept: LAB | Facility: HOSPITAL | Age: 87
Discharge: HOME | End: 2022-12-28
Attending: INTERNAL MEDICINE
Payer: MEDICARE

## 2022-12-28 DIAGNOSIS — Z79.01 ANTICOAGULATED ON WARFARIN: ICD-10-CM

## 2022-12-28 DIAGNOSIS — Z86.718 PERSONAL HISTORY OF DVT (DEEP VEIN THROMBOSIS): ICD-10-CM

## 2022-12-28 DIAGNOSIS — I48.91 ATRIAL FIBRILLATION, UNSPECIFIED TYPE (HCC): ICD-10-CM

## 2022-12-28 LAB — INR BLDC: 2.6 (ref 0.9–1.1)

## 2022-12-28 PROCEDURE — 85610 PROTHROMBIN TIME: CPT | Performed by: INTERNAL MEDICINE

## 2023-01-03 ENCOUNTER — NURSE ONLY (OUTPATIENT)
Dept: INTERNAL MEDICINE CLINIC | Facility: CLINIC | Age: 88
End: 2023-01-03
Payer: MEDICARE

## 2023-01-03 DIAGNOSIS — M81.0 OSTEOPOROSIS, UNSPECIFIED OSTEOPOROSIS TYPE, UNSPECIFIED PATHOLOGICAL FRACTURE PRESENCE: Primary | ICD-10-CM

## 2023-01-03 PROCEDURE — 96372 THER/PROPH/DIAG INJ SC/IM: CPT | Performed by: INTERNAL MEDICINE

## 2023-01-11 ENCOUNTER — HOSPITAL ENCOUNTER (OUTPATIENT)
Dept: LAB | Facility: HOSPITAL | Age: 88
Discharge: HOME | End: 2023-01-11
Attending: INTERNAL MEDICINE
Payer: MEDICARE

## 2023-01-11 DIAGNOSIS — Z79.01 ANTICOAGULATED ON WARFARIN: ICD-10-CM

## 2023-01-11 DIAGNOSIS — Z86.718 PERSONAL HISTORY OF DVT (DEEP VEIN THROMBOSIS): ICD-10-CM

## 2023-01-11 DIAGNOSIS — Z79.01 LONG TERM (CURRENT) USE OF ANTICOAGULANTS: ICD-10-CM

## 2023-01-11 DIAGNOSIS — I48.91 ATRIAL FIBRILLATION, UNSPECIFIED TYPE (HCC): ICD-10-CM

## 2023-01-11 LAB — INR BLDC: 3.3 (ref 0.9–1.1)

## 2023-01-11 PROCEDURE — 85610 PROTHROMBIN TIME: CPT

## 2023-01-18 ENCOUNTER — HOSPITAL ENCOUNTER (OUTPATIENT)
Dept: LAB | Facility: HOSPITAL | Age: 88
Discharge: HOME | End: 2023-01-18
Attending: INTERNAL MEDICINE
Payer: MEDICARE

## 2023-01-18 DIAGNOSIS — Z86.718 PERSONAL HISTORY OF DVT (DEEP VEIN THROMBOSIS): ICD-10-CM

## 2023-01-18 DIAGNOSIS — Z79.01 ANTICOAGULATED ON WARFARIN: ICD-10-CM

## 2023-01-18 DIAGNOSIS — I48.91 ATRIAL FIBRILLATION, UNSPECIFIED TYPE (HCC): ICD-10-CM

## 2023-01-18 LAB — INR BLDC: 2.9 (ref 0.9–1.1)

## 2023-01-18 PROCEDURE — 85610 PROTHROMBIN TIME: CPT | Performed by: INTERNAL MEDICINE

## 2023-01-24 ENCOUNTER — HOSPITAL ENCOUNTER (OUTPATIENT)
Dept: LAB | Facility: HOSPITAL | Age: 88
Discharge: HOME | End: 2023-01-24
Attending: INTERNAL MEDICINE
Payer: MEDICARE

## 2023-01-24 DIAGNOSIS — Z86.718 PERSONAL HISTORY OF DVT (DEEP VEIN THROMBOSIS): ICD-10-CM

## 2023-01-24 DIAGNOSIS — Z79.01 LONG TERM (CURRENT) USE OF ANTICOAGULANTS: ICD-10-CM

## 2023-01-24 DIAGNOSIS — Z79.01 ANTICOAGULATED ON WARFARIN: ICD-10-CM

## 2023-01-24 DIAGNOSIS — I48.91 ATRIAL FIBRILLATION, UNSPECIFIED TYPE (HCC): ICD-10-CM

## 2023-01-24 LAB — INR BLDC: 1.9 (ref 0.9–1.1)

## 2023-01-24 PROCEDURE — 85610 PROTHROMBIN TIME: CPT

## 2023-01-31 ENCOUNTER — TELEPHONE (OUTPATIENT)
Dept: INTERNAL MEDICINE CLINIC | Facility: CLINIC | Age: 88
End: 2023-01-31

## 2023-01-31 ENCOUNTER — HOSPITAL ENCOUNTER (OUTPATIENT)
Dept: LAB | Facility: HOSPITAL | Age: 88
Discharge: HOME | End: 2023-01-31
Attending: INTERNAL MEDICINE
Payer: MEDICARE

## 2023-01-31 DIAGNOSIS — Z79.01 LONG TERM (CURRENT) USE OF ANTICOAGULANTS: ICD-10-CM

## 2023-01-31 DIAGNOSIS — E87.6 HYPOKALEMIA: Primary | ICD-10-CM

## 2023-01-31 LAB — INR BLDC: 2.1 (ref 0.9–1.1)

## 2023-01-31 PROCEDURE — 85610 PROTHROMBIN TIME: CPT

## 2023-01-31 RX ORDER — ARIPIPRAZOLE 15 MG/1
TABLET ORAL
Qty: 3800 ML | Refills: 1 | Status: SHIPPED | OUTPATIENT
Start: 2023-01-31

## 2023-01-31 NOTE — TELEPHONE ENCOUNTER
VM left for pt   Will forward message to Dr Robel Kimbrough. Pt to call back if any other information is needed.      Du Davis RN

## 2023-01-31 NOTE — TELEPHONE ENCOUNTER
Gilford Kansky wants to make sure Dr. Cheryle Magallanes knows she is taking liquid potassium even though her pharmacy is not always able to fill the prescription entirely since it is hard to get this medication. Patient would like the nurse to call her.

## 2023-01-31 NOTE — TELEPHONE ENCOUNTER
Potassium 40 meq/30 ml take 15 ml daily filled 9/7/22 1350 ml with 0 refills     LOV 10/7/22  Return in about 2 months (around 12/7/2022).   No upcoming apt on file   Labs 10/20/22  Potassium  3.5 - 5.1 mmol/L 3.9

## 2023-02-02 ENCOUNTER — TELEPHONE (OUTPATIENT)
Dept: INTERNAL MEDICINE CLINIC | Facility: CLINIC | Age: 88
End: 2023-02-02

## 2023-02-02 NOTE — TELEPHONE ENCOUNTER
Patient has an order for BMP and is currently living at [facility] assisted living facility. She wants to know if she can have the blood work done at [facility] next Thursday.

## 2023-02-10 ENCOUNTER — LAB REQUISITION (OUTPATIENT)
Dept: LAB | Facility: HOSPITAL | Age: 88
End: 2023-02-10
Payer: MEDICARE

## 2023-02-10 DIAGNOSIS — E87.6 HYPOKALEMIA: ICD-10-CM

## 2023-02-10 LAB
ANION GAP SERPL CALC-SCNC: 6 MMOL/L (ref 0–18)
BUN BLD-MCNC: 14 MG/DL (ref 7–18)
CALCIUM BLD-MCNC: 8.9 MG/DL (ref 8.5–10.1)
CHLORIDE SERPL-SCNC: 106 MMOL/L (ref 98–112)
CO2 SERPL-SCNC: 27 MMOL/L (ref 21–32)
CREAT BLD-MCNC: 0.98 MG/DL
GFR SERPLBLD BASED ON 1.73 SQ M-ARVRAT: 54 ML/MIN/1.73M2 (ref 60–?)
GLUCOSE BLD-MCNC: 135 MG/DL (ref 70–99)
OSMOLALITY SERPL CALC.SUM OF ELEC: 291 MOSM/KG (ref 275–295)
POTASSIUM SERPL-SCNC: 4 MMOL/L (ref 3.5–5.1)
SODIUM SERPL-SCNC: 139 MMOL/L (ref 136–145)

## 2023-02-10 PROCEDURE — 80048 BASIC METABOLIC PNL TOTAL CA: CPT | Performed by: INTERNAL MEDICINE

## 2023-02-15 ENCOUNTER — HOSPITAL ENCOUNTER (OUTPATIENT)
Dept: LAB | Facility: HOSPITAL | Age: 88
Discharge: HOME | End: 2023-02-15
Attending: INTERNAL MEDICINE
Payer: MEDICARE

## 2023-02-15 DIAGNOSIS — Z79.01 LONG TERM (CURRENT) USE OF ANTICOAGULANTS: ICD-10-CM

## 2023-02-15 DIAGNOSIS — I48.91 ATRIAL FIBRILLATION, UNSPECIFIED TYPE (HCC): ICD-10-CM

## 2023-02-15 DIAGNOSIS — Z79.01 ANTICOAGULATED ON WARFARIN: ICD-10-CM

## 2023-02-15 DIAGNOSIS — Z86.718 PERSONAL HISTORY OF DVT (DEEP VEIN THROMBOSIS): ICD-10-CM

## 2023-02-15 LAB — INR BLDC: 2.1 (ref 0.9–1.1)

## 2023-02-15 PROCEDURE — 85610 PROTHROMBIN TIME: CPT

## 2023-02-28 ENCOUNTER — TELEPHONE (OUTPATIENT)
Dept: INTERNAL MEDICINE CLINIC | Facility: CLINIC | Age: 88
End: 2023-02-28

## 2023-02-28 DIAGNOSIS — R05.3 PERSISTENT COUGH: Primary | ICD-10-CM

## 2023-02-28 RX ORDER — AZITHROMYCIN 250 MG/1
TABLET, FILM COATED ORAL
Qty: 6 TABLET | Refills: 0 | Status: SHIPPED | OUTPATIENT
Start: 2023-02-28 | End: 2023-03-05

## 2023-02-28 NOTE — TELEPHONE ENCOUNTER
Spoke with patient. Originally cough was dry and harsh,now, mainly productive. Concerned it is still lingering. Afebrile, denies SOB or wheezing. Currently at [facility]. Has lab and x-ray available.

## 2023-02-28 NOTE — TELEPHONE ENCOUNTER
Spoke with patient. Called in [address] [place] [address] to [facility]. Will fax over order for cxr. Spoke with nurse at [facility]. Advised order for CXR was faxed over.

## 2023-02-28 NOTE — TELEPHONE ENCOUNTER
Patient states that more than a week ago she developed a bad cough. She took the rest of the cough syrup that she had from when she had covid, and that helped. Then her allergies started bothering her, so she took some Claritin, which helped. But now she still has the cough. She has taken two covid tests which were both negative, the most recent test was yesterday. Please call.

## 2023-03-02 ENCOUNTER — TELEPHONE (OUTPATIENT)
Dept: INTERNAL MEDICINE CLINIC | Facility: CLINIC | Age: 88
End: 2023-03-02

## 2023-03-02 NOTE — TELEPHONE ENCOUNTER
Patient is calling for the results of her chest Xray. The results were faxed to our office this morning. Please call.

## 2023-03-02 NOTE — TELEPHONE ENCOUNTER
S/w pt   Results given per Dr Quincy Salinas' interpretation. No active lung disease. Pt aware Dr William Aldana not in the office. Not change in treatment until xray seen by Dr William Aldana.      Results to Dr Onofre Jimenes RN

## 2023-03-03 RX ORDER — TORSEMIDE 20 MG/1
20 TABLET ORAL EVERY EVENING
Qty: 30 TABLET | Refills: 0 | COMMUNITY
Start: 2023-03-03

## 2023-03-06 RX ORDER — LOSARTAN POTASSIUM 100 MG/1
TABLET ORAL
Qty: 90 TABLET | Refills: 3 | Status: SHIPPED | OUTPATIENT
Start: 2023-03-06

## 2023-03-06 NOTE — TELEPHONE ENCOUNTER
Losartan 100 mg 1 tab daily filled 3/18/22 90 with 3 refills     LOV 10/7/22  Return in about 2 months (around 12/7/2022).   No upcoming apt on file   Labs 6/24/22

## 2023-03-13 DIAGNOSIS — Z12.31 SCREENING MAMMOGRAM FOR BREAST CANCER: Primary | ICD-10-CM

## 2023-03-14 RX ORDER — LEVOTHYROXINE SODIUM 0.07 MG/1
TABLET ORAL
Qty: 90 TABLET | Refills: 0 | Status: SHIPPED | OUTPATIENT
Start: 2023-03-14

## 2023-03-14 NOTE — TELEPHONE ENCOUNTER
Levothyroxine 75 mcg 1 tab daily filled 11/29/21 90 with 3 refills     LOV 10/7/22  Return in about 2 months (around 12/7/2022).   Next apt 5/9/23  Labs 4/12/22  TSH  mIU/mL 3.610

## 2023-03-15 ENCOUNTER — TELEPHONE (OUTPATIENT)
Dept: INTERNAL MEDICINE CLINIC | Facility: CLINIC | Age: 88
End: 2023-03-15

## 2023-03-17 ENCOUNTER — HOSPITAL ENCOUNTER (OUTPATIENT)
Dept: LAB | Facility: HOSPITAL | Age: 88
Discharge: HOME | End: 2023-03-17
Attending: INTERNAL MEDICINE
Payer: MEDICARE

## 2023-03-17 DIAGNOSIS — Z79.01 ANTICOAGULATED ON WARFARIN: ICD-10-CM

## 2023-03-17 DIAGNOSIS — I48.91 ATRIAL FIBRILLATION, UNSPECIFIED TYPE (HCC): ICD-10-CM

## 2023-03-17 DIAGNOSIS — Z86.718 PERSONAL HISTORY OF DVT (DEEP VEIN THROMBOSIS): ICD-10-CM

## 2023-03-17 LAB — INR BLDC: 2.6 (ref 0.9–1.1)

## 2023-03-17 PROCEDURE — 85610 PROTHROMBIN TIME: CPT | Performed by: INTERNAL MEDICINE

## 2023-03-23 ENCOUNTER — LAB REQUISITION (OUTPATIENT)
Dept: LAB | Facility: HOSPITAL | Age: 88
End: 2023-03-23
Payer: MEDICARE

## 2023-03-23 DIAGNOSIS — R60.9 EDEMA, UNSPECIFIED: ICD-10-CM

## 2023-03-23 DIAGNOSIS — E78.2 MIXED HYPERLIPIDEMIA: ICD-10-CM

## 2023-03-23 LAB
ANION GAP SERPL CALC-SCNC: 6 MMOL/L (ref 0–18)
BUN BLD-MCNC: 15 MG/DL (ref 7–18)
CALCIUM BLD-MCNC: 8.8 MG/DL (ref 8.5–10.1)
CHLORIDE SERPL-SCNC: 107 MMOL/L (ref 98–112)
CO2 SERPL-SCNC: 29 MMOL/L (ref 21–32)
CREAT BLD-MCNC: 0.92 MG/DL
GFR SERPLBLD BASED ON 1.73 SQ M-ARVRAT: 58 ML/MIN/1.73M2 (ref 60–?)
GLUCOSE BLD-MCNC: 105 MG/DL (ref 70–99)
NT-PROBNP SERPL-MCNC: 1583 PG/ML (ref ?–450)
OSMOLALITY SERPL CALC.SUM OF ELEC: 295 MOSM/KG (ref 275–295)
POTASSIUM SERPL-SCNC: 4.2 MMOL/L (ref 3.5–5.1)
SODIUM SERPL-SCNC: 142 MMOL/L (ref 136–145)

## 2023-03-23 PROCEDURE — 80048 BASIC METABOLIC PNL TOTAL CA: CPT | Performed by: INTERNAL MEDICINE

## 2023-03-23 PROCEDURE — 83880 ASSAY OF NATRIURETIC PEPTIDE: CPT | Performed by: INTERNAL MEDICINE

## 2023-04-07 DIAGNOSIS — E11.8 TYPE 2 DIABETES MELLITUS WITH UNSPECIFIED COMPLICATIONS (HCC): ICD-10-CM

## 2023-04-07 RX ORDER — BLOOD SUGAR DIAGNOSTIC
STRIP MISCELLANEOUS
Qty: 400 STRIP | Refills: 3 | Status: SHIPPED | OUTPATIENT
Start: 2023-04-07

## 2023-04-12 ENCOUNTER — HOSPITAL ENCOUNTER (OUTPATIENT)
Dept: LAB | Facility: HOSPITAL | Age: 88
Discharge: HOME | End: 2023-04-12
Attending: INTERNAL MEDICINE
Payer: MEDICARE

## 2023-04-12 DIAGNOSIS — Z86.718 PERSONAL HISTORY OF DVT (DEEP VEIN THROMBOSIS): ICD-10-CM

## 2023-04-12 DIAGNOSIS — I48.91 ATRIAL FIBRILLATION, UNSPECIFIED TYPE (HCC): ICD-10-CM

## 2023-04-12 DIAGNOSIS — Z79.01 ANTICOAGULATED ON WARFARIN: ICD-10-CM

## 2023-04-12 LAB — INR BLDC: 2.6 (ref 0.9–1.1)

## 2023-04-12 PROCEDURE — 85610 PROTHROMBIN TIME: CPT | Performed by: INTERNAL MEDICINE

## 2023-04-24 ENCOUNTER — OFFICE VISIT (OUTPATIENT)
Facility: LOCATION | Age: 88
End: 2023-04-24
Payer: MEDICARE

## 2023-04-24 DIAGNOSIS — H61.23 BILATERAL IMPACTED CERUMEN: Primary | ICD-10-CM

## 2023-04-25 ENCOUNTER — LAB ENCOUNTER (OUTPATIENT)
Dept: LAB | Age: 88
End: 2023-04-25
Attending: INTERNAL MEDICINE
Payer: MEDICARE

## 2023-04-25 ENCOUNTER — NURSE ONLY (OUTPATIENT)
Dept: INTERNAL MEDICINE CLINIC | Facility: CLINIC | Age: 88
End: 2023-04-25
Payer: MEDICARE

## 2023-04-25 DIAGNOSIS — R69 DIAGNOSIS UNKNOWN: Primary | ICD-10-CM

## 2023-04-25 LAB
AMB EXT CHLORIDE: 103
AMB EXT CHOL/HDL RATIO: 3.6
AMB EXT CHOLESTEROL, TOTAL: 133 MG/DL
AMB EXT CMP ALT: 12 U/L
AMB EXT CMP AST: 18 U/L
AMB EXT GLUCOSE: 112 MG/DL
AMB EXT HDL CHOLESTEROL: 37 MG/DL
AMB EXT HEMATOCRIT: 35.3
AMB EXT HEMOGLOBIN: 10.9
AMB EXT HGBA1C: 6.6 %
AMB EXT LDL CHOLESTEROL, DIRECT: 68 MG/DL
AMB EXT MCV: 78.6
AMB EXT NON HDL CHOL: 96 MG/DL
AMB EXT POSTASSIUM: 4.5 MMOL/L
AMB EXT SODIUM: 140 MMOL/L
AMB EXT TRIGLYCERIDES: 213 MG/DL
AMB EXT TSH: 2.18 MIU/ML
AMB EXT WBC: 9.3 X10(3)UL

## 2023-04-25 NOTE — PROGRESS NOTES
VENIPUNCTURE:[facility]     ADD-ON TEST:    EKG:    SPIROMETRY:    URINE:      VISION: Patient refused eye exam Last eye exam 2/2023 DR. Torres     Right Eye 20/      Left Eye 20/     Both Eyes: 20/      AFSANEH: Pt has a history of blood Clots     Right Tibial Foot ___ divided by highest arm ___ = ___       Right Dorsal Foot ___ divided by highest arm ___ = ___       Left Tibial Foot ___ divided by highest arm ___ = ___       Left Dorsal Foot ___ divided by highest arm ___ = ___      ARM:   Right Brachial-     Left Brachial-      FOOT:   Right Tibial (Side)-    Right Dorsal (Top)-      Left Tibial (Side)-    Left Dorsal (Top)-      Greater than 1.3: results not reliable  1.01 to 1.3: correlated with history, especially if the patient has diabetes  0.97 to 1: normal  0.8 to 0.96: mild ischemia  0.4 to 0.79: moderate to severe ischemia  0.39 or less: severe ischemia; danger of limb loss

## 2023-05-09 ENCOUNTER — OFFICE VISIT (OUTPATIENT)
Dept: INTERNAL MEDICINE CLINIC | Facility: CLINIC | Age: 88
End: 2023-05-09
Payer: MEDICARE

## 2023-05-09 ENCOUNTER — LAB ENCOUNTER (OUTPATIENT)
Dept: LAB | Age: 88
End: 2023-05-09
Attending: INTERNAL MEDICINE
Payer: MEDICARE

## 2023-05-09 VITALS
BODY MASS INDEX: 32.46 KG/M2 | RESPIRATION RATE: 16 BRPM | WEIGHT: 161 LBS | DIASTOLIC BLOOD PRESSURE: 76 MMHG | SYSTOLIC BLOOD PRESSURE: 140 MMHG | HEIGHT: 59 IN | HEART RATE: 71 BPM | OXYGEN SATURATION: 99 % | TEMPERATURE: 97 F

## 2023-05-09 DIAGNOSIS — I50.32 CHRONIC DIASTOLIC (CONGESTIVE) HEART FAILURE (HCC): ICD-10-CM

## 2023-05-09 DIAGNOSIS — I48.21 PERMANENT ATRIAL FIBRILLATION (HCC): ICD-10-CM

## 2023-05-09 DIAGNOSIS — D50.9 IRON DEFICIENCY ANEMIA, UNSPECIFIED IRON DEFICIENCY ANEMIA TYPE: ICD-10-CM

## 2023-05-09 DIAGNOSIS — I72.8 SPLENIC ARTERY ANEURYSM (HCC): ICD-10-CM

## 2023-05-09 DIAGNOSIS — Z00.01 ENCOUNTER FOR GENERAL ADULT MEDICAL EXAMINATION WITH ABNORMAL FINDINGS: Primary | ICD-10-CM

## 2023-05-09 DIAGNOSIS — E11.8 TYPE 2 DIABETES MELLITUS WITH UNSPECIFIED COMPLICATIONS (HCC): ICD-10-CM

## 2023-05-09 PROBLEM — M62.81 HAND MUSCLE WEAKNESS: Status: RESOLVED | Noted: 2021-04-26 | Resolved: 2023-05-09

## 2023-05-09 PROBLEM — E66.811 CLASS 1 OBESITY: Status: ACTIVE | Noted: 2023-05-09

## 2023-05-09 PROBLEM — E66.9 CLASS 1 OBESITY: Status: ACTIVE | Noted: 2023-05-09

## 2023-05-09 PROBLEM — R26.89 FUNCTIONAL GAIT ABNORMALITY: Status: RESOLVED | Noted: 2021-04-26 | Resolved: 2023-05-09

## 2023-05-09 PROBLEM — R54 FRAILTY: Status: ACTIVE | Noted: 2023-05-09

## 2023-05-09 PROBLEM — L85.1 KERATODERMA OF FOOT, ACQUIRED: Status: RESOLVED | Noted: 2022-05-05 | Resolved: 2023-05-09

## 2023-05-09 PROBLEM — E04.1 THYROID NODULE: Status: ACTIVE | Noted: 2023-05-09

## 2023-05-09 LAB
ATRIAL RATE: 50 BPM
BILIRUB UR QL STRIP.AUTO: NEGATIVE
CLARITY UR REFRACT.AUTO: CLEAR
COLOR UR AUTO: YELLOW
CREAT UR-SCNC: 77.4 MG/DL
GLUCOSE UR STRIP.AUTO-MCNC: NEGATIVE MG/DL
INR BLD: 2.08 (ref 0.85–1.16)
IRON SATN MFR SERPL: 7 %
IRON SERPL-MCNC: 31 UG/DL
KETONES UR STRIP.AUTO-MCNC: NEGATIVE MG/DL
LEUKOCYTE ESTERASE UR QL STRIP.AUTO: NEGATIVE
MICROALBUMIN UR-MCNC: 0.53 MG/DL
MICROALBUMIN/CREAT 24H UR-RTO: 6.8 UG/MG (ref ?–30)
NITRITE UR QL STRIP.AUTO: NEGATIVE
PH UR STRIP.AUTO: 6 [PH] (ref 5–8)
PROT UR STRIP.AUTO-MCNC: NEGATIVE MG/DL
PROTHROMBIN TIME: 23.2 SECONDS (ref 11.6–14.8)
Q-T INTERVAL: 420 MS
QRS DURATION: 128 MS
QTC CALCULATION (BEZET): 429 MS
R AXIS: -38 DEGREES
RBC UR QL AUTO: NEGATIVE
SP GR UR STRIP.AUTO: 1.01 (ref 1–1.03)
T AXIS: -77 DEGREES
TIBC SERPL-MCNC: 460 UG/DL (ref 240–450)
TRANSFERRIN SERPL-MCNC: 309 MG/DL (ref 200–360)
UROBILINOGEN UR STRIP.AUTO-MCNC: <2 MG/DL
VENTRICULAR RATE: 63 BPM

## 2023-05-09 PROCEDURE — 81001 URINALYSIS AUTO W/SCOPE: CPT | Performed by: INTERNAL MEDICINE

## 2023-05-09 PROCEDURE — 82570 ASSAY OF URINE CREATININE: CPT | Performed by: INTERNAL MEDICINE

## 2023-05-09 PROCEDURE — 83550 IRON BINDING TEST: CPT

## 2023-05-09 PROCEDURE — G0439 PPPS, SUBSEQ VISIT: HCPCS | Performed by: INTERNAL MEDICINE

## 2023-05-09 PROCEDURE — 93000 ELECTROCARDIOGRAM COMPLETE: CPT | Performed by: INTERNAL MEDICINE

## 2023-05-09 PROCEDURE — 85610 PROTHROMBIN TIME: CPT

## 2023-05-09 PROCEDURE — 83540 ASSAY OF IRON: CPT

## 2023-05-09 PROCEDURE — 82043 UR ALBUMIN QUANTITATIVE: CPT | Performed by: INTERNAL MEDICINE

## 2023-05-09 PROCEDURE — 36415 COLL VENOUS BLD VENIPUNCTURE: CPT

## 2023-05-09 RX ORDER — FERROUS SULFATE 15 MG/ML
75 DROPS ORAL DAILY
Qty: 50 ML | Refills: 3 | Status: SHIPPED | OUTPATIENT
Start: 2023-05-09 | End: 2023-05-09

## 2023-05-09 RX ORDER — LANSOPRAZOLE 15 MG/1
15 CAPSULE, DELAYED RELEASE ORAL EVERY MORNING
COMMUNITY

## 2023-05-09 RX ORDER — ASPIRIN 81 MG/1
81 TABLET, CHEWABLE ORAL
COMMUNITY

## 2023-05-09 RX ORDER — MAGNESIUM 200 MG
1000 TABLET ORAL EVERY MORNING
Qty: 90 TABLET | Refills: 3 | Status: SHIPPED | OUTPATIENT
Start: 2023-05-09 | End: 2023-06-08

## 2023-05-09 RX ORDER — FERROUS SULFATE 15 MG/ML
90 DROPS ORAL DAILY
Qty: 50 ML | Refills: 3 | COMMUNITY
Start: 2023-05-09

## 2023-05-10 DIAGNOSIS — I48.21 PERMANENT ATRIAL FIBRILLATION (HCC): Primary | ICD-10-CM

## 2023-05-10 DIAGNOSIS — D50.9 IRON DEFICIENCY ANEMIA, UNSPECIFIED IRON DEFICIENCY ANEMIA TYPE: ICD-10-CM

## 2023-05-10 NOTE — PROGRESS NOTES
PHONE CALL SPOKE WITH PATIENT. NOTIFIED RESULTS. Will start liquid iron supplement and repeat labs in 3 months.

## 2023-05-11 ENCOUNTER — PATIENT MESSAGE (OUTPATIENT)
Dept: INTERNAL MEDICINE CLINIC | Facility: CLINIC | Age: 88
End: 2023-05-11

## 2023-05-11 RX ORDER — FUROSEMIDE 20 MG/1
20 TABLET ORAL
Qty: 12 TABLET | Refills: 3 | COMMUNITY
Start: 2023-05-12 | End: 2023-05-12 | Stop reason: CLARIF

## 2023-05-12 RX ORDER — TORSEMIDE 20 MG/1
20 TABLET ORAL
Refills: 0 | COMMUNITY
Start: 2023-05-12

## 2023-05-12 NOTE — TELEPHONE ENCOUNTER
Irish Pak from [facility] is calling back the nurse regarding an order being faxed over for omega 3 and Iron.

## 2023-05-26 DIAGNOSIS — E11.8 TYPE 2 DIABETES MELLITUS WITH UNSPECIFIED COMPLICATIONS (HCC): ICD-10-CM

## 2023-05-26 RX ORDER — LANCETS
EACH MISCELLANEOUS
Qty: 200 EACH | Refills: 3 | Status: SHIPPED | OUTPATIENT
Start: 2023-05-26

## 2023-05-26 NOTE — TELEPHONE ENCOUNTER
accu-chek lancets three times a day filled 8/30/22 200 each with 3 refills     LOV 5/9/23  Return in about 3 months (around 8/9/2023).   No upcoming apt on file   Labs 4/25/23  HgbA1C  % 6.6

## 2023-06-01 ENCOUNTER — LAB REQUISITION (OUTPATIENT)
Dept: LAB | Facility: HOSPITAL | Age: 88
End: 2023-06-01
Payer: MEDICARE

## 2023-06-01 DIAGNOSIS — E78.2 MIXED HYPERLIPIDEMIA: ICD-10-CM

## 2023-06-01 DIAGNOSIS — R60.9 EDEMA, UNSPECIFIED: ICD-10-CM

## 2023-06-01 LAB
ANION GAP SERPL CALC-SCNC: 6 MMOL/L (ref 0–18)
BUN BLD-MCNC: 15 MG/DL (ref 7–18)
CALCIUM BLD-MCNC: 8.7 MG/DL (ref 8.5–10.1)
CHLORIDE SERPL-SCNC: 106 MMOL/L (ref 98–112)
CO2 SERPL-SCNC: 24 MMOL/L (ref 21–32)
CREAT BLD-MCNC: 1.05 MG/DL
FASTING STATUS PATIENT QL REPORTED: YES
GFR SERPLBLD BASED ON 1.73 SQ M-ARVRAT: 50 ML/MIN/1.73M2 (ref 60–?)
GLUCOSE BLD-MCNC: 155 MG/DL (ref 70–99)
NT-PROBNP SERPL-MCNC: 1172 PG/ML (ref ?–450)
OSMOLALITY SERPL CALC.SUM OF ELEC: 286 MOSM/KG (ref 275–295)
POTASSIUM SERPL-SCNC: 3.6 MMOL/L (ref 3.5–5.1)
SODIUM SERPL-SCNC: 136 MMOL/L (ref 136–145)

## 2023-06-01 PROCEDURE — 83880 ASSAY OF NATRIURETIC PEPTIDE: CPT | Performed by: INTERNAL MEDICINE

## 2023-06-01 PROCEDURE — 80048 BASIC METABOLIC PNL TOTAL CA: CPT | Performed by: INTERNAL MEDICINE

## 2023-06-05 ENCOUNTER — HOSPITAL ENCOUNTER (OUTPATIENT)
Dept: LAB | Facility: HOSPITAL | Age: 88
Discharge: HOME | End: 2023-06-05
Attending: INTERNAL MEDICINE
Payer: MEDICARE

## 2023-06-05 DIAGNOSIS — Z79.01 ANTICOAGULATED ON WARFARIN: ICD-10-CM

## 2023-06-05 DIAGNOSIS — D50.9 IRON DEFICIENCY ANEMIA, UNSPECIFIED IRON DEFICIENCY ANEMIA TYPE: ICD-10-CM

## 2023-06-05 DIAGNOSIS — Z86.718 PERSONAL HISTORY OF DVT (DEEP VEIN THROMBOSIS): ICD-10-CM

## 2023-06-05 DIAGNOSIS — I48.21 PERMANENT ATRIAL FIBRILLATION (HCC): ICD-10-CM

## 2023-06-05 DIAGNOSIS — I48.91 ATRIAL FIBRILLATION, UNSPECIFIED TYPE (HCC): ICD-10-CM

## 2023-06-05 LAB — INR BLDC: 2 (ref 0.9–1.1)

## 2023-06-05 PROCEDURE — 85610 PROTHROMBIN TIME: CPT | Performed by: INTERNAL MEDICINE

## 2023-06-07 ENCOUNTER — TELEPHONE (OUTPATIENT)
Dept: INTERNAL MEDICINE CLINIC | Facility: CLINIC | Age: 88
End: 2023-06-07

## 2023-06-07 ENCOUNTER — MED REC SCAN ONLY (OUTPATIENT)
Dept: INTERNAL MEDICINE CLINIC | Facility: CLINIC | Age: 88
End: 2023-06-07

## 2023-06-07 NOTE — TELEPHONE ENCOUNTER
Pt has benefits for Prolia     Last injection 1/3/23  Due 7/4/23  Dexa 6/9/22    LVM to call and set up a nurse visit after 7/4/23

## 2023-06-16 RX ORDER — LEVOTHYROXINE SODIUM 0.07 MG/1
TABLET ORAL
Qty: 90 TABLET | Refills: 0 | Status: SHIPPED | OUTPATIENT
Start: 2023-06-16

## 2023-06-19 ENCOUNTER — HOSPITAL ENCOUNTER (OUTPATIENT)
Dept: LAB | Facility: HOSPITAL | Age: 88
Discharge: HOME | End: 2023-06-19
Attending: INTERNAL MEDICINE
Payer: MEDICARE

## 2023-06-19 DIAGNOSIS — Z79.01 ANTICOAGULATED ON WARFARIN: ICD-10-CM

## 2023-06-19 DIAGNOSIS — I48.91 ATRIAL FIBRILLATION, UNSPECIFIED TYPE (HCC): ICD-10-CM

## 2023-06-19 DIAGNOSIS — Z86.718 PERSONAL HISTORY OF DVT (DEEP VEIN THROMBOSIS): ICD-10-CM

## 2023-06-19 LAB — INR BLDC: 3.1 (ref 0.9–1.1)

## 2023-06-19 PROCEDURE — 85610 PROTHROMBIN TIME: CPT | Performed by: INTERNAL MEDICINE

## 2023-06-30 RX ORDER — MECLIZINE HCL 12.5 MG/1
12.5 TABLET ORAL 3 TIMES DAILY PRN
Qty: 30 TABLET | Refills: 0 | Status: SHIPPED | OUTPATIENT
Start: 2023-06-30

## 2023-07-03 ENCOUNTER — HOSPITAL ENCOUNTER (OUTPATIENT)
Dept: LAB | Facility: HOSPITAL | Age: 88
Discharge: HOME | End: 2023-07-03
Attending: INTERNAL MEDICINE
Payer: MEDICARE

## 2023-07-03 DIAGNOSIS — Z86.718 PERSONAL HISTORY OF DVT (DEEP VEIN THROMBOSIS): ICD-10-CM

## 2023-07-03 DIAGNOSIS — Z79.01 ANTICOAGULATED ON WARFARIN: ICD-10-CM

## 2023-07-03 DIAGNOSIS — I48.91 ATRIAL FIBRILLATION, UNSPECIFIED TYPE (HCC): ICD-10-CM

## 2023-07-03 LAB — INR BLDC: 2.6 (ref 0.9–1.1)

## 2023-07-03 PROCEDURE — 85610 PROTHROMBIN TIME: CPT | Performed by: INTERNAL MEDICINE

## 2023-07-05 ENCOUNTER — NURSE ONLY (OUTPATIENT)
Dept: INTERNAL MEDICINE CLINIC | Facility: CLINIC | Age: 88
End: 2023-07-05
Payer: MEDICARE

## 2023-07-05 DIAGNOSIS — M81.0 OSTEOPOROSIS, UNSPECIFIED OSTEOPOROSIS TYPE, UNSPECIFIED PATHOLOGICAL FRACTURE PRESENCE: Primary | ICD-10-CM

## 2023-07-05 PROCEDURE — 96372 THER/PROPH/DIAG INJ SC/IM: CPT | Performed by: INTERNAL MEDICINE

## 2023-07-17 ENCOUNTER — TELEPHONE (OUTPATIENT)
Dept: INTERNAL MEDICINE CLINIC | Facility: CLINIC | Age: 88
End: 2023-07-17

## 2023-07-17 NOTE — TELEPHONE ENCOUNTER
Spoke with patient. Discussed potential risks involved in receiving Prolia injections. Patient verbalized understanding.

## 2023-07-17 NOTE — TELEPHONE ENCOUNTER
Patient called and would like to speak with the nurse about the side effects of the prolia injection.

## 2023-08-02 ENCOUNTER — HOSPITAL ENCOUNTER (OUTPATIENT)
Dept: LAB | Facility: HOSPITAL | Age: 88
Discharge: HOME | End: 2023-08-02
Attending: INTERNAL MEDICINE
Payer: MEDICARE

## 2023-08-02 DIAGNOSIS — Z79.01 ANTICOAGULATED ON WARFARIN: ICD-10-CM

## 2023-08-02 DIAGNOSIS — Z86.718 PERSONAL HISTORY OF DVT (DEEP VEIN THROMBOSIS): ICD-10-CM

## 2023-08-02 DIAGNOSIS — I48.91 ATRIAL FIBRILLATION, UNSPECIFIED TYPE (HCC): ICD-10-CM

## 2023-08-02 LAB — INR BLDC: 2.3 (ref 0.9–1.1)

## 2023-08-02 PROCEDURE — 85610 PROTHROMBIN TIME: CPT | Performed by: INTERNAL MEDICINE

## 2023-08-29 ENCOUNTER — HOSPITAL ENCOUNTER (OUTPATIENT)
Dept: LAB | Facility: HOSPITAL | Age: 88
Discharge: HOME | End: 2023-08-29
Attending: INTERNAL MEDICINE
Payer: MEDICARE

## 2023-08-29 DIAGNOSIS — I48.20 CHRONIC ATRIAL FIBRILLATION (HCC): ICD-10-CM

## 2023-08-29 LAB — INR BLDC: 2.7 (ref 0.9–1.1)

## 2023-08-29 PROCEDURE — 85610 PROTHROMBIN TIME: CPT

## 2023-09-11 RX ORDER — LEVOTHYROXINE SODIUM 0.07 MG/1
75 TABLET ORAL
Qty: 90 TABLET | Refills: 0 | Status: SHIPPED | OUTPATIENT
Start: 2023-09-11

## 2023-09-18 ENCOUNTER — HOSPITAL ENCOUNTER (OUTPATIENT)
Dept: LAB | Facility: HOSPITAL | Age: 88
Discharge: HOME | End: 2023-09-18
Attending: INTERNAL MEDICINE
Payer: MEDICARE

## 2023-09-18 DIAGNOSIS — I48.20 CHRONIC ATRIAL FIBRILLATION (HCC): ICD-10-CM

## 2023-09-18 LAB — INR BLDC: 3.5 (ref 0.9–1.1)

## 2023-09-18 PROCEDURE — 85610 PROTHROMBIN TIME: CPT

## 2023-09-21 ENCOUNTER — LAB REQUISITION (OUTPATIENT)
Dept: LAB | Facility: HOSPITAL | Age: 88
End: 2023-09-21
Payer: MEDICARE

## 2023-09-21 DIAGNOSIS — I10 ESSENTIAL (PRIMARY) HYPERTENSION: ICD-10-CM

## 2023-09-21 DIAGNOSIS — E78.2 MIXED HYPERLIPIDEMIA: ICD-10-CM

## 2023-09-21 DIAGNOSIS — R60.9 EDEMA, UNSPECIFIED: ICD-10-CM

## 2023-09-21 LAB
ALBUMIN SERPL-MCNC: 3.5 G/DL (ref 3.4–5)
ALBUMIN/GLOB SERPL: 1 {RATIO} (ref 1–2)
ALP LIVER SERPL-CCNC: 57 U/L
ALT SERPL-CCNC: 22 U/L
ANION GAP SERPL CALC-SCNC: 6 MMOL/L (ref 0–18)
AST SERPL-CCNC: 16 U/L (ref 15–37)
BASOPHILS # BLD AUTO: 0.06 X10(3) UL (ref 0–0.2)
BASOPHILS NFR BLD AUTO: 0.8 %
BILIRUB SERPL-MCNC: 0.6 MG/DL (ref 0.1–2)
BUN BLD-MCNC: 15 MG/DL (ref 7–18)
CALCIUM BLD-MCNC: 8.7 MG/DL (ref 8.5–10.1)
CHLORIDE SERPL-SCNC: 107 MMOL/L (ref 98–112)
CHOLEST SERPL-MCNC: 144 MG/DL (ref ?–200)
CO2 SERPL-SCNC: 26 MMOL/L (ref 21–32)
CREAT BLD-MCNC: 1.01 MG/DL
EGFRCR SERPLBLD CKD-EPI 2021: 52 ML/MIN/1.73M2 (ref 60–?)
EOSINOPHIL # BLD AUTO: 0.23 X10(3) UL (ref 0–0.7)
EOSINOPHIL NFR BLD AUTO: 3 %
ERYTHROCYTE [DISTWIDTH] IN BLOOD BY AUTOMATED COUNT: 15.1 %
GLOBULIN PLAS-MCNC: 3.6 G/DL (ref 2.8–4.4)
GLUCOSE BLD-MCNC: 105 MG/DL (ref 70–99)
HCT VFR BLD AUTO: 38.9 %
HDLC SERPL-MCNC: 36 MG/DL (ref 40–59)
HGB BLD-MCNC: 12.7 G/DL
IMM GRANULOCYTES # BLD AUTO: 0.04 X10(3) UL (ref 0–1)
IMM GRANULOCYTES NFR BLD: 0.5 %
LDLC SERPL CALC-MCNC: 77 MG/DL (ref ?–100)
LYMPHOCYTES # BLD AUTO: 1.31 X10(3) UL (ref 1–4)
LYMPHOCYTES NFR BLD AUTO: 16.9 %
MCH RBC QN AUTO: 28.2 PG (ref 26–34)
MCHC RBC AUTO-ENTMCNC: 32.6 G/DL (ref 31–37)
MCV RBC AUTO: 86.4 FL
MONOCYTES # BLD AUTO: 0.63 X10(3) UL (ref 0.1–1)
MONOCYTES NFR BLD AUTO: 8.1 %
NEUTROPHILS # BLD AUTO: 5.49 X10 (3) UL (ref 1.5–7.7)
NEUTROPHILS # BLD AUTO: 5.49 X10(3) UL (ref 1.5–7.7)
NEUTROPHILS NFR BLD AUTO: 70.7 %
NONHDLC SERPL-MCNC: 108 MG/DL (ref ?–130)
NT-PROBNP SERPL-MCNC: 1404 PG/ML (ref ?–450)
OSMOLALITY SERPL CALC.SUM OF ELEC: 289 MOSM/KG (ref 275–295)
PLATELET # BLD AUTO: 197 10(3)UL (ref 150–450)
POTASSIUM SERPL-SCNC: 3.8 MMOL/L (ref 3.5–5.1)
PROT SERPL-MCNC: 7.1 G/DL (ref 6.4–8.2)
RBC # BLD AUTO: 4.5 X10(6)UL
SODIUM SERPL-SCNC: 139 MMOL/L (ref 136–145)
T4 FREE SERPL-MCNC: 1.2 NG/DL (ref 0.8–1.7)
TRIGL SERPL-MCNC: 182 MG/DL (ref 30–149)
TSI SER-ACNC: 2.36 MIU/ML (ref 0.36–3.74)
VLDLC SERPL CALC-MCNC: 28 MG/DL (ref 0–30)
WBC # BLD AUTO: 7.8 X10(3) UL (ref 4–11)

## 2023-09-21 PROCEDURE — 83880 ASSAY OF NATRIURETIC PEPTIDE: CPT | Performed by: INTERNAL MEDICINE

## 2023-09-21 PROCEDURE — 84439 ASSAY OF FREE THYROXINE: CPT | Performed by: INTERNAL MEDICINE

## 2023-09-21 PROCEDURE — 84443 ASSAY THYROID STIM HORMONE: CPT | Performed by: INTERNAL MEDICINE

## 2023-09-21 PROCEDURE — 85025 COMPLETE CBC W/AUTO DIFF WBC: CPT | Performed by: INTERNAL MEDICINE

## 2023-09-21 PROCEDURE — 80053 COMPREHEN METABOLIC PANEL: CPT | Performed by: INTERNAL MEDICINE

## 2023-09-21 PROCEDURE — 80061 LIPID PANEL: CPT | Performed by: INTERNAL MEDICINE

## 2023-09-25 ENCOUNTER — HOSPITAL ENCOUNTER (OUTPATIENT)
Dept: LAB | Facility: HOSPITAL | Age: 88
Discharge: HOME | End: 2023-09-25
Attending: INTERNAL MEDICINE
Payer: MEDICARE

## 2023-09-25 DIAGNOSIS — I48.20 CHRONIC ATRIAL FIBRILLATION (HCC): ICD-10-CM

## 2023-09-25 LAB — INR BLDC: 2.1 (ref 0.9–1.1)

## 2023-09-25 PROCEDURE — 85610 PROTHROMBIN TIME: CPT

## 2023-10-09 ENCOUNTER — HOSPITAL ENCOUNTER (OUTPATIENT)
Dept: LAB | Facility: HOSPITAL | Age: 88
Discharge: HOME | End: 2023-10-09
Attending: INTERNAL MEDICINE
Payer: MEDICARE

## 2023-10-09 DIAGNOSIS — I48.20 CHRONIC ATRIAL FIBRILLATION (HCC): ICD-10-CM

## 2023-10-09 LAB — INR BLDC: 3.4 (ref 0.9–1.1)

## 2023-10-09 PROCEDURE — 85610 PROTHROMBIN TIME: CPT

## 2023-10-17 ENCOUNTER — HOSPITAL ENCOUNTER (OUTPATIENT)
Dept: LAB | Facility: HOSPITAL | Age: 88
Discharge: HOME | End: 2023-10-17
Attending: INTERNAL MEDICINE
Payer: MEDICARE

## 2023-10-17 DIAGNOSIS — I48.20 CHRONIC ATRIAL FIBRILLATION (HCC): ICD-10-CM

## 2023-10-17 LAB — INR BLDC: 3.1 (ref 0.9–1.1)

## 2023-10-17 PROCEDURE — 85610 PROTHROMBIN TIME: CPT

## 2023-10-25 ENCOUNTER — HOSPITAL ENCOUNTER (OUTPATIENT)
Dept: LAB | Facility: HOSPITAL | Age: 88
Discharge: HOME | End: 2023-10-25
Attending: INTERNAL MEDICINE

## 2023-10-25 DIAGNOSIS — I48.20 CHRONIC ATRIAL FIBRILLATION (HCC): ICD-10-CM

## 2023-10-25 LAB — INR BLDC: 2.1 (ref 0.9–1.1)

## 2023-10-25 PROCEDURE — 85610 PROTHROMBIN TIME: CPT

## 2023-11-01 ENCOUNTER — HOSPITAL ENCOUNTER (OUTPATIENT)
Dept: LAB | Facility: HOSPITAL | Age: 88
Discharge: HOME | End: 2023-11-01
Attending: INTERNAL MEDICINE
Payer: MEDICARE

## 2023-11-01 DIAGNOSIS — I48.20 CHRONIC ATRIAL FIBRILLATION (HCC): ICD-10-CM

## 2023-11-01 LAB — INR BLDC: 2 (ref 0.9–1.1)

## 2023-11-01 PROCEDURE — 85610 PROTHROMBIN TIME: CPT

## 2023-11-08 ENCOUNTER — HOSPITAL ENCOUNTER (OUTPATIENT)
Dept: LAB | Facility: HOSPITAL | Age: 88
Discharge: HOME | End: 2023-11-08
Attending: INTERNAL MEDICINE
Payer: MEDICARE

## 2023-11-08 DIAGNOSIS — I48.20 CHRONIC ATRIAL FIBRILLATION (HCC): ICD-10-CM

## 2023-11-08 LAB — INR BLDC: 1.8 (ref 0.9–1.1)

## 2023-11-08 PROCEDURE — 85610 PROTHROMBIN TIME: CPT

## 2023-11-15 ENCOUNTER — HOSPITAL ENCOUNTER (OUTPATIENT)
Dept: LAB | Facility: HOSPITAL | Age: 88
Discharge: HOME | End: 2023-11-15
Attending: INTERNAL MEDICINE
Payer: MEDICARE

## 2023-11-15 DIAGNOSIS — I48.20 CHRONIC ATRIAL FIBRILLATION (HCC): ICD-10-CM

## 2023-11-15 LAB — INR BLDC: 3 (ref 0.9–1.1)

## 2023-11-15 PROCEDURE — 85610 PROTHROMBIN TIME: CPT

## 2023-11-29 ENCOUNTER — HOSPITAL ENCOUNTER (OUTPATIENT)
Dept: LAB | Facility: HOSPITAL | Age: 88
Discharge: HOME | End: 2023-11-29
Attending: INTERNAL MEDICINE
Payer: MEDICARE

## 2023-11-29 DIAGNOSIS — I48.20 CHRONIC ATRIAL FIBRILLATION (HCC): ICD-10-CM

## 2023-11-29 LAB — INR BLDC: 3.2 (ref 0.9–1.1)

## 2023-11-29 PROCEDURE — 85610 PROTHROMBIN TIME: CPT

## 2023-12-06 ENCOUNTER — HOSPITAL ENCOUNTER (OUTPATIENT)
Dept: LAB | Facility: HOSPITAL | Age: 88
Discharge: HOME | End: 2023-12-06
Attending: INTERNAL MEDICINE
Payer: MEDICARE

## 2023-12-06 DIAGNOSIS — I48.20 CHRONIC ATRIAL FIBRILLATION (HCC): ICD-10-CM

## 2023-12-06 LAB — INR BLDC: 2.4 (ref 0.9–1.1)

## 2023-12-06 PROCEDURE — 85610 PROTHROMBIN TIME: CPT

## 2023-12-11 RX ORDER — LEVOTHYROXINE SODIUM 0.07 MG/1
75 TABLET ORAL
Qty: 90 TABLET | Refills: 0 | Status: SHIPPED | OUTPATIENT
Start: 2023-12-11

## 2023-12-11 NOTE — TELEPHONE ENCOUNTER
Levothyroxine 75 mcg 1 tab daily filled 9/11/23 90 with 0 refills     LOV 5/9/23  RTC 3mos with repeat labs.     No upcoming apt on file   Labs     Ref Range & Units 9/21/23  8:07 AM   Free T4  0.8 - 1.7 ng/dL 1.2     TSH  0.358 - 3.740 mIU/mL 2.360

## 2023-12-13 ENCOUNTER — HOSPITAL ENCOUNTER (OUTPATIENT)
Dept: LAB | Facility: HOSPITAL | Age: 88
Discharge: HOME | End: 2023-12-13
Attending: INTERNAL MEDICINE
Payer: MEDICARE

## 2023-12-13 DIAGNOSIS — I48.20 CHRONIC ATRIAL FIBRILLATION (HCC): ICD-10-CM

## 2023-12-13 LAB — INR BLDC: 2.6 (ref 0.9–1.1)

## 2023-12-13 PROCEDURE — 85610 PROTHROMBIN TIME: CPT

## 2023-12-25 ENCOUNTER — HOSPITAL ENCOUNTER (OUTPATIENT)
Facility: HOSPITAL | Age: 88
Setting detail: OBSERVATION
LOS: 1 days | Discharge: HOME | End: 2023-12-27
Attending: EMERGENCY MEDICINE | Admitting: STUDENT IN AN ORGANIZED HEALTH CARE EDUCATION/TRAINING PROGRAM
Payer: MEDICARE

## 2023-12-25 ENCOUNTER — APPOINTMENT (OUTPATIENT)
Dept: CT IMAGING | Facility: HOSPITAL | Age: 88
End: 2023-12-25
Attending: EMERGENCY MEDICINE
Payer: MEDICARE

## 2023-12-25 DIAGNOSIS — R11.2 NAUSEA AND VOMITING IN ADULT: Primary | ICD-10-CM

## 2023-12-25 DIAGNOSIS — N39.0 URINARY TRACT INFECTION WITHOUT HEMATURIA, SITE UNSPECIFIED: ICD-10-CM

## 2023-12-25 DIAGNOSIS — E86.0 DEHYDRATION: ICD-10-CM

## 2023-12-25 LAB
ALBUMIN SERPL-MCNC: 3.6 G/DL (ref 3.4–5)
ALBUMIN/GLOB SERPL: 1.1 {RATIO} (ref 1–2)
ALP LIVER SERPL-CCNC: 69 U/L
ALT SERPL-CCNC: 19 U/L
ANION GAP SERPL CALC-SCNC: 6 MMOL/L (ref 0–18)
AST SERPL-CCNC: 15 U/L (ref 15–37)
BASOPHILS # BLD AUTO: 0.04 X10(3) UL (ref 0–0.2)
BASOPHILS NFR BLD AUTO: 0.3 %
BILIRUB SERPL-MCNC: 0.8 MG/DL (ref 0.1–2)
BILIRUB UR QL STRIP.AUTO: NEGATIVE
BUN BLD-MCNC: 20 MG/DL (ref 9–23)
CALCIUM BLD-MCNC: 9.4 MG/DL (ref 8.5–10.1)
CHLORIDE SERPL-SCNC: 107 MMOL/L (ref 98–112)
CLARITY UR REFRACT.AUTO: CLEAR
CO2 SERPL-SCNC: 27 MMOL/L (ref 21–32)
COLOR UR AUTO: YELLOW
CREAT BLD-MCNC: 0.95 MG/DL
EGFRCR SERPLBLD CKD-EPI 2021: 56 ML/MIN/1.73M2 (ref 60–?)
EOSINOPHIL # BLD AUTO: 0.04 X10(3) UL (ref 0–0.7)
EOSINOPHIL NFR BLD AUTO: 0.3 %
ERYTHROCYTE [DISTWIDTH] IN BLOOD BY AUTOMATED COUNT: 13 %
GLOBULIN PLAS-MCNC: 3.3 G/DL (ref 2.8–4.4)
GLUCOSE BLD-MCNC: 155 MG/DL (ref 70–99)
GLUCOSE BLD-MCNC: 156 MG/DL (ref 70–99)
GLUCOSE BLD-MCNC: 156 MG/DL (ref 70–99)
GLUCOSE BLD-MCNC: 221 MG/DL (ref 70–99)
GLUCOSE UR STRIP.AUTO-MCNC: NORMAL MG/DL
HCT VFR BLD AUTO: 42.3 %
HGB BLD-MCNC: 14.2 G/DL
HYALINE CASTS #/AREA URNS AUTO: PRESENT /LPF
IMM GRANULOCYTES # BLD AUTO: 0.05 X10(3) UL (ref 0–1)
IMM GRANULOCYTES NFR BLD: 0.4 %
INR BLD: 1.9 (ref 0.8–1.2)
KETONES UR STRIP.AUTO-MCNC: 10 MG/DL
LEUKOCYTE ESTERASE UR QL STRIP.AUTO: 250
LIPASE SERPL-CCNC: 12 U/L (ref 13–75)
LYMPHOCYTES # BLD AUTO: 0.24 X10(3) UL (ref 1–4)
LYMPHOCYTES NFR BLD AUTO: 1.9 %
MCH RBC QN AUTO: 29.1 PG (ref 26–34)
MCHC RBC AUTO-ENTMCNC: 33.6 G/DL (ref 31–37)
MCV RBC AUTO: 86.7 FL
MONOCYTES # BLD AUTO: 0.17 X10(3) UL (ref 0.1–1)
MONOCYTES NFR BLD AUTO: 1.3 %
NEUTROPHILS # BLD AUTO: 12.36 X10 (3) UL (ref 1.5–7.7)
NEUTROPHILS # BLD AUTO: 12.36 X10(3) UL (ref 1.5–7.7)
NEUTROPHILS NFR BLD AUTO: 95.8 %
NITRITE UR QL STRIP.AUTO: NEGATIVE
OSMOLALITY SERPL CALC.SUM OF ELEC: 299 MOSM/KG (ref 275–295)
PH UR STRIP.AUTO: 5 [PH] (ref 5–8)
PLATELET # BLD AUTO: 173 10(3)UL (ref 150–450)
POTASSIUM SERPL-SCNC: 3.5 MMOL/L (ref 3.5–5.1)
PROT SERPL-MCNC: 6.9 G/DL (ref 6.4–8.2)
PROT UR STRIP.AUTO-MCNC: 20 MG/DL
PROTHROMBIN TIME: 22 SECONDS (ref 11.6–14.8)
RBC # BLD AUTO: 4.88 X10(6)UL
SARS-COV-2 RNA RESP QL NAA+PROBE: NOT DETECTED
SODIUM SERPL-SCNC: 140 MMOL/L (ref 136–145)
SP GR UR STRIP.AUTO: 1.02 (ref 1–1.03)
TROPONIN I SERPL HS-MCNC: 12 NG/L
UROBILINOGEN UR STRIP.AUTO-MCNC: NORMAL MG/DL
WBC # BLD AUTO: 12.9 X10(3) UL (ref 4–11)

## 2023-12-25 PROCEDURE — 74177 CT ABD & PELVIS W/CONTRAST: CPT | Performed by: EMERGENCY MEDICINE

## 2023-12-25 PROCEDURE — 99223 1ST HOSP IP/OBS HIGH 75: CPT | Performed by: INTERNAL MEDICINE

## 2023-12-25 RX ORDER — MELATONIN
3 NIGHTLY PRN
Status: DISCONTINUED | OUTPATIENT
Start: 2023-12-25 | End: 2023-12-27

## 2023-12-25 RX ORDER — ONDANSETRON 2 MG/ML
4 INJECTION INTRAMUSCULAR; INTRAVENOUS
Status: DISCONTINUED | OUTPATIENT
Start: 2023-12-25 | End: 2023-12-27

## 2023-12-25 RX ORDER — NICOTINE POLACRILEX 4 MG
30 LOZENGE BUCCAL
Status: DISCONTINUED | OUTPATIENT
Start: 2023-12-25 | End: 2023-12-27

## 2023-12-25 RX ORDER — LEVOTHYROXINE SODIUM 0.07 MG/1
75 TABLET ORAL
Status: DISCONTINUED | OUTPATIENT
Start: 2023-12-26 | End: 2023-12-27

## 2023-12-25 RX ORDER — NICOTINE POLACRILEX 4 MG
15 LOZENGE BUCCAL
Status: DISCONTINUED | OUTPATIENT
Start: 2023-12-25 | End: 2023-12-27

## 2023-12-25 RX ORDER — ONDANSETRON 2 MG/ML
4 INJECTION INTRAMUSCULAR; INTRAVENOUS EVERY 6 HOURS PRN
Status: DISCONTINUED | OUTPATIENT
Start: 2023-12-25 | End: 2023-12-27

## 2023-12-25 RX ORDER — POLYETHYLENE GLYCOL 3350 17 G/17G
17 POWDER, FOR SOLUTION ORAL DAILY PRN
Status: DISCONTINUED | OUTPATIENT
Start: 2023-12-25 | End: 2023-12-27

## 2023-12-25 RX ORDER — WARFARIN SODIUM 5 MG/1
5 TABLET ORAL ONCE
Qty: 1 TABLET | Refills: 0 | Status: COMPLETED | OUTPATIENT
Start: 2023-12-25 | End: 2023-12-25

## 2023-12-25 RX ORDER — ECHINACEA PURPUREA EXTRACT 125 MG
1 TABLET ORAL
Status: DISCONTINUED | OUTPATIENT
Start: 2023-12-25 | End: 2023-12-27

## 2023-12-25 RX ORDER — ENEMA 19; 7 G/133ML; G/133ML
1 ENEMA RECTAL ONCE AS NEEDED
Status: DISCONTINUED | OUTPATIENT
Start: 2023-12-25 | End: 2023-12-27

## 2023-12-25 RX ORDER — METOCLOPRAMIDE HYDROCHLORIDE 5 MG/ML
10 INJECTION INTRAMUSCULAR; INTRAVENOUS EVERY 8 HOURS PRN
Status: DISCONTINUED | OUTPATIENT
Start: 2023-12-25 | End: 2023-12-27

## 2023-12-25 RX ORDER — SENNOSIDES 8.6 MG
17.2 TABLET ORAL NIGHTLY PRN
Status: DISCONTINUED | OUTPATIENT
Start: 2023-12-25 | End: 2023-12-27

## 2023-12-25 RX ORDER — ASPIRIN 81 MG/1
81 TABLET, CHEWABLE ORAL
Status: DISCONTINUED | OUTPATIENT
Start: 2023-12-25 | End: 2023-12-27

## 2023-12-25 RX ORDER — BISACODYL 10 MG
10 SUPPOSITORY, RECTAL RECTAL
Status: DISCONTINUED | OUTPATIENT
Start: 2023-12-25 | End: 2023-12-27

## 2023-12-25 RX ORDER — SODIUM CHLORIDE 9 MG/ML
125 INJECTION, SOLUTION INTRAVENOUS CONTINUOUS
Status: DISCONTINUED | OUTPATIENT
Start: 2023-12-25 | End: 2023-12-25

## 2023-12-25 RX ORDER — ACETAMINOPHEN 500 MG
500 TABLET ORAL EVERY 4 HOURS PRN
Status: DISCONTINUED | OUTPATIENT
Start: 2023-12-25 | End: 2023-12-27

## 2023-12-25 RX ORDER — PANTOPRAZOLE SODIUM 20 MG/1
20 TABLET, DELAYED RELEASE ORAL
Status: DISCONTINUED | OUTPATIENT
Start: 2023-12-26 | End: 2023-12-27

## 2023-12-25 RX ORDER — SODIUM CHLORIDE 9 MG/ML
INJECTION, SOLUTION INTRAVENOUS CONTINUOUS
Status: DISCONTINUED | OUTPATIENT
Start: 2023-12-25 | End: 2023-12-25

## 2023-12-25 RX ORDER — DEXTROSE MONOHYDRATE 25 G/50ML
50 INJECTION, SOLUTION INTRAVENOUS
Status: DISCONTINUED | OUTPATIENT
Start: 2023-12-25 | End: 2023-12-27

## 2023-12-25 RX ORDER — SIMETHICONE 125 MG
125 TABLET,CHEWABLE ORAL 4 TIMES DAILY PRN
Status: DISCONTINUED | OUTPATIENT
Start: 2023-12-25 | End: 2023-12-27

## 2023-12-25 RX ORDER — INSULIN ASPART 100 [IU]/ML
INJECTION, SOLUTION INTRAVENOUS; SUBCUTANEOUS
Status: DISCONTINUED | OUTPATIENT
Start: 2023-12-25 | End: 2023-12-27

## 2023-12-25 NOTE — ED QUICK NOTES
Orders for admission, patient is aware of plan and ready to go upstairs. Any questions, please call ED RN Suzi Hoffman at extension [phone].      Patient Covid vaccination status: Unvaccinated     COVID Test Ordered in ED: Rapid SARS-CoV-2 by PCR    COVID Suspicion at Admission: N/A    Running Infusions:    sodium chloride 125 mL/hr (12/25/23 1047)        Mental Status/LOC at time of transport: a/o x4    Other pertinent information:   CIWA score: N/A   NIH score:  N/A

## 2023-12-25 NOTE — PLAN OF CARE
NURSING ADMISSION NOTE      Patient admitted via Cart. Oriented to room. Safety precautions initiated. Bed in low position. Call light in reach. Patient is alert, wearing glasses and hard of hearing w/ LAYNE hearing aids. Denies pain, SOB, and nausea. On tele - afib. Edema BLE. Cane at bedside. IVF per [facility]. Up to bathroom with contact guard assist. Call light within reach.

## 2023-12-25 NOTE — ED INITIAL ASSESSMENT (HPI)
Pt presents to ED via EMS with c/o nausea and vomiting simce last  night. Denies abd pain or headache or dizziness.

## 2023-12-26 ENCOUNTER — APPOINTMENT (OUTPATIENT)
Dept: ULTRASOUND IMAGING | Facility: HOSPITAL | Age: 88
End: 2023-12-26
Attending: INTERNAL MEDICINE
Payer: MEDICARE

## 2023-12-26 PROBLEM — R11.2 NAUSEA AND VOMITING IN ADULT: Status: ACTIVE | Noted: 2023-01-01

## 2023-12-26 PROBLEM — R11.2 NAUSEA AND VOMITING IN ADULT: Status: RESOLVED | Noted: 2023-01-01 | Resolved: 2023-01-01

## 2023-12-26 PROBLEM — E86.0 DEHYDRATION: Status: RESOLVED | Noted: 2023-01-01 | Resolved: 2023-01-01

## 2023-12-26 PROBLEM — R11.2 NAUSEA AND VOMITING IN ADULT: Status: ACTIVE | Noted: 2023-12-26

## 2023-12-26 PROBLEM — R11.2 NAUSEA AND VOMITING IN ADULT: Status: RESOLVED | Noted: 2023-12-25 | Resolved: 2023-12-26

## 2023-12-26 PROBLEM — E86.0 DEHYDRATION: Status: RESOLVED | Noted: 2023-12-25 | Resolved: 2023-12-26

## 2023-12-26 LAB
ALBUMIN SERPL-MCNC: 3 G/DL (ref 3.4–5)
ALBUMIN/GLOB SERPL: 1 {RATIO} (ref 1–2)
ALP LIVER SERPL-CCNC: 57 U/L
ALT SERPL-CCNC: 18 U/L
ANION GAP SERPL CALC-SCNC: 3 MMOL/L (ref 0–18)
AST SERPL-CCNC: 24 U/L (ref 15–37)
BILIRUB SERPL-MCNC: 0.6 MG/DL (ref 0.1–2)
BUN BLD-MCNC: 17 MG/DL (ref 9–23)
CALCIUM BLD-MCNC: 8.7 MG/DL (ref 8.5–10.1)
CHLORIDE SERPL-SCNC: 109 MMOL/L (ref 98–112)
CO2 SERPL-SCNC: 29 MMOL/L (ref 21–32)
CREAT BLD-MCNC: 0.87 MG/DL
EGFRCR SERPLBLD CKD-EPI 2021: 62 ML/MIN/1.73M2 (ref 60–?)
ERYTHROCYTE [DISTWIDTH] IN BLOOD BY AUTOMATED COUNT: 13.3 %
GLOBULIN PLAS-MCNC: 2.9 G/DL (ref 2.8–4.4)
GLUCOSE BLD-MCNC: 100 MG/DL (ref 70–99)
GLUCOSE BLD-MCNC: 101 MG/DL (ref 70–99)
GLUCOSE BLD-MCNC: 137 MG/DL (ref 70–99)
GLUCOSE BLD-MCNC: 143 MG/DL (ref 70–99)
GLUCOSE BLD-MCNC: 144 MG/DL (ref 70–99)
HCT VFR BLD AUTO: 38.1 %
HGB BLD-MCNC: 12.5 G/DL
INR BLD: 2.21 (ref 0.8–1.2)
MAGNESIUM SERPL-MCNC: 2.2 MG/DL (ref 1.6–2.6)
MCH RBC QN AUTO: 28.8 PG (ref 26–34)
MCHC RBC AUTO-ENTMCNC: 32.8 G/DL (ref 31–37)
MCV RBC AUTO: 87.8 FL
OSMOLALITY SERPL CALC.SUM OF ELEC: 294 MOSM/KG (ref 275–295)
PHOSPHATE SERPL-MCNC: 2.6 MG/DL (ref 2.5–4.9)
PLATELET # BLD AUTO: 153 10(3)UL (ref 150–450)
POTASSIUM SERPL-SCNC: 3.4 MMOL/L (ref 3.5–5.1)
PROT SERPL-MCNC: 5.9 G/DL (ref 6.4–8.2)
PROTHROMBIN TIME: 24.7 SECONDS (ref 11.6–14.8)
Q-T INTERVAL: 384 MS
QRS DURATION: 128 MS
QTC CALCULATION (BEZET): 490 MS
R AXIS: -47 DEGREES
RBC # BLD AUTO: 4.34 X10(6)UL
SODIUM SERPL-SCNC: 141 MMOL/L (ref 136–145)
T AXIS: 235 DEGREES
VENTRICULAR RATE: 98 BPM
WBC # BLD AUTO: 7.6 X10(3) UL (ref 4–11)

## 2023-12-26 PROCEDURE — 99232 SBSQ HOSP IP/OBS MODERATE 35: CPT | Performed by: INTERNAL MEDICINE

## 2023-12-26 PROCEDURE — 93970 EXTREMITY STUDY: CPT | Performed by: INTERNAL MEDICINE

## 2023-12-26 RX ORDER — TORSEMIDE 20 MG/1
20 TABLET ORAL
Status: DISCONTINUED | OUTPATIENT
Start: 2023-12-27 | End: 2023-12-27

## 2023-12-26 RX ORDER — WARFARIN SODIUM 5 MG/1
5 TABLET ORAL
Status: COMPLETED | OUTPATIENT
Start: 2023-12-26 | End: 2023-12-26

## 2023-12-26 RX ORDER — POTASSIUM CHLORIDE 1.5 G/1.58G
40 POWDER, FOR SOLUTION ORAL ONCE
Status: COMPLETED | OUTPATIENT
Start: 2023-12-26 | End: 2023-12-26

## 2023-12-26 RX ORDER — FUROSEMIDE 10 MG/ML
20 INJECTION INTRAMUSCULAR; INTRAVENOUS ONCE
Status: COMPLETED | OUTPATIENT
Start: 2023-12-26 | End: 2023-12-26

## 2023-12-26 RX ORDER — LOSARTAN POTASSIUM 100 MG/1
100 TABLET ORAL DAILY
Status: DISCONTINUED | OUTPATIENT
Start: 2023-12-26 | End: 2023-12-27

## 2023-12-26 NOTE — CONSULTS
[facility] Dosing Service  Warfarin (Coumadin) Initial Dosing    Berlin Sorensen is a 80year old patient for whom pharmacy has been consulted to dose warfarin (COUMADIN) for Prophylaxis of venous thrombosis by Dr. Jazz Crenshaw.  Based on this indication, goal INR is 2-3. Pertinent Patient Medical History:  A-Fib  Potential Drug Interactions:  None    Latest INR:   Recent Labs     12/25/23  0935   INR 1.90*       Other Anticoagulants:  None  Home regimen (if applicable):  Coumadin 5mg daily   Date/Time last dose was given (if applicable): 30/63    Based on above -  1. For today, Give warfarin (COUMADIN) 5 mg at 2100 tonight    2. PT/INR ordered daily while on warfarin    3. Pharmacy will continue to follow. We appreciate the opportunity to assist in the care of this patient.     Antoinette Beebe, PharmD  12/25/2023  7:20 PM

## 2023-12-26 NOTE — CM/SW NOTE
Patient failed inpatient criteria. Second level of review completed and supports observation. UR committee in agreement. Discussed with Dr. Neel Tadeo who approves observation status. Observation letter given to the patient and order written. Miguelina Hernandez RN, 12/26/23, 5:04 PM

## 2023-12-26 NOTE — PHYSICAL THERAPY NOTE
PHYSICAL THERAPY EVALUATION - INPATIENT     Room Number: 430/430-A  Evaluation Date: 12/26/2023  Type of Evaluation: Initial  Physician Order: PT Eval and Treat    Presenting Problem: nausea and vomiting  Co-Morbidities : atrial fibrillation on warfarin, CAD status post PCI, chronic HFpEF, hypertension, type 2 diabetes, GERD, hypothyroidism, obesity  Reason for Therapy: Mobility Dysfunction and Discharge Planning    History related to current admission: Patient is a 80year old female admitted on 12/25/2023: Presented from [facility] with multiple episodes of N&V, UA +,     ASSESSMENT   In this PT evaluation, the patient presents with the following impairments 1) Pt able to ambulate 150ft with RW and CGA reports at baseline intermittently uses SPC instead of RW - recommend use of RW at all times. These impairments and comorbidities manifest themselves as functional limitations in independent bed mobility, transfers, and gait. The patient is below baseline and would benefit from skilled inpatient PT to address the above deficits to assist patient in returning to prior to level of function. Functional outcome measures completed include AMPAC. The AM-PAC '6-Clicks' Inpatient Basic Mobility Short Form was completed and this patient is demonstrating a Approx Degree of Impairment: 35.83%  degree of impairment in mobility. Research supports that patients with this level of impairment may benefit from [address] . DISCHARGE RECOMMENDATIONS  PT Discharge Recommendations: Home with home health PT    PLAN  PT Treatment Plan: Bed mobility; Body mechanics; Coordination; Endurance; Energy conservation;Patient education; Family education;Gait training;Range of motion; Neuromuscular re-educate;Strengthening;Transfer training;Balance training  Rehab Potential : Good  Frequency (Obs): 3x/week  Number of Visits to Meet Established Goals: 5      CURRENT GOALS    Goal #1 Patient is able to demonstrate supine - sit EOB @ level: supervision Goal #2 Patient is able to demonstrate transfers EOB to/from Chair/Wheelchair at assistance level: supervision     Goal #3 Patient is able to ambulate 150 feet with assist device: walker - rolling at assistance level: supervision     Goal #4    Goal #5    Goal #6    Goal Comments: Goals established on 2023    HOME SITUATION  Type of Home: [place]: One level                      Patient Owned Equipment: Rolling walker;Cane  Patient Regularly Uses: Glasses; Hearing aides    Prior Level of [place]: Pt reports 1 fall in the past 6 months from loss of balance, states uses SPC when she does not have enough space for the RW to fit, does her own laundry, staff can assist with showering but she normally does that herself with someone sitting outside the bathroom for safety, she ambulates down to the dining phillips     SUBJECTIVE  \" They do like the towels and bedding but I do my own clothes for laundry\"       OBJECTIVE  Precautions: Bed/chair alarm  Fall Risk: Standard fall risk    WEIGHT BEARING RESTRICTION  Weight Bearing Restriction: None                PAIN ASSESSMENT  Ratin          COGNITION  Overall Cognitive Status:  WFL - within functional limits    RANGE OF MOTION AND STRENGTH ASSESSMENT  Upper extremity ROM and strength are within functional limits     Lower extremity ROM is within functional limits     Lower extremity strength is within functional limits       BALANCE  Static Sitting: Good  Dynamic Sitting: Good  Static Standing: Fair -  Dynamic Standing: Fair -    ADDITIONAL TESTS                                    ACTIVITY TOLERANCE                         O2 WALK       NEUROLOGICAL FINDINGS                        AM-PAC '6-Clicks' INPATIENT SHORT FORM - BASIC MOBILITY  How much difficulty does the patient currently have. ..   Patient Difficulty: Turning over in bed (including adjusting bedclothes, sheets and blankets)?: None   Patient Difficulty: Sitting down on and standing up from a chair with arms (e.g., wheelchair, bedside commode, etc.): A Little   Patient Difficulty: Moving from lying on back to sitting on the side of the bed?: None   How much help from another person does the patient currently need. .. Help from Another: Moving to and from a bed to a chair (including a wheelchair)?: A Little   Help from Another: Need to walk in hospital room?: A Little   Help from Another: Climbing 3-5 steps with a railing?: A Little       AM-PAC Score:  Raw Score: 20   Approx Degree of Impairment: 35.83%   Standardized Score (AM-PAC Scale): 47.67   CMS Modifier (G-Code): CJ    FUNCTIONAL ABILITY STATUS  Gait Assessment   Functional Mobility/Gait Assessment  Gait Assistance: Contact guard assist  Distance (ft): 150  Assistive Device: Rolling walker  Pattern: Shuffle    Skilled Therapy Provided     Bed Mobility:  Supine to sit: SBA      Transfer Mobility:  Sit to stand: CGA . vc for UE placement/appropriate body mechanics   Stand to sit: CGA  Gait = CGA vc for RW placement closer to patient, specifically while turning, postural re-ed performed while ambulating     Exercise/Education Provided: Body mechanics  Functional activity tolerated  Gait training  Transfer training    Patient End of Session: Up in chair;Needs met;Call light within reach;RN aware of session/findings; All patient questions and concerns addressed      Patient Evaluation Complexity Level:  History Low - no personal factors and/or co-morbidities   Examination of body systems Low - addressing 1-2 elements   Clinical Presentation Low - Stable   Clinical Decision Making Low - Stable       PT Session Time: 25 minutes  Gait Training: 10 minutes  Therapeutic Activity:  minutes  Neuromuscular Re-education:  minutes  Therapeutic Exercise:  minutes

## 2023-12-26 NOTE — PLAN OF CARE
Received pt alert oriented x 4. VSS on RA. No complaints of pain. Denies SOB and n/v. Medications given per MAR. Fall and safety precautions in place. Call light within reach.

## 2023-12-26 NOTE — CONSULTS
[facility] Dosing Service  Warfarin (Coumadin) Subsequent Dosing    Berlin Sorensen is a 80year old patient for whom pharmacy is dosing warfarin (Coumadin). Goal INR is 2-3    Recent Labs   Lab 12/25/23  0935 12/26/23  1439   INR 1.90* 2.21*       Consulted by:  Dr. Jazz Crenshaw  Indication:  afib  Potential Drug Interactions:  none  Other Anticoagulants:  none  Home regimen (if applicable):  warfarin 5mg daily    Inpatient Dosing History:    Date 12/25 12/26       INR 1.90 2.21       Coumadin dose 5 mg                 Based on above -  1. For today, Give warfarin (COUMADIN) 5 mg at 2100 tonight  2   PT/INR ordered daily while on warfarin  3. Pharmacy will continue to follow. We appreciate the opportunity to assist in the care of this patient.     Petra Solano, PharmD  12/26/2023  3:45 PM

## 2023-12-27 VITALS
RESPIRATION RATE: 20 BRPM | HEART RATE: 69 BPM | DIASTOLIC BLOOD PRESSURE: 68 MMHG | OXYGEN SATURATION: 94 % | TEMPERATURE: 99 F | WEIGHT: 158.88 LBS | SYSTOLIC BLOOD PRESSURE: 160 MMHG | BODY MASS INDEX: 34 KG/M2

## 2023-12-27 LAB
C DIFF TOX B STL QL: NEGATIVE
GLUCOSE BLD-MCNC: 129 MG/DL (ref 70–99)
GLUCOSE BLD-MCNC: 162 MG/DL (ref 70–99)
INR BLD: 2.45 (ref 0.8–1.2)
POTASSIUM SERPL-SCNC: 3.7 MMOL/L (ref 3.5–5.1)
PROTHROMBIN TIME: 26.9 SECONDS (ref 11.6–14.8)

## 2023-12-27 NOTE — CM/SW NOTE
12/27/23 1300   Discharge disposition   Expected discharge disposition Home or Self     Pt cleared to dc today to [facility]. PT recs [facility]. SW called [facility] ( therapy program at [facility]) . [phone]. They have orders for therapy and will get pt started upon return. Pt's daughter to transport. RN to call report to [facility].

## 2023-12-27 NOTE — PLAN OF CARE
Pt A/O x4. VSS. Room air, w/ O2 satting at 94-96%. Afebrile. Pt denies pain throughout day. Medications admin per MAR. Pt ambulated safely in room w/ standby assist. Pt's US results in chart, negative for DVT. Pt negative for c. Diff. Pt cleared for DC. POC discussed w/ Pt's daughters and Evie Alonzo called for report, report given. Pt discharging via car w/ Daughter Rebekah Min to [facility]. Fall and safety precautions in place. Call light within reach.

## 2023-12-27 NOTE — PLAN OF CARE
Pt A/O x4. VSS. Afebrile. Pt denies pain throughout day. Medications admin per MAR. Pt received lasix x1 dose per order. POC discussed w/ MD. Awaiting US doppler. Pending results, pt okay to DC per MD. Pt ambulated safely in room w/ standby assist. Fall and safety precautions in place. Call light within reach.

## 2023-12-27 NOTE — PLAN OF CARE
Patient is alert and oriented, vitals stable overnight, no fever. BLE edema improving, patient denies pain . US venous doppler (-) for DVT. Potassium 3.4, replaced last night. Loose stool overnight, ordered Cdiff. No new complaints or acute events. Safety precautions in place, call light within reach, plan of care  ongoing.      Problem: GASTROINTESTINAL - ADULT  Goal: Minimal or absence of nausea and vomiting  Description: INTERVENTIONS:  - Maintain adequate hydration with IV or PO as ordered and tolerated  - Nasogastric tube to low intermittent suction as ordered  - Evaluate effectiveness of ordered antiemetic medications  - Provide nonpharmacologic comfort measures as appropriate  - Advance diet as tolerated, if ordered  - Obtain nutritional consult as needed  - Evaluate fluid balance  Outcome: Progressing  Goal: Maintains or returns to baseline bowel function  Description: INTERVENTIONS:  - Assess bowel function  - Maintain adequate hydration with IV or PO as ordered and tolerated  - Evaluate effectiveness of GI medications  - Encourage mobilization and activity  - Obtain nutritional consult as needed  - Establish a toileting routine/schedule  - Consider collaborating with pharmacy to review patient's medication profile  Outcome: Progressing

## 2023-12-27 NOTE — PROGRESS NOTES
NURSING DISCHARGE NOTE    Discharged Nursing home via Wheelchair. Accompanied by Family member and Support staff  Belongings Taken by patient/family. Pt discharged to [facility] at approx. 1315 via care w/ daughter. AVS discussed w/ pt and daughter. DC paperwork sent w/ pt.

## 2023-12-28 ENCOUNTER — PATIENT OUTREACH (OUTPATIENT)
Dept: CASE MANAGEMENT | Age: 88
End: 2023-12-28

## 2023-12-28 DIAGNOSIS — Z02.9 ENCOUNTERS FOR UNSPECIFIED ADMINISTRATIVE PURPOSE: Primary | ICD-10-CM

## 2023-12-28 PROCEDURE — 1111F DSCHRG MED/CURRENT MED MERGE: CPT

## 2023-12-28 NOTE — PROGRESS NOTES
Left message on mailbox for pt to call NCM back for TCM.  NCM contact information included in message.

## 2023-12-28 NOTE — PROGRESS NOTES
LM for pt to call [facility] for TCM since discharge. [facility] phone number was provided for pt to call back.

## 2024-01-02 ENCOUNTER — OFFICE VISIT (OUTPATIENT)
Dept: INTERNAL MEDICINE CLINIC | Facility: CLINIC | Age: 89
End: 2024-01-02
Payer: MEDICARE

## 2024-01-02 ENCOUNTER — LAB ENCOUNTER (OUTPATIENT)
Dept: LAB | Age: 89
End: 2024-01-02
Attending: INTERNAL MEDICINE
Payer: MEDICARE

## 2024-01-02 VITALS
TEMPERATURE: 97 F | HEIGHT: 57 IN | DIASTOLIC BLOOD PRESSURE: 90 MMHG | BODY MASS INDEX: 34.95 KG/M2 | HEART RATE: 77 BPM | WEIGHT: 162 LBS | SYSTOLIC BLOOD PRESSURE: 140 MMHG | RESPIRATION RATE: 16 BRPM | OXYGEN SATURATION: 99 %

## 2024-01-02 DIAGNOSIS — K52.9 GASTROENTERITIS: Primary | ICD-10-CM

## 2024-01-02 DIAGNOSIS — I48.21 PERMANENT ATRIAL FIBRILLATION (HCC): ICD-10-CM

## 2024-01-02 DIAGNOSIS — N30.00 ACUTE CYSTITIS WITHOUT HEMATURIA: ICD-10-CM

## 2024-01-02 DIAGNOSIS — E11.8 TYPE 2 DIABETES MELLITUS WITH UNSPECIFIED COMPLICATIONS (HCC): ICD-10-CM

## 2024-01-02 LAB
CARTRIDGE LOT#: 611 NUMERIC
HEMOGLOBIN A1C: 7.2 % (ref 4.3–5.6)
INR BLD: 2.95 (ref 0.8–1.2)
PROTHROMBIN TIME: 31.1 SECONDS (ref 11.6–14.8)

## 2024-01-02 PROCEDURE — 83036 HEMOGLOBIN GLYCOSYLATED A1C: CPT | Performed by: INTERNAL MEDICINE

## 2024-01-02 PROCEDURE — 85610 PROTHROMBIN TIME: CPT

## 2024-01-02 PROCEDURE — 36415 COLL VENOUS BLD VENIPUNCTURE: CPT

## 2024-01-02 PROCEDURE — 1111F DSCHRG MED/CURRENT MED MERGE: CPT | Performed by: INTERNAL MEDICINE

## 2024-01-02 PROCEDURE — 99496 TRANSJ CARE MGMT HIGH F2F 7D: CPT | Performed by: INTERNAL MEDICINE

## 2024-01-02 RX ORDER — INSULIN GLARGINE-YFGN 100 [IU]/ML
5 INJECTION, SOLUTION SUBCUTANEOUS NIGHTLY
Qty: 9 ML | Refills: 3 | Status: SHIPPED | OUTPATIENT
Start: 2024-01-02 | End: 2024-04-01

## 2024-01-02 RX ORDER — CIPROFLOXACIN 250 MG/1
250 TABLET, FILM COATED ORAL 2 TIMES DAILY
COMMUNITY
Start: 2023-12-30

## 2024-01-02 RX ORDER — TORSEMIDE 20 MG/1
20 TABLET ORAL EVERY MORNING
COMMUNITY

## 2024-01-03 ENCOUNTER — TELEPHONE (OUTPATIENT)
Dept: INTERNAL MEDICINE CLINIC | Facility: CLINIC | Age: 89
End: 2024-01-03

## 2024-01-03 NOTE — TELEPHONE ENCOUNTER
Patient has questions about the paperwork that Dr. Curry gave her yesterday at her OV.  Please call.

## 2024-01-03 NOTE — PROGRESS NOTES
Multiple attempts to reach pt and messages left with no return call.  Patient went in for HFU appt with PCP on 1/2/24.  Encounter closing.

## 2024-01-03 NOTE — PROGRESS NOTES
Subjective:   Patient ID: Denae Kern is a 89 year old female.TCM from  12/25-27    Sent to ER 12/25 with vomiting and diarrhea thought to be gastroenteritis-resolved, possible UTI-went untreated, afin on Coumadin. She call me 12/31 and 1/1 with inc urinary freq and burning and I gave Cipro reducing Coumadin dose in 1/2        History/Other:   Review of Systems   Constitutional:  Positive for fatigue.   HENT:  Positive for hearing loss and tinnitus.    Eyes:  Positive for visual disturbance.   Respiratory:  Positive for shortness of breath (exertion).    Cardiovascular:  Positive for leg swelling (jaylan LLE).        CAD, Afib, HTN, hx MI and stent, abnormal EKG, hx DVT, CHF with preserved EF   Gastrointestinal:  Positive for abdominal distention (gas) and constipation (takes extra fiber).        HH with Reflux and dysphagia  Hemorrhoids, diverticulosis, fatty liver   Endocrine:        Sugars stable on insulin, also on thyroid   Genitourinary:  Positive for frequency (Torsemide) and urgency.        Urinary incontinence, freq UTIs   Musculoskeletal:  Positive for arthralgias, back pain, gait problem (walker) and neck pain.        Osteoporosis, BTKR   Skin:  Positive for pallor.        Hx skin cancers   Allergic/Immunologic: Positive for environmental allergies.   Neurological:  Positive for dizziness, weakness (frailty) and light-headedness.        Fall risk  Hx Meniere's with vertigo and tinnitus   Hematological:  Bruises/bleeds easily (Coumadin).   Psychiatric/Behavioral: Negative.         Current Outpatient Medications   Medication Sig Dispense Refill    ciprofloxacin 250 MG Oral Tab Take 1 tablet (250 mg total) by mouth 2 (two) times daily.      torsemide 20 MG Oral Tab Take 1 tablet (20 mg total) by mouth every morning. PATIENT REQUESTS TO TAKE IM AM, SELF DISPENSE      Insulin Glargine-yfgn (SEMGLEE, YFGN,) 100 UNIT/ML Subcutaneous Solution Pen-injector Inject 5 Units into the skin at bedtime. 9 mL 3     Multiple Vitamins-Minerals (PRESERVISION AREDS 2) Oral Cap as directed Orally bid daily      furosemide (LASIX) 20 MG Oral Tab Lasix      meclizine 12.5 MG Oral Tab Take 1 tablet (12.5 mg total) by mouth 3 (three) times daily as needed. 30 tablet 0    ACCU-CHEK SOFTCLIX LANCETS Does not apply Misc USE ONE TO THREE times a  each 3    Glucose Blood (ACCU-CHEK HAYLIE PLUS) In Vitro Strip Use 3-4 times a day as directed. 100 strip 1    Insulin Pen Needle (BD AUTOSHIELD DUO) 30G X 5 MM Does not apply Misc USE 3 TIMES A  each 0    Lansoprazole 15 MG Oral Capsule Delayed Release Take 1 capsule (15 mg total) by mouth every morning.      aspirin 81 MG Oral Chew Tab Chew 1 tablet (81 mg total) by mouth 3 (three) times a week.      Iron, Ferrous Sulfate, 75 (15 Fe) MG/ML Oral Solution Take 90 mg by mouth daily. Taken as 1 tablespoon=15ml (45mg) bid after meals 50 mL 3    LOSARTAN 100 MG Oral Tab TAKE ONE TABLET BY MOUTH DAILY 90 tablet 3    POTASSIUM CHLORIDE 40 meq/30 ml (10%) Oral Solution TAKE 15ml BY MOUTH DAILY 3800 mL 1    metoprolol tartrate 50 MG Oral Tab Take 1.5 tablets (75 mg total) by mouth 2 (two) times daily.      warfarin 5 MG Oral Tab Take 0.5 tablets (2.5 mg total) by mouth daily.  0    MULTIPLE VITAMIN OR 1 by mouth daily      Simethicone (GAS-X EXTRA STRENGTH OR) Take 1 tablet by mouth as needed.      Calcium Carbonate-Vitamin D 600-125 MG-UNIT Oral Tab Take 1 tablet by mouth daily.      LEVOTHYROXINE 75 MCG Oral Tab TAKE ONE TABLET BY MOUTH DAILY BEFORE breakfast 90 tablet 0     Allergies:  Allergies   Allergen Reactions    Penicillins HIVES and OTHER (SEE COMMENTS)     CLASS    Rosuvastatin OTHER (SEE COMMENTS)     Reaction: muscle aches  Reaction: muscle aches    Acyclovir DIARRHEA    Allergy      BETA LACTAMS      Carbapenems UNKNOWN and OTHER (SEE COMMENTS)    Cephalosporins UNKNOWN    Codeine NAUSEA ONLY    Codeine [Opioid Analgesics] NAUSEA AND VOMITING    Demerol NAUSEA AND VOMITING     Diphenhydramine-Zinc Acetate UNKNOWN     BETA LACTAMS    Glucophage [Metformin Hydrochloride]      \"problematic\"    Meperidine NAUSEA ONLY and NAUSEA AND VOMITING    Misc. Sulfonamide Containing Compounds OTHER (SEE COMMENTS)    Other UNKNOWN    Penicillin G HIVES    Statins PAIN     Reaction: muscle aches      Sulfa Drugs Cross Reactors NAUSEA AND VOMITING     \"Sulfa\"      Cardizem RASH       Objective:   Physical Exam  Constitutional:       General: She is not in acute distress.     Appearance: She is obese.   HENT:      Head:      Comments: Hearing aids  Cardiovascular:      Rate and Rhythm: Rhythm irregular.      Comments: afib  Pulmonary:      Effort: No respiratory distress.      Breath sounds: Normal breath sounds.   Abdominal:      Tenderness: There is no abdominal tenderness.      Comments: Liver enlarged just below right costal margin   Genitourinary:     Comments: defer  Musculoskeletal:         General: Deformity (arthritic) present.      Cervical back: Rigidity present.      Left lower leg: Edema (severe-neg Doppl;er for DVT in hospital) present.      Comments: With prominent leg veins, BTKR   Skin:     Findings: Bruising and lesion (on nose-recent derm) present.      Comments: Minor skin tear RUE redressed   Neurological:      Mental Status: She is alert.      Motor: Weakness present.      Gait: Gait abnormal (walker).   Psychiatric:         Mood and Affect: Mood normal.         Behavior: Behavior normal.         Thought Content: Thought content normal.         Judgment: Judgment normal.       Chief Complaint   Patient presents with    TCM (Transition Of Care Management)     Stomach pain, nausea and vomiting        Active Problems:  Patient Active Problem List   Diagnosis    Personal history of other malignant neoplasm of skin    Hypothyroidism    Osteopenia    Esophageal stricture    Diverticulosis    History of DVT (deep vein thrombosis)    Atrial fibrillation (HCC)    Wears hearing aid    History of  colon polyps    Warfarin anticoagulation    Uncontrolled type 2 diabetes mellitus with hyperglycemia, with long-term current use of insulin (HCC)    Hypercholesterolemia    Essential hypertension    Vitamin B 12 deficiency    Chronic 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), lumbar arthritis, and lumbar spinal stenosis    Chronic heart failure with preserved ejection fraction ([facility])    Fatty liver    Coronary artery disease involving native coronary artery of native heart without angina pectoris    GERD (gastroesophageal reflux disease)    At risk for falls    Multiple splenic artery aneurysms, largest at 1.5 cm ([facility])    Vertigo    Class 1 obesity    Thyroid nodule    History of Meniere's disease    Frailty    Urinary tract infection without hematuria, site unspecified    Nausea and vomiting in adult       Past Medical History:  Past Medical History:   Diagnosis Date    AC joint arthropathy 12/01/2011    Actinic keratosis 03/21/2019    right anterior lower leg    At high risk for falls     Atrial fibrillation ([facility]) 04/26/2012    PERSISTENT DR IRWIN  On Coumadin. Stable.    Autonomic neuropathy 02/27/2012    Back pain 06/28/2012    Bunion     CAD (coronary artery disease) 04/26/2012    Cardiomegaly 08/26/2010    mild, w/ mildly increased pulmonary vascularity, increased interstitial markings in the mid to lower lung fields, and B/L perihilar opacities is concerning for congestive failure    Chronic heart failure with preserved ejection fraction ([facility]) 12/16/2019    Class 1 obesity     Constipation     Diabetes mellitus ([facility]) 04/26/2012    Difficult intubation     Disc degeneration 01/08/2013    has several levels of degenerative discs and several areas of stenosis, both foraminal and central canal stenosis; and hx DDD, DJD    Diverticulosis     DVT of leg (deep venous thrombosis) ([facility]) 2007    post knee replacement    Dysphagia 01/12/2011    Esophageal dilatation 2011    Esophageal  stricture 04/26/2012    w/ variability in terms of tolerating that and tolerating meds    Fatty liver     Fecal impaction in rectum (HCC) 02/19/2011    Flatulence/gas pain/belching     Frailty     Gastric polyp 01/12/2011    Gastroparesis 04/26/2012    primarily effecting insulin treatment    GERD (gastroesophageal reflux disease)     and motility disorder    Hammertoe     Hearing impairment     HA's bilateral    Hemorrhoids     Hiatal hernia 10/12/2010    small    Hip pain 01/31/2011    History  of basal cell carcinoma 04/26/2012    basal cell and squamous cell    History of blood transfusion     with hysterectomy    History of colon polyps     History of Meniere's disease 04/26/2012    History of myocardial infarction     History of PTCA 04/26/2012    HTN (hypertension) 04/26/2012    Hypothyroidism 04/26/2012    IBS (irritable bowel syndrome)     Iron deficiency 04/26/2012    Need iron infusion.    Mild mitral regurgitation 07/13/2010    Nausea and vomiting in adult 12/25/2023    Neck pain 03/31/2011    head and neck discomfort    OA (osteoarthritis)     an hx low grade arthritis    Onychomycosis 04/26/2012    severe, toenails, received podiatric treatment 2/29/2012 w/ Dr. Bowers    Osteoporosis 04/26/2012    and osteopenia; 3/2009 \"Reclast infusion\"    Perirectal discomfort 02/18/2011    perirectus discomfort    Presbyesophagus 10/12/2010    Pulmonary hypertension (HCC)     Sinusitis     Squamous cell carcinoma     Thyroid nodule     right-on Carotid US    Tinnitus     Tumor, thyroid     Venous disease 04/26/2012    w/ peripheral edema; and hx varicose veins    Vertigo 03/31/2011    vertiginous symptoms       Past Surgical History:  Past Surgical History:   Procedure Laterality Date    ANGIOPLASTY (CORONARY)  07/14/2010    PTCA, bare-metal stent to the R coronary artery, catheterization, subtotal stenosis of the mid to distal R coronary artery    APPENDECTOMY      CATARACT      B/L    CHOLECYSTECTOMY  1994     COLONOSCOPY      COLONOSCOPY      DIAGNOSTIC ANOSCOPY      EGD      FLUOR GID & LOCLZJ NDL/CATH SPI DX/THER NJX  02/07/2014    Procedure: SI JOINT INJECTION;  Surgeon: Willian Orellana MD;  Location: [facility]    FLUOR GID & LOCLZJ NDL/CATH SPI DX/THER NJX  03/27/2014    Procedure: LUMBAR EPIDURAL;  Surgeon: Willian Orellana MD;  Location: [facility]    FOOT/TOES SURGERY PROC UNLISTED  1991, 1998    bunions B/L, hammertoe B/L    HYSTERECTOMY  1967    partial    INJECTION, W/WO CONTRAST, DX/THERAPEUTIC SUBSTANCE, EPIDURAL/SUBARACHNOID; LUMBAR/SACRAL  02/07/2014    Procedure: LUMBAR EPIDURAL;  Surgeon: Willian Orellana MD;  Location: [facility]    INJECTION, W/WO CONTRAST, DX/THERAPEUTIC SUBSTANCE, EPIDURAL/SUBARACHNOID; LUMBAR/SACRAL  03/27/2014    Procedure: LUMBAR EPIDURAL;  Surgeon: Willian Orellana MD;  Location: [facility]    KNEE REPLACEMENT SURGERY      R TKR-1995, L TKR-5/2007    ORTHOPEDIC SURG ([facility])      kael bunions    OTHER SURGICAL HISTORY      thyroid nodule; benign tumor on thyroid removed 1996; subtotal thyroidectomy 1996    OTHER SURGICAL HISTORY  1974    varicose vein strip R leg    OTHER SURGICAL HISTORY  01/12/2011    mid esophagus bx, gastric polyp bx    OTHER SURGICAL HISTORY      esophageal dilatation    OTHER SURGICAL HISTORY  02/14/2018    vulva biopsy: lichenoid inflammation with stromal fibrosis, negative for dysplasia    PATIENT DOCUMENTED NOT TO HAVE EXPERIENCED ANY OF THE FOLLOWING EVENTS  02/07/2014    Procedure: SI JOINT INJECTION;  Surgeon: Willian Orellana MD;  Location: [facility]    PATIENT DOCUMENTED NOT TO HAVE EXPERIENCED ANY OF THE FOLLOWING EVENTS  03/27/2014    Procedure: LUMBAR EPIDURAL;  Surgeon: Willian Orellana MD;  Location: [facility]    PATIENT WITHOUGH PREOPERATIVE ORDER FOR IV ANTIBIOTIC SURGICAL SITE INFECTION PROPHYLAXIS.  02/07/2014    Procedure: SI  JOINT INJECTION;  Surgeon: Willian Orellana MD;  Location: [facility]    PATIENT WITHOUGH PREOPERATIVE ORDER FOR IV ANTIBIOTIC SURGICAL SITE INFECTION PROPHYLAXIS.  03/27/2014    Procedure: LUMBAR EPIDURAL;  Surgeon: Willian Orellana MD;  Location: [facility]    SKIN SURGERY  05/19/2011    SCCIS to R temple / Mohs surgery    TONSILLECTOMY      T&A, childhood    TOTAL KNEE REPLACEMENT      bilateral    UPPER GI ENDOSCOPY,EXAM  10/12/2010    upper gi series (air contrast) w/ esophogram       OB/GYN History:  No LMP recorded (lmp unknown). Patient is postmenopausal.  G:   P:   A:     Family History:  Family History   Problem Relation Age of Onset    Other (Parkinson's) Father     Other (pneumonia) Father     Heart Disorder Mother     Heart Disease Mother     Heart Attack Mother     Arthritis Mother     Circulatory Problems Mother     Other (Other) Mother     Heart Disorder Brother     Heart Attack Paternal Grandfather     Heart Attack Paternal Grandmother     Heart Disorder Paternal Grandmother     Diabetes Daughter     Cancer Brother         lung ca    Diabetes Brother     Suicide History Son     Cancer Other         colorectal ca, skin ca, fam hx    High Blood Pressure Other         fam hx    Heart Disorder Other         heart condition, mat grandparen    Heart Attack Other         mat grandparent    Stroke Other         mat grandparent    Other (Other) Other     Gastro-Intestinal Disorder Other         IBS, fam hx    Blood Disorder Other         blood blots, fam hx    Seizure Disorder Son         Epilepsy    Mental Disorder Son         paranoia, OCD       Social History:  Social History     Socioeconomic History    Marital status:      Spouse name: Not on file    Number of children: Not on file    Years of education: Not on file    Highest education level: Not on file   Occupational History    Not on file   Tobacco Use    Smoking status: Never    Smokeless tobacco: Never    Vaping Use    Vaping Use: Never used   Substance and Sexual Activity    Alcohol use: No     Alcohol/week: 0.0 standard drinks of alcohol     Comment: NONE    Drug use: Never    Sexual activity: Not on file   Other Topics Concern     Service Not Asked    Blood Transfusions Not Asked    Caffeine Concern Yes    Occupational Exposure Not Asked    Hobby Hazards Not Asked    Sleep Concern Not Asked    Stress Concern Not Asked    Weight Concern Not Asked    Special Diet Not Asked    Back Care Not Asked    Exercise Yes     Comment: not much    Bike Helmet Not Asked    Seat Belt Yes    Self-Exams Not Asked   Social History Narrative    Not on file     Social Determinants of Health     Financial Resource Strain: Not on file   Food Insecurity: No Food Insecurity (12/25/2023)    Food Insecurity     Food Insecurity: Never true   Transportation Needs: No Transportation Needs (12/25/2023)    Transportation Needs     Lack of Transportation: No   Physical Activity: Not on file   Stress: Not on file   Social Connections: Not on file   Housing Stability: Low Risk  (12/25/2023)    Housing Stability     Housing Instability: No     Housing Instability Emergency: Not on file       Health Maintenance:  Immunization History   Administered Date(s) Administered    Covid-19 Vaccine Pfizer 30 mcg/0.3 ml 01/22/2021, 02/12/2021, 10/26/2021    Covid-19 Vaccine Pfizer Bivalent 30mcg/0.3mL 12/19/2022    Covid-19 Vaccine Pfizer Liam-Sucrose 30 mcg/0.3 ml 05/08/2022    Depo-Medrol 40mg Inj 11/17/2016, 01/26/2017    FLU VAC High Dose 65 YRS & Older PRSV Free (85539) 10/17/2014, 09/23/2018, 10/03/2020    FLUAD High Dose 65 yr and older (46221) 10/12/2019    Fluzone Vaccine Medicare () 09/13/2016, 09/21/2017, 09/22/2018    HIGH DOSE FLU 65 YRS AND OLDER PRSV FREE SINGLE D (95854) FLU CLINIC 10/17/2014, 09/13/2016, 09/22/2017, 09/23/2018, 10/03/2020, 10/25/2021, 09/13/2023    Influenza 11/17/2000, 10/01/2002, 10/07/2003, 10/20/2006,  01/01/2007, 10/23/2008, 09/15/2009, 10/15/2015, 09/22/2016    Influenza Virus Vaccine, H1N1 01/01/2009    Moderna Covid-19 Vaccine 50mcg/0.5ml 12yrs+ (8047-1445) 11/02/2023    Pneumococcal (Prevnar 13) 11/09/2015    Pneumovax 01/01/2005    TD 12/15/2006    Td, Preserv Free 12/13/2018    Tetanus 01/01/2009    Zoster Vaccine Live (Zostavax) 01/01/2008       [facility] Labs and Tests:  Patient Active Problem List   Component Date Value Ref Range Status    Glucose 12/25/2023 221 (H)  70 - 99 mg/dL Final    Sodium 12/25/2023 140  136 - 145 mmol/L Final    Potassium 12/25/2023 3.5  3.5 - 5.1 mmol/L Final    Chloride 12/25/2023 107  98 - 112 mmol/L Final    CO2 12/25/2023 27.0  21.0 - 32.0 mmol/L Final    Anion Gap 12/25/2023 6  0 - 18 mmol/L Final    BUN 12/25/2023 20  9 - 23 mg/dL Final    Creatinine 12/25/2023 0.95  0.55 - 1.02 mg/dL Final    Calcium, Total 12/25/2023 9.4  8.5 - 10.1 mg/dL Final    Calculated Osmolality 12/25/2023 299 (H)  275 - 295 mOsm/kg Final    eGFR-Cr 12/25/2023 56 (L)  >=60 mL/min/1.73m2 Final    AST 12/25/2023 15  15 - 37 U/L Final    ALT 12/25/2023 19  13 - 56 U/L Final    Alkaline Phosphatase 12/25/2023 69  55 - 142 U/L Final    Bilirubin, Total 12/25/2023 0.8  0.1 - 2.0 mg/dL Final    Total Protein 12/25/2023 6.9  6.4 - 8.2 g/dL Final    Albumin 12/25/2023 3.6  3.4 - 5.0 g/dL Final    Globulin  12/25/2023 3.3  2.8 - 4.4 g/dL Final    A/G Ratio 12/25/2023 1.1  1.0 - 2.0 Final    Urine Color 12/25/2023 Yellow  Yellow Final    Clarity Urine 12/25/2023 Clear  Clear Final    Spec Gravity 12/25/2023 1.023  1.005 - 1.030 Final    Glucose Urine 12/25/2023 Normal  Normal mg/dL Final    Bilirubin Urine 12/25/2023 Negative  Negative Final    Ketones Urine 12/25/2023 10 (A)  Negative mg/dL Final    Blood Urine 12/25/2023 1+ (A)  Negative Final    pH Urine 12/25/2023 5.0  5.0 - 8.0 Final    Protein Urine 12/25/2023 20 (A)  Negative mg/dL Final    Urobilinogen Urine 12/25/2023 Normal  Normal mg/dL Final     Nitrite Urine 12/25/2023 Negative  Negative Final    Leukocyte Esterase Urine 12/25/2023 250 (A)  Negative Final      August/uL Interpretation   25          Trace   75          1+   250         2+   500         3+    WBC Urine 12/25/2023 11-20 (A)  0 - 5 /HPF Final    RBC Urine 12/25/2023 3-5 (A)  0 - 2 /HPF Final    Bacteria Urine 12/25/2023 None Seen  None Seen /HPF Final    Squamous Epi. Cells 12/25/2023 None Seen  None Seen /HPF Final    Renal Tubular Epithelial Cells 12/25/2023 None Seen  None Seen /HPF Final    Transitional Cells 12/25/2023 None Seen  None Seen /HPF Final    Hyaline Casts 12/25/2023 Present (A)  None Seen /LPF Final    Yeast Urine 12/25/2023 None Seen  None Seen /HPF Final    Lipase 12/25/2023 12 (L)  13 - 75 U/L Final    Troponin I (High Sensitivity) 12/25/2023 12  <=54 ng/L Final    The Troponin test may exhibit interference when a sample is collected from a person who is consuming high dose of biotin (a.k.a., vitamin B7, vitamin H, coenzyme R) supplements resulting in serum concentrations >300 ng/mL.  Intake of the recommended daily allowance (RDA) for biotin (0.03 mg) has not been shown to typically cause significant interference; however, high dose daily dietary supplements may contain biotin concentrations greater than 150 times (5-10 mg) the RDA.  It is recommended that physicians ask all patients who may be on biotin supplementation to stop biotin consumption at least 72 hours prior to collection of a new sample.      Ventricular rate 12/25/2023 98  BPM Final    QRS Duration 12/25/2023 128  ms Final    Q-T Interval 12/25/2023 384  ms Final    QTC Calculation (Bezet) 12/25/2023 490  ms Final    R Axis 12/25/2023 -47  degrees Final    T Axis 12/25/2023 235  degrees Final    PT 12/25/2023 22.0 (H)  11.6 - 14.8 seconds Final    Elevations of the PT and/or INR in patients not   receiving anticoagulant therapy may be seen in   factor deficiency, vitamin K deficiency, liver   disease, etc.  Clinical correlation is recommended.        INR 12/25/2023 1.90 (H)  0.80 - 1.20 Final    Only the INR (not the PT value) should be utilized for   the monitoring of oral anticoagulant therapy.     Recommended therapeutic ranges for anticoagulant therapy are   as follows:   2.0 - 3.0 All indications except for mechanical prosthetic   cardiac valves.     2.5 - 3.5 Mechanical prosthetic cardiac valves.        Rapid SARS-CoV-2 by PCR 12/25/2023 Not Detected  Not Detected Final    This test is intended for the qualitative detection of nucleic acid from the SARS-CoV-2 viral RNA from individuals who are suspected of COVID-19 infection by their healthcare provider.    A \"Detected\" result is considered a positive test result for COVID-19.   A \"Not Detected\" result for this test means that SARS-CoV-2 RNA was not present in the sample above the limit of detection of the assay.    Test performed using the Abbott ID NOW COVID-19 assay performed on the ID NOW Instrument, [employer] [place], [place] [address].    This test is being used under the Food and Drug Administration's Emergency Use Authorization.    The authorized Fact Sheet for Healthcare Providers for this assay is available upon request from the laboratory.    [facility]   [address] [place] [id number]   Phone: [phone] Fax: [phone]  CLIA # [id number]    WBC 12/25/2023 12.9 (H)  4.0 - 11.0 x10(3) uL Final    RBC 12/25/2023 4.88  3.80 - 5.30 x10(6)uL Final    HGB 12/25/2023 14.2  12.0 - 16.0 g/dL Final    HCT 12/25/2023 42.3  35.0 - 48.0 % Final    PLT 12/25/2023 173.0  150.0 - 450.0 10(3)uL Final    MCV 12/25/2023 86.7  80.0 - 100.0 fL Final    MCH 12/25/2023 29.1  26.0 - 34.0 pg Final    MCHC 12/25/2023 33.6  31.0 - 37.0 g/dL Final    RDW 12/25/2023 13.0  % Final    Neutrophil Absolute Prelim 12/25/2023 12.36 (H)  1.50 - 7.70 x10 (3) uL Final    Neutrophil Absolute  12/25/2023 12.36 (H)  1.50 - 7.70 x10(3) uL Final    Lymphocyte Absolute 12/25/2023 0.24 (L)  1.00 - 4.00 x10(3) uL Final    Monocyte Absolute 12/25/2023 0.17  0.10 - 1.00 x10(3) uL Final    Eosinophil Absolute 12/25/2023 0.04  0.00 - 0.70 x10(3) uL Final    Basophil Absolute 12/25/2023 0.04  0.00 - 0.20 x10(3) uL Final    Immature Granulocyte Absolute 12/25/2023 0.05  0.00 - 1.00 x10(3) uL Final    Neutrophil % 12/25/2023 95.8  % Final    Lymphocyte % 12/25/2023 1.9  % Final    Monocyte % 12/25/2023 1.3  % Final    Eosinophil % 12/25/2023 0.3  % Final    Basophil % 12/25/2023 0.3  % Final    Immature Granulocyte % 12/25/2023 0.4  % Final    POC Glucose 12/25/2023 156 (H)  70 - 99 mg/dL Final    POC Glucose 12/25/2023 156 (H)  70 - 99 mg/dL Final    POC Glucose 12/25/2023 155 (H)  70 - 99 mg/dL Final    Glucose 12/26/2023 100 (H)  70 - 99 mg/dL Final    Sodium 12/26/2023 141  136 - 145 mmol/L Final    Potassium 12/26/2023 3.4 (L)  3.5 - 5.1 mmol/L Final    Chloride 12/26/2023 109  98 - 112 mmol/L Final    CO2 12/26/2023 29.0  21.0 - 32.0 mmol/L Final    Anion Gap 12/26/2023 3  0 - 18 mmol/L Final    BUN 12/26/2023 17  9 - 23 mg/dL Final    Creatinine 12/26/2023 0.87  0.55 - 1.02 mg/dL Final    Calcium, Total 12/26/2023 8.7  8.5 - 10.1 mg/dL Final    Calculated Osmolality 12/26/2023 294  275 - 295 mOsm/kg Final    eGFR-Cr 12/26/2023 62  >=60 mL/min/1.73m2 Final    AST 12/26/2023 24  15 - 37 U/L Final    ALT 12/26/2023 18  13 - 56 U/L Final    Alkaline Phosphatase 12/26/2023 57  55 - 142 U/L Final    Bilirubin, Total 12/26/2023 0.6  0.1 - 2.0 mg/dL Final    Total Protein 12/26/2023 5.9 (L)  6.4 - 8.2 g/dL Final    Albumin 12/26/2023 3.0 (L)  3.4 - 5.0 g/dL Final    Globulin  12/26/2023 2.9  2.8 - 4.4 g/dL Final    A/G Ratio 12/26/2023 1.0  1.0 - 2.0 Final    Magnesium 12/26/2023 2.2  1.6 - 2.6 mg/dL Final    Phosphorus 12/26/2023 2.6  2.5 - 4.9 mg/dL Final    WBC 12/26/2023 7.6  4.0 - 11.0 x10(3) uL Final    RBC  12/26/2023 4.34  3.80 - 5.30 x10(6)uL Final    HGB 12/26/2023 12.5  12.0 - 16.0 g/dL Final    HCT 12/26/2023 38.1  35.0 - 48.0 % Final    PLT 12/26/2023 153.0  150.0 - 450.0 10(3)uL Final    MCV 12/26/2023 87.8  80.0 - 100.0 fL Final    MCH 12/26/2023 28.8  26.0 - 34.0 pg Final    MCHC 12/26/2023 32.8  31.0 - 37.0 g/dL Final    RDW 12/26/2023 13.3  % Final    POC Glucose 12/26/2023 101 (H)  70 - 99 mg/dL Final    POC Glucose 12/26/2023 143 (H)  70 - 99 mg/dL Final    PT 12/26/2023 24.7 (H)  11.6 - 14.8 seconds Final    Elevations of the PT and/or INR in patients not   receiving anticoagulant therapy may be seen in   factor deficiency, vitamin K deficiency, liver   disease, etc. Clinical correlation is recommended.        INR 12/26/2023 2.21 (H)  0.80 - 1.20 Final    Only the INR (not the PT value) should be utilized for   the monitoring of oral anticoagulant therapy.     Recommended therapeutic ranges for anticoagulant therapy are   as follows:   2.0 - 3.0 All indications except for mechanical prosthetic   cardiac valves.     2.5 - 3.5 Mechanical prosthetic cardiac valves.        POC Glucose 12/26/2023 137 (H)  70 - 99 mg/dL Final    POC Glucose 12/26/2023 144 (H)  70 - 99 mg/dL Final    PT 12/27/2023 26.9 (H)  11.6 - 14.8 seconds Final    Elevations of the PT and/or INR in patients not   receiving anticoagulant therapy may be seen in   factor deficiency, vitamin K deficiency, liver   disease, etc. Clinical correlation is recommended.        INR 12/27/2023 2.45 (H)  0.80 - 1.20 Final    Only the INR (not the PT value) should be utilized for   the monitoring of oral anticoagulant therapy.     Recommended therapeutic ranges for anticoagulant therapy are   as follows:   2.0 - 3.0 All indications except for mechanical prosthetic   cardiac valves.     2.5 - 3.5 Mechanical prosthetic cardiac valves.        Potassium 12/27/2023 3.7  3.5 - 5.1 mmol/L Final    C. Difficile Toxin B Gene 12/27/2023 Negative  Negative Final     POC Glucose 12/27/2023 129 (H)  70 - 99 mg/dL Final    POC Glucose 12/27/2023 162 (H)  70 - 99 mg/dL Final       Vitals:  Vitals:    01/02/24 1347   BP: 140/90   BP Location: Left arm   Patient Position: Sitting   Cuff Size: adult   Pulse: 77   Resp: 16   Temp: 97 °F (36.1 °C)   TempSrc: Temporal   SpO2: 99%   Weight: 162 lb (73.5 kg)   Height: 4' 9\" (1.448 m)     Body mass index is 35.06 kg/m².    A1C high at 7.2  INR 2.95-warfarin was reduced on Cipro  Check UACS      Assessment & Plan:   1. Gastroenteritis    2. Acute cystitis without hematuria    3. Type 2 diabetes mellitus with unspecified complications (HCC)    4. Permanent atrial fibrillation (HCC)      Hospital records reviewed and I communicated with hospitalists during 12/25-27 admission  Gastroenteritis with dehydration resolved!  As above, cont Cipro and reduced Coumadin  Switch out Lantus for formulary change to Semglee, high A1C but will NOT increase insulin at her age 89  Meds reconciled        Orders Placed This Encounter   Procedures    HEMOGLOBIN A1C    Prothrombin Time (PT) [E]    UA/M WITH CULTURE REFLEX [3020]       Meds This Visit:  Requested Prescriptions     Signed Prescriptions Disp Refills    Insulin Glargine-yfgn (SEMGLEE, YFGN,) 100 UNIT/ML Subcutaneous Solution Pen-injector 9 mL 3     Sig: Inject 5 Units into the skin at bedtime.       Imaging & Referrals:  None

## 2024-01-11 ENCOUNTER — HOSPITAL ENCOUNTER (OUTPATIENT)
Dept: LAB | Facility: HOSPITAL | Age: 89
Discharge: HOME | End: 2024-01-11
Attending: INTERNAL MEDICINE
Payer: MEDICARE

## 2024-01-11 DIAGNOSIS — I48.20 CHRONIC ATRIAL FIBRILLATION (HCC): ICD-10-CM

## 2024-01-11 LAB — INR BLDC: 2.5 (ref 0.9–1.1)

## 2024-01-11 PROCEDURE — 85610 PROTHROMBIN TIME: CPT

## 2024-01-15 ENCOUNTER — TELEPHONE (OUTPATIENT)
Dept: INTERNAL MEDICINE CLINIC | Facility: CLINIC | Age: 89
End: 2024-01-15

## 2024-01-15 NOTE — TELEPHONE ENCOUNTER
Pt has benefits for Prolia     Last injection 7/5/23  Due 1/6/24  Dexa 6/9/22    Pt will call and set up apt.

## 2024-01-22 ENCOUNTER — MED REC SCAN ONLY (OUTPATIENT)
Dept: INTERNAL MEDICINE CLINIC | Facility: CLINIC | Age: 89
End: 2024-01-22

## 2024-01-24 ENCOUNTER — HOSPITAL ENCOUNTER (OUTPATIENT)
Dept: LAB | Facility: HOSPITAL | Age: 89
Discharge: HOME | End: 2024-01-24
Attending: INTERNAL MEDICINE
Payer: MEDICARE

## 2024-01-24 DIAGNOSIS — I48.20 CHRONIC ATRIAL FIBRILLATION (HCC): ICD-10-CM

## 2024-01-24 LAB
INR BLD: 3.12 (ref 0.8–1.2)
INR BLDC: 4 (ref 0.9–1.1)
PROTHROMBIN TIME: 32.6 SECONDS (ref 11.6–14.8)

## 2024-01-24 PROCEDURE — 36415 COLL VENOUS BLD VENIPUNCTURE: CPT

## 2024-01-24 PROCEDURE — 85610 PROTHROMBIN TIME: CPT

## 2024-01-30 ENCOUNTER — NURSE ONLY (OUTPATIENT)
Dept: INTERNAL MEDICINE CLINIC | Facility: CLINIC | Age: 89
End: 2024-01-30
Payer: MEDICARE

## 2024-01-30 ENCOUNTER — LAB ENCOUNTER (OUTPATIENT)
Dept: LAB | Age: 89
End: 2024-01-30
Attending: INTERNAL MEDICINE
Payer: MEDICARE

## 2024-01-30 DIAGNOSIS — N30.00 ACUTE CYSTITIS WITHOUT HEMATURIA: ICD-10-CM

## 2024-01-30 DIAGNOSIS — M81.0 OSTEOPOROSIS, UNSPECIFIED OSTEOPOROSIS TYPE, UNSPECIFIED PATHOLOGICAL FRACTURE PRESENCE: Primary | ICD-10-CM

## 2024-01-30 LAB
BILIRUB UR QL STRIP.AUTO: NEGATIVE
CLARITY UR REFRACT.AUTO: CLEAR
COLOR UR AUTO: YELLOW
GLUCOSE UR STRIP.AUTO-MCNC: NORMAL MG/DL
KETONES UR STRIP.AUTO-MCNC: NEGATIVE MG/DL
LEUKOCYTE ESTERASE UR QL STRIP.AUTO: NEGATIVE
NITRITE UR QL STRIP.AUTO: NEGATIVE
PH UR STRIP.AUTO: 7.5 [PH] (ref 5–8)
PROT UR STRIP.AUTO-MCNC: NEGATIVE MG/DL
RBC UR QL AUTO: NEGATIVE
SP GR UR STRIP.AUTO: 1.02 (ref 1–1.03)
UROBILINOGEN UR STRIP.AUTO-MCNC: 2 MG/DL

## 2024-01-30 PROCEDURE — 81003 URINALYSIS AUTO W/O SCOPE: CPT

## 2024-01-30 PROCEDURE — 96372 THER/PROPH/DIAG INJ SC/IM: CPT | Performed by: INTERNAL MEDICINE

## 2024-01-30 NOTE — PATIENT INSTRUCTIONS
Pt presents to the office today for their Prolia injection.  Prolia 60 mg given subcutaneous  into legt arm.  Patient tolerated injection well without any complaints.   Patient to return to office in 6 months for next injection.      ABN given to Tamiko to scan.

## 2024-02-01 ENCOUNTER — HOSPITAL ENCOUNTER (OUTPATIENT)
Dept: LAB | Facility: HOSPITAL | Age: 89
Discharge: HOME | End: 2024-02-01
Attending: INTERNAL MEDICINE
Payer: MEDICARE

## 2024-02-01 DIAGNOSIS — I48.20 CHRONIC ATRIAL FIBRILLATION (HCC): ICD-10-CM

## 2024-02-01 LAB — INR BLDC: 2.2 (ref 0.9–1.1)

## 2024-02-01 PROCEDURE — 85610 PROTHROMBIN TIME: CPT

## 2024-02-06 ENCOUNTER — HOSPITAL ENCOUNTER (OUTPATIENT)
Dept: LAB | Facility: HOSPITAL | Age: 89
Discharge: HOME | End: 2024-02-06
Attending: INTERNAL MEDICINE
Payer: MEDICARE

## 2024-02-06 DIAGNOSIS — I48.20 CHRONIC ATRIAL FIBRILLATION (HCC): ICD-10-CM

## 2024-02-06 LAB — INR BLDC: 2.3 (ref 0.9–1.1)

## 2024-02-06 PROCEDURE — 85610 PROTHROMBIN TIME: CPT

## 2024-02-10 DIAGNOSIS — E11.8 TYPE 2 DIABETES MELLITUS WITH UNSPECIFIED COMPLICATIONS (HCC): ICD-10-CM

## 2024-02-12 RX ORDER — PEN NEEDLE, DIABETIC, SAFETY 30 GX3/16"
NEEDLE, DISPOSABLE MISCELLANEOUS
Qty: 120 EACH | Refills: 3 | Status: SHIPPED | OUTPATIENT
Start: 2024-02-12

## 2024-02-12 RX ORDER — LANCETS
EACH MISCELLANEOUS
Qty: 100 EACH | Refills: 3 | Status: SHIPPED | OUTPATIENT
Start: 2024-02-12

## 2024-02-15 ENCOUNTER — TELEPHONE (OUTPATIENT)
Dept: INTERNAL MEDICINE CLINIC | Facility: CLINIC | Age: 89
End: 2024-02-15

## 2024-02-15 NOTE — TELEPHONE ENCOUNTER
S/w Navid MIJARES for [facility]    Pt having episodes of dizziness.   VSS    Asking for Meclizine order to be TID prn.   Order on MAR in [facility] is TID prn.   Order faxed to Navid MIJARES as above  [phone]    RN will keep this office updated if pt needs anything further     Shelli MIJARES

## 2024-02-15 NOTE — TELEPHONE ENCOUNTER
Navid from [facility] would like to speak with the nurse to have the directions changed for meclizine 12.5 MG Oral Tab .

## 2024-02-22 ENCOUNTER — HOSPITAL ENCOUNTER (OUTPATIENT)
Dept: LAB | Facility: HOSPITAL | Age: 89
Discharge: HOME | End: 2024-02-22
Attending: INTERNAL MEDICINE
Payer: MEDICARE

## 2024-02-22 DIAGNOSIS — I48.20 CHRONIC ATRIAL FIBRILLATION (HCC): ICD-10-CM

## 2024-02-22 LAB — INR BLDC: 3 (ref 0.9–1.1)

## 2024-02-22 PROCEDURE — 85610 PROTHROMBIN TIME: CPT

## 2024-03-08 RX ORDER — ARIPIPRAZOLE 15 MG/1
20 TABLET ORAL DAILY
Qty: 473 ML | Refills: 3 | Status: SHIPPED | OUTPATIENT
Start: 2024-03-08

## 2024-03-08 RX ORDER — LOSARTAN POTASSIUM 100 MG/1
100 TABLET ORAL DAILY
Qty: 90 TABLET | Refills: 3 | Status: SHIPPED | OUTPATIENT
Start: 2024-03-08

## 2024-03-08 NOTE — TELEPHONE ENCOUNTER
Losartan 100 mg 1 tab daily filled 3/6/23 90 with 3 refills   Potassium 40 meq 15 ml with 0 refills 1/31/23 3800 ml with 0 refills     LOV 1/2/24  Return for will call.   No upcoming apt on file   Labs   12/27/23  6:38 AM    Potassium  3.5 - 5.1 mmol/L 3.7

## 2024-03-20 RX ORDER — LEVOTHYROXINE SODIUM 0.07 MG/1
75 TABLET ORAL
Qty: 90 TABLET | Refills: 0 | Status: SHIPPED | OUTPATIENT
Start: 2024-03-20

## 2024-03-28 ENCOUNTER — HOSPITAL ENCOUNTER (OUTPATIENT)
Dept: LAB | Facility: HOSPITAL | Age: 89
Discharge: HOME | End: 2024-03-28
Attending: INTERNAL MEDICINE
Payer: MEDICARE

## 2024-03-28 DIAGNOSIS — I48.20 CHRONIC ATRIAL FIBRILLATION (HCC): ICD-10-CM

## 2024-03-28 LAB — INR BLDC: 2.7 (ref 0.9–1.1)

## 2024-03-28 PROCEDURE — 85610 PROTHROMBIN TIME: CPT

## 2024-04-04 ENCOUNTER — LAB REQUISITION (OUTPATIENT)
Dept: LAB | Facility: HOSPITAL | Age: 89
End: 2024-04-04
Payer: MEDICARE

## 2024-04-04 DIAGNOSIS — I10 ESSENTIAL (PRIMARY) HYPERTENSION: ICD-10-CM

## 2024-04-04 LAB
ALBUMIN SERPL-MCNC: 3.3 G/DL (ref 3.4–5)
ALBUMIN/GLOB SERPL: 1 {RATIO} (ref 1–2)
ALP LIVER SERPL-CCNC: 77 U/L
ALT SERPL-CCNC: 24 U/L
ANION GAP SERPL CALC-SCNC: 5 MMOL/L (ref 0–18)
AST SERPL-CCNC: 17 U/L (ref 15–37)
BASOPHILS # BLD AUTO: 0.05 X10(3) UL (ref 0–0.2)
BASOPHILS NFR BLD AUTO: 0.7 %
BILIRUB SERPL-MCNC: 0.5 MG/DL (ref 0.1–2)
BUN BLD-MCNC: 16 MG/DL (ref 9–23)
CALCIUM BLD-MCNC: 8.8 MG/DL (ref 8.5–10.1)
CHLORIDE SERPL-SCNC: 108 MMOL/L (ref 98–112)
CHOLEST SERPL-MCNC: 129 MG/DL (ref ?–200)
CO2 SERPL-SCNC: 26 MMOL/L (ref 21–32)
CREAT BLD-MCNC: 1.08 MG/DL
EGFRCR SERPLBLD CKD-EPI 2021: 48 ML/MIN/1.73M2 (ref 60–?)
EOSINOPHIL # BLD AUTO: 0.2 X10(3) UL (ref 0–0.7)
EOSINOPHIL NFR BLD AUTO: 2.8 %
ERYTHROCYTE [DISTWIDTH] IN BLOOD BY AUTOMATED COUNT: 13.7 %
FASTING PATIENT LIPID ANSWER: YES
FASTING STATUS PATIENT QL REPORTED: NO
GLOBULIN PLAS-MCNC: 3.4 G/DL (ref 2.8–4.4)
GLUCOSE BLD-MCNC: 175 MG/DL (ref 70–99)
HCT VFR BLD AUTO: 38.3 %
HDLC SERPL-MCNC: 32 MG/DL (ref 40–59)
HGB BLD-MCNC: 12.9 G/DL
IMM GRANULOCYTES # BLD AUTO: 0.03 X10(3) UL (ref 0–1)
IMM GRANULOCYTES NFR BLD: 0.4 %
LDLC SERPL CALC-MCNC: 64 MG/DL (ref ?–100)
LYMPHOCYTES # BLD AUTO: 1.06 X10(3) UL (ref 1–4)
LYMPHOCYTES NFR BLD AUTO: 14.9 %
MCH RBC QN AUTO: 29.4 PG (ref 26–34)
MCHC RBC AUTO-ENTMCNC: 33.7 G/DL (ref 31–37)
MCV RBC AUTO: 87.2 FL
MONOCYTES # BLD AUTO: 0.64 X10(3) UL (ref 0.1–1)
MONOCYTES NFR BLD AUTO: 9 %
NEUTROPHILS # BLD AUTO: 5.15 X10 (3) UL (ref 1.5–7.7)
NEUTROPHILS # BLD AUTO: 5.15 X10(3) UL (ref 1.5–7.7)
NEUTROPHILS NFR BLD AUTO: 72.2 %
NONHDLC SERPL-MCNC: 97 MG/DL (ref ?–130)
NT-PROBNP SERPL-MCNC: 1547 PG/ML (ref ?–450)
OSMOLALITY SERPL CALC.SUM OF ELEC: 293 MOSM/KG (ref 275–295)
PLATELET # BLD AUTO: 170 10(3)UL (ref 150–450)
POTASSIUM SERPL-SCNC: 4.1 MMOL/L (ref 3.5–5.1)
PROT SERPL-MCNC: 6.7 G/DL (ref 6.4–8.2)
RBC # BLD AUTO: 4.39 X10(6)UL
SODIUM SERPL-SCNC: 139 MMOL/L (ref 136–145)
TRIGL SERPL-MCNC: 198 MG/DL (ref 30–149)
TSI SER-ACNC: 3.44 MIU/ML (ref 0.36–3.74)
VLDLC SERPL CALC-MCNC: 30 MG/DL (ref 0–30)
WBC # BLD AUTO: 7.1 X10(3) UL (ref 4–11)

## 2024-04-04 PROCEDURE — 84443 ASSAY THYROID STIM HORMONE: CPT | Performed by: INTERNAL MEDICINE

## 2024-04-04 PROCEDURE — 83880 ASSAY OF NATRIURETIC PEPTIDE: CPT | Performed by: INTERNAL MEDICINE

## 2024-04-04 PROCEDURE — 80061 LIPID PANEL: CPT | Performed by: INTERNAL MEDICINE

## 2024-04-04 PROCEDURE — 80053 COMPREHEN METABOLIC PANEL: CPT | Performed by: INTERNAL MEDICINE

## 2024-04-04 PROCEDURE — 85025 COMPLETE CBC W/AUTO DIFF WBC: CPT | Performed by: INTERNAL MEDICINE

## 2024-04-18 ENCOUNTER — LAB REQUISITION (OUTPATIENT)
Dept: LAB | Facility: HOSPITAL | Age: 89
End: 2024-04-18
Payer: MEDICARE

## 2024-04-18 DIAGNOSIS — I10 ESSENTIAL (PRIMARY) HYPERTENSION: ICD-10-CM

## 2024-04-18 LAB
ALBUMIN SERPL-MCNC: 3.1 G/DL (ref 3.4–5)
ALBUMIN/GLOB SERPL: 0.9 {RATIO} (ref 1–2)
ALP LIVER SERPL-CCNC: 80 U/L
ALT SERPL-CCNC: 23 U/L
ANION GAP SERPL CALC-SCNC: 3 MMOL/L (ref 0–18)
AST SERPL-CCNC: 23 U/L (ref 15–37)
BASOPHILS # BLD AUTO: 0.03 X10(3) UL (ref 0–0.2)
BASOPHILS NFR BLD AUTO: 0.4 %
BILIRUB SERPL-MCNC: 0.4 MG/DL (ref 0.1–2)
BUN BLD-MCNC: 13 MG/DL (ref 9–23)
CALCIUM BLD-MCNC: 8.5 MG/DL (ref 8.5–10.1)
CHLORIDE SERPL-SCNC: 110 MMOL/L (ref 98–112)
CHOLEST SERPL-MCNC: 119 MG/DL (ref ?–200)
CO2 SERPL-SCNC: 27 MMOL/L (ref 21–32)
CREAT BLD-MCNC: 0.88 MG/DL
EGFRCR SERPLBLD CKD-EPI 2021: 61 ML/MIN/1.73M2 (ref 60–?)
EOSINOPHIL # BLD AUTO: 0.22 X10(3) UL (ref 0–0.7)
EOSINOPHIL NFR BLD AUTO: 3.2 %
ERYTHROCYTE [DISTWIDTH] IN BLOOD BY AUTOMATED COUNT: 13.6 %
FASTING PATIENT LIPID ANSWER: YES
FASTING STATUS PATIENT QL REPORTED: YES
GLOBULIN PLAS-MCNC: 3.3 G/DL (ref 2.8–4.4)
GLUCOSE BLD-MCNC: 103 MG/DL (ref 70–99)
HCT VFR BLD AUTO: 38 %
HDLC SERPL-MCNC: 31 MG/DL (ref 40–59)
HGB BLD-MCNC: 12.4 G/DL
IMM GRANULOCYTES # BLD AUTO: 0.02 X10(3) UL (ref 0–1)
IMM GRANULOCYTES NFR BLD: 0.3 %
LDLC SERPL CALC-MCNC: 59 MG/DL (ref ?–100)
LYMPHOCYTES # BLD AUTO: 0.82 X10(3) UL (ref 1–4)
LYMPHOCYTES NFR BLD AUTO: 11.8 %
MCH RBC QN AUTO: 28.9 PG (ref 26–34)
MCHC RBC AUTO-ENTMCNC: 32.6 G/DL (ref 31–37)
MCV RBC AUTO: 88.6 FL
MONOCYTES # BLD AUTO: 0.69 X10(3) UL (ref 0.1–1)
MONOCYTES NFR BLD AUTO: 10 %
NEUTROPHILS # BLD AUTO: 5.15 X10 (3) UL (ref 1.5–7.7)
NEUTROPHILS # BLD AUTO: 5.15 X10(3) UL (ref 1.5–7.7)
NEUTROPHILS NFR BLD AUTO: 74.3 %
NONHDLC SERPL-MCNC: 88 MG/DL (ref ?–130)
NT-PROBNP SERPL-MCNC: 1157 PG/ML (ref ?–450)
OSMOLALITY SERPL CALC.SUM OF ELEC: 290 MOSM/KG (ref 275–295)
PLATELET # BLD AUTO: 163 10(3)UL (ref 150–450)
POTASSIUM SERPL-SCNC: 4 MMOL/L (ref 3.5–5.1)
PROT SERPL-MCNC: 6.4 G/DL (ref 6.4–8.2)
RBC # BLD AUTO: 4.29 X10(6)UL
SODIUM SERPL-SCNC: 140 MMOL/L (ref 136–145)
TRIGL SERPL-MCNC: 172 MG/DL (ref 30–149)
TSI SER-ACNC: 2.93 MIU/ML (ref 0.36–3.74)
VLDLC SERPL CALC-MCNC: 25 MG/DL (ref 0–30)
WBC # BLD AUTO: 6.9 X10(3) UL (ref 4–11)

## 2024-04-18 PROCEDURE — 80061 LIPID PANEL: CPT | Performed by: INTERNAL MEDICINE

## 2024-04-18 PROCEDURE — 85025 COMPLETE CBC W/AUTO DIFF WBC: CPT | Performed by: INTERNAL MEDICINE

## 2024-04-18 PROCEDURE — 83880 ASSAY OF NATRIURETIC PEPTIDE: CPT | Performed by: INTERNAL MEDICINE

## 2024-04-18 PROCEDURE — 84443 ASSAY THYROID STIM HORMONE: CPT | Performed by: INTERNAL MEDICINE

## 2024-04-18 PROCEDURE — 80053 COMPREHEN METABOLIC PANEL: CPT | Performed by: INTERNAL MEDICINE

## 2024-04-25 ENCOUNTER — HOSPITAL ENCOUNTER (OUTPATIENT)
Dept: LAB | Facility: HOSPITAL | Age: 89
Discharge: HOME | End: 2024-04-25
Attending: INTERNAL MEDICINE
Payer: MEDICARE

## 2024-04-25 DIAGNOSIS — I48.20 CHRONIC ATRIAL FIBRILLATION (HCC): ICD-10-CM

## 2024-04-25 LAB — INR BLDC: 4.3 (ref 0.9–1.1)

## 2024-04-25 PROCEDURE — 85610 PROTHROMBIN TIME: CPT

## 2024-04-30 ENCOUNTER — OFFICE VISIT (OUTPATIENT)
Dept: INTERNAL MEDICINE CLINIC | Facility: CLINIC | Age: 89
End: 2024-04-30
Payer: MEDICARE

## 2024-04-30 ENCOUNTER — HOSPITAL ENCOUNTER (OUTPATIENT)
Dept: GENERAL RADIOLOGY | Facility: HOSPITAL | Age: 89
Discharge: HOME | End: 2024-04-30
Attending: INTERNAL MEDICINE
Payer: MEDICARE

## 2024-04-30 VITALS
DIASTOLIC BLOOD PRESSURE: 78 MMHG | RESPIRATION RATE: 16 BRPM | WEIGHT: 160 LBS | TEMPERATURE: 97 F | BODY MASS INDEX: 35 KG/M2 | SYSTOLIC BLOOD PRESSURE: 130 MMHG | OXYGEN SATURATION: 96 % | HEART RATE: 76 BPM

## 2024-04-30 DIAGNOSIS — I50.22 CHRONIC SYSTOLIC HEART FAILURE (HCC): ICD-10-CM

## 2024-04-30 DIAGNOSIS — M79.644 PAIN OF RIGHT THUMB: ICD-10-CM

## 2024-04-30 DIAGNOSIS — I48.21 PERMANENT ATRIAL FIBRILLATION (HCC): ICD-10-CM

## 2024-04-30 DIAGNOSIS — Z79.01 WARFARIN ANTICOAGULATION: ICD-10-CM

## 2024-04-30 DIAGNOSIS — E11.8 TYPE 2 DIABETES MELLITUS WITH UNSPECIFIED COMPLICATIONS (HCC): Primary | ICD-10-CM

## 2024-04-30 PROBLEM — H35.371 EPIRETINAL MEMBRANE (ERM) OF RIGHT EYE: Status: ACTIVE | Noted: 2024-01-01

## 2024-04-30 PROBLEM — H35.371 EPIRETINAL MEMBRANE (ERM) OF RIGHT EYE: Status: ACTIVE | Noted: 2024-04-30

## 2024-04-30 PROBLEM — R60.0 PERIPHERAL EDEMA: Status: ACTIVE | Noted: 2022-06-22

## 2024-04-30 PROBLEM — H35.61: Status: ACTIVE | Noted: 2024-04-30

## 2024-04-30 PROBLEM — H35.61: Status: ACTIVE | Noted: 2024-01-01

## 2024-04-30 LAB — HEMOGLOBIN A1C: 6.4 % (ref 4.3–5.6)

## 2024-04-30 PROCEDURE — 73140 X-RAY EXAM OF FINGER(S): CPT | Performed by: INTERNAL MEDICINE

## 2024-04-30 PROCEDURE — 99214 OFFICE O/P EST MOD 30 MIN: CPT | Performed by: INTERNAL MEDICINE

## 2024-04-30 PROCEDURE — 83036 HEMOGLOBIN GLYCOSYLATED A1C: CPT | Performed by: INTERNAL MEDICINE

## 2024-04-30 RX ORDER — CLARITHROMYCIN 125 MG/5ML
FOR SUSPENSION ORAL
COMMUNITY
Start: 2024-04-16

## 2024-04-30 RX ORDER — LOTEPREDNOL ETABONATE 2.5 MG/ML
SUSPENSION/ DROPS OPHTHALMIC
COMMUNITY
Start: 2024-03-20

## 2024-05-01 ENCOUNTER — TELEPHONE (OUTPATIENT)
Dept: INTERNAL MEDICINE CLINIC | Facility: CLINIC | Age: 89
End: 2024-05-01

## 2024-05-01 NOTE — TELEPHONE ENCOUNTER
Maikel (nurse) from [facility] state that they are giving the patient Jardiance 10 mg in the evening and the script says daily. They are giving injectable. When should they give the medication? Please advise.

## 2024-05-01 NOTE — TELEPHONE ENCOUNTER
Spoke with patient. Discussed x-ray results and need to stay on insulin 5 units due to cost of Jardiance. Called [facility] and left message for nurse and faxed over yesterdays progress notes.

## 2024-05-01 NOTE — TELEPHONE ENCOUNTER
Patient called back and wanted to let the nurse know to send over via fax to discontinue accu check at 4:00pm. Please call Maikel at [facility] for fax number at [phone] or  [phone]

## 2024-05-01 NOTE — PROGRESS NOTES
Subjective:   Patient ID: Denae Kern is a 89 year old female.fu DM, Heart, right thumb    Hx HF, afib, on Coumadin, DM on only 5 units insulin brittle sugars . Had fall couple weeks ago only apparent injury right thumb. Did not seek medical attention. Fall sounded like trip, no LOC, unlikely hypoglycemic but that is a fear.         History/Other:   Review of Systems   Constitutional:  Positive for fatigue.   HENT:  Positive for hearing loss and tinnitus.    Eyes:  Positive for visual disturbance.   Respiratory:  Positive for shortness of breath (exertion).    Cardiovascular:  Positive for leg swelling (jaylan LLE).        CAD, Afib, HTN, hx MI and stent, abnormal EKG, hx DVT, CHF with preserved EF   Gastrointestinal:  Positive for abdominal distention (gas) and constipation (takes extra fiber).        HH with Reflux and dysphagia  Hemorrhoids, diverticulosis, fatty liver   Endocrine:        Sugars stable on insulin, also on thyroid   Genitourinary:  Positive for frequency (Torsemide) and urgency.        Urinary incontinence, freq UTIs   Musculoskeletal:  Positive for arthralgias, back pain, gait problem (walker), joint swelling (right thumb from fall) and neck pain.        Osteoporosis, BTKR   Skin:  Positive for pallor.        Hx skin cancers   Allergic/Immunologic: Positive for environmental allergies.   Neurological:  Positive for dizziness, weakness (frailty) and light-headedness.        Fall risk  Hx Meniere's with vertigo and tinnitus   Hematological:  Bruises/bleeds easily (Coumadin).   Psychiatric/Behavioral: Negative.       Current Outpatient Medications   Medication Sig Dispense Refill    Clarithromycin 125 MG/5ML Oral Recon Susp TAKE 20ML BY MOUTH 30-60 MINUTES PRIOR TO DENTAL PROCEDURE      Loteprednol Etabonate (EYSUVIS) 0.25 % Ophthalmic Suspension 1 drop into affected eye Ophthalmic twice a day for 30 days      LEVOTHYROXINE 75 MCG Oral Tab TAKE ONE TABLET BY MOUTH DAILY BEFORE breakfast 90  tablet 0    losartan 100 MG Oral Tab Take 1 tablet (100 mg total) by mouth daily. 90 tablet 3    potassium chloride 40 meq/30 ml (10%) Oral Solution Take 15 mL (20 mEq total) by mouth daily. 473 mL 3    ACCU-CHEK SOFTCLIX LANCETS Does not apply Misc USE ONE TO THREE times a  each 3    torsemide 20 MG Oral Tab Take 1 tablet (20 mg total) by mouth every morning. PATIENT REQUESTS TO TAKE IM AM, SELF DISPENSE      Multiple Vitamins-Minerals (PRESERVISION AREDS 2) Oral Cap as directed Orally bid daily      meclizine 12.5 MG Oral Tab Take 1 tablet (12.5 mg total) by mouth 3 (three) times daily as needed. 30 tablet 0    Glucose Blood (ACCU-CHEK HAYLIE PLUS) In Vitro Strip Use 3-4 times a day as directed. 100 strip 1    Lansoprazole 15 MG Oral Capsule Delayed Release Take 1 capsule (15 mg total) by mouth every morning.      aspirin 81 MG Oral Chew Tab Chew 1 tablet (81 mg total) by mouth 3 (three) times a week.      Iron, Ferrous Sulfate, 75 (15 Fe) MG/ML Oral Solution Take 90 mg by mouth daily. Taken as 1 tablespoon=15ml (45mg) bid after meals 50 mL 3    metoprolol tartrate 50 MG Oral Tab Take 1.5 tablets (75 mg total) by mouth 2 (two) times daily.      warfarin 5 MG Oral Tab Take 0.5 tablets (2.5 mg total) by mouth daily.  0    MULTIPLE VITAMIN OR 1 by mouth daily      Simethicone (GAS-X EXTRA STRENGTH OR) Take 1 tablet by mouth as needed.      Calcium Carbonate-Vitamin D 600-125 MG-UNIT Oral Tab Take 1 tablet by mouth daily.       Allergies:  Allergies   Allergen Reactions    Penicillins HIVES and OTHER (SEE COMMENTS)     CLASS    Rosuvastatin OTHER (SEE COMMENTS)     Reaction: muscle aches  Reaction: muscle aches    Acyclovir DIARRHEA    Allergy      BETA LACTAMS      Carbapenems UNKNOWN and OTHER (SEE COMMENTS)    Cephalosporins UNKNOWN    Codeine NAUSEA ONLY    Codeine [Opioid Analgesics] NAUSEA AND VOMITING    Demerol NAUSEA AND VOMITING    Diphenhydramine-Zinc Acetate UNKNOWN     BETA LACTAMS    Glucophage  [Metformin Hydrochloride]      \"problematic\"    Meperidine NAUSEA ONLY and NAUSEA AND VOMITING    Misc. Sulfonamide Containing Compounds OTHER (SEE COMMENTS)    Other UNKNOWN    Penicillin G HIVES    Statins PAIN     Reaction: muscle aches      Sulfa Drugs Cross Reactors NAUSEA AND VOMITING     \"Sulfa\"      Cardizem RASH       Objective:   Physical Exam  Constitutional:       General: She is not in acute distress.     Appearance: She is obese.   HENT:      Head:      Comments: Hearing aids  Cardiovascular:      Rate and Rhythm: Rhythm irregular.      Comments: afib  Pulmonary:      Effort: No respiratory distress.      Breath sounds: Normal breath sounds.   Abdominal:      Tenderness: There is no abdominal tenderness.      Comments: Liver enlarged just below right costal margin   Genitourinary:     Comments: defer  Musculoskeletal:         General: Deformity (arthritic) and signs of injury (righ thumb tender ROMs but no apparent fx) present.      Cervical back: Rigidity present.      Left lower leg: Edema (severe-neg Doppl;er for DVT in hospital) present.      Comments: With prominent leg veins, BTKR   Skin:     Findings: Bruising and lesion (on nose-recent derm) present.   Neurological:      Mental Status: She is alert.      Motor: Weakness present.      Gait: Gait abnormal (walker).   Psychiatric:         Mood and Affect: Mood normal.         Behavior: Behavior normal.         Thought Content: Thought content normal.         Judgment: Judgment normal.       Chief Complaint   Patient presents with    Follow - Up       Active Problems:  Patient Active Problem List   Diagnosis    Personal history of other malignant neoplasm of skin    Hypothyroidism    Osteopenia    Esophageal stricture    Diverticulosis    History of DVT (deep vein thrombosis)    Atrial fibrillation (HCC)    Wears hearing aid    History of colon polyps    Warfarin anticoagulation    Uncontrolled type 2 diabetes mellitus with hyperglycemia, with  long-term current use of insulin (HCC)    Hypercholesterolemia    Essential hypertension    Vitamin B 12 deficiency    Chronic 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), lumbar arthritis, and lumbar spinal stenosis    Chronic heart failure with preserved ejection fraction (HCC)    Fatty liver    Coronary artery disease involving native coronary artery of native heart without angina pectoris    GERD (gastroesophageal reflux disease)    At risk for falls    Multiple splenic artery aneurysms, largest at 1.5 cm (HCC)    Vertigo    Class 1 obesity    Thyroid nodule    History of Meniere's disease    Frailty    Urinary tract infection without hematuria, site unspecified    Nausea and vomiting in adult    Epiretinal membrane (ERM) of right eye    Peripheral edema    Preretinal hemorrhage of right eye       Past Medical History:  Past Medical History:    AC joint arthropathy    Actinic keratosis    right anterior lower leg    At high risk for falls    Atrial fibrillation (HCC)    PERSISTENT DR IRWIN  On Coumadin. Stable.    Autonomic neuropathy    Back pain    Bunion    CAD (coronary artery disease)    Cardiomegaly    mild, w/ mildly increased pulmonary vascularity, increased interstitial markings in the mid to lower lung fields, and B/L perihilar opacities is concerning for congestive failure    Chronic heart failure with preserved ejection fraction (HCC)    Class 1 obesity    Constipation    Diabetes mellitus (HCC)    Difficult intubation    Disc degeneration    has several levels of degenerative discs and several areas of stenosis, both foraminal and central canal stenosis; and hx DDD, DJD    Diverticulosis    DVT of leg (deep venous thrombosis) (HCC)    post knee replacement    Dysphagia    Esophageal dilatation    Esophageal stricture    w/ variability in terms of tolerating that and tolerating meds    Fatty liver    Fecal impaction in rectum (HCC)    Flatulence/gas pain/belching    Frailty     Gastric polyp    Gastroparesis    primarily effecting insulin treatment    GERD (gastroesophageal reflux disease)    and motility disorder    Hammertoe    Hearing impairment    HA's bilateral    Hemorrhoids    Hiatal hernia    small    Hip pain    History  of basal cell carcinoma    basal cell and squamous cell    History of blood transfusion    with hysterectomy    History of colon polyps    History of Meniere's disease    History of myocardial infarction    History of PTCA    HTN (hypertension)    Hypothyroidism    IBS (irritable bowel syndrome)    Iron deficiency    Need iron infusion.    Mild mitral regurgitation    Nausea and vomiting in adult    Neck pain    head and neck discomfort    OA (osteoarthritis)    an hx low grade arthritis    Onychomycosis    severe, toenails, received podiatric treatment 2/29/2012 w/ Dr. Bowers    Osteoporosis    and osteopenia; 3/2009 \"Reclast infusion\"    Perirectal discomfort    perirectus discomfort    Presbyesophagus    Pulmonary hypertension (HCC)    Sinusitis    Squamous cell carcinoma    Thyroid nodule    right-on Carotid US    Tinnitus    Tumor, thyroid    Venous disease    w/ peripheral edema; and hx varicose veins    Vertigo    vertiginous symptoms       Past Surgical History:  Past Surgical History:   Procedure Laterality Date    Angioplasty (coronary)  07/14/2010    PTCA, bare-metal stent to the R coronary artery, catheterization, subtotal stenosis of the mid to distal R coronary artery    Appendectomy      Cataract      B/L    Cholecystectomy  1994    Colonoscopy      Colonoscopy      Diagnostic anoscopy      Egd      Fluor gid & loclzj ndl/cath spi dx/ther njx  02/07/2014    Procedure: SI JOINT INJECTION;  Surgeon: Willian Orellana MD;  Location: [facility]    Fluor gid & loclzj ndl/cath spi dx/ther njx  03/27/2014    Procedure: LUMBAR EPIDURAL;  Surgeon: Willian Orellana MD;  Location: [facility]    Foot/toes surgery proc  unlisted  1991, 1998    bunions B/L, hammertoe B/L    Hysterectomy  1967    partial    Injection, w/wo contrast, dx/therapeutic substance, epidural/subarachnoid; lumbar/sacral  02/07/2014    Procedure: LUMBAR EPIDURAL;  Surgeon: Willian Orellana MD;  Location: [facility]    Injection, w/wo contrast, dx/therapeutic substance, epidural/subarachnoid; lumbar/sacral  03/27/2014    Procedure: LUMBAR EPIDURAL;  Surgeon: Willian Orellana MD;  Location: [facility]    Knee replacement surgery      R TKR-1995, L TKR-5/2007    Orthopedic surg ([facility])      kael bunions    Other surgical history      thyroid nodule; benign tumor on thyroid removed 1996; subtotal thyroidectomy 1996    Other surgical history  1974    varicose vein strip R leg    Other surgical history  01/12/2011    mid esophagus bx, gastric polyp bx    Other surgical history      esophageal dilatation    Other surgical history  02/14/2018    vulva biopsy: lichenoid inflammation with stromal fibrosis, negative for dysplasia    Patient documented not to have experienced any of the following events  02/07/2014    Procedure: SI JOINT INJECTION;  Surgeon: Willian Orellana MD;  Location: [facility]    Patient documented not to have experienced any of the following events  03/27/2014    Procedure: LUMBAR EPIDURAL;  Surgeon: Willian Orellana MD;  Location: [facility]    Patient withough preoperative order for iv antibiotic surgical site infection prophylaxis.  02/07/2014    Procedure: SI JOINT INJECTION;  Surgeon: Willian Orellana MD;  Location: [facility]    Patient withough preoperative order for iv antibiotic surgical site infection prophylaxis.  03/27/2014    Procedure: LUMBAR EPIDURAL;  Surgeon: Willian Orellana MD;  Location: [facility]    Skin surgery  05/19/2011    SCCIS to R temple / Mohs surgery    Tonsillectomy      T&A, childhood    Total knee  replacement      bilateral    Upper gi endoscopy,exam  10/12/2010    upper gi series (air contrast) w/ esophogram       OB/GYN History:  No LMP recorded (lmp unknown). Patient is postmenopausal.  G:   P:   A:     Family History:  Family History   Problem Relation Age of Onset    Other (Parkinson's) Father     Other (pneumonia) Father     Heart Disorder Mother     Heart Disease Mother     Heart Attack Mother     Arthritis Mother     Circulatory Problems Mother     Other (Other) Mother     Heart Disorder Brother     Heart Attack Paternal Grandfather     Heart Attack Paternal Grandmother     Heart Disorder Paternal Grandmother     Diabetes Daughter     Cancer Brother         lung ca    Diabetes Brother     Suicide History Son     Cancer Other         colorectal ca, skin ca, fam hx    High Blood Pressure Other         fam hx    Heart Disorder Other         heart condition, mat grandparen    Heart Attack Other         mat grandparent    Stroke Other         mat grandparent    Other (Other) Other     Gastro-Intestinal Disorder Other         IBS, fam hx    Blood Disorder Other         blood blots, fam hx    Seizure Disorder Son         Epilepsy    Mental Disorder Son         paranoia, OCD       Social History:  Social History     Socioeconomic History    Marital status:      Spouse name: Not on file    Number of children: Not on file    Years of education: Not on file    Highest education level: Not on file   Occupational History    Not on file   Tobacco Use    Smoking status: Never    Smokeless tobacco: Never   Vaping Use    Vaping status: Never Used   Substance and Sexual Activity    Alcohol use: No     Alcohol/week: 0.0 standard drinks of alcohol     Comment: NONE    Drug use: Never    Sexual activity: Not on file   Other Topics Concern     Service Not Asked    Blood Transfusions Not Asked    Caffeine Concern Yes    Occupational Exposure Not Asked    Hobby Hazards Not Asked    Sleep Concern Not Asked     Stress Concern Not Asked    Weight Concern Not Asked    Special Diet Not Asked    Back Care Not Asked    Exercise Yes     Comment: not much    Bike Helmet Not Asked    Seat Belt Yes    Self-Exams Not Asked   Social History Narrative    Not on file     Social Determinants of Health     Financial Resource Strain: Not on file   Food Insecurity: No Food Insecurity (12/25/2023)    Food Insecurity     Food Insecurity: Never true   Transportation Needs: No Transportation Needs (12/25/2023)    Transportation Needs     Lack of Transportation: No   Physical Activity: Insufficiently Active (8/24/2020)    Received from [email or web address], [facility] [email or web address]    Exercise Vital Sign     Days of Exercise per Week: 5 days     Minutes of Exercise per Session: 10 min   Stress: Not on file   Social Connections: Not on file   Housing Stability: Low Risk  (12/25/2023)    Housing Stability     Housing Instability: No     Housing Instability Emergency: Not on file       Health Maintenance:  Immunization History   Administered Date(s) Administered    Covid-19 Vaccine Pfizer 30 mcg/0.3 ml 01/22/2021, 02/12/2021, 10/26/2021    Covid-19 Vaccine Pfizer Bivalent 30mcg/0.3mL 12/19/2022    Covid-19 Vaccine Pfizer Liam-Sucrose 30 mcg/0.3 ml 05/08/2022    Depo-Medrol 40mg Inj 11/17/2016, 01/26/2017    FLU VAC High Dose 65 YRS & Older PRSV Free (07684) 10/17/2014, 09/23/2018, 10/03/2020    FLUAD High Dose 65 yr and older (73618) 10/12/2019    Fluzone Vaccine Medicare () 09/13/2016, 09/21/2017, 09/22/2018    HIGH DOSE FLU 65 YRS AND OLDER PRSV FREE SINGLE D (60376) FLU CLINIC 10/17/2014, 09/13/2016, 09/22/2017, 09/23/2018, 10/03/2020, 10/25/2021, 09/13/2023    Influenza 11/17/2000, 10/01/2002, 10/07/2003, 10/20/2006, 01/01/2007, 10/23/2008, 09/15/2009, 10/15/2015, 09/22/2016    Influenza Virus Vaccine, H1N1 01/01/2009    Moderna Covid-19 Vaccine 50mcg/0.5ml 12yrs+ (0776-2022) 11/02/2023    Pneumococcal (Prevnar 13) 11/09/2015    Pneumovax  01/01/2005    TD 12/15/2006    Td, Preserv Free 12/13/2018    Tetanus 01/01/2009    Zoster Vaccine Live (Zostavax) 01/01/2008       Labs and Tests:  Patient Active Problem List   Component Date Value Ref Range Status    POC INR  04/25/2024 4.30 (H)  0.90 - 1.10 Final       Vitals:  Vitals:    04/30/24 1341   BP: 130/78   BP Location: Left arm   Patient Position: Sitting   Cuff Size: adult   Pulse: 76   Resp: 16   Temp: 97.2 °F (36.2 °C)   TempSrc: Temporal   SpO2: 96%   Weight: 160 lb (72.6 kg)     Body mass index is 34.62 kg/m².      Labs from 4/18 noted inc BNP  INR 4/25 high 4.3 and Coumadin reduced by Cardiology  A1C today 6.4 and random   XR right thumb=no fx    Assessment & Plan:   1. Type 2 diabetes mellitus with unspecified complications (HCC)    2. Permanent atrial fibrillation (HCC)    3. Warfarin anticoagulation    4. Chronic systolic heart failure (HCC)    5. Pain of right thumb      Heart and DM stable but planned to stop insulin at only 5 units worried about hypoglycemia  Stated allergy/side effects metformin and sulfonamides  I wrote for Jardiance 10 but this and Farxica and Januvia all unaffordable, for now just 5units Lantus or Levemir qam she will follow BS at facility [facility]-will alert their staff  No Rx for thumb  FU 3mos CPE  Has cardio appt in June          Orders Placed This Encounter   Procedures    POC Hgb A1C       Meds This Visit:  Requested Prescriptions      No prescriptions requested or ordered in this encounter       Imaging & Referrals:  None

## 2024-05-02 ENCOUNTER — HOSPITAL ENCOUNTER (OUTPATIENT)
Dept: LAB | Facility: HOSPITAL | Age: 89
Discharge: HOME | End: 2024-05-02
Attending: INTERNAL MEDICINE
Payer: MEDICARE

## 2024-05-02 ENCOUNTER — TELEPHONE (OUTPATIENT)
Dept: INTERNAL MEDICINE CLINIC | Facility: CLINIC | Age: 89
End: 2024-05-02

## 2024-05-02 DIAGNOSIS — I48.20 CHRONIC ATRIAL FIBRILLATION (HCC): ICD-10-CM

## 2024-05-02 LAB — INR BLDC: 2.6 (ref 0.9–1.1)

## 2024-05-02 PROCEDURE — 85610 PROTHROMBIN TIME: CPT

## 2024-05-02 NOTE — TELEPHONE ENCOUNTER
Patient came into the office and stated that [facility] never received the fax from yesterday to discontinue 4pm accuchecks.

## 2024-05-02 NOTE — TELEPHONE ENCOUNTER
Patient walked into the office and dropped off a health history and physical form to be filled out by Dr. Curry to apply for housing benefits through medicaid. Advised patient that Dr. Curry will not be back in the office until Tuesday 05/06/24. Put form in 's out basket for completion.

## 2024-05-13 ENCOUNTER — TELEPHONE (OUTPATIENT)
Dept: INTERNAL MEDICINE CLINIC | Facility: CLINIC | Age: 89
End: 2024-05-13

## 2024-05-13 NOTE — TELEPHONE ENCOUNTER
Spoke with patient regarding Dr Curry's recommendations of multiple vaccines.     Pt verbalized understanding of orders.     Shelli MIJARES

## 2024-05-13 NOTE — TELEPHONE ENCOUNTER
Patient asking if she should get the shingles vaccine.     Last dose was Zostavax in 2008    Also asking about RSV and   Covid vaccine    Last Covid vaccine 11/2023    To Dr Patricio Marques RN

## 2024-05-16 ENCOUNTER — HOSPITAL ENCOUNTER (OUTPATIENT)
Dept: LAB | Facility: HOSPITAL | Age: 89
Discharge: HOME | End: 2024-05-16
Attending: INTERNAL MEDICINE

## 2024-05-16 DIAGNOSIS — I48.20 CHRONIC ATRIAL FIBRILLATION (HCC): ICD-10-CM

## 2024-05-16 LAB — INR BLDC: 2.7 (ref 0.9–1.1)

## 2024-05-16 PROCEDURE — 85610 PROTHROMBIN TIME: CPT

## 2024-05-23 ENCOUNTER — LAB REQUISITION (OUTPATIENT)
Dept: LAB | Facility: HOSPITAL | Age: 89
End: 2024-05-23

## 2024-05-23 DIAGNOSIS — I10 ESSENTIAL (PRIMARY) HYPERTENSION: ICD-10-CM

## 2024-05-23 LAB
INR BLD: 3.16 (ref 0.8–1.2)
PROTHROMBIN TIME: 32.9 SECONDS (ref 11.6–14.8)

## 2024-05-23 PROCEDURE — 85610 PROTHROMBIN TIME: CPT | Performed by: INTERNAL MEDICINE

## 2024-05-30 ENCOUNTER — OFFICE VISIT (OUTPATIENT)
Dept: INTERNAL MEDICINE CLINIC | Facility: CLINIC | Age: 89
End: 2024-05-30

## 2024-05-30 ENCOUNTER — LAB ENCOUNTER (OUTPATIENT)
Dept: LAB | Age: 89
End: 2024-05-30
Attending: INTERNAL MEDICINE
Payer: MEDICARE

## 2024-05-30 VITALS
DIASTOLIC BLOOD PRESSURE: 72 MMHG | BODY MASS INDEX: 35 KG/M2 | WEIGHT: 160 LBS | OXYGEN SATURATION: 97 % | TEMPERATURE: 97 F | RESPIRATION RATE: 16 BRPM | HEART RATE: 72 BPM | SYSTOLIC BLOOD PRESSURE: 128 MMHG

## 2024-05-30 DIAGNOSIS — E11.8 TYPE 2 DIABETES MELLITUS WITH UNSPECIFIED COMPLICATIONS (HCC): ICD-10-CM

## 2024-05-30 DIAGNOSIS — I48.91 ATRIAL FIBRILLATION, UNSPECIFIED TYPE (HCC): ICD-10-CM

## 2024-05-30 DIAGNOSIS — H60.532 ACUTE CONTACT OTITIS EXTERNA OF LEFT EAR: Primary | ICD-10-CM

## 2024-05-30 LAB
INR BLD: 2.35 (ref 0.8–1.2)
PROTHROMBIN TIME: 26 SECONDS (ref 11.6–14.8)

## 2024-05-30 PROCEDURE — 85610 PROTHROMBIN TIME: CPT

## 2024-05-30 PROCEDURE — 36415 COLL VENOUS BLD VENIPUNCTURE: CPT

## 2024-05-30 PROCEDURE — 99213 OFFICE O/P EST LOW 20 MIN: CPT | Performed by: INTERNAL MEDICINE

## 2024-05-30 NOTE — PROGRESS NOTES
Very mild left otitis externa, no wax, no need for oral abx, possible irritation from left hearing aid-told to leave out and use corticosporin otic for 7-10 days.   Also reports FBS 90s for T@DM stable on meds.   On Coumadin 5mg daily for afib seems like a lot redo INR today. Last one high at 3.16 at cardiology  Here with grandson-given instructions as well.   RTC prn  Vitals:    05/30/24 1256   BP: 128/72   Pulse: 72   Resp: 16   Temp: 97.2 °F (36.2 °C)

## 2024-06-04 ENCOUNTER — TELEPHONE (OUTPATIENT)
Dept: INTERNAL MEDICINE CLINIC | Facility: CLINIC | Age: 89
End: 2024-06-04

## 2024-06-04 NOTE — TELEPHONE ENCOUNTER
Patient was prescribed ear drops for an ear infection. She is having a hard time giving the drops to herself. She needs orders sent to [facility] to have a nurse give the drops to her.

## 2024-06-04 NOTE — TELEPHONE ENCOUNTER
Left message for patient. Will fax over order to have nurse at [facility] administer ear drops.

## 2024-06-06 ENCOUNTER — LAB REQUISITION (OUTPATIENT)
Dept: LAB | Facility: HOSPITAL | Age: 89
End: 2024-06-06
Payer: MEDICARE

## 2024-06-06 DIAGNOSIS — I10 ESSENTIAL (PRIMARY) HYPERTENSION: ICD-10-CM

## 2024-06-06 DIAGNOSIS — E78.2 MIXED HYPERLIPIDEMIA: ICD-10-CM

## 2024-06-06 DIAGNOSIS — I25.10 ATHEROSCLEROTIC HEART DISEASE OF NATIVE CORONARY ARTERY WITHOUT ANGINA PECTORIS: ICD-10-CM

## 2024-06-06 DIAGNOSIS — I50.32 CHRONIC DIASTOLIC (CONGESTIVE) HEART FAILURE (HCC): ICD-10-CM

## 2024-06-06 DIAGNOSIS — R60.9 EDEMA, UNSPECIFIED: ICD-10-CM

## 2024-06-06 LAB
ALBUMIN SERPL-MCNC: 3.4 G/DL (ref 3.4–5)
ALBUMIN/GLOB SERPL: 1.1 {RATIO} (ref 1–2)
ALP LIVER SERPL-CCNC: 78 U/L
ALT SERPL-CCNC: 24 U/L
ANION GAP SERPL CALC-SCNC: 2 MMOL/L (ref 0–18)
AST SERPL-CCNC: 22 U/L (ref 15–37)
BASOPHILS # BLD AUTO: 0.04 X10(3) UL (ref 0–0.2)
BASOPHILS NFR BLD AUTO: 0.5 %
BILIRUB SERPL-MCNC: 0.6 MG/DL (ref 0.1–2)
BUN BLD-MCNC: 13 MG/DL (ref 9–23)
CALCIUM BLD-MCNC: 9.1 MG/DL (ref 8.5–10.1)
CHLORIDE SERPL-SCNC: 107 MMOL/L (ref 98–112)
CHOLEST SERPL-MCNC: 132 MG/DL (ref ?–200)
CO2 SERPL-SCNC: 30 MMOL/L (ref 21–32)
CREAT BLD-MCNC: 0.86 MG/DL
EGFRCR SERPLBLD CKD-EPI 2021: 63 ML/MIN/1.73M2 (ref 60–?)
EOSINOPHIL # BLD AUTO: 0.23 X10(3) UL (ref 0–0.7)
EOSINOPHIL NFR BLD AUTO: 2.9 %
ERYTHROCYTE [DISTWIDTH] IN BLOOD BY AUTOMATED COUNT: 13.3 %
FASTING PATIENT LIPID ANSWER: YES
FASTING STATUS PATIENT QL REPORTED: YES
GLOBULIN PLAS-MCNC: 3.1 G/DL (ref 2.8–4.4)
GLUCOSE BLD-MCNC: 99 MG/DL (ref 70–99)
HCT VFR BLD AUTO: 39.6 %
HDLC SERPL-MCNC: 36 MG/DL (ref 40–59)
HGB BLD-MCNC: 13 G/DL
IMM GRANULOCYTES # BLD AUTO: 0.05 X10(3) UL (ref 0–1)
IMM GRANULOCYTES NFR BLD: 0.6 %
LDLC SERPL CALC-MCNC: 68 MG/DL (ref ?–100)
LYMPHOCYTES # BLD AUTO: 0.9 X10(3) UL (ref 1–4)
LYMPHOCYTES NFR BLD AUTO: 11.3 %
MCH RBC QN AUTO: 29 PG (ref 26–34)
MCHC RBC AUTO-ENTMCNC: 32.8 G/DL (ref 31–37)
MCV RBC AUTO: 88.4 FL
MONOCYTES # BLD AUTO: 0.76 X10(3) UL (ref 0.1–1)
MONOCYTES NFR BLD AUTO: 9.5 %
NEUTROPHILS # BLD AUTO: 5.98 X10 (3) UL (ref 1.5–7.7)
NEUTROPHILS # BLD AUTO: 5.98 X10(3) UL (ref 1.5–7.7)
NEUTROPHILS NFR BLD AUTO: 75.2 %
NONHDLC SERPL-MCNC: 96 MG/DL (ref ?–130)
NT-PROBNP SERPL-MCNC: 1496 PG/ML (ref ?–450)
OSMOLALITY SERPL CALC.SUM OF ELEC: 288 MOSM/KG (ref 275–295)
PLATELET # BLD AUTO: 172 10(3)UL (ref 150–450)
POTASSIUM SERPL-SCNC: 5 MMOL/L (ref 3.5–5.1)
PROT SERPL-MCNC: 6.5 G/DL (ref 6.4–8.2)
RBC # BLD AUTO: 4.48 X10(6)UL
SODIUM SERPL-SCNC: 139 MMOL/L (ref 136–145)
TRIGL SERPL-MCNC: 166 MG/DL (ref 30–149)
TSI SER-ACNC: 3.13 MIU/ML (ref 0.36–3.74)
VLDLC SERPL CALC-MCNC: 25 MG/DL (ref 0–30)
WBC # BLD AUTO: 8 X10(3) UL (ref 4–11)

## 2024-06-06 PROCEDURE — 84443 ASSAY THYROID STIM HORMONE: CPT | Performed by: INTERNAL MEDICINE

## 2024-06-06 PROCEDURE — 83880 ASSAY OF NATRIURETIC PEPTIDE: CPT | Performed by: INTERNAL MEDICINE

## 2024-06-06 PROCEDURE — 80053 COMPREHEN METABOLIC PANEL: CPT | Performed by: INTERNAL MEDICINE

## 2024-06-06 PROCEDURE — 85025 COMPLETE CBC W/AUTO DIFF WBC: CPT | Performed by: INTERNAL MEDICINE

## 2024-06-06 PROCEDURE — 80061 LIPID PANEL: CPT | Performed by: INTERNAL MEDICINE

## 2024-06-13 ENCOUNTER — HOSPITAL ENCOUNTER (OUTPATIENT)
Dept: LAB | Facility: HOSPITAL | Age: 89
Discharge: HOME | DRG: 947 | End: 2024-06-13
Attending: INTERNAL MEDICINE

## 2024-06-13 ENCOUNTER — TELEPHONE (OUTPATIENT)
Dept: INTERNAL MEDICINE CLINIC | Facility: CLINIC | Age: 89
End: 2024-06-13

## 2024-06-13 ENCOUNTER — HOSPITAL ENCOUNTER (OUTPATIENT)
Dept: LAB | Facility: HOSPITAL | Age: 89
Discharge: HOME | End: 2024-06-13
Attending: INTERNAL MEDICINE

## 2024-06-13 DIAGNOSIS — M81.0 OSTEOPOROSIS, UNSPECIFIED OSTEOPOROSIS TYPE, UNSPECIFIED PATHOLOGICAL FRACTURE PRESENCE: Primary | ICD-10-CM

## 2024-06-13 DIAGNOSIS — I48.20 CHRONIC ATRIAL FIBRILLATION (HCC): ICD-10-CM

## 2024-06-13 LAB — INR BLDC: 2.6 (ref 0.9–1.1)

## 2024-06-13 PROCEDURE — 85610 PROTHROMBIN TIME: CPT

## 2024-06-13 NOTE — TELEPHONE ENCOUNTER
Benefits: Yes pt has benefits     Last injection:1/30/24  Due:7/31/24  Dexa:6/9/22  Labs: 6/6/24  Calcium, Total  8.5 - 10.1 mg/dL 9.1     Pended Dexa please sign order.

## 2024-06-14 ENCOUNTER — TELEPHONE (OUTPATIENT)
Dept: INTERNAL MEDICINE CLINIC | Facility: CLINIC | Age: 89
End: 2024-06-14

## 2024-06-14 RX ORDER — DOXYCYCLINE 50 MG/1
50 CAPSULE ORAL 2 TIMES DAILY
Qty: 20 CAPSULE | Refills: 0 | Status: ON HOLD | OUTPATIENT
Start: 2024-06-14

## 2024-06-14 NOTE — TELEPHONE ENCOUNTER
MARYANA Shoemaker, from [facility] called. Patient woke up complaining of body aches today. Temp. 101.1 gave Tylenol 500mg. Just checked temp again 99.1. No other complaints, denies headache, congestion. No cough.

## 2024-06-14 NOTE — TELEPHONE ENCOUNTER
Navid called back. Covid negative, but, now patient c/o chills, allergic to PCN, sulfa and cephalosporin.

## 2024-06-15 ENCOUNTER — HOSPITAL ENCOUNTER (INPATIENT)
Facility: HOSPITAL | Age: 89
LOS: 3 days | Discharge: NURSING, REHAB OR LONG-TERM CARE | DRG: 947 | End: 2024-06-18
Attending: EMERGENCY MEDICINE | Admitting: HOSPITALIST

## 2024-06-15 ENCOUNTER — HOSPITAL ENCOUNTER (EMERGENCY)
Facility: HOSPITAL | Age: 89
Discharge: HOME | End: 2024-06-15
Attending: EMERGENCY MEDICINE

## 2024-06-15 ENCOUNTER — HOSPITAL ENCOUNTER (EMERGENCY)
Facility: HOSPITAL | Age: 89
Discharge: HOME | DRG: 947 | End: 2024-06-15
Attending: EMERGENCY MEDICINE

## 2024-06-15 ENCOUNTER — APPOINTMENT (OUTPATIENT)
Dept: CT IMAGING | Facility: HOSPITAL | Age: 89
End: 2024-06-15
Attending: EMERGENCY MEDICINE

## 2024-06-15 ENCOUNTER — APPOINTMENT (OUTPATIENT)
Dept: CT IMAGING | Facility: HOSPITAL | Age: 89
DRG: 947 | End: 2024-06-15
Attending: EMERGENCY MEDICINE

## 2024-06-15 ENCOUNTER — APPOINTMENT (OUTPATIENT)
Dept: GENERAL RADIOLOGY | Facility: HOSPITAL | Age: 89
DRG: 947 | End: 2024-06-15
Attending: EMERGENCY MEDICINE

## 2024-06-15 ENCOUNTER — TELEPHONE (OUTPATIENT)
Dept: INTERNAL MEDICINE CLINIC | Facility: CLINIC | Age: 89
End: 2024-06-15

## 2024-06-15 ENCOUNTER — TELEPHONE (OUTPATIENT)
Dept: CASE MANAGEMENT | Facility: HOSPITAL | Age: 89
End: 2024-06-15

## 2024-06-15 ENCOUNTER — LAB REQUISITION (OUTPATIENT)
Dept: LAB | Facility: HOSPITAL | Age: 89
DRG: 947 | End: 2024-06-15

## 2024-06-15 ENCOUNTER — LAB REQUISITION (OUTPATIENT)
Dept: LAB | Facility: HOSPITAL | Age: 89
End: 2024-06-15

## 2024-06-15 VITALS
HEIGHT: 59 IN | TEMPERATURE: 98 F | WEIGHT: 160 LBS | OXYGEN SATURATION: 95 % | BODY MASS INDEX: 32.25 KG/M2 | DIASTOLIC BLOOD PRESSURE: 94 MMHG | SYSTOLIC BLOOD PRESSURE: 151 MMHG | RESPIRATION RATE: 18 BRPM | HEART RATE: 92 BPM

## 2024-06-15 DIAGNOSIS — R53.1 WEAKNESS: Primary | ICD-10-CM

## 2024-06-15 DIAGNOSIS — S30.0XXA CONTUSION OF BUTTOCK, INITIAL ENCOUNTER: ICD-10-CM

## 2024-06-15 DIAGNOSIS — E87.1 HYPONATREMIA: ICD-10-CM

## 2024-06-15 DIAGNOSIS — N39.0 URINARY TRACT INFECTION, SITE NOT SPECIFIED: ICD-10-CM

## 2024-06-15 DIAGNOSIS — S30.0XXA LUMBAR CONTUSION, INITIAL ENCOUNTER: Primary | ICD-10-CM

## 2024-06-15 LAB
ALBUMIN SERPL-MCNC: 3 G/DL (ref 3.4–5)
ALBUMIN/GLOB SERPL: 0.8 {RATIO} (ref 1–2)
ALP LIVER SERPL-CCNC: 80 U/L
ALT SERPL-CCNC: 23 U/L
ANION GAP SERPL CALC-SCNC: 9 MMOL/L (ref 0–18)
APTT PPP: 65 SECONDS (ref 23–36)
AST SERPL-CCNC: 25 U/L (ref 15–37)
BASOPHILS # BLD AUTO: 0.02 X10(3) UL (ref 0–0.2)
BASOPHILS NFR BLD AUTO: 0.1 %
BILIRUB SERPL-MCNC: 1.2 MG/DL (ref 0.1–2)
BILIRUB UR QL STRIP.AUTO: NEGATIVE
BILIRUB UR QL STRIP.AUTO: NEGATIVE
BUN BLD-MCNC: 17 MG/DL (ref 9–23)
CALCIUM BLD-MCNC: 8.7 MG/DL (ref 8.5–10.1)
CHLORIDE SERPL-SCNC: 100 MMOL/L (ref 98–112)
CLARITY UR REFRACT.AUTO: CLEAR
CLARITY UR REFRACT.AUTO: CLEAR
CO2 SERPL-SCNC: 21 MMOL/L (ref 21–32)
COLOR UR AUTO: YELLOW
CREAT BLD-MCNC: 0.99 MG/DL
EGFRCR SERPLBLD CKD-EPI 2021: 53 ML/MIN/1.73M2 (ref 60–?)
EOSINOPHIL # BLD AUTO: 0 X10(3) UL (ref 0–0.7)
EOSINOPHIL NFR BLD AUTO: 0 %
ERYTHROCYTE [DISTWIDTH] IN BLOOD BY AUTOMATED COUNT: 13.6 %
GLOBULIN PLAS-MCNC: 3.9 G/DL (ref 2.8–4.4)
GLUCOSE BLD-MCNC: 142 MG/DL (ref 70–99)
GLUCOSE BLD-MCNC: 146 MG/DL (ref 70–99)
GLUCOSE BLD-MCNC: 159 MG/DL (ref 70–99)
GLUCOSE UR STRIP.AUTO-MCNC: NORMAL MG/DL
GLUCOSE UR STRIP.AUTO-MCNC: NORMAL MG/DL
HCT VFR BLD AUTO: 35.8 %
HGB BLD-MCNC: 12.5 G/DL
IMM GRANULOCYTES # BLD AUTO: 0.1 X10(3) UL (ref 0–1)
IMM GRANULOCYTES NFR BLD: 0.7 %
INR BLD: 3.08 (ref 0.8–1.2)
KETONES UR STRIP.AUTO-MCNC: NEGATIVE MG/DL
KETONES UR STRIP.AUTO-MCNC: NEGATIVE MG/DL
LEUKOCYTE ESTERASE UR QL STRIP.AUTO: 75
LEUKOCYTE ESTERASE UR QL STRIP.AUTO: NEGATIVE
LYMPHOCYTES # BLD AUTO: 0.42 X10(3) UL (ref 1–4)
LYMPHOCYTES NFR BLD AUTO: 2.8 %
MCH RBC QN AUTO: 29.4 PG (ref 26–34)
MCHC RBC AUTO-ENTMCNC: 34.9 G/DL (ref 31–37)
MCV RBC AUTO: 84.2 FL
MONOCYTES # BLD AUTO: 1.16 X10(3) UL (ref 0.1–1)
MONOCYTES NFR BLD AUTO: 7.7 %
NEUTROPHILS # BLD AUTO: 13.34 X10 (3) UL (ref 1.5–7.7)
NEUTROPHILS # BLD AUTO: 13.34 X10(3) UL (ref 1.5–7.7)
NEUTROPHILS NFR BLD AUTO: 88.7 %
NITRITE UR QL STRIP.AUTO: NEGATIVE
NITRITE UR QL STRIP.AUTO: NEGATIVE
OSMOLALITY SERPL CALC.SUM OF ELEC: 274 MOSM/KG (ref 275–295)
PH UR STRIP.AUTO: 5.5 [PH] (ref 5–8)
PH UR STRIP.AUTO: 6 [PH] (ref 5–8)
PLATELET # BLD AUTO: 166 10(3)UL (ref 150–450)
POTASSIUM SERPL-SCNC: 4 MMOL/L (ref 3.5–5.1)
PROT SERPL-MCNC: 6.9 G/DL (ref 6.4–8.2)
PROTHROMBIN TIME: 32.2 SECONDS (ref 11.6–14.8)
RBC # BLD AUTO: 4.25 X10(6)UL
RBC UR QL AUTO: NEGATIVE
RBC UR QL AUTO: NEGATIVE
SODIUM SERPL-SCNC: 130 MMOL/L (ref 136–145)
SP GR UR STRIP.AUTO: 1.01 (ref 1–1.03)
SP GR UR STRIP.AUTO: 1.02 (ref 1–1.03)
TSI SER-ACNC: 1.5 MIU/ML (ref 0.36–3.74)
UROBILINOGEN UR STRIP.AUTO-MCNC: NORMAL MG/DL
UROBILINOGEN UR STRIP.AUTO-MCNC: NORMAL MG/DL
WBC # BLD AUTO: 15 X10(3) UL (ref 4–11)

## 2024-06-15 PROCEDURE — 71045 X-RAY EXAM CHEST 1 VIEW: CPT | Performed by: EMERGENCY MEDICINE

## 2024-06-15 PROCEDURE — 76377 3D RENDER W/INTRP POSTPROCES: CPT | Performed by: EMERGENCY MEDICINE

## 2024-06-15 PROCEDURE — 99284 EMERGENCY DEPT VISIT MOD MDM: CPT

## 2024-06-15 PROCEDURE — 82962 GLUCOSE BLOOD TEST: CPT

## 2024-06-15 PROCEDURE — 81003 URINALYSIS AUTO W/O SCOPE: CPT | Performed by: INTERNAL MEDICINE

## 2024-06-15 PROCEDURE — 72131 CT LUMBAR SPINE W/O DYE: CPT | Performed by: EMERGENCY MEDICINE

## 2024-06-15 PROCEDURE — 99223 1ST HOSP IP/OBS HIGH 75: CPT | Performed by: HOSPITALIST

## 2024-06-15 PROCEDURE — 87086 URINE CULTURE/COLONY COUNT: CPT | Performed by: INTERNAL MEDICINE

## 2024-06-15 PROCEDURE — 72192 CT PELVIS W/O DYE: CPT | Performed by: EMERGENCY MEDICINE

## 2024-06-15 PROCEDURE — 81001 URINALYSIS AUTO W/SCOPE: CPT | Performed by: INTERNAL MEDICINE

## 2024-06-15 RX ORDER — WARFARIN SODIUM 5 MG/1
5 TABLET ORAL DAILY
Status: DISCONTINUED | OUTPATIENT
Start: 2024-06-15 | End: 2024-06-16

## 2024-06-15 RX ORDER — BISACODYL 10 MG
10 SUPPOSITORY, RECTAL RECTAL
Status: DISCONTINUED | OUTPATIENT
Start: 2024-06-15 | End: 2024-06-18

## 2024-06-15 RX ORDER — SODIUM CHLORIDE 9 MG/ML
INJECTION, SOLUTION INTRAVENOUS CONTINUOUS
Status: DISCONTINUED | OUTPATIENT
Start: 2024-06-15 | End: 2024-06-16

## 2024-06-15 RX ORDER — NICOTINE POLACRILEX 4 MG
15 LOZENGE BUCCAL
Status: DISCONTINUED | OUTPATIENT
Start: 2024-06-15 | End: 2024-06-18

## 2024-06-15 RX ORDER — ECHINACEA PURPUREA EXTRACT 125 MG
1 TABLET ORAL
Status: DISCONTINUED | OUTPATIENT
Start: 2024-06-15 | End: 2024-06-18

## 2024-06-15 RX ORDER — DEXTROSE MONOHYDRATE 25 G/50ML
50 INJECTION, SOLUTION INTRAVENOUS
Status: DISCONTINUED | OUTPATIENT
Start: 2024-06-15 | End: 2024-06-18

## 2024-06-15 RX ORDER — LEVOTHYROXINE SODIUM 0.07 MG/1
75 TABLET ORAL
Status: DISCONTINUED | OUTPATIENT
Start: 2024-06-16 | End: 2024-06-18

## 2024-06-15 RX ORDER — BENZONATATE 200 MG/1
200 CAPSULE ORAL 3 TIMES DAILY PRN
Status: DISCONTINUED | OUTPATIENT
Start: 2024-06-15 | End: 2024-06-18

## 2024-06-15 RX ORDER — PANTOPRAZOLE SODIUM 20 MG/1
20 TABLET, DELAYED RELEASE ORAL
Status: DISCONTINUED | OUTPATIENT
Start: 2024-06-16 | End: 2024-06-18

## 2024-06-15 RX ORDER — LOSARTAN POTASSIUM 100 MG/1
100 TABLET ORAL DAILY
Status: DISCONTINUED | OUTPATIENT
Start: 2024-06-16 | End: 2024-06-18

## 2024-06-15 RX ORDER — ONDANSETRON 2 MG/ML
4 INJECTION INTRAMUSCULAR; INTRAVENOUS EVERY 6 HOURS PRN
Status: DISCONTINUED | OUTPATIENT
Start: 2024-06-15 | End: 2024-06-18

## 2024-06-15 RX ORDER — POLYETHYLENE GLYCOL 3350 17 G/17G
17 POWDER, FOR SOLUTION ORAL DAILY PRN
Status: DISCONTINUED | OUTPATIENT
Start: 2024-06-15 | End: 2024-06-18

## 2024-06-15 RX ORDER — MELATONIN
3 NIGHTLY PRN
Status: DISCONTINUED | OUTPATIENT
Start: 2024-06-15 | End: 2024-06-18

## 2024-06-15 RX ORDER — METOCLOPRAMIDE HYDROCHLORIDE 5 MG/ML
5 INJECTION INTRAMUSCULAR; INTRAVENOUS EVERY 8 HOURS PRN
Status: DISCONTINUED | OUTPATIENT
Start: 2024-06-15 | End: 2024-06-18

## 2024-06-15 RX ORDER — SENNOSIDES 8.6 MG
17.2 TABLET ORAL NIGHTLY PRN
Status: DISCONTINUED | OUTPATIENT
Start: 2024-06-15 | End: 2024-06-18

## 2024-06-15 RX ORDER — SODIUM CHLORIDE 9 MG/ML
125 INJECTION, SOLUTION INTRAVENOUS CONTINUOUS
Status: DISCONTINUED | OUTPATIENT
Start: 2024-06-15 | End: 2024-06-16

## 2024-06-15 RX ORDER — MECLIZINE HCL 12.5 MG/1
12.5 TABLET ORAL 3 TIMES DAILY PRN
Status: DISCONTINUED | OUTPATIENT
Start: 2024-06-15 | End: 2024-06-18

## 2024-06-15 RX ORDER — ACETAMINOPHEN 500 MG
500 TABLET ORAL EVERY 4 HOURS PRN
Status: DISCONTINUED | OUTPATIENT
Start: 2024-06-15 | End: 2024-06-16

## 2024-06-15 RX ORDER — NICOTINE POLACRILEX 4 MG
30 LOZENGE BUCCAL
Status: DISCONTINUED | OUTPATIENT
Start: 2024-06-15 | End: 2024-06-18

## 2024-06-15 RX ORDER — ENEMA 19; 7 G/133ML; G/133ML
1 ENEMA RECTAL ONCE AS NEEDED
Status: DISCONTINUED | OUTPATIENT
Start: 2024-06-15 | End: 2024-06-18

## 2024-06-15 RX ORDER — FERROUS SULFATE 325(65) MG
325 TABLET, DELAYED RELEASE (ENTERIC COATED) ORAL DAILY
Status: DISCONTINUED | OUTPATIENT
Start: 2024-06-16 | End: 2024-06-18

## 2024-06-15 RX ORDER — ASPIRIN 81 MG/1
81 TABLET, CHEWABLE ORAL
Status: DISCONTINUED | OUTPATIENT
Start: 2024-06-17 | End: 2024-06-18

## 2024-06-15 NOTE — ED INITIAL ASSESSMENT (HPI)
Patient arrives by EMS from [facility] for witnessed ground level fall that occurred while at home. No LOC or trauma to head. Patient reports 5/10 pain to her butt. Patient is on coumadin. Patient is A/Ox4 and pleasant.

## 2024-06-15 NOTE — CM/SW NOTE
Called to help provide resources for 24 hr home caregiver for patient as they do not feel it is safe for her to return to the assisted living facility without. A list of caregivers was provided to daughter at the bedside. She stated that her other sister, Jazzmine, who lives out of town is already making phone calls and trying to find her a caregiver, they are unsure at this time if they would be able to arrange for that to happen tonight. Daughter was asked if family would be able to stay with her until the caregiver could be arranged. She does not feel they would be of much assistance staying at her bedside if she were to fall but does understand that may need to be an option. Family is currently calling around to different caregivers to see if they can arrange. Daughter at bedside given number for CM for any further assistance and was informed to keep staff posted if they were successful in finding help.

## 2024-06-15 NOTE — ED PROVIDER NOTES
Patient Seen in: [facility] Emergency Department      History     Chief Complaint   Patient presents with    Fall     Stated Complaint: Fall    Subjective:   HPI    89-year-old female with a past medical history as below including hypertension, diabetes, CAD, atrial fibrillation on warfarin presents after she lost her balance and fell back hitting her buttock and lower back against a wall and slid to the ground.  Patient denies hitting her head at any time.  She denies any head or neck pain.  Denies feeling dizzy or lightheaded prior to or after the fall.  Denies any other injury from the fall.  Denies any pain in her arms or legs.    Objective:   Past Medical History:    AC joint arthropathy    Actinic keratosis    right anterior lower leg    At high risk for falls    Atrial fibrillation (HCC)    PERSISTENT DR IRWIN  On Coumadin. Stable.    Autonomic neuropathy    Back pain    Bunion    CAD (coronary artery disease)    Cardiomegaly    mild, w/ mildly increased pulmonary vascularity, increased interstitial markings in the mid to lower lung fields, and B/L perihilar opacities is concerning for congestive failure    Chronic heart failure with preserved ejection fraction (HCC)    Class 1 obesity    Constipation    Diabetes mellitus (HCC)    Difficult intubation    Disc degeneration    has several levels of degenerative discs and several areas of stenosis, both foraminal and central canal stenosis; and hx DDD, DJD    Diverticulosis    DVT of leg (deep venous thrombosis) (HCC)    post knee replacement    Dysphagia    Esophageal dilatation    Esophageal stricture    w/ variability in terms of tolerating that and tolerating meds    Fatty liver    Fecal impaction in rectum (HCC)    Flatulence/gas pain/belching    Frailty    Gastric polyp    Gastroparesis    primarily effecting insulin treatment    GERD (gastroesophageal reflux disease)    and motility disorder    Hammertoe    Hearing impairment    HA's bilateral     Hemorrhoids    Hiatal hernia    small    Hip pain    History  of basal cell carcinoma    basal cell and squamous cell    History of blood transfusion    with hysterectomy    History of colon polyps    History of Meniere's disease    History of myocardial infarction    History of PTCA    HTN (hypertension)    Hypothyroidism    IBS (irritable bowel syndrome)    Iron deficiency    Need iron infusion.    Mild mitral regurgitation    Nausea and vomiting in adult    Neck pain    head and neck discomfort    OA (osteoarthritis)    an hx low grade arthritis    Onychomycosis    severe, toenails, received podiatric treatment 2/29/2012 w/ Dr. Bowers    Osteoporosis    and osteopenia; 3/2009 \"Reclast infusion\"    Perirectal discomfort    perirectus discomfort    Presbyesophagus    Pulmonary hypertension (HCC)    Sinusitis    Squamous cell carcinoma    Thyroid nodule    right-on Carotid US    Tinnitus    Tumor, thyroid    Venous disease    w/ peripheral edema; and hx varicose veins    Vertigo    vertiginous symptoms              Past Surgical History:   Procedure Laterality Date    Angioplasty (coronary)  07/14/2010    PTCA, bare-metal stent to the R coronary artery, catheterization, subtotal stenosis of the mid to distal R coronary artery    Appendectomy      Cataract      B/L    Cholecystectomy  1994    Colonoscopy      Colonoscopy      Diagnostic anoscopy      Egd      Fluor gid & loclzj ndl/cath spi dx/ther njx  02/07/2014    Procedure: SI JOINT INJECTION;  Surgeon: Willian Orellana MD;  Location: [facility]    Fluor gid & loclzj ndl/cath spi dx/ther njx  03/27/2014    Procedure: LUMBAR EPIDURAL;  Surgeon: Willian Orellana MD;  Location: [facility]    Foot/toes surgery proc unlisted  1991, 1998    bunions B/L, hammertoe B/L    Hysterectomy  1967    partial    Injection, w/wo contrast, dx/therapeutic substance, epidural/subarachnoid; lumbar/sacral  02/07/2014    Procedure: LUMBAR  EPIDURAL;  Surgeon: Willian Orellana MD;  Location: [facility]    Injection, w/wo contrast, dx/therapeutic substance, epidural/subarachnoid; lumbar/sacral  03/27/2014    Procedure: LUMBAR EPIDURAL;  Surgeon: Willian Orellana MD;  Location: [facility]    Knee replacement surgery      R TKR-1995, L TKR-5/2007    Orthopedic surg (pbp)      kael bunions    Other surgical history      thyroid nodule; benign tumor on thyroid removed 1996; subtotal thyroidectomy 1996    Other surgical history  1974    varicose vein strip R leg    Other surgical history  01/12/2011    mid esophagus bx, gastric polyp bx    Other surgical history      esophageal dilatation    Other surgical history  02/14/2018    vulva biopsy: lichenoid inflammation with stromal fibrosis, negative for dysplasia    Patient documented not to have experienced any of the following events  02/07/2014    Procedure: SI JOINT INJECTION;  Surgeon: Willian Orellana MD;  Location: [facility]    Patient documented not to have experienced any of the following events  03/27/2014    Procedure: LUMBAR EPIDURAL;  Surgeon: Willian Orellana MD;  Location: [facility]    Patient withough preoperative order for iv antibiotic surgical site infection prophylaxis.  02/07/2014    Procedure: SI JOINT INJECTION;  Surgeon: Willian Orellana MD;  Location: [facility]    Patient withough preoperative order for iv antibiotic surgical site infection prophylaxis.  03/27/2014    Procedure: LUMBAR EPIDURAL;  Surgeon: Willian Orellana MD;  Location: [facility]    Skin surgery  05/19/2011    SCCIS to R temple / Mohs surgery    Tonsillectomy      T&A, childhood    Total knee replacement      bilateral    Upper gi endoscopy,exam  10/12/2010    upper gi series (air contrast) w/ esophogram                Social History     Socioeconomic History    Marital status:    Tobacco Use     Smoking status: Never    Smokeless tobacco: Never   Vaping Use    Vaping status: Never Used   Substance and Sexual Activity    Alcohol use: No     Alcohol/week: 0.0 standard drinks of alcohol     Comment: NONE    Drug use: Never   Other Topics Concern    Caffeine Concern Yes    Exercise Yes     Comment: not much    Seat Belt Yes     Social Determinants of Health     Food Insecurity: No Food Insecurity (12/25/2023)    Food Insecurity     Food Insecurity: Never true   Transportation Needs: No Transportation Needs (12/25/2023)    Transportation Needs     Lack of Transportation: No   Physical Activity: Insufficiently Active (8/24/2020)    [facility] from [facility]    Exercise Vital Sign     Days of Exercise per Week: 5 days     Minutes of Exercise per Session: 10 min   Housing Stability: Low Risk  (12/25/2023)    Housing Stability     Housing Instability: No              Review of Systems    Positive for stated complaint: Fall  Other systems are as noted in HPI.  Constitutional and vital signs reviewed.      All other systems reviewed and negative except as noted above.    Physical Exam     ED Triage Vitals [06/15/24 1332]   BP (!) 151/94   Pulse 92   Resp 18   Temp 98.2 °F (36.8 °C)   Temp src Oral   SpO2 95 %   O2 Device None (Room air)       Current Vitals:   Vital Signs  BP: (!) 151/94  Pulse: 92  Resp: 18  Temp: 98.2 °F (36.8 °C)  Temp src: Oral    Oxygen Therapy  SpO2: 95 %  O2 Device: None (Room air)            Physical Exam  Vitals and nursing note reviewed.   Constitutional:       Appearance: She is well-developed.   HENT:      Head: Normocephalic and atraumatic.      Mouth/Throat:      Mouth: Mucous membranes are moist.   Eyes:      General: No scleral icterus.     Extraocular Movements: Extraocular movements intact.      Conjunctiva/sclera: Conjunctivae normal.      Pupils: Pupils are equal, round, and reactive to light.   Abdominal:      Palpations: Abdomen is soft.       Tenderness: There is no abdominal tenderness.   Musculoskeletal:        Back:    Skin:     General: Skin is warm and dry.   Neurological:      General: No focal deficit present.      Mental Status: She is alert and oriented to person, place, and time.      Cranial Nerves: No cranial nerve deficit.      Motor: No weakness.   Psychiatric:         Mood and Affect: Mood normal.         Behavior: Behavior normal.               ED Course     Labs Reviewed   POCT GLUCOSE - Abnormal; Notable for the following components:       Result Value    POC Glucose 146 (*)     All other components within normal limits             CT PELVIS (CPT=72192)    Result Date: 6/15/2024  CONCLUSION:   1. No evidence of acute pelvic or hip fracture.  If the patient is unable to bear weight and there is clinical suspicion of occult fracture, MRI is the modality of choice for further assessment.  2. Soft tissue thickening of the perianal region could represent a mass or inflammation.  Recommend direct visual inspection.    LOCATION:  [facility]   Dictated by (CST): Lisa Beaver MD on 6/15/2024 at 3:23 PM     Finalized by (CST): Lisa Beaver MD on 6/15/2024 at 3:27 PM       CT SPINE LUMBAR (CPT=72131)    Result Date: 6/15/2024  CONCLUSION:  No evidence of an acute fracture or dislocation lumbar spine.  Moderate osteoarthritic changes as described above.   LOCATION:  [facility]   Dictated by (CST): Allen Sparks MD on 6/15/2024 at 2:59 PM     Finalized by (CST): Allen Sparks MD on 6/15/2024 at 3:03 PM               MDM   89-year-old female with a past medical history as below including hypertension, diabetes, CAD, atrial fibrillation on warfarin presents after she lost her balance and fell back hitting her buttock and lower back against a wall and slid to the ground.        Differential includes but is not limited to lumbar compression fracture, sacral fracture, pelvic fracture versus contusion    Independent interpretation of CT lumbar spine  pelvis shows no evidence of fractures.  Radiology reports reviewed as above.    UA shows no evidence of UTI.    Daughter is present who states that patient has had balance problems for quite some time to be getting worse.  She was concerned about patient going back to her assisted living facility however they are able to arrange for 24-hour caregiver felt comfortable with discharge.      Medical Decision Making  Amount and/or Complexity of Data Reviewed  Independent Historian: caregiver     Details: Daughter, see MDM  Labs: ordered. Decision-making details documented in ED Course.  Radiology: ordered and independent interpretation performed. Decision-making details documented in ED Course.    Risk  OTC drugs.        Disposition and Plan     Clinical Impression:  1. Lumbar contusion, initial encounter    2. Contusion of buttock, initial encounter         Disposition:  Discharge  6/15/2024  4:40 pm    Follow-up:  Willian Curry MD  [facility] [address] [address]  [phone]    Schedule an appointment as soon as possible for a visit            Medications Prescribed:  Discharge Medication List as of 6/15/2024  4:43 PM

## 2024-06-15 NOTE — ED QUICK NOTES
Patient and patient daughter whom is at bedside was able to find a caregiver for patient and will be available tonight. Patient and daughter is ok with Discharging home. MD made aware.

## 2024-06-15 NOTE — TELEPHONE ENCOUNTER
I was paged by nursing at [facility] assisted living facility. She was seen in the ER earlier today after a fall. She was sent back to the facility. Now she cannot weight bear at all. Staff at facility do not have the capacity to handle non-weight bearing residents. They're sending her back to the ER for failure to thrive and inability to bear weight unassisted. Courtesy call made to the ER staff advising them of patient's pending arrival. She may need to be admitted, further assessed, and may require eventual placement into a facility that can support her care needs until she's able to meet the requirements of her current assisted living facility.       Augustine To MD  Diplomate, American Board of Internal Medicine  Member, American College of Lifestyle Medicine  Member, American Association for Physician Leadership  [address], [place] [address]  [phone] (phone); [phone] (fax)  [email or web address]

## 2024-06-15 NOTE — TELEPHONE ENCOUNTER
I received a telephone call from Navid who is a nurse from [facility] this morning.   They wanted clarification on the Doxycycline order that was sent yesterday. I confirmed that it was 50 mg BID.  They wanted clarification from the Coumadin clinic about holding her potassium (orders written by Dr. Proctor her cardiologist in response to a potassium level of 5.0 back on 6/6/24). I informed them to follow Dr. Proctor's orders, but they can seek clarification by calling that cardiology service.  I am told that her urine is dark +/- malodor. They are sending it to the [facility] for eval.       Augustine To MD  Diplomate, American Board of Internal Medicine  Member, American College of Lifestyle Medicine  Member, American Association for Physician Leadership  [address], [place] [address]  [phone] (phone); [phone] (fax)  [email or web address]

## 2024-06-16 LAB
ANION GAP SERPL CALC-SCNC: 6 MMOL/L (ref 0–18)
BASOPHILS # BLD AUTO: 0.03 X10(3) UL (ref 0–0.2)
BASOPHILS NFR BLD AUTO: 0.3 %
BUN BLD-MCNC: 16 MG/DL (ref 9–23)
C DIFF TOX B STL QL: NEGATIVE
CALCIUM BLD-MCNC: 8.5 MG/DL (ref 8.5–10.1)
CHLORIDE SERPL-SCNC: 105 MMOL/L (ref 98–112)
CO2 SERPL-SCNC: 22 MMOL/L (ref 21–32)
CREAT BLD-MCNC: 0.84 MG/DL
EGFRCR SERPLBLD CKD-EPI 2021: 64 ML/MIN/1.73M2 (ref 60–?)
EOSINOPHIL # BLD AUTO: 0 X10(3) UL (ref 0–0.7)
EOSINOPHIL NFR BLD AUTO: 0 %
ERYTHROCYTE [DISTWIDTH] IN BLOOD BY AUTOMATED COUNT: 13.5 %
GLUCOSE BLD-MCNC: 104 MG/DL (ref 70–99)
GLUCOSE BLD-MCNC: 112 MG/DL (ref 70–99)
GLUCOSE BLD-MCNC: 137 MG/DL (ref 70–99)
GLUCOSE BLD-MCNC: 144 MG/DL (ref 70–99)
GLUCOSE BLD-MCNC: 185 MG/DL (ref 70–99)
HCT VFR BLD AUTO: 37 %
HGB BLD-MCNC: 12.5 G/DL
IMM GRANULOCYTES # BLD AUTO: 0.05 X10(3) UL (ref 0–1)
IMM GRANULOCYTES NFR BLD: 0.4 %
INR BLD: 3.21 (ref 0.8–1.2)
LYMPHOCYTES # BLD AUTO: 0.4 X10(3) UL (ref 1–4)
LYMPHOCYTES NFR BLD AUTO: 3.5 %
MCH RBC QN AUTO: 28.9 PG (ref 26–34)
MCHC RBC AUTO-ENTMCNC: 33.8 G/DL (ref 31–37)
MCV RBC AUTO: 85.5 FL
MONOCYTES # BLD AUTO: 1.02 X10(3) UL (ref 0.1–1)
MONOCYTES NFR BLD AUTO: 8.9 %
NEUTROPHILS # BLD AUTO: 9.9 X10 (3) UL (ref 1.5–7.7)
NEUTROPHILS # BLD AUTO: 9.9 X10(3) UL (ref 1.5–7.7)
NEUTROPHILS NFR BLD AUTO: 86.9 %
OSMOLALITY SERPL CALC.SUM OF ELEC: 277 MOSM/KG (ref 275–295)
PLATELET # BLD AUTO: 155 10(3)UL (ref 150–450)
POTASSIUM SERPL-SCNC: 3.4 MMOL/L (ref 3.5–5.1)
PROTHROMBIN TIME: 33.3 SECONDS (ref 11.6–14.8)
RBC # BLD AUTO: 4.33 X10(6)UL
SODIUM SERPL-SCNC: 133 MMOL/L (ref 136–145)
WBC # BLD AUTO: 11.4 X10(3) UL (ref 4–11)

## 2024-06-16 PROCEDURE — 99232 SBSQ HOSP IP/OBS MODERATE 35: CPT | Performed by: INTERNAL MEDICINE

## 2024-06-16 RX ORDER — FUROSEMIDE 10 MG/ML
20 INJECTION INTRAMUSCULAR; INTRAVENOUS ONCE
Status: COMPLETED | OUTPATIENT
Start: 2024-06-16 | End: 2024-06-16

## 2024-06-16 RX ORDER — POTASSIUM CHLORIDE 20 MEQ/1
40 TABLET, EXTENDED RELEASE ORAL ONCE
Status: COMPLETED | OUTPATIENT
Start: 2024-06-16 | End: 2024-06-16

## 2024-06-16 RX ORDER — WARFARIN SODIUM 5 MG/1
5 TABLET ORAL DAILY
Status: DISCONTINUED | OUTPATIENT
Start: 2024-06-17 | End: 2024-06-18

## 2024-06-16 RX ORDER — MIRTAZAPINE 7.5 MG/1
7.5 TABLET, FILM COATED ORAL NIGHTLY
Status: DISCONTINUED | OUTPATIENT
Start: 2024-06-16 | End: 2024-06-18

## 2024-06-16 RX ORDER — ACETAMINOPHEN 500 MG
1000 TABLET ORAL EVERY 6 HOURS PRN
Status: DISCONTINUED | OUTPATIENT
Start: 2024-06-16 | End: 2024-06-18

## 2024-06-16 NOTE — PROGRESS NOTES
NURSING ADMISSION NOTE    Patient is A/O x4, [place] and poor vision. Reported BLE weakness and unable to ambulate as usual after fall. Right buttock bruising noted but denies any discomfort to site and anywhere else in the body. Intact skin integrity. Usual diet at home is soft, chopped, easy to chew. Navigator completed. Med list needs to be reviewed with facility nurse. Pt brought 2 different med list, placed on chart. Followed recent med list from [facility], some med supplement dosing needs clarification. Patient took all sched meds prior to ED visit. AM RN to call facility, attempted to call upon patient admit but no nurse available; caregiver on phone unable to help. No med due overnight. Needs addressed. VSS and afebrile. Call light within reach. MSW, PT/ OT consult needed.     0437 Low grade temp at 99.7 and loose BM, Cdiff protocol in place, specimen collected.    Patient admitted via Cart  Oriented to room.  Safety precautions initiated.  Bed in low position.  Call light in reach.

## 2024-06-16 NOTE — PROGRESS NOTES
[facility]   part of [facility]     Hospitalist Progress Note     Denae Kern Patient Status:  Inpatient    5/3/1930 MRN [id number]   Formerly [facility] 4NW-A Attending Marlys Lei,    Hosp Day # 1 PCP Willian Curry MD     Chief Complaint: Weakness    Subjective:     Patient feels a little better today  Admits she has not been eating much lately and has some depressed mood   Denies any pain or difficulty breathing     Objective:    Review of Systems:   A comprehensive review of systems was completed; pertinent positive and negatives stated in subjective.    Vital signs:  Temp:  [98.2 °F (36.8 °C)-99.7 °F (37.6 °C)] 99.7 °F (37.6 °C)  Pulse:  [73-92] 80  Resp:  [18-24] 19  BP: (137-156)/(50-94) 156/55  SpO2:  [93 %-95 %] 93 %    Physical Exam:    General: No acute distress  Respiratory: No wheezes, no rhonchi  Cardiovascular: S1, S2, regular rate and rhythm  Abdomen: Soft, Non-tender, non-distended, positive bowel sounds  Neuro: No new focal deficits.   Extremities: No edema      Diagnostic Data:    Labs:  Recent Labs   Lab 24  1311 06/15/24  2108 06/15/24  2109 06/16/24  0529   WBC  --   --  15.0* 11.4*   HGB  --   --  12.5 12.5   MCV  --   --  84.2 85.5   PLT  --   --  166.0 155.0   INR 2.60* 3.08*  --  3.21*       Recent Labs   Lab 06/15/24  2109 06/16/24  0529   * 104*   BUN 17 16   CREATSERUM 0.99 0.84   CA 8.7 8.5   ALB 3.0*  --    * 133*   K 4.0 3.4*    105   CO2 21.0 22.0   ALKPHO 80  --    AST 25  --    ALT 23  --    BILT 1.2  --    TP 6.9  --        Estimated Creatinine Clearance: 47.8 mL/min (based on SCr of 0.84 mg/dL).    No results for input(s): \"TROP\", \"TROPHS\", \"CK\" in the last 168 hours.    Recent Labs   Lab 24  1311 06/15/24  2108 24  0529   PTP  --  32.2* 33.3*   INR 2.60* 3.08* 3.21*       Lab Results   Component Value Date    TSH 1.500 06/15/2024             Microbiology    No results found for this visit on 06/15/24.      Imaging:  Reviewed in Epic.    Medications:    potassium chloride  40 mEq Oral Once    [START ON 6/17/2024] aspirin  81 mg Oral Once per day on Monday Wednesday Friday    ferrous sulfate  325 mg Oral Daily    pantoprazole  20 mg Oral QAM AC    levothyroxine  75 mcg Oral Before breakfast    losartan  100 mg Oral Daily    metoprolol tartrate  75 mg Oral 2x Daily(Beta Blocker)    warfarin  5 mg Oral Daily    insulin aspart  1-10 Units Subcutaneous TID AC and HS       Assessment & Plan:      #Generalized Weakness/deconditioning  -PT/OT  -lives at assisted living, will likely need HARSHA      #Mild acute on chronic HFpEF  -hold IVF and usual PO torsemide   -diurese as tolerated     #Decreased appetite with depressive symptoms  -start low dose nightly Remeron      #Atrial fibrillation on warfarin- warfarin, metoprolol  #CAD status post PCI-aspirin  #Hypertension-metoprolol  #Type 2 diabetes-ISS  #GERD-Protonix  #Hypothyroidism-levothyroxine        Marlys Lei, DO    Supplementary Documentation:     Quality:  DVT Mechanical Prophylaxis:        DVT Pharmacologic Prophylaxis   Medication    warfarin (Coumadin) tab 5 mg      DVT Pharmacologic prophylaxis: Aspirin 162 mg         Code Status: Full Code  Ramirez: No urinary catheter in place      The 21st Century Cures Act makes medical notes like these available to patients in the interest of transparency. Please be advised this is a medical document. Medical documents are intended to carry relevant information, facts as evident, and the clinical opinion of the practitioner. The medical note is intended as peer to peer communication and may appear blunt or direct. It is written in medical language and may contain abbreviations or verbiage that are unfamiliar.             **Certification      PHYSICIAN Certification of Need for Inpatient Hospitalization - Initial Certification    Patient will require inpatient services that will reasonably be expected to span two midnight's based on the  clinical documentation in H+P.   Based on patients current state of illness, I anticipate that, after discharge, patient will require TBD.

## 2024-06-16 NOTE — ED QUICK NOTES
[facility] was asking for an update if possible when pt dispo is decided.  Requestor is Hiwot Vázquez at [phone]

## 2024-06-16 NOTE — ED QUICK NOTES
Orders for admission, patient is aware of plan and ready to go upstairs. Any questions, please call ED RN Ayla at extension [phone].     Patient Covid vaccination status: Fully vaccinated     COVID Test Ordered in ED: None    COVID Suspicion at Admission: N/A    Running Infusions:    sodium chloride 125 mL/hr (06/15/24 2113)        Mental Status/LOC at time of transport: x4    Other pertinent information: [place], wearing hearing aids  CIWA score: N/A   NIH score:  N/A

## 2024-06-16 NOTE — CM/SW NOTE
1945:  MARYANA Shoemaker from \[facility]\"" calling to inform us he is sending patient back to [facility] ER \"because she is unable to walk, she is able to stand, but not able to walk and in order to be in AL she needs to be able to walk.\" Per MARYANA Shoemaker patient fell at approx 1300 today, was seen in [facility] ER and discharged back to [facility]. Per MARYANA Shoemaker prior to pt's fall today patient was able to ambulate. MARYANA Shoemaker states himself, another RN from \[facility]\"" and both daughters were with patient and observed patient's inability to walk. Per MARYANA Shoemaker he states \"I also reached out to pt's MD - Dr. To and he is going to place a courtesy call to [facility] ER also.\" This [facility] reviewed pt's chart and informed MARYANA Shoemaker patient arrived to [facility] ER at 1936. This [facility] obtained MARYANA Shoemaker's direct contact# [phone] in case ER MD has any questions.

## 2024-06-16 NOTE — CM/SW NOTE
Dr. Mayfield calling this ERCM with direction on pt's disposition, informed Dr. Mayfield likely patient will need SNF placement upon discharge due to pt's inability to ambulate S/P fall - however patient will need 3 MN's of INPT stay for SNF to be [facility]. This ERCM reviewed pt's labs and informed Dr. Mayfield due to pt's Na of 130 and WBC of 15 this ERCM feels patient qualifies for INPT admission. Dr. Mayfield agreeable and will admit patient INPT. Dr. Mayfield also informed to please obtain PCXR on patient as well. Dr. Mayfield v/u.

## 2024-06-16 NOTE — ED PROVIDER NOTES
Patient Seen in: [facility] Emergency Department      History     Chief Complaint   Patient presents with    Weakness     Fall earlier today, now unable to fall     Stated Complaint: unable to walk due to a fall    Subjective:   HPI    Patient is a 89-year-old female with history of atrial fibrillation on Coumadin, tension, diabetes presenting for evaluation of weakness.  Ultimately she has been feeling progressively weaker for at least a few days now.  At 1 point her primary care physician thought she may have a UTI so she was on antibiotics but has not gotten better.  Admits her appetite is very poor although she is been trying to push herself to drink fluids.  No fevers, no cough, no vomiting or diarrhea.  She had a fall earlier today, she states she was just trying to grab onto her walker as her daughter was coming into her room but she missed it and fell, basically sat down landing on her butt.  Did not strike her head or anything else.  CT of pelvis was obtained which was negative for acute traumatic injury.  Patient was discharged back, however after getting there due to a combination of both weakness and pain she was unable to ambulate at all.  She is currently living in an assisted living situation but family bedside reports it is really just helping with medications and there is no other assistance.  They do not feel they can manage her there she was not able to ambulate at all so she was sent back.    Objective:   Past Medical History:    AC joint arthropathy    Actinic keratosis    right anterior lower leg    At high risk for falls    Atrial fibrillation (HCC)    PERSISTENT DR IRWIN  On Coumadin. Stable.    Autonomic neuropathy    Back pain    Bunion    CAD (coronary artery disease)    Cardiomegaly    mild, w/ mildly increased pulmonary vascularity, increased interstitial markings in the mid to lower lung fields, and B/L perihilar opacities is concerning for congestive failure    Chronic heart  failure with preserved ejection fraction (HCC)    Class 1 obesity    Constipation    Diabetes mellitus (HCC)    Difficult intubation    Disc degeneration    has several levels of degenerative discs and several areas of stenosis, both foraminal and central canal stenosis; and hx DDD, DJD    Diverticulosis    DVT of leg (deep venous thrombosis) (HCC)    post knee replacement    Dysphagia    Esophageal dilatation    Esophageal stricture    w/ variability in terms of tolerating that and tolerating meds    Fatty liver    Fecal impaction in rectum (HCC)    Flatulence/gas pain/belching    Frailty    Gastric polyp    Gastroparesis    primarily effecting insulin treatment    GERD (gastroesophageal reflux disease)    and motility disorder    Hammertoe    Hearing impairment    HA's bilateral    Hemorrhoids    Hiatal hernia    small    Hip pain    History  of basal cell carcinoma    basal cell and squamous cell    History of blood transfusion    with hysterectomy    History of colon polyps    History of Meniere's disease    History of myocardial infarction    History of PTCA    HTN (hypertension)    Hypothyroidism    IBS (irritable bowel syndrome)    Iron deficiency    Need iron infusion.    Mild mitral regurgitation    Nausea and vomiting in adult    Neck pain    head and neck discomfort    OA (osteoarthritis)    an hx low grade arthritis    Onychomycosis    severe, toenails, received podiatric treatment 2/29/2012 w/ Dr. Bowers    Osteoporosis    and osteopenia; 3/2009 \"Reclast infusion\"    Perirectal discomfort    perirectus discomfort    Presbyesophagus    Pulmonary hypertension (HCC)    Sinusitis    Squamous cell carcinoma    Thyroid nodule    right-on Carotid US    Tinnitus    Tumor, thyroid    Venous disease    w/ peripheral edema; and hx varicose veins    Vertigo    vertiginous symptoms              Past Surgical History:   Procedure Laterality Date    Angioplasty (coronary)  07/14/2010    PTCA, bare-metal stent to the R  coronary artery, catheterization, subtotal stenosis of the mid to distal R coronary artery    Appendectomy      Cataract      B/L    Cholecystectomy  1994    Colonoscopy      Colonoscopy      Diagnostic anoscopy      Egd      Fluor gid & loclzj ndl/cath spi dx/ther njx  02/07/2014    Procedure: SI JOINT INJECTION;  Surgeon: Willian Orellana MD;  Location: [facility]    Fluor gid & loclzj ndl/cath spi dx/ther njx  03/27/2014    Procedure: LUMBAR EPIDURAL;  Surgeon: Willian Orellana MD;  Location: [facility]    Foot/toes surgery proc unlisted  1991, 1998    bunions B/L, hammertoe B/L    Hysterectomy  1967    partial    Injection, w/wo contrast, dx/therapeutic substance, epidural/subarachnoid; lumbar/sacral  02/07/2014    Procedure: LUMBAR EPIDURAL;  Surgeon: Willian Orellana MD;  Location: [facility] PAIN [facility]    Injection, w/wo contrast, dx/therapeutic substance, epidural/subarachnoid; lumbar/sacral  03/27/2014    Procedure: LUMBAR EPIDURAL;  Surgeon: Willian Orellana MD;  Location: [facility]    Knee replacement surgery      R TKR-1995, L TKR-5/2007    Orthopedic surg ([facility])      kael bunions    Other surgical history      thyroid nodule; benign tumor on thyroid removed 1996; subtotal thyroidectomy 1996    Other surgical history  1974    varicose vein strip R leg    Other surgical history  01/12/2011    mid esophagus bx, gastric polyp bx    Other surgical history      esophageal dilatation    Other surgical history  02/14/2018    vulva biopsy: lichenoid inflammation with stromal fibrosis, negative for dysplasia    Patient documented not to have experienced any of the following events  02/07/2014    Procedure: SI JOINT INJECTION;  Surgeon: Willian Orellana MD;  Location: [facility]    Patient documented not to have experienced any of the following events  03/27/2014    Procedure: LUMBAR EPIDURAL;  Surgeon: Willian Orellana MD;   Location: [facility]    Patient withough preoperative order for iv antibiotic surgical site infection prophylaxis.  02/07/2014    Procedure: SI JOINT INJECTION;  Surgeon: Willian Orellana MD;  Location: [facility] PAIN [facility]NA[facility]    Patient withough preoperative order for iv antibiotic surgical site infection prophylaxis.  03/27/2014    Procedure: LUMBAR EPIDURAL;  Surgeon: Willian Orellana MD;  Location: [facility]    Skin surgery  05/19/2011    SCCIS to R temple / Mohs surgery    Tonsillectomy      T&A, childhood    Total knee replacement      bilateral    Upper gi endoscopy,exam  10/12/2010    upper gi series (air contrast) w/ esophogram                Social History     Socioeconomic History    Marital status:    Tobacco Use    Smoking status: Never    Smokeless tobacco: Never   Vaping Use    Vaping status: Never Used   Substance and Sexual Activity    Alcohol use: No     Alcohol/week: 0.0 standard drinks of alcohol     Comment: NONE    Drug use: Never   Other Topics Concern    Caffeine Concern Yes    Exercise Yes     Comment: not much    Seat Belt Yes     Social Determinants of Health     Food Insecurity: No Food Insecurity (12/25/2023)    Food Insecurity     Food Insecurity: Never true   Transportation Needs: No Transportation Needs (12/25/2023)    Transportation Needs     Lack of Transportation: No   Physical Activity: Insufficiently Active (8/24/2020)    Received from [employer]    Exercise Vital Sign     Days of Exercise per Week: 5 days     Minutes of Exercise per Session: 10 min   Housing Stability: Low Risk  (12/25/2023)    Housing Stability     Housing Instability: No              Review of Systems    Positive for stated complaint: unable to walk due to a fall  Other systems are as noted in HPI.  Constitutional and vital signs reviewed.      All other systems reviewed and negative except as noted above.    Physical Exam      ED Triage Vitals [06/15/24 1942]   /77   Pulse 85   Resp 24   Temp 98.7 °F (37.1 °C)   Temp src Oral   SpO2 93 %   O2 Device None (Room air)       Current Vitals:   Vital Signs  BP: 137/87  Pulse: 77  Resp: 24  Temp: 98.7 °F (37.1 °C)  Temp src: Oral  MAP (mmHg): (!) 103    Oxygen Therapy  SpO2: 93 %  O2 Device: None (Room air)            Physical Exam  Vitals and nursing note reviewed.   Constitutional:       Appearance: She is well-developed.   HENT:      Head: Normocephalic and atraumatic.   Eyes:      Conjunctiva/sclera: Conjunctivae normal.      Pupils: Pupils are equal, round, and reactive to light.   Cardiovascular:      Rate and Rhythm: Normal rate and regular rhythm.      Heart sounds: Normal heart sounds.   Pulmonary:      Effort: Pulmonary effort is normal.      Breath sounds: Normal breath sounds.   Abdominal:      General: Bowel sounds are normal.      Palpations: Abdomen is soft.   Musculoskeletal:         General: Normal range of motion.   Skin:     General: Skin is warm and dry.   Neurological:      Mental Status: She is alert and oriented to person, place, and time.               ED Course     Labs Reviewed   COMP METABOLIC PANEL (14) - Abnormal; Notable for the following components:       Result Value    Glucose 142 (*)     Sodium 130 (*)     Calculated Osmolality 274 (*)     eGFR-Cr 53 (*)     Albumin 3.0 (*)     A/G Ratio 0.8 (*)     All other components within normal limits   URINALYSIS WITH CULTURE REFLEX - Abnormal; Notable for the following components:    Protein Urine Trace (*)     Leukocyte Esterase Urine 75 (*)     WBC Urine 6-10 (*)     Squamous Epi. Cells Few (*)     All other components within normal limits   PROTHROMBIN TIME (PT) - Abnormal; Notable for the following components:    PT 32.2 (*)     INR 3.08 (*)     All other components within normal limits   PTT, ACTIVATED - Abnormal; Notable for the following components:    PTT 65.0 (*)     All other components within normal  limits   CBC W/ DIFFERENTIAL - Abnormal; Notable for the following components:    WBC 15.0 (*)     Neutrophil Absolute Prelim 13.34 (*)     Neutrophil Absolute 13.34 (*)     Lymphocyte Absolute 0.42 (*)     Monocyte Absolute 1.16 (*)     All other components within normal limits   CBC WITH DIFFERENTIAL WITH PLATELET    Narrative:     The following orders were created for panel order CBC With Differential With Platelet.  Procedure                               Abnormality         Status                     ---------                               -----------         ------                     CBC W/ DIFFERENTIAL[[id number]]          Abnormal            Final result                 Please view results for these tests on the individual orders.   URINE CULTURE, ROUTINE             CT PELVIS (CPT=72192)    Result Date: 6/15/2024  PROCEDURE:  CT PELVIS (CPT=72192)  COMPARISON:  [facility] , CT, CT ABDOMEN PELVIS IV CONTRAST, NO ORAL (ER), 12/25/2023, 12:14 PM.  INDICATIONS:  fall, lower back and pelvic/buttock pain  TECHNIQUE:  Following oral contrast, unenhanced CT scanning is performed through the pelvis. Dose reduction techniques were used. Dose information is transmitted to the ACR (American College of Radiology) NRDR (National Radiology Data Registry) which includes the Dose Index Registry.  PATIENT STATED HISTORY: (As transcribed by Technologist)  Patient is here post fall with lower back and pelvic/buttock pain.    FINDINGS:   No acute fracture or malalignment of the pelvis or hips.  Moderate osteoarthritis of bilateral hips.  Obturator rings are intact with moderate degenerative sclerosis of the pubic symphysis.  Normal sacroiliac joints.  Moderate disc and endplate degenerative change of the visualized lower lumbar spine.  Soft tissue thickening of the perianal region could represent a mass or inflammation.             CONCLUSION:   1. No evidence of acute pelvic or hip fracture.  If the patient is unable to bear  weight and there is clinical suspicion of occult fracture, MRI is the modality of choice for further assessment.  2. Soft tissue thickening of the perianal region could represent a mass or inflammation.  Recommend direct visual inspection.    LOCATION:  [facility]   Dictated by (CST): Lisa Beaver MD on 6/15/2024 at 3:23 PM     Finalized by (CST): Lisa Beaver MD on 6/15/2024 at 3:27 PM       CT SPINE LUMBAR (CPT=72131)    Result Date: 6/15/2024  PROCEDURE:  CT SPINE LUMBAR (CPT=72131)  COMPARISON:  [facility] , CT, CT SPINE LUMBAR (CPT=72131), 6/22/2018, 7:10 PM.  INDICATIONS:  fall, lower back and pelvic/buttock pain  TECHNIQUE:  Noncontrast CT scanning is performed through the lumbar spine.  Multiplanar reconstructions are generated.  Dose reduction techniques were used. Dose information is transmitted to the ACR (American College of Radiology) NRDR (National Radiology Data Registry) which includes the Dose Index Registry.  PATIENT STATED HISTORY: (As transcribed by Technologist)  Patient is here post fall with lower back and pelvic pain.   FINDINGS: There is right lateral listhesis L3 over L4 measuring approximately 8 mm.  There is left lateral ceases of L4 over L5 measuring 7 mm.  Levoscoliosis is noted. Vertebral body heights are maintained throughout the lumbar spine. Moderate to marked loss of L3-4 and L4-5 disc spaces with endplate osteoarthritic changes. An acute fracture or dislocation is not identified.  T12-L1:  Posterior osteophyte and disc bulge is noted.  No spinal canal stenosis.  Mild to moderate bilateral neural foraminal stenosis. L1-L2:  Broad-based disc bulge is noted.  No spinal canal stenosis.  Mild bilateral neural foraminal stenosis. L2-L3:  Posterior osteophyte with facet hypertrophic change.  Mild spinal canal stenosis.  Mild bilateral neural foraminal stenosis. L3-L4:  Posterior osteophyte with disc bulge and facet hypertrophic changes.  Mild bilateral neural foraminal stenosis.  Moderate spinal  canal stenosis. L4-L5:  Posterior osteophyte with facet hypertrophic changes.  Moderate to severe right neural foraminal stenosis.  Mild to moderate spinal canal stenosis. L5-S1:  Central disc bulge without spinal canal or neural foraminal stenosis.            CONCLUSION:  No evidence of an acute fracture or dislocation lumbar spine.  Moderate osteoarthritic changes as described above.   LOCATION:  [facility]   Dictated by (CST): Allen Sparks MD on 6/15/2024 at 2:59 PM     Finalized by (CST): Allen Sparks MD on 6/15/2024 at 3:03 PM               MDM      84-year-old female presenting with ongoing weakness as well as pain in her low back and sacrum after a fall today as noted above.  CT scan of the pelvis and lumbar spine earlier today was negative and I do not think she needs to be reimaged, no additional trauma.  Differential includes anemia, dehydration, electrolyte abnormality, UTI.  Will check labs.  Admission disposition: 6/15/2024 10:21 PM           Update at 10:15 PM.  Patient has a mild hyponatremia of 130.  Leukocytosis of 15,000 although urinalysis is not definitive for UTI.  Would not start antibiotics at this point.  Discussed with the  as I do not think the patient can be discharged tonight to her current facility and they do not have any other options for higher level of care there.  Case management recommends inpatient admission at this point and can work on placement from the floor.  Not able to place her anywhere tonight as it is Saturday evening.        Past Medical History-hypertension, diabetes, A-fib    Differential diagnosis before testing included dehydration, electrode abnormality, infection    Co-morbidities that add to the complexity of management include: None    Testing ordered during this visit included labs, UA      External chart review showed reviewed imaging studies from earlier today    History obtained by an independent source included from family at  bedside            Disposition:    Admission  I have discussed with the patient the results of test, differential diagnosis, and treatment plan. They expressed clear understanding of these instructions and agrees to the plan provided.                                Medical Decision Making      Disposition and Plan     Clinical Impression:  1. Weakness    2. Hyponatremia         Disposition:  Admit  6/15/2024 10:21 pm    Follow-up:  No follow-up provider specified.        Medications Prescribed:  Current Discharge Medication List                            Hospital Problems       Present on Admission  Date Reviewed: 5/30/2024            ICD-10-CM Noted POA    * (Principal) Weakness R53.1 6/15/2024 Unknown

## 2024-06-16 NOTE — PLAN OF CARE
Pt A/O x4. VSS. Afebrile. Pt denied pain throughout day. Medications admin per MAR. Pt negative for c. Diff. Pt seen by PT, up to chair and back to bed safely today w/ walker and x1 assist. Pt repositioned in bed. Pt has poor appetite, MD aware. Fall and safety precautions in place. Call light within reach.

## 2024-06-16 NOTE — ED INITIAL ASSESSMENT (HPI)
Patient here from [place] via EMS for weakness, unable to walk from a fall ealier today. Patient was brought to our ER s/p fall and medically cleared. When patient returned to facility MD and family states she needed to come back as she is now unable to walk. Patient endorsing 5/10 pain, no new falls or complaints since her visit this morning.

## 2024-06-16 NOTE — PHYSICAL THERAPY NOTE
PHYSICAL THERAPY EVALUATION - INPATIENT     Room Number: 402/402-A  Evaluation Date: 6/16/2024  Type of Evaluation: Initial  Physician Order: PT Eval and Treat    Presenting Problem: fall,weakness  Co-Morbidities : atrial fibrillation, HTN. CAD  Reason for Therapy: Mobility Dysfunction and Discharge Planning    PHYSICAL THERAPY ASSESSMENT   Patient is currently functioning below baseline with bed mobility, transfers, gait, and standing prolonged periods.  Prior to admission, patient's baseline is independent using a walker .  Patient is requiring minimal assist and moderate assist as a result of the following impairments: decreased functional strength, decreased endurance/aerobic capacity, impaired standing balance, and decreased muscular endurance.  Physical Therapy will continue to follow for duration of hospitalization.    Patient will benefit from continued skilled PT Services at discharge to promote functional independence and safety with additional support and return home with home health PT.    PLAN  PT Treatment Plan: Bed mobility;Endurance;Gait training  Rehab Potential : Good  Frequency (Obs): 5x/week  Number of Visits to Meet Established Goals: 5      CURRENT GOALS    Goal #1 Patient is able to demonstrate supine - sit EOB @ level: supervision     Goal #2 Patient is able to demonstrate transfers Sit to/from Stand at assistance level: supervision     Goal #3 Patient is able to ambulate 100 feet with assist device: walker - rolling at assistance level: supervision     Goal #4    Goal #5    Goal #6    Goal Comments: Goals established on 6/16/2024      PHYSICAL THERAPY MEDICAL/SOCIAL HISTORY  History related to current admission: Patient is a 89 year old female admitted on 6/15/2024 from [facility] for fall .  Pt diagnosed with weakness, dehydration , hyponatremia.      HOME SITUATION  Type of Home: Assisted living facility                    Lives With: Alone  Drives: No  Patient Owned Equipment:  Rolling walker;Cane  Patient Regularly Uses: Glasses    Prior Level of [place]: independent using a walker, occasional assistance with ADLs     SUBJECTIVE  \" I will try\"       OBJECTIVE  Precautions: Low vision;Hard of hearing  Fall Risk: High fall risk    WEIGHT BEARING RESTRICTION  Weight Bearing Restriction: None                PAIN ASSESSMENT  Ratin          COGNITION  Orientation Level:  oriented x4    RANGE OF MOTION AND STRENGTH ASSESSMENT  Upper extremity ROM and strength are within functional limits     Lower extremity ROM is within functional limits   Lower extremity strength; grossly 3/5       BALANCE  Static Sitting: Fair +  Dynamic Sitting: Fair  Static Standing: Fair  Dynamic Standing: Fair -    ADDITIONAL TESTS                                    ACTIVITY TOLERANCE                         O2 WALK       NEUROLOGICAL FINDINGS                        AM-PAC '6-Clicks' INPATIENT SHORT FORM - BASIC MOBILITY  How much difficulty does the patient currently have...  Patient Difficulty: Turning over in bed (including adjusting bedclothes, sheets and blankets)?: A Lot   Patient Difficulty: Sitting down on and standing up from a chair with arms (e.g., wheelchair, bedside commode, etc.): A Little   Patient Difficulty: Moving from lying on back to sitting on the side of the bed?: A Lot   How much help from another person does the patient currently need...   Help from Another: Moving to and from a bed to a chair (including a wheelchair)?: A Little   Help from Another: Need to walk in hospital room?: A Little   Help from Another: Climbing 3-5 steps with a railing?: None       AM-PAC Score:  Raw Score: 17   Approx Degree of Impairment: 50.57%   Standardized Score (AM-PAC Scale): 42.13   CMS Modifier (G-Code): CK    FUNCTIONAL ABILITY STATUS  Gait Assessment   Functional Mobility/Gait Assessment  Gait Assistance: Contact guard assist  Distance (ft): 6  Assistive Device: Rolling walker  Pattern: R Foot drag  (decreased stride length and weight shifting)    Skilled Therapy Provided     Bed Mobility:  Rolling: Jaret  Supine to sit: modA   Sit to supine: NT     Transfer Mobility:  Sit to stand: Jaret   Stand to sit: CGA  Gait = 6 feet using a walker     Therapist's Comments: patient was seen twice as the first time she needed to be cleaned up. Cueing required for proper sequencing during bed mob and transfers. She appears to be anxious but able to participate during session. No complain of pain or lightheadedness at the time of session.     Exercise/Education Provided:  Bed mobility  Body mechanics  Gait training  Transfer training    Patient End of Session: Up in chair;Needs met;Call light within reach;RN aware of session/findings;All patient questions and concerns addressed;Alarm set      Patient Evaluation Complexity Level:  History Moderate - 1 or 2 personal factors and/or co-morbidities   Examination of body systems Moderate - addressing a total of 3 or more elements   Clinical Presentation Moderate - Evolving   Clinical Decision Making Moderate - Evolving       PT Session Time: 30 minutes  Gait Training: minutes  Therapeutic Activity: 20 minutes  Neuromuscular Re-education:  minutes  Therapeutic Exercise:  minutes

## 2024-06-16 NOTE — H&P
[facility]  [facility]     Denae Kern Patient Status:  Emergency    5/3/1930 MRN [id number]   Location [facility] EMERGENCY DEPARTMENT Attending Lenard Mayfield MD   Hosp Day # 0 PCP Willian Curry MD     Chief Complaint: Fall/weakness    Subjective:    History of Present Illness:     Denae Kern is a 89 year old female with history of atrial fibrillation, type 2 diabetes, hypertension, GERD, coronary disease, chronic heart failure with preserved ejection fraction, hypothyroidism, iron deficiency anemia presents emergency room after a fall patient was feeling progressively weak over the last couple days primary care physician thought she had a urinary tract infection so had put her on antibiotics but patient states she has not seen any change.  Patient is also had poor appetite.  Patient earlier  earlier today tried grabbing onto her walker but missed and fell.  Patient in the hospital had a negative workup was discharged back to her assisted living and was unable to ambulate and so came back to the emergency room.  No fevers, chills, nausea, vomiting, diarrhea or constipation.  No chest pain, shortness of breath, dizziness, lightheadedness, or syncope.  No numbness or tingling in extremities or any focalized weakness.    History/Other:    Past Medical History:  Past Medical History:    AC joint arthropathy    Actinic keratosis    right anterior lower leg    At high risk for falls    Atrial fibrillation (HCC)    PERSISTENT DR IRWIN  On Coumadin. Stable.    Autonomic neuropathy    Back pain    Bunion    CAD (coronary artery disease)    Cardiomegaly    mild, w/ mildly increased pulmonary vascularity, increased interstitial markings in the mid to lower lung fields, and B/L perihilar opacities is concerning for congestive failure    Chronic heart failure with preserved ejection fraction (HCC)    Class 1 obesity    Constipation    Diabetes mellitus (HCC)    Difficult intubation    Disc  degeneration    has several levels of degenerative discs and several areas of stenosis, both foraminal and central canal stenosis; and hx DDD, DJD    Diverticulosis    DVT of leg (deep venous thrombosis) (HCC)    post knee replacement    Dysphagia    Esophageal dilatation    Esophageal stricture    w/ variability in terms of tolerating that and tolerating meds    Fatty liver    Fecal impaction in rectum (HCC)    Flatulence/gas pain/belching    Frailty    Gastric polyp    Gastroparesis    primarily effecting insulin treatment    GERD (gastroesophageal reflux disease)    and motility disorder    Hammertoe    Hearing impairment    HA's bilateral    Hemorrhoids    Hiatal hernia    small    Hip pain    History  of basal cell carcinoma    basal cell and squamous cell    History of blood transfusion    with hysterectomy    History of colon polyps    History of Meniere's disease    History of myocardial infarction    History of PTCA    HTN (hypertension)    Hypothyroidism    IBS (irritable bowel syndrome)    Iron deficiency    Need iron infusion.    Mild mitral regurgitation    Nausea and vomiting in adult    Neck pain    head and neck discomfort    OA (osteoarthritis)    an hx low grade arthritis    Onychomycosis    severe, toenails, received podiatric treatment 2/29/2012 w/ Dr. Bowers    Osteoporosis    and osteopenia; 3/2009 \"Reclast infusion\"    Perirectal discomfort    perirectus discomfort    Presbyesophagus    Pulmonary hypertension (HCC)    Sinusitis    Squamous cell carcinoma    Thyroid nodule    right-on Carotid US    Tinnitus    Tumor, thyroid    Venous disease    w/ peripheral edema; and hx varicose veins    Vertigo    vertiginous symptoms     Past Surgical History:   Past Surgical History:   Procedure Laterality Date    Angioplasty (coronary)  07/14/2010    PTCA, bare-metal stent to the R coronary artery, catheterization, subtotal stenosis of the mid to distal R coronary artery    Appendectomy      Cataract       B/L    Cholecystectomy  1994    Colonoscopy      Colonoscopy      Diagnostic anoscopy      Egd      Fluor gid & loclzj ndl/cath spi dx/ther njx  02/07/2014    Procedure: SI JOINT INJECTION;  Surgeon: Willian Orellana MD;  Location: [facility]    Fluor gid & loclzj ndl/cath spi dx/ther njx  03/27/2014    Procedure: LUMBAR EPIDURAL;  Surgeon: Willian Orellana MD;  Location: [facility]    Foot/toes surgery proc unlisted  1991, 1998    bunions B/L, hammertoe B/L    Hysterectomy  1967    partial    Injection, w/wo contrast, dx/therapeutic substance, epidural/subarachnoid; lumbar/sacral  02/07/2014    Procedure: LUMBAR EPIDURAL;  Surgeon: Willian Orellana MD;  Location: [facility]    Injection, w/wo contrast, dx/therapeutic substance, epidural/subarachnoid; lumbar/sacral  03/27/2014    Procedure: LUMBAR EPIDURAL;  Surgeon: Willian Orellana MD;  Location: [facility]    Knee replacement surgery      R TKR-1995, L TKR-5/2007    Orthopedic surg ([facility])      kael bunions    Other surgical history      thyroid nodule; benign tumor on thyroid removed 1996; subtotal thyroidectomy 1996    Other surgical history  1974    varicose vein strip R leg    Other surgical history  01/12/2011    mid esophagus bx, gastric polyp bx    Other surgical history      esophageal dilatation    Other surgical history  02/14/2018    vulva biopsy: lichenoid inflammation with stromal fibrosis, negative for dysplasia    Patient documented not to have experienced any of the following events  02/07/2014    Procedure: SI JOINT INJECTION;  Surgeon: Willian Orellana MD;  Location: [facility]    Patient documented not to have experienced any of the following events  03/27/2014    Procedure: LUMBAR EPIDURAL;  Surgeon: Willian Orellana MD;  Location: [facility]    Patient withough preoperative order for iv antibiotic surgical site infection prophylaxis.   02/07/2014    Procedure: SI JOINT INJECTION;  Surgeon: Willian Orellana MD;  Location: [facility]    Patient withough preoperative order for iv antibiotic surgical site infection prophylaxis.  03/27/2014    Procedure: LUMBAR EPIDURAL;  Surgeon: Willian Orellana MD;  Location: [facility]    Skin surgery  05/19/2011    SCCIS to R temple / Mohs surgery    Tonsillectomy      T&A, childhood    Total knee replacement      bilateral    Upper gi endoscopy,exam  10/12/2010    upper gi series (air contrast) w/ esophogram      Family History:   Family History   Problem Relation Age of Onset    Other (Parkinson's) Father     Other (pneumonia) Father     Heart Disorder Mother     Heart Disease Mother     Heart Attack Mother     Arthritis Mother     Circulatory Problems Mother     Other (Other) Mother     Heart Disorder Brother     Heart Attack Paternal Grandfather     Heart Attack Paternal Grandmother     Heart Disorder Paternal Grandmother     Diabetes Daughter     Cancer Brother         lung ca    Diabetes Brother     Suicide History Son     Cancer Other         colorectal ca, skin ca, fam hx    High Blood Pressure Other         fam hx    Heart Disorder Other         heart condition, mat grandparen    Heart Attack Other         mat grandparent    Stroke Other         mat grandparent    Other (Other) Other     Gastro-Intestinal Disorder Other         IBS, fam hx    Blood Disorder Other         blood blots, fam hx    Seizure Disorder Son         Epilepsy    Mental Disorder Son         paranoia, OCD     Social History:    reports that she has never smoked. She has never used smokeless tobacco. She reports that she does not drink alcohol and does not use drugs.     Allergies:   Allergies   Allergen Reactions    Penicillins HIVES and OTHER (SEE COMMENTS)     CLASS    Rosuvastatin OTHER (SEE COMMENTS)     Reaction: muscle aches  Reaction: muscle aches    Acyclovir DIARRHEA    Allergy      BETA  LACTAMS      Carbapenems UNKNOWN and OTHER (SEE COMMENTS)    Cephalosporins UNKNOWN    Codeine NAUSEA ONLY    Codeine [Opioid Analgesics] NAUSEA AND VOMITING    Demerol NAUSEA AND VOMITING    Diphenhydramine-Zinc Acetate UNKNOWN     BETA LACTAMS    Glucophage [Metformin Hydrochloride]      \"problematic\"    Meperidine NAUSEA ONLY and NAUSEA AND VOMITING    Misc. Sulfonamide Containing Compounds OTHER (SEE COMMENTS)    Other UNKNOWN    Penicillin G HIVES    Statins PAIN     Reaction: muscle aches      Sulfa Drugs Cross Reactors NAUSEA AND VOMITING     \"Sulfa\"      Cardizem RASH       Medications:    No current facility-administered medications on file prior to encounter.     Current Outpatient Medications on File Prior to Encounter   Medication Sig Dispense Refill    acetaminophen 500 MG Oral Chew Tab Chew 1 tablet (500 mg total) by mouth every 6 (six) hours as needed for Pain or Fever. 30 tablet 0    Doxycycline Monohydrate 50 MG Oral Cap Take 1 capsule (50 mg total) by mouth 2 (two) times daily. 20 capsule 0    neomycin-polymyxin-hydrocortisone 3.5-48968-5 Otic Solution 2 drops 4 (four) times daily. (Patient not taking: Reported on 6/15/2024)      BD AUTOSHIELD DUO 30G X 5 MM Does not apply Misc 1 Lancet by Finger stick route 3 (three) times daily.      empagliflozin (JARDIANCE) 10 MG Oral Tab Take 1 tablet (10 mg total) by mouth daily. 90 tablet 3    Clarithromycin 125 MG/5ML Oral Recon Susp TAKE 20ML BY MOUTH 30-60 MINUTES PRIOR TO DENTAL PROCEDURE (Patient not taking: Reported on 6/15/2024)      Loteprednol Etabonate (EYSUVIS) 0.25 % Ophthalmic Suspension 1 drop into affected eye Ophthalmic twice a day for 30 days (Patient not taking: Reported on 6/15/2024)      LEVOTHYROXINE 75 MCG Oral Tab TAKE ONE TABLET BY MOUTH DAILY BEFORE breakfast 90 tablet 0    losartan 100 MG Oral Tab Take 1 tablet (100 mg total) by mouth daily. 90 tablet 3    potassium chloride 40 meq/30 ml (10%) Oral Solution Take 15 mL (20 mEq total)  by mouth daily. 473 mL 3    ACCU-CHEK SOFTCLIX LANCETS Does not apply Misc USE ONE TO THREE times a  each 3    torsemide 20 MG Oral Tab Take 1 tablet (20 mg total) by mouth every morning. PATIENT REQUESTS TO TAKE IM AM, SELF DISPENSE      [] Insulin Glargine-yfgn (SEMGLEE, YFGN,) 100 UNIT/ML Subcutaneous Solution Pen-injector Inject 5 Units into the skin at bedtime. 9 mL 3    Multiple Vitamins-Minerals (PRESERVISION AREDS 2) Oral Cap as directed Orally bid daily (Patient not taking: Reported on 6/15/2024)      meclizine 12.5 MG Oral Tab Take 1 tablet (12.5 mg total) by mouth 3 (three) times daily as needed. 30 tablet 0    Glucose Blood (ACCU-CHEK HAYLIE PLUS) In Vitro Strip Use 3-4 times a day as directed. 100 strip 1    Lansoprazole 15 MG Oral Capsule Delayed Release Take 1 capsule (15 mg total) by mouth every morning.      aspirin 81 MG Oral Chew Tab Chew 1 tablet (81 mg total) by mouth 3 (three) times a week.      Iron, Ferrous Sulfate, 75 (15 Fe) MG/ML Oral Solution Take 90 mg by mouth daily. Taken as 1 tablespoon=15ml (45mg) bid after meals 50 mL 3    metoprolol tartrate 50 MG Oral Tab Take 1.5 tablets (75 mg total) by mouth 2 (two) times daily.      warfarin 5 MG Oral Tab Take 1 tablet (5 mg total) by mouth daily.  0    MULTIPLE VITAMIN OR 1 by mouth daily      Simethicone (GAS-X EXTRA STRENGTH OR) Take 1 tablet by mouth as needed.      Calcium Carbonate-Vitamin D 600-125 MG-UNIT Oral Tab Take 1 tablet by mouth daily.         Review of Systems:   A comprehensive review of systems was completed.    Pertinent positives and negatives noted in the HPI.    Objective:   Physical Exam:    /87   Pulse 77   Temp 98.7 °F (37.1 °C) (Oral)   Resp 24   LMP  (LMP Unknown)   SpO2 93%   General: No acute distress, Alert  Respiratory: No rhonchi, no wheezes  Cardiovascular: S1, S2. Regular rate and rhythm  Abdomen: Soft, Non-tender, non-distended, positive bowel sounds  Neuro: No new focal  deficits  Extremities: No edema      Results:    Labs:      Labs Last 24 Hours:    Recent Labs   Lab 06/15/24  2109   RBC 4.25   HGB 12.5   HCT 35.8   MCV 84.2   MCH 29.4   MCHC 34.9   RDW 13.6   NEPRELIM 13.34*   WBC 15.0*   .0       Recent Labs   Lab 06/15/24  2109   *   BUN 17   CREATSERUM 0.99   EGFRCR 53*   CA 8.7   ALB 3.0*   *   K 4.0      CO2 21.0   ALKPHO 80   AST 25   ALT 23   BILT 1.2   TP 6.9       Lab Results   Component Value Date    PT 28.6 (H) 03/03/2014    PT 25.4 (H) 01/23/2014    PT 22.9 (H) 07/27/2013    INR 3.08 (H) 06/15/2024    INR 2.60 (H) 06/13/2024    INR 2.35 (H) 05/30/2024       No results for input(s): \"TROP\", \"TROPHS\", \"CK\" in the last 168 hours.    No results for input(s): \"TROP\", \"PBNP\" in the last 168 hours.    No results for input(s): \"PCT\" in the last 168 hours.    Imaging: Imaging data reviewed in Epic.    Assessment & Plan:      # Weakness  - Unable to walk  - No fracture from fall  - likely due to deconditioning    # Hyponatremia  -Will continue IV fluids  -Repeat BMP in the morning    # Hypertension  - Will continue on losartan and metoprolol.    # Hypothyroidism  - Will continue on levothyroxine.  - will check TSH    # GERD  - Will coninue on PPI.    # Type 2 diabetes  -Will place on hypoglycemia protocol with correction factor insulin.    # Atrial fibrillation  -Will continue on metoprolol for rate control  -Will continue on warfarin for anticoagulation.    # Iron deficiency anemia  -Will continue oral iron supplementation      Plan of care discussed with patient at bedside    Owen Carr DO    Supplementary Documentation:     The 21st Century Cures Act makes medical notes like these available to patients in the interest of transparency. Please be advised this is a medical document. Medical documents are intended to carry relevant information, facts as evident, and the clinical opinion of the practitioner. The medical note is intended  as peer to peer communication and may appear blunt or direct. It is written in medical language and may contain abbreviations or verbiage that are unfamiliar.

## 2024-06-17 LAB
ADENOVIRUS PCR:: NOT DETECTED
ANION GAP SERPL CALC-SCNC: 12 MMOL/L (ref 0–18)
ANION GAP SERPL CALC-SCNC: 8 MMOL/L (ref 0–18)
B PARAPERT DNA SPEC QL NAA+PROBE: NOT DETECTED
B PERT DNA SPEC QL NAA+PROBE: NOT DETECTED
BUN BLD-MCNC: 14 MG/DL (ref 9–23)
BUN BLD-MCNC: 15 MG/DL (ref 9–23)
C PNEUM DNA SPEC QL NAA+PROBE: NOT DETECTED
CALCIUM BLD-MCNC: 7.2 MG/DL (ref 8.5–10.1)
CALCIUM BLD-MCNC: 8.4 MG/DL (ref 8.5–10.1)
CHLORIDE SERPL-SCNC: 104 MMOL/L (ref 98–112)
CHLORIDE SERPL-SCNC: 107 MMOL/L (ref 98–112)
CO2 SERPL-SCNC: 11 MMOL/L (ref 21–32)
CO2 SERPL-SCNC: 22 MMOL/L (ref 21–32)
CORONAVIRUS 229E PCR:: NOT DETECTED
CORONAVIRUS HKU1 PCR:: NOT DETECTED
CORONAVIRUS NL63 PCR:: NOT DETECTED
CORONAVIRUS OC43 PCR:: NOT DETECTED
CREAT BLD-MCNC: 0.84 MG/DL
CREAT BLD-MCNC: 0.93 MG/DL
EGFRCR SERPLBLD CKD-EPI 2021: 57 ML/MIN/1.73M2 (ref 60–?)
EGFRCR SERPLBLD CKD-EPI 2021: 64 ML/MIN/1.73M2 (ref 60–?)
ERYTHROCYTE [DISTWIDTH] IN BLOOD BY AUTOMATED COUNT: 13.6 %
FLUAV RNA SPEC QL NAA+PROBE: NOT DETECTED
FLUBV RNA SPEC QL NAA+PROBE: NOT DETECTED
GLUCOSE BLD-MCNC: 115 MG/DL (ref 70–99)
GLUCOSE BLD-MCNC: 121 MG/DL (ref 70–99)
GLUCOSE BLD-MCNC: 142 MG/DL (ref 70–99)
GLUCOSE BLD-MCNC: 144 MG/DL (ref 70–99)
GLUCOSE BLD-MCNC: 165 MG/DL (ref 70–99)
GLUCOSE BLD-MCNC: 88 MG/DL (ref 70–99)
GLUCOSE BLD-MCNC: 97 MG/DL (ref 70–99)
HCT VFR BLD AUTO: 34.7 %
HGB BLD-MCNC: 12 G/DL
INR BLD: 3.88 (ref 0.8–1.2)
MAGNESIUM SERPL-MCNC: 1.8 MG/DL (ref 1.6–2.6)
MCH RBC QN AUTO: 29.3 PG (ref 26–34)
MCHC RBC AUTO-ENTMCNC: 34.6 G/DL (ref 31–37)
MCV RBC AUTO: 84.6 FL
METAPNEUMOVIRUS PCR:: NOT DETECTED
MYCOPLASMA PNEUMONIA PCR:: NOT DETECTED
OSMOLALITY SERPL CALC.SUM OF ELEC: 270 MOSM/KG (ref 275–295)
OSMOLALITY SERPL CALC.SUM OF ELEC: 280 MOSM/KG (ref 275–295)
PARAINFLUENZA 1 PCR:: NOT DETECTED
PARAINFLUENZA 2 PCR:: NOT DETECTED
PARAINFLUENZA 3 PCR:: NOT DETECTED
PARAINFLUENZA 4 PCR:: NOT DETECTED
PLATELET # BLD AUTO: 183 10(3)UL (ref 150–450)
POTASSIUM SERPL-SCNC: 2.9 MMOL/L (ref 3.5–5.1)
POTASSIUM SERPL-SCNC: 3.5 MMOL/L (ref 3.5–5.1)
PROTHROMBIN TIME: 38.7 SECONDS (ref 11.6–14.8)
RBC # BLD AUTO: 4.1 X10(6)UL
RHINOVIRUS/ENTERO PCR:: NOT DETECTED
RSV RNA SPEC QL NAA+PROBE: NOT DETECTED
SARS-COV-2 RNA NPH QL NAA+NON-PROBE: NOT DETECTED
SODIUM SERPL-SCNC: 130 MMOL/L (ref 136–145)
SODIUM SERPL-SCNC: 134 MMOL/L (ref 136–145)
WBC # BLD AUTO: 11.1 X10(3) UL (ref 4–11)

## 2024-06-17 PROCEDURE — 99232 SBSQ HOSP IP/OBS MODERATE 35: CPT | Performed by: INTERNAL MEDICINE

## 2024-06-17 RX ORDER — INSULIN GLARGINE-YFGN 100 [IU]/ML
5 INJECTION, SOLUTION SUBCUTANEOUS NIGHTLY
Qty: 9 ML | Refills: 3 | Status: SHIPPED | OUTPATIENT
Start: 2024-06-17 | End: 2024-09-15

## 2024-06-17 RX ORDER — POTASSIUM CHLORIDE 1.5 G/1.58G
40 POWDER, FOR SOLUTION ORAL ONCE
Status: COMPLETED | OUTPATIENT
Start: 2024-06-17 | End: 2024-06-17

## 2024-06-17 RX ORDER — TORSEMIDE 5 MG/1
10 TABLET ORAL DAILY
Status: DISCONTINUED | OUTPATIENT
Start: 2024-06-17 | End: 2024-06-18

## 2024-06-17 RX ORDER — MAGNESIUM OXIDE 400 MG/1
400 TABLET ORAL ONCE
Status: COMPLETED | OUTPATIENT
Start: 2024-06-17 | End: 2024-06-17

## 2024-06-17 RX ORDER — POTASSIUM CHLORIDE 1.5 G/1.58G
20 POWDER, FOR SOLUTION ORAL ONCE
Status: COMPLETED | OUTPATIENT
Start: 2024-06-17 | End: 2024-06-17

## 2024-06-17 RX ORDER — WARFARIN SODIUM 5 MG/1
5 TABLET ORAL NIGHTLY
Status: SHIPPED | COMMUNITY
Start: 2024-06-18 | End: 2024-06-24

## 2024-06-17 RX ORDER — MIRTAZAPINE 7.5 MG/1
7.5 TABLET, FILM COATED ORAL NIGHTLY
Qty: 30 TABLET | Refills: 0 | Status: SHIPPED | OUTPATIENT
Start: 2024-06-17

## 2024-06-17 NOTE — CONGREGATE LIVING REVIEW
Congregate Living Authorization    [facility] ([facility] has reviewed this case and the committee DOES NOT RECOMMEND discharge to a skilled nursing facility under skilled care.    The CLRC recommends:  Home with home health and any other appropriate caregiver assistance or medical equipment as needed vs respite in SNF.     Does not appear to have skilled services for [facility], but additional home support appears to be needed.    For questions regarding CLRC approval process, please contact the CM assigned to the case.  For questions regarding RN discharge workflow, please contact the unit Clinical Leader.

## 2024-06-17 NOTE — PLAN OF CARE
Pt received A&Ox4. VSS. 2L NC. Tele. Denies pain. Mediations given per MAR. Call light within reach. Fall precautions in place. Poor PO intake.

## 2024-06-17 NOTE — CM/SW NOTE
06/17/24 0800   CM/SW Referral Data   Referral Source Social Work (self-referral)   Reason for Referral Discharge planning   Informant EMR   Patient Info   Patient's Home Environment Assisted Living   Patient Status Prior to Admission   Independent with ADLs and Mobility Yes   Discharge Needs   Anticipated D/C needs To be determined     SW noted that patient admitted from [facility] in [place]. Patient had a fall and has not been able to ambulate as she normally does. Per EMR, patient is not able to return to Assisted Living unless her ambulation improves or if patient hires a private duty caregiver. MARIBEL noted that family was approached about this in ER and had begun researching private duty caregivers.     PT anticipates that patient will likely need Home Health services at [place]. Patient has historically had therapy services arranged through [facility]'s on site therapy company, [employer] ([phone]). There is a chance that [facility] will not accept patient back without rehab stay.     Patient would need 3 inpatient midnights in order for rehab to be covered by insurance. Today is day 2. MARIBEL sent referral for  agencies as well as [facility] as backup option. MARIBEL will discuss DC plan with patient's family once choice lists results.     SW will continue to follow for plan of care changes and remain available for any additional DC needs or concerns.     Юлия Cid MSW, LSW  Discharge Planner   [id number]

## 2024-06-17 NOTE — PROGRESS NOTES
[facility]   part of [facility]     Hospitalist Progress Note     Denae Kern Patient Status:  Inpatient    5/3/1930 MRN [id number]   Location [facility] 4NW-A Attending Marlys Lei,    Hosp Day # 2 PCP Willian Curry MD     Chief Complaint: Weakness    Subjective:     Did well with PT  Appetite improved  Single low grade temp this morning, asymptomatic     Objective:    Review of Systems:   A comprehensive review of systems was completed; pertinent positive and negatives stated in subjective.    Vital signs:  Temp:  [98.3 °F (36.8 °C)-100.7 °F (38.2 °C)] 98.3 °F (36.8 °C)  Pulse:  [59-75] 63  Resp:  [17-18] 17  BP: (127-138)/(41-69) 127/59  SpO2:  [94 %-95 %] 94 %    Physical Exam:    General: No acute distress  Respiratory: No wheezes, no rhonchi  Cardiovascular: S1, S2, regular rate and rhythm  Abdomen: Soft, Non-tender, non-distended, positive bowel sounds  Neuro: No new focal deficits.   Extremities: No edema      Diagnostic Data:    Labs:  Recent Labs   Lab 24  1311 06/15/24  2108 06/15/24  2109 06/16/24  0529 24  0615   WBC  --   --  15.0* 11.4* 11.1*   HGB  --   --  12.5 12.5 12.0   MCV  --   --  84.2 85.5 84.6   PLT  --   --  166.0 155.0 183.0   INR 2.60* 3.08*  --  3.21* 3.88*       Recent Labs   Lab 06/15/24  2109 06/16/24  0529 24  0615 24  0918   * 104* 88 121*   BUN 17 16 14 15   CREATSERUM 0.99 0.84 0.84 0.93   CA 8.7 8.5 7.2* 8.4*   ALB 3.0*  --   --   --    * 133* 130* 134*   K 4.0 3.4* 3.5 2.9*    105 107 104   CO2 21.0 22.0 11.0* 22.0   ALKPHO 80  --   --   --    AST 25  --   --   --    ALT 23  --   --   --    BILT 1.2  --   --   --    TP 6.9  --   --   --        Estimated Creatinine Clearance: 43.2 mL/min (based on SCr of 0.93 mg/dL).    No results for input(s): \"TROP\", \"TROPHS\", \"CK\" in the last 168 hours.    Recent Labs   Lab 06/15/24  2108 24  0529 24  0615   PTP 32.2* 33.3* 38.7*   INR 3.08* 3.21* 3.88*                     Microbiology    Hospital Encounter on 06/15/24   1. Urine Culture, Routine     Status: None    Collection Time: 06/15/24  9:09 PM    Specimen: Urine, clean catch   Result Value Ref Range    Urine Culture No Growth at 18-24 hrs. N/A         Imaging: Reviewed in Epic.    Medications:    potassium chloride  40 mEq Oral Once    potassium chloride  20 mEq Oral Once    torsemide  10 mg Oral Daily    [Held by provider] warfarin  5 mg Oral Daily    mirtazapine  7.5 mg Oral Nightly    aspirin  81 mg Oral Once per day on Monday Wednesday Friday    ferrous sulfate  325 mg Oral Daily    pantoprazole  20 mg Oral QAM AC    levothyroxine  75 mcg Oral Before breakfast    losartan  100 mg Oral Daily    metoprolol tartrate  75 mg Oral 2x Daily(Beta Blocker)    insulin aspart  1-10 Units Subcutaneous TID AC and HS       Assessment & Plan:      #Generalized Weakness/deconditioning  -PT/OT  -lives at assisted living, will likely need HARSHA      #Mild acute on chronic HFpEF  -resume PO torsemide    #mild Leukocytosis  -no infectious etiology identified  -check respiratory panel    #Decreased appetite with depressive symptoms  -start low dose nightly Remeron     #Hypokalemia  -Electrolyte replacement     #Supratherapeutic INR  -hold Coumadin   -daily INR     #Atrial fibrillation on warfarin- warfarin, metoprolol  #CAD status post PCI-aspirin  #Hypertension-metoprolol  #Type 2 diabetes-ISS  #GERD-Protonix  #Hypothyroidism-levothyroxine            Marlys Lei DO    Supplementary Documentation:     Quality:  DVT Mechanical Prophylaxis:        DVT Pharmacologic Prophylaxis   Medication    [Held by provider] warfarin (Coumadin) tab 5 mg      DVT Pharmacologic prophylaxis: Aspirin 162 mg         Code Status: Full Code  Ramirez: No urinary catheter in place      The 21st Century Cures Act makes medical notes like these available to patients in the interest of transparency. Please be advised this is a medical document. Medical documents  are intended to carry relevant information, facts as evident, and the clinical opinion of the practitioner. The medical note is intended as peer to peer communication and may appear blunt or direct. It is written in medical language and may contain abbreviations or verbiage that are unfamiliar.             **Certification      PHYSICIAN Certification of Need for Inpatient Hospitalization - Initial Certification    Patient will require inpatient services that will reasonably be expected to span two midnight's based on the clinical documentation in H+P.   Based on patients current state of illness, I anticipate that, after discharge, patient will require TBD.

## 2024-06-17 NOTE — CM/SW NOTE
MARIBEL finally received a call back from [facility] nursing who reported that the DON Gneesis is out of the office today and they have no one available to complete re-admission assessment with patient. Due to Genesis being out of the office today there is no one to approve patient's return.     MRAIBEL notified [facility] that patient is medically cleared for DC and it is not appopriate for patient to remain in the hospital if she meets the criteria for assisted living, which she does.     MARIBEL escalated to  director Dinah who is reaching out to [facility].     MARIBEL will continue to follow for plan of care changes and remain available for any additional DC needs or concerns.     Юлия Cid MSW, LSW  Discharge Planner   [id number]

## 2024-06-17 NOTE — PLAN OF CARE
Pt A/O x4. VSS. Afebrile. Pt denied pain throughout day. Medications admin per MAR. Pt's potassium critical today at 2.9. MD notified and replaced per MD order. Pt ambulated safely in room w/ walker and SBA.Pt cleared for DC by hospitalist. Per MARIBEL, awaiting insurance auth for [facility]. Fall and safety precautions in place. Call light within reach.

## 2024-06-17 NOTE — CM/SW NOTE
Spoke with HARMAN Hurtado #[phone] [facility] regarding pt's return. Genesis is out of town in [place] until late this evening. She does not have anyone on call/in her place to perform onsite assessments of residents. Discussed pt's current mobility level as described in therapy notes. Due to pt discharging from the ED back to [facility] and then returning due to the ED \"due to not being able to stand/requiring 3 people assist\" Genesis states that it is against state regulations for her to be accepted back to [facility] until she can formally evaluate. She is coming back into town this evening and will be at [facility] between [id number] tomorrow to evaluate for return.     Updated unit SW. Avoidable delay entered.    CHRIS PrestonN, RN-BC    [id number]

## 2024-06-17 NOTE — PHYSICAL THERAPY NOTE
PHYSICAL THERAPY TREATMENT NOTE - INPATIENT    Room Number: 402/402-A     Session: 1     Number of Visits to Meet Established Goals: 5    Presenting Problem: fall,weakness  Co-Morbidities : atrial fibrillation, HTN. CAD    ASSESSMENT   Patient demonstrates good  progress this session as reflected in goals below.    Patient continues to function near baseline with bed mobility, transfers, and gait.  Contributing factors to remaining limitations include decreased functional strength, decreased endurance/aerobic capacity, and decreased muscular endurance.  Next session anticipate patient to progress transfers and gait.  Physical Therapy will continue to follow patient for duration of hospitalization.    Patient continues to benefit from continued skilled PT services: at discharge to promote functional independence in home.  Anticipate patient will return home with home health PT.    PLAN  PT Treatment Plan: Bed mobility;Endurance;Gait training  Rehab Potential : Good  Frequency (Obs): 5x/week    CURRENT GOALS     Goal #1 Patient is able to demonstrate supine - sit EOB @ level: supervision     -MET   Goal #2 Patient is able to demonstrate transfers Sit to/from Stand at assistance level: supervision      Goal #3 Patient is able to ambulate 100 feet with assist device: walker - rolling at assistance level: supervision        2024 goals ongoing 2,3 achieved 1    SUBJECTIVE  \"These are not my hearing aids.\"    OBJECTIVE  Precautions: Low vision;Hard of hearing    WEIGHT BEARING RESTRICTION  Weight Bearing Restriction: None                PAIN ASSESSMENT   Ratin          BALANCE                                                                                                                       Static Sitting: Fair +  Dynamic Sitting: Fair +           Static Standing: Fair -  Dynamic Standing: Fair -    ACTIVITY TOLERANCE                         O2 WALK         AM-PAC '6-Clicks' INPATIENT SHORT FORM - BASIC  MOBILITY  How much difficulty does the patient currently have...  Patient Difficulty: Turning over in bed (including adjusting bedclothes, sheets and blankets)?: A Little   Patient Difficulty: Sitting down on and standing up from a chair with arms (e.g., wheelchair, bedside commode, etc.): A Little   Patient Difficulty: Moving from lying on back to sitting on the side of the bed?: A Little   How much help from another person does the patient currently need...   Help from Another: Moving to and from a bed to a chair (including a wheelchair)?: A Little   Help from Another: Need to walk in hospital room?: A Little   Help from Another: Climbing 3-5 steps with a railing?: A Lot       AM-PAC Score:  Raw Score: 17   Approx Degree of Impairment: 50.57%   Standardized Score (AM-PAC Scale): 42.13   CMS Modifier (G-Code): CK    FUNCTIONAL ABILITY STATUS  Gait Assessment   Functional Mobility/Gait Assessment  Gait Assistance: Contact guard assist  Distance (ft): 50  Assistive Device: Rolling walker  Pattern: Shuffle    Skilled Therapy Provided    Bed Mobility:  Rolling: NT   Supine<>Sit: supervision   Sit<>Supine: NT     Transfer Mobility:  Sit<>Stand: CGA   Stand<>Sit: supervision   Gait: see assessment above.     Therapist's Comments: Pt received asleep in bed. Pt agreeable to PT. Pt reports being [place] and cannot find hearing aids. Pt demonstrates bed mobility with supervision using the railing to pull trunk up. Pt denied lightheadedness and dizziness sitting EOB and was agreeable to try walking in the hallway. Pt required CGA sit<>stand and walked with slowed guillaume and shuffled steps. Pt verbalized feeling fatigued after about 25 ft so asked to go back into room. Pt still denied SOB. Pt sat in chair under supervision and preformed LE exercises. Pt encouraged to walk with nursing staff later today.       THERAPEUTIC EXERCISES  Lower Extremity Alternating marching  LAQ     Position Sitting & Standing     Repetitions   20  total   20 total   Sets   1     Patient End of Session: Up in chair;Needs met;Call light within reach;RN aware of session/findings;All patient questions and concerns addressed;Alarm set    PT Session Time: 23 minutes    Therapeutic Activity: 23 minutes

## 2024-06-17 NOTE — CM/SW NOTE
MARIBEL called patient's daughter Jazzmine to discuss DC plan and patient likely being able to return back to [facility]. Anticipated therapy need is currently home health services which can be re-started with [facility].     Jazzmine reported that she has signed a contract with [facility]. She stated that she was not sure that patient would need them at DC but is going to call them today to see if they would be able to service patient today.     MARIBEL called and left a voicemail for the  at [facility] and is awaiting a call back to verify they can re-accept patient.     MARIBEL will continue to follow for plan of care changes and remain available for any additional DC needs or concerns.     Юлия Cid MSW, LSW  Discharge Planner   [id number]

## 2024-06-17 NOTE — OCCUPATIONAL THERAPY NOTE
OCCUPATIONAL THERAPY EVALUATION - INPATIENT     Room Number: 402/402-A  Evaluation Date: 6/17/2024  Type of Evaluation: Initial  Presenting Problem: Fall, weakness,    Physician Order: IP Consult to Occupational Therapy  Reason for Therapy: ADL/IADL Dysfunction and Discharge Planning    OCCUPATIONAL THERAPY ASSESSMENT   Patient is currently functioning near baseline with toileting, bathing, lower body dressing, and transfers. Prior to admission, patient's baseline is Modified independent. Supervision for showering.  Patient is requiring supervision and contact guard assist as a result of the following impairments: decreased endurance. Occupational Therapy will continue to follow for duration of hospitalization.    Patient will benefit from continued skilled OT Services at discharge to promote functional independence and safety with additional support and return home with home health OT      History Related to Current Admission: Patient is a 89 year old female admitted on 6/15/2024 with Presenting Problem: Fall, weakness,. Co-Morbidities : atrial fibrillation, HTN. CAD. [facility] for fall .  Pt diagnosed with weakness, dehydration , hyponatremia.       WEIGHT BEARING RESTRICTION  Weight Bearing Restriction: None                Recommendations for nursing staff:   Transfers: supervision  Toileting location:toilet    EVALUATION SESSION:  Patient Start of Session: supine  FUNCTIONAL TRANSFER ASSESSMENT  Sit to Stand: Edge of Bed  Edge of Bed: Supervision    BED MOBILITY  Supine to Sit : Supervision  Sit to Supine (OT): Supervision    COGNITION  Overall Cognitive Status:  WFL - within functional limits    Upper Extremity   ROM: within functional limits   Strength: within functional limits     EDUCATION PROVIDED  Patient: Role of Occupational Therapy; Plan of Care; Functional Transfer Techniques; Energy Conservation  Patient's Response to Education: Verbalized Understanding    Equipment used: rw       Therapist  comments: pt reporting fatigue after walking with therapy earlier. Supervision supine to sit. Able to stand with RW, supervision and took 6 steps by the bed with RW, supervision. Pt wished to return to bed to rest.   Supervision sit to supine. OT educated the pt about role of OT and plan of care. Verbalized understanding.    Patient End of Session: In bed;Needs met;RN aware of session/findings;Call light within reach;All patient questions and concerns addressed;Family present    OCCUPATIONAL PROFILE    HOME SITUATION  Type of Home: Assisted living facility ([facility])     Lives With: Alone    Toilet and Equipment: Comfort height toilet  Shower/Tub and Equipment: Walk-in shower;Grab bar;Shower chair             Drives: No  Patient Regularly Uses: Glasses    Prior Level of Function: lives at [facility]. Modified independent level with dressing, toileting, toilet transfer. Supervision for showering. Uses rollator.      PAIN ASSESSMENT  Ratin          OBJECTIVE  Precautions: Low vision;Hard of hearing  Fall Risk: High fall risk      ASSESSMENTS    AM-PAC ‘6-Clicks’ Inpatient Daily Activity Short Form  -   Putting on and taking off regular lower body clothing?: A Little  -   Bathing (including washing, rinsing, drying)?: A Little  -   Toileting, which includes using toilet, bedpan or urinal? : A Little  -   Putting on and taking off regular upper body clothing?: None  -   Taking care of personal grooming such as brushing teeth?: None  -   Eating meals?: None    AM-PAC Score:  Score: 21  Approx Degree of Impairment: 32.79%  Standardized Score (AM-PAC Scale): 44.27    ADDITIONAL TESTS     NEUROLOGICAL FINDINGS      COGNITION ASSESSMENTS       PLAN  OT Treatment Plan: Energy conservation/work simplification techniques;ADL training;UE strengthening/ROM;Patient/Family education  Rehab Potential : Good  Frequency: 3x/week  Number of Visits to Meet Established Goals: 5    ADL Goals   Patient will perform grooming: with  setup  Patient will perform lower body dressing:  with supervision  Patient will perform toileting: with supervision    Functional Transfer Goals  Patient will transfer to toilet:  with supervision    UE Exercise Program Goal  Patient will be independent with bilateral AROM HEP (home exercise program).    Additional Goals  Pt will incorporate 2 energy conservation techniques into ADL.    Patient Evaluation Complexity Level:   Occupational Profile/Medical History LOW - Brief history including review of medical or therapy records    Specific performance deficits impacting engagement in ADL/IADL LOW  1 - 3 performance deficits    Client Assessment/Performance Deficits MODERATE - Comorbidities and min to mod modifications of tasks    Clinical Decision Making LOW - Analysis of occupational profile, problem-focused assessments, limited treatment options    Overall Complexity LOW     OT Session Time: 16 minutes  Self-Care Home Management:  minutes  Therapeutic Activity: 5 minutes

## 2024-06-18 ENCOUNTER — TELEPHONE (OUTPATIENT)
Dept: INTERNAL MEDICINE CLINIC | Facility: CLINIC | Age: 89
End: 2024-06-18

## 2024-06-18 VITALS
BODY MASS INDEX: 33 KG/M2 | TEMPERATURE: 98 F | SYSTOLIC BLOOD PRESSURE: 127 MMHG | RESPIRATION RATE: 19 BRPM | HEART RATE: 71 BPM | WEIGHT: 163 LBS | OXYGEN SATURATION: 95 % | DIASTOLIC BLOOD PRESSURE: 79 MMHG

## 2024-06-18 DIAGNOSIS — D50.9 IRON DEFICIENCY ANEMIA, UNSPECIFIED IRON DEFICIENCY ANEMIA TYPE: Primary | ICD-10-CM

## 2024-06-18 LAB
GLUCOSE BLD-MCNC: 124 MG/DL (ref 70–99)
GLUCOSE BLD-MCNC: 189 MG/DL (ref 70–99)

## 2024-06-18 RX ORDER — FERROUS SULFATE 15 MG/ML
90 DROPS ORAL DAILY
Qty: 50 ML | Refills: 3 | Status: SHIPPED | OUTPATIENT
Start: 2024-06-18

## 2024-06-18 NOTE — CM/SW NOTE
SW called and spoke with [facility] ([phone]) DON for [facility] assisted living who confirmed that patient is able to return today but requersted that patient be sent back with Home O2. SW notified RN and requested that O2 walk be completed.     SW will continue to follow for O2 walk to be completed and will discuss transport back to [facility] with patient.     RN to call report to Navid   [phone]    SW will continue to follow for plan of care changes and remain available for any additional DC needs or concerns.     Юлия Cid MSW, LSW  Discharge Planner   [id number]

## 2024-06-18 NOTE — PLAN OF CARE
Assumed pt care at 0730. A&O x 4. On room air. Denies pain. Vital signs stable. Up with x1 assist and walker.     Plan of care: DC planning.    Plan of care updated with patient. Questions answered, pt verbalized understanding. Call light is within reach. All needs met at this time.

## 2024-06-18 NOTE — PROGRESS NOTES
06/18/24 1035   Mobility   O2 walk? Yes   SPO2% on Room Air at Rest 96   At rest oxygen flow (liters per minute) 0   SPO2% Ambulation on Room Air 88   SPO2% Ambulation on Oxygen 95   Ambulation oxygen flow (liters per minute) 1

## 2024-06-18 NOTE — PLAN OF CARE
Pt received A&Ox4. VSS. RA. Afebrile. Denies pain. Medications given per MAR. Call light within reach. Fall precautions in place. Poor PO intake. Pt refusing AM labs, will defer to dayshift.

## 2024-06-18 NOTE — CM/SW NOTE
SW noted that patient is medically cleared for DC but is needing home O2. SW sent referral to [facility] for home O2 and is awaiting MD chen for order and MD verbiage.     Patient reported that she will have her daughter Kacey transport her home to [facility] today.     Update: Per MD home O2 is not needed. SW notified [facility] at [facility] that MD is not recommending O2 at DC and she is agreeable to patient coming back today. SW notified RN. Family to transport patient back.     RN to call report to Navid   [phone]    SW will continue to follow for plan of care changes and remain available for any additional DC needs or concerns.     Юлия Cid MSW, LSW  Discharge Planner   [id number]

## 2024-06-18 NOTE — TELEPHONE ENCOUNTER
Navid MIJARES from [facility] called     Patient was discharged from [facility] and is now back at [facility]    Navid MIJARES is calling to clarify medications orders.     Verbal order given to resume prn orders as previously written:    Simethicone 125mg one daily as needed  Meclizine 12.5mg q 8hrs as needed for dizziness.  Tylenol 500mg q 6hrs as needed for pain  Vitamin B12 SL daily    Navid MIJARES asking about Iron order from hospital:  Ferrous Sulfate 90mg oral daily to take as 1 tablespoon= 15ml (45mg) BID after meals.     Same as previously written- Needs new Rx sent to [facility].  Order sent to pharmacy     #[phone] [facility] nurses line.   Please call tomorrow if any changes in medications    To Dr Patricio Marques RN

## 2024-06-18 NOTE — DISCHARGE SUMMARY
[facility]     Denae Kern Patient Status:  Inpatient    5/3/1930 MRN [id number]   Location [facility] 4NW-A Attending No att. providers found   Hosp Day # 3 PCP Willian Curry MD     Date of Admission: 6/15/2024  Date of Discharge:  2024     Discharge Disposition: Assisted Living    Discharge Diagnosis:  #Generalized Weakness/deconditioning  -PT/OT  -lives at assisted living, will likely need [facility]SHA      #Mild acute on chronic HFpEF  -resume PO torsemide     #mild Leukocytosis  -no infectious etiology identified  -respiratory panel negative      #Decreased appetite with depressive symptoms  -start low dose nightly Remeron      #Hypokalemia  -Electrolyte replacement      #Supratherapeutic INR  -hold Coumadin - refused INR check this AM, plan to resume tonight but will need INR done at assisted living tomorrow      #Atrial fibrillation on warfarin- warfarin, metoprolol  #CAD status post PCI-aspirin  #Hypertension-metoprolol  #Type 2 diabetes-ISS  #GERD-Protonix  #Hypothyroidism-levothyroxine    History of Present Illness:      Denae Kern is a 89 year old female with history of atrial fibrillation, type 2 diabetes, hypertension, GERD, coronary disease, chronic heart failure with preserved ejection fraction, hypothyroidism, iron deficiency anemia presents emergency room after a fall patient was feeling progressively weak over the last couple days primary care physician thought she had a urinary tract infection so had put her on antibiotics but patient states she has not seen any change.  Patient is also had poor appetite.  Patient earlier  earlier today tried grabbing onto her walker but missed and fell.  Patient in the hospital had a negative workup was discharged back to her assisted living and was unable to ambulate and so came back to the emergency room.  No fevers, chills, nausea, vomiting, diarrhea or constipation.  No chest pain, shortness of breath, dizziness,  lightheadedness, or syncope.  No numbness or tingling in extremities or any focalized weakness.    Brief Synopsis: Patient was admitted, improved with gentle diuresis, was also started on Remeron, worked with therapy and discharged back to assisted living in stable condition.    Lace+ Score: 80  59-90 High Risk  29-58 Medium Risk  0-28   Low Risk       TCM Follow-Up Recommendation:  LACE > 58: High Risk of readmission after discharge from the hospital.      Procedures during hospitalization:   N/a    Incidental or significant findings and recommendations (brief descriptions):  N/a    Lab/Test results pending at Discharge:   N/a    Consultants:  N/a    Discharge Medication List:     Discharge Medications        START taking these medications        Instructions Prescription details   mirtazapine 7.5 MG Tabs  Commonly known as: Remeron      Take 1 tablet (7.5 mg total) by mouth nightly.   Quantity: 30 tablet  Refills: 0            CHANGE how you take these medications        Instructions Prescription details   warfarin 5 MG Tabs  Commonly known as: Coumadin  What changed: when to take this      Take 1 tablet (5 mg total) by mouth nightly.   Refills: 0            CONTINUE taking these medications        Instructions Prescription details   Accu-Chek Susi Plus Strp      Use 3-4 times a day as directed.   Quantity: 100 strip  Refills: 1     Accu-Chek Softclix Lancets Misc      USE ONE TO THREE times a DAY   Quantity: 100 each  Refills: 3     aspirin 81 MG Chew      Chew 1 tablet (81 mg total) by mouth 3 (three) times a week.   Refills: 0     Calcium Carbonate-Vitamin D 600-125 MG-UNIT Tabs      Take 1 tablet by mouth daily.   Refills: 0     Insulin Glargine-yfgn 100 UNIT/ML Sopn  Commonly known as: Semglee (yfgn)      Inject 5 Units into the skin at bedtime.   Stop taking on: September 15, 2024  Quantity: 9 mL  Refills: 3     Iron (Ferrous Sulfate) 75 (15 Fe) MG/ML Soln      Take 90 mg by mouth daily. Taken as 1  tablespoon=15ml (45mg) bid after meals   Quantity: 50 mL  Refills: 3     Lansoprazole 15 MG Cpdr      Take 1 capsule (15 mg total) by mouth every morning.   Refills: 0     levothyroxine 75 MCG Tabs  Commonly known as: Synthroid      TAKE ONE TABLET BY MOUTH DAILY BEFORE breakfast   Quantity: 90 tablet  Refills: 0     losartan 100 MG Tabs  Commonly known as: Cozaar      Take 1 tablet (100 mg total) by mouth daily.   Quantity: 90 tablet  Refills: 3     metoprolol tartrate 50 MG Tabs  Commonly known as: Lopressor      Take 1.5 tablets (75 mg total) by mouth 2 (two) times daily.   Refills: 0     MULTIPLE VITAMIN OR      1 by mouth daily   Refills: 0     potassium chloride 40 meq/30 ml (10%) Soln  Commonly known as: K-Sol      Take 15 mL (20 mEq total) by mouth daily.   Quantity: 473 mL  Refills: 3     torsemide 20 MG Tabs  Commonly known as: Demadex      Take 1 tablet (20 mg total) by mouth every morning. PATIENT REQUESTS TO TAKE IM AM, SELF DISPENSE   Refills: 0            STOP taking these medications      BD AutoShield Duo 30G X 5 MM Misc  Generic drug: Insulin Pen Needle        Clarithromycin 125 MG/5ML Susr  Commonly known as: BIAXIN        Doxycycline Monohydrate 50 MG Caps  Commonly known as: MONODOX        empagliflozin 10 MG Tabs  Commonly known as: Jardiance        Eysuvis 0.25 % Susp  Generic drug: Loteprednol Etabonate        neomycin-polymyxin-hydrocortisone 3.5-65548-2 Soln  Commonly known as: Cortisporin                  Where to Get Your Medications        These medications were sent to [facility] - [place], [place] -  [address] [phone], [phone]  [address], [facility] [address]      Phone: [phone]   Insulin Glargine-yfgn 100 UNIT/ML Sopn  mirtazapine 7.5 MG Tabs         [place] reviewed: n/a    Follow-up appointment:   Willian Curry MD  [address]  [place] [address] [phone]    Follow up in 1 week(s)  Follow up    Appointments for Next 30 Days 6/18/2024  - 2024        Date Arrival Time Visit Type Length Department Provider     2024  8:00 AM  LAB VISIT [2829] 60 min [facility] LAB 24    Patient Instructions:         Location Instructions:     Masks are optional for all patients and visitors, unless otherwise indicated.                      Vital signs:  Temp:  [98.2 °F (36.8 °C)-98.9 °F (37.2 °C)] 98.4 °F (36.9 °C)  Pulse:  [65-89] 71  Resp:  [19-21] 19  BP: (127-160)/(73-89) 127/79  SpO2:  [91 %-97 %] 95 %    Physical Exam:    General: No acute distress   Lungs: clear to auscultation  Cardiovascular: S1, S2  Abdomen: Soft      -----------------------------------------------------------------------------------------------  PATIENT DISCHARGE INSTRUCTIONS: See electronic chart    Marlys Lei DO    Total time spent on discharge plannin minutes     The  [employer] Cures Act makes medical notes like these available to patients in the interest of transparency. Please be advised this is a medical document. Medical documents are intended to carry relevant information, facts as evident, and the clinical opinion of the practitioner. The medical note is intended as peer to peer communication and may appear blunt or direct. It is written in medical language and may contain abbreviations or verbiage that are unfamiliar.

## 2024-06-18 NOTE — PROGRESS NOTES
Patient seen and examined  Medically stable and okay for discharge back to [facility]   Further documentation to follow

## 2024-06-18 NOTE — PLAN OF CARE
Discharge Note    Patient discharged at 1340. Explained discharge instructions and follow ups to patient and daughters. Verbalizes understanding, IV removed, tele monitor discontinued. Patient transported via wheelchair to [facility].     Called report to Navid at [facility]. Questions answered.

## 2024-06-19 ENCOUNTER — TELEPHONE (OUTPATIENT)
Dept: INTERNAL MEDICINE CLINIC | Facility: CLINIC | Age: 89
End: 2024-06-19

## 2024-06-19 ENCOUNTER — PATIENT OUTREACH (OUTPATIENT)
Dept: CASE MANAGEMENT | Age: 89
End: 2024-06-19

## 2024-06-19 DIAGNOSIS — R53.1 WEAKNESS: ICD-10-CM

## 2024-06-19 DIAGNOSIS — Z02.9 ENCOUNTERS FOR UNSPECIFIED ADMINISTRATIVE PURPOSE: Primary | ICD-10-CM

## 2024-06-19 PROCEDURE — 1111F DSCHRG MED/CURRENT MED MERGE: CPT

## 2024-06-19 RX ORDER — NYSTATIN 100000 [USP'U]/G
1 POWDER TOPICAL 2 TIMES DAILY
Qty: 60 G | Refills: 0 | Status: SHIPPED | OUTPATIENT
Start: 2024-06-19

## 2024-06-19 NOTE — TELEPHONE ENCOUNTER
Christine (pharmacist) from [facility] states that she needs the strength of the liquid iron because it does not match what she has on file and the other order she received yesterday. Please advise.

## 2024-06-19 NOTE — PROGRESS NOTES
Initial Post Discharge Follow Up   Discharge Date: 6/18/24  Contact Date: 6/19/2024    Consent Verification:  Assessment Completed With: Patient  HIPAA Verified?  Yes    Discharge Dx:   Weakness     General:   How have you been since your discharge from the hospital? NCM spoke with pt states she is feeling better. Pt denies any weakness, shortness of breath, chest pain, fevers, chills, nausea, vomiting or any others. Pt states someone stays with her all the time.   Do you have any pain since discharge?  No    How well was your pain managed while in the hospital?   On a scale of 1-5   1- Very Poor and 5- Very well   5  When you were leaving the hospital were your discharge instructions reviewed with you? Yes  How well were your discharge instructions explained to you?   On a scale of 1-5   1- Very Poor and 5- Very well   5  Do you have any questions about your discharge instructions?  No  Before leaving the hospital was your diagnoses explained to you? Yes  Do you have any questions about your diagnoses? No  Are you able to perform normal daily activities of living as you have prior to your hospital stay (dressing, bathing, ambulating to the bathroom, etc)? yes  (LEOBARDO) Was patient given a different diet per AVS? no      Medications: Reviewed medication list.  Medications are up to date.  Current Outpatient Medications   Medication Sig Dispense Refill    Iron, Ferrous Sulfate, 75 (15 Fe) MG/ML Oral Solution Take 90 mg by mouth daily. Taken as 1 tablespoon=15ml (45mg) bid after meals 50 mL 3    warfarin 5 MG Oral Tab Take 1 tablet (5 mg total) by mouth nightly.      mirtazapine 7.5 MG Oral Tab Take 1 tablet (7.5 mg total) by mouth nightly. 30 tablet 0    Insulin Glargine-yfgn (SEMGLEE, YFGN,) 100 UNIT/ML Subcutaneous Solution Pen-injector Inject 5 Units into the skin at bedtime. 9 mL 3    LEVOTHYROXINE 75 MCG Oral Tab TAKE ONE TABLET BY MOUTH DAILY BEFORE breakfast 90 tablet 0    losartan 100 MG Oral Tab Take 1 tablet  (100 mg total) by mouth daily. 90 tablet 3    potassium chloride 40 meq/30 ml (10%) Oral Solution Take 15 mL (20 mEq total) by mouth daily. 473 mL 3    ACCU-CHEK SOFTCLIX LANCETS Does not apply Misc USE ONE TO THREE times a  each 3    torsemide 20 MG Oral Tab Take 1 tablet (20 mg total) by mouth every morning. PATIENT REQUESTS TO TAKE IM AM, SELF DISPENSE      Glucose Blood (ACCU-CHEK HAYLIE PLUS) In Vitro Strip Use 3-4 times a day as directed. 100 strip 1    Lansoprazole 15 MG Oral Capsule Delayed Release Take 1 capsule (15 mg total) by mouth every morning.      aspirin 81 MG Oral Chew Tab Chew 1 tablet (81 mg total) by mouth 3 (three) times a week.      metoprolol tartrate 50 MG Oral Tab Take 1.5 tablets (75 mg total) by mouth 2 (two) times daily.      MULTIPLE VITAMIN OR 1 by mouth daily      Calcium Carbonate-Vitamin D 600-125 MG-UNIT Oral Tab Take 1 tablet by mouth daily.       Were there any changes to your current medication(s) noted on the AVS? Yes  If so, were these medication changes discussed with you prior to leaving the hospital? Yes  If a new medication was prescribed:    Was the new medication's purpose & side effects reviewed? Yes  Do you have any questions about your new medication? No  Did you  your discharge medications when you left the hospital? Yes  Let's go over your medications together to make sure we are not missing anything. Medications Reviewed  Are there any reasons that keep you from taking your medication as prescribed? No  Are you having any concerns with constipation? No      Discharge medications reviewed/discussed/and reconciled against outpatient medications with patient.  Any changes or updates to medications sent to PCP.  Patient Acknowledged     Referrals/orders at D/C:  Referrals/orders placed at D/C? no    DME ordered at D/C? No      Discharge orders, AVS reviewed and discussed with patient. Any changes or updates to orders sent to PCP.  Patient  Acknowledged      SDOH:   Transportation Needs: No Transportation Needs (6/15/2024)    Transportation Needs     Lack of Transportation: No     Car Seat: Not on file     Financial Resource Strain: Low Risk  (6/19/2024)    Financial Resource Strain     Difficulty of Paying Living Expenses: Not hard at all     Med Affordability: Not on file         Diagnosis specifics:   Warfarin/Coumadin:   Next PT/INR Date: today    Current Warfarin Dose: 5mg nightly    Does patient know who to follow-up with Warfarin/Coumadin management?  yes     Who manages Warfarin/Coumadin? Cardiology     Who is monitoring PT/INR?  (Coumadin Clinic, HH, PCP, cardiology, etc) cardiology   NCM Reviewed next PT/INR appointment with pt:  Yes      Follow up appointments:      Your appointments       Date & Time Appointment Department ([place])    Jul 18, 2024 8:00 AM CDT Lab Visit with [facility] LAB 24 [facility] ([facility])        Jul 24, 2024 10:40 AM CDT Follow-Up OV with Rajan Powers DO [facility] ([facility])    Please arrive 15 minutes prior to your scheduled appointment time.         Aug 06, 2024 2:00 PM CDT Medicare Annual Well Visit with Willian Curry MD [facility] ([facility])        Sep 10, 2024 2:15 PM CDT Established Patient with Laith Leigh MD [facility] ([facility])              [facility]  [address]  [facility] [address] [phone] [facility]  [facility]  [address]  [facility] [address]  [phone] [facility]  [facility]  [address] [facility] [id number] [phone]            TCC  Was TCC ordered: No      PCP (If no TCC appointment)  Does patient already have a PCP  appointment scheduled? No  NCM Attempted to schedule PCP office TCM appointment with patient   If no appointment scheduled: Explain: pt declined will schedule on her own.     Specialist    Does the patient have any other follow up appointment(s) needing to be scheduled? No      Is there any reason as to why you cannot make your appointment(s)?  No     Needs post D/C:   Now that you are home, are there any needs or concerns you need addressed before your next visit with your PCP?  (DME, meds, questions, etc.): No    Interventions by NCM:   All discharge instructions reviewed with the patient. Reviewed when to call MD vs when to call 911 or go the ED. Educated patient on the importance of taking all meds as prescribed as well as close f/u with PCP/specialists. Pt verbalized understanding and will contact the office with any further questions or concerns. Patient denies fevers, chills, nausea, vomiting, shortness of breath, chest pain, or any other symptoms at this time.  NCM attempted to schedule HFU, patient declined will call PCP/TCC office directly. NCM sent TE to PCP office re: assistance in scheduling HFU appt. NCM provided contact information for any further questions/concerns. Patient verbalized understanding and agreeable.       Overall Rating:   How would you rate the care you received while in the hospital? good    CCM referral placed:    No    BOOK BY DATE: 7/2/24

## 2024-06-19 NOTE — TELEPHONE ENCOUNTER
Spoke to pt for TCM today.  Pt does not have an appointment scheduled at this time.  TCM appt recommended by 6/25/24 as pt is a high risk for readmission.  Please advise.    BOOK BY DATE (last date for TCM): 7/2/24    Clinical staff:  Please f/u with pt and try to get them to schedule as pt would greatly benefit from TCM appt.  Thank you!

## 2024-06-20 ENCOUNTER — TELEPHONE (OUTPATIENT)
Dept: INTERNAL MEDICINE CLINIC | Facility: CLINIC | Age: 89
End: 2024-06-20

## 2024-06-20 NOTE — TELEPHONE ENCOUNTER
Patient refused her water pill, plus Navid would like to speak to the nurse regarding some information that was sent over today. Please call.

## 2024-06-20 NOTE — TELEPHONE ENCOUNTER
Spoke with Navid. Patient transferring to [facility] tomorrow per daughter Jazzmine.

## 2024-06-21 ENCOUNTER — EXTERNAL FACILITY (OUTPATIENT)
Dept: FAMILY MEDICINE CLINIC | Facility: CLINIC | Age: 89
End: 2024-06-21

## 2024-06-21 DIAGNOSIS — I48.20 CHRONIC ATRIAL FIBRILLATION (HCC): ICD-10-CM

## 2024-06-21 DIAGNOSIS — Z79.4 UNCONTROLLED TYPE 2 DIABETES MELLITUS WITH HYPERGLYCEMIA, WITH LONG-TERM CURRENT USE OF INSULIN (HCC): ICD-10-CM

## 2024-06-21 DIAGNOSIS — I10 ESSENTIAL HYPERTENSION: ICD-10-CM

## 2024-06-21 DIAGNOSIS — R53.1 GENERALIZED WEAKNESS: Primary | ICD-10-CM

## 2024-06-21 DIAGNOSIS — Z91.81 AT RISK FOR FALLS: ICD-10-CM

## 2024-06-21 DIAGNOSIS — E11.65 UNCONTROLLED TYPE 2 DIABETES MELLITUS WITH HYPERGLYCEMIA, WITH LONG-TERM CURRENT USE OF INSULIN (HCC): ICD-10-CM

## 2024-06-21 DIAGNOSIS — M46.96 INFLAMMATORY SPONDYLOPATHY OF LUMBAR REGION (HCC): ICD-10-CM

## 2024-06-21 DIAGNOSIS — Z79.01 WARFARIN ANTICOAGULATION: ICD-10-CM

## 2024-06-21 DIAGNOSIS — I50.32 CHRONIC HEART FAILURE WITH PRESERVED EJECTION FRACTION (HCC): ICD-10-CM

## 2024-06-23 NOTE — PROGRESS NOTES
Denae Kern Author: Henrik Gunderson MD     5/3/1930 MRN [id number]   Last Hospital  Admission 6/15/24      Last Hospital Discharge 24 PCP Willian Curry MD   [facility]        CC -admitted to [facility] residence for subacute rehab, generalized weakness/deconditioning    H.P.I Denae Kern is a 89 year old female resident of [facility] with history of multiple medical problems including type 2 diabetes mellitus hypertension hyperlipidemia atrial fibrillation coronary artery disease chronic congestive heart failure hypothyroidism was brought into the emergency room, patient has been feeling progressively weak.  UTI was suspected patient was started on oral antibiotics unfortunately did not improve and fell on the day of admission to the hospital.  Patient was found to be fluid overloaded, treated with diuresis with improvement and was transferred back to assisted living 2024  After going back to [facility] patient started refusing a water pill and continued to feel weak   were contacted and patient was accepted at [facility] for subacute rehab    Patient seen in her room today, she is doing well sitting comfortably on the recliner denies any chest pain or shortness of breath, denies any dizziness or headache nausea vomiting or diarrhea  She denies dysuria  Has poor appetite  She does understand that she is here for therapy and is looking forward to it    Past Medical History:    AC joint arthropathy    Actinic keratosis    right anterior lower leg    At high risk for falls    Atrial fibrillation (HCC)    PERSISTENT DR IRWIN  On Coumadin. Stable.    Autonomic neuropathy    Back pain    Bunion    CAD (coronary artery disease)    Cardiomegaly    mild, w/ mildly increased pulmonary vascularity, increased interstitial markings in the mid to lower lung fields, and B/L perihilar opacities is concerning for congestive failure    Chronic  heart failure with preserved ejection fraction (HCC)    Class 1 obesity    Constipation    Diabetes mellitus (HCC)    Difficult intubation    Disc degeneration    has several levels of degenerative discs and several areas of stenosis, both foraminal and central canal stenosis; and hx DDD, DJD    Diverticulosis    DVT of leg (deep venous thrombosis) (HCC)    post knee replacement    Dysphagia    Esophageal dilatation    Esophageal stricture    w/ variability in terms of tolerating that and tolerating meds    Fatty liver    Fecal impaction in rectum (HCC)    Flatulence/gas pain/belching    Frailty    Gastric polyp    Gastroparesis    primarily effecting insulin treatment    GERD (gastroesophageal reflux disease)    and motility disorder    Hammertoe    Hearing impairment    HA's bilateral    Hemorrhoids    Hiatal hernia    small    Hip pain    History  of basal cell carcinoma    basal cell and squamous cell    History of blood transfusion    with hysterectomy    History of colon polyps    History of Meniere's disease    History of myocardial infarction    History of PTCA    HTN (hypertension)    Hypothyroidism    IBS (irritable bowel syndrome)    Iron deficiency    Need iron infusion.    Mild mitral regurgitation    Nausea and vomiting in adult    Neck pain    head and neck discomfort    OA (osteoarthritis)    an hx low grade arthritis    Onychomycosis    severe, toenails, received podiatric treatment 2/29/2012 w/ Dr. Bowers    Osteoporosis    and osteopenia; 3/2009 \"Reclast infusion\"    Perirectal discomfort    perirectus discomfort    Presbyesophagus    Pulmonary hypertension (HCC)    Sinusitis    Squamous cell carcinoma    Thyroid nodule    right-on Carotid US    Tinnitus    Tumor, thyroid    Venous disease    w/ peripheral edema; and hx varicose veins    Vertigo    vertiginous symptoms     Past Surgical History:   Procedure Laterality Date    Angioplasty (coronary)  07/14/2010    PTCA, bare-metal stent to the R  coronary artery, catheterization, subtotal stenosis of the mid to distal R coronary artery    Appendectomy      Cataract      B/L    Cholecystectomy  1994    Colonoscopy      Colonoscopy      Diagnostic anoscopy      Egd      Fluor gid & loclzj ndl/cath spi dx/ther njx  02/07/2014    Procedure: SI JOINT INJECTION;  Surgeon: Willian Orellana MD;  Location: [facility] PA[facility] MA[facility]    Fluor gid & loclzj ndl/cath spi dx/ther njx  03/27/2014    Procedure: LUMBAR EPIDURAL;  Surgeon: Willian Orellana MD;  Location: [facility]    Foot/toes surgery proc unlisted  1991, 1998    bunions B/L, hammertoe B/L    Hysterectomy  1967    partial    Injection, w/wo contrast, dx/therapeutic substance, epidural/subarachnoid; lumbar/sacral  02/07/2014    Procedure: LUMBAR EPIDURAL;  Surgeon: Willian Orellana MD;  Location: [facility] PAIN MANAGEMENT    Injection, w/wo contrast, dx/therapeutic substance, epidural/subarachnoid; lumbar/sacral  03/27/2014    Procedure: LUMBAR EPIDURAL;  Surgeon: Willian Orellana MD;  Location: [facility]    Knee replacement surgery      R TKR-1995, L TKR-5/2007    Orthopedic surg ([facility])      kael bunions    Other surgical history      thyroid nodule; benign tumor on thyroid removed 1996; subtotal thyroidectomy 1996    Other surgical history  1974    varicose vein strip R leg    Other surgical history  01/12/2011    mid esophagus bx, gastric polyp bx    Other surgical history      esophageal dilatation    Other surgical history  02/14/2018    vulva biopsy: lichenoid inflammation with stromal fibrosis, negative for dysplasia    Patient documented not to have experienced any of the following events  02/07/2014    Procedure: SI JOINT INJECTION;  Surgeon: Willian Orellana MD;  Location: [facility]    Patient documented not to have experienced any of the following events  03/27/2014    Procedure: LUMBAR EPIDURAL;  Surgeon: Willian Orellana MD;   Location: [facility]    Patient withough preoperative order for iv antibiotic surgical site infection prophylaxis.  02/07/2014    Procedure: SI JOINT INJECTION;  Surgeon: Willian Orellana MD;  Location: [facility] PAIN [facility]    Patient withough preoperative order for iv antibiotic surgical site infection prophylaxis.  03/27/2014    Procedure: LUMBAR EPIDURAL;  Surgeon: Willian Orellana MD;  Location: [facility]IN [facility]    Skin surgery  05/19/2011    SCCIS to R temple / Mohs surgery    Tonsillectomy      T&A, childhood    Total knee replacement      bilateral    Upper gi endoscopy,exam  10/12/2010    upper gi series (air contrast) w/ esophogram     Family History   Problem Relation Age of Onset    Other (Parkinson's) Father     Other (pneumonia) Father     Heart Disorder Mother     Heart Disease Mother     Heart Attack Mother     Arthritis Mother     Circulatory Problems Mother     Other (Other) Mother     Heart Disorder Brother     Heart Attack Paternal Grandfather     Heart Attack Paternal Grandmother     Heart Disorder Paternal Grandmother     Diabetes Daughter     Cancer Brother         lung ca    Diabetes Brother     Suicide History Son     Cancer Other         colorectal ca, skin ca, fam hx    High Blood Pressure Other         fam hx    Heart Disorder Other         heart condition, mat grandparen    Heart Attack Other         mat grandparent    Stroke Other         mat grandparent    Other (Other) Other     Gastro-Intestinal Disorder Other         IBS, fam hx    Blood Disorder Other         blood blots, fam hx    Seizure Disorder Son         Epilepsy    Mental Disorder Son         paranoia, OCD     Social History     Socioeconomic History    Marital status:    Tobacco Use    Smoking status: Never    Smokeless tobacco: Never   Vaping Use    Vaping status: Never Used   Substance and Sexual Activity    Alcohol use: No     Alcohol/week: 0.0 standard drinks of alcohol      Comment: NONE    Drug use: Never   Other Topics Concern    Caffeine Concern Yes    Exercise Yes     Comment: not much    Seat Belt Yes     Social Determinants of Health     Financial Resource Strain: Low Risk  (6/19/2024)    Financial Resource Strain     Difficulty of Paying Living Expenses: Not hard at all   Food Insecurity: No Food Insecurity (6/15/2024)    Food Insecurity     Food Insecurity: Never true   Transportation Needs: No Transportation Needs (6/19/2024)    Transportation Needs     Lack of Transportation: No   Physical Activity: Insufficiently Active (8/24/2020)    Received from [facility], [facility]    Exercise Vital Sign     Days of Exercise per Week: 5 days     Minutes of Exercise per Session: 10 min   Housing Stability: Low Risk  (6/15/2024)    Housing Stability     Housing Instability: No       ALLERGIES:  Allergies   Allergen Reactions    Penicillins HIVES and OTHER (SEE COMMENTS)     CLASS    Rosuvastatin OTHER (SEE COMMENTS)     Reaction: muscle aches  Reaction: muscle aches    Acyclovir DIARRHEA    Allergy      BETA LACTAMS      Carbapenems UNKNOWN and OTHER (SEE COMMENTS)    Cephalosporins UNKNOWN    Codeine NAUSEA ONLY    Codeine [Opioid Analgesics] NAUSEA AND VOMITING    Demerol NAUSEA AND VOMITING    Diphenhydramine-Zinc Acetate UNKNOWN     BETA LACTAMS    Glucophage [Metformin Hydrochloride]      \"problematic\"    Meperidine NAUSEA ONLY and NAUSEA AND VOMITING    Misc. Sulfonamide Containing Compounds OTHER (SEE COMMENTS)    Other UNKNOWN    Penicillin G HIVES    Statins PAIN     Reaction: muscle aches      Sulfa Drugs Cross Reactors NAUSEA AND VOMITING     \"Sulfa\"      Cardizem RASH       CODE STATUS:  Full Code    CURRENT MEDICATIONS   Current Outpatient Medications   Medication Sig Dispense Refill    Nystatin 627692 UNIT/GM External Powder Apply 1 Application topically 2 (two) times daily. 60 g 0    Iron, Ferrous Sulfate, 75 (15 Fe) MG/ML Oral Solution Take 90 mg  by mouth daily. Taken as 1 tablespoon=15ml (45mg) bid after meals 50 mL 3    warfarin 5 MG Oral Tab Take 1 tablet (5 mg total) by mouth nightly.      mirtazapine 7.5 MG Oral Tab Take 1 tablet (7.5 mg total) by mouth nightly. 30 tablet 0    Insulin Glargine-yfgn (SEMGLEE, YFGN,) 100 UNIT/ML Subcutaneous Solution Pen-injector Inject 5 Units into the skin at bedtime. 9 mL 3    LEVOTHYROXINE 75 MCG Oral Tab TAKE ONE TABLET BY MOUTH DAILY BEFORE breakfast 90 tablet 0    losartan 100 MG Oral Tab Take 1 tablet (100 mg total) by mouth daily. 90 tablet 3    potassium chloride 40 meq/30 ml (10%) Oral Solution Take 15 mL (20 mEq total) by mouth daily. 473 mL 3    ACCU-CHEK SOFTCLIX LANCETS Does not apply Misc USE ONE TO THREE times a  each 3    torsemide 20 MG Oral Tab Take 1 tablet (20 mg total) by mouth every morning. PATIENT REQUESTS TO TAKE IM AM, SELF DISPENSE      Glucose Blood (ACCU-CHEK HAYLIE PLUS) In Vitro Strip Use 3-4 times a day as directed. 100 strip 1    Lansoprazole 15 MG Oral Capsule Delayed Release Take 1 capsule (15 mg total) by mouth every morning.      aspirin 81 MG Oral Chew Tab Chew 1 tablet (81 mg total) by mouth 3 (three) times a week.      metoprolol tartrate 50 MG Oral Tab Take 1.5 tablets (75 mg total) by mouth 2 (two) times daily.      MULTIPLE VITAMIN OR 1 by mouth daily      Calcium Carbonate-Vitamin D 600-125 MG-UNIT Oral Tab Take 1 tablet by mouth daily.         REVIEW OF SYSTEMS:  Review of Systems:   Constitutional: No fevers, chills, fatigue or night sweats.  ENT: No mouth pain, neck pain, running nose, headaches or swollen glands.  Skin: No rashes, pruritus or skin changes,  Respiratory: Denies cough, wheezing or shortness of breath.  CV: Denies chest pain, palpitations, orthopnea, PND or dizziness.  Musculoskeletal: No joint pain, stiffness or swelling.  GI: No nausea, vomiting or diarrhea. No blood in stools.  Neurologic: No seizures, tremors, weakness or numbness    VITALS: Pulse  72/min respiration 18/min temperature 97.4 blood pressure 120/80    PHYSICAL EXAM:  GENERAL: well developed, well nourished, in no apparent distress  SKIN: no rashes, no suspicious lesions  Wound; no open wounds  HEENT: atraumatic, normocephalic, ears and throat are clear  EYES: PERRLA, EOMI, conjunctiva are clear  NECK: supple, no adenopathy, no bruits  CHEST: no chest tenderness  LUNGS: clear to auscultation  CARDIO: RRR without murmur  GI: good BS's, no masses, HSM or tenderness  : deferred  RECTAL: deferred  MUSCULOSKELETAL: back is not tender, FROM of the back  EXTREMITIES: no cyanosis, clubbing or edema  NEURO: Oriented times three, cranial nerves are intact, motor and sensory are grossly intact      DIAGNOSTICS REVIEWED AT THIS VISIT:  Lab Results   Component Value Date     (H) 06/17/2024    BUN 15 06/17/2024    BUNCREA 14.0 08/04/2020    CREATSERUM 0.93 06/17/2024    ANIONGAP 8 06/17/2024    GFR 65 05/17/2017    GFRNAA 55 (L) 06/24/2022    GFRAA 63 06/24/2022    CA 8.4 (L) 06/17/2024    OSMOCALC 280 06/17/2024    ALKPHO 80 06/15/2024    AST 25 06/15/2024    ALT 23 06/15/2024    BILT 1.2 06/15/2024    TP 6.9 06/15/2024    ALB 3.0 (L) 06/15/2024    GLOBULIN 3.9 06/15/2024     (L) 06/17/2024    K 2.9 (LL) 06/17/2024     06/17/2024    CO2 22.0 06/17/2024       Lab Results   Component Value Date    WBC 11.1 (H) 06/17/2024    RBC 4.10 06/17/2024    HGB 12.0 06/17/2024    HCT 34.7 (L) 06/17/2024    .0 06/17/2024    MPV 10.2 (H) 03/31/2011    MCV 84.6 06/17/2024    MCH 29.3 06/17/2024    MCHC 34.6 06/17/2024    RDW 13.6 06/17/2024    NEPRELIM 9.90 (H) 06/16/2024    NEPERCENT 86.9 06/16/2024    LYPERCENT 3.5 06/16/2024    MOPERCENT 8.9 06/16/2024    EOPERCENT 0.0 06/16/2024    BAPERCENT 0.3 06/16/2024    NE 9.90 (H) 06/16/2024    LYMABS 0.40 (L) 06/16/2024    MOABSO 1.02 (H) 06/16/2024    EOABSO 0.00 06/16/2024    BAABSO 0.03 06/16/2024     ASSESSMENT AND PLAN:      Diagnoses and all  orders for this visit:    Generalized weakness  At risk for falls   PT to work on ambulation, gait, balance, strength, endurance, transfers, safety  - OT to work on fine motor skills, ADLs, hygiene, toileting, transfers, safety  Chronic heart failure with preserved ejection fraction (HCC)  Chronic atrial fibrillation (HCC)  Daily weights  Torsemide 20 mg daily  Warfarin anticoagulation  Coumadin 5 mg daily   check PT/INR  Essential hypertension  Losartan 100 mg daily  Metoprolol 50 mg daily  Chronic 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), lumbar arthritis, and lumbar spinal stenosis  PT/OT  Tylenol as needed for pain  Type 2 diabetes mellitus  Check sugars before meals and at bedtime  Continue insulin glargine 5 units at bedtime  - 24h nursing for medication administration, bowel/bladder care, pain/sleep assessment  - Physician supervision for multiple medical comorbidities, fall risk, DVT risk, infection risk, pain management  - Psych for adjustment to disability and cognitive deficits  - Social work/: for discharge planning needs and access to community resources as needed       Check the labs in the morning, CBC CMP TSH, PT/INR vitamin D, UA    Time spent at appointment today is 95 minutes including preparing to see patient, reviewing test results, performing medically appropriate examination and evaluation and coordinating care, counseling and educating patient/family, ordering medications and testing, and documenting clinical information in EMR.       Henrik Gunderson MD   [facility]  [address] [address]    Electronically signed      This dictation was performed with a verbal recognition program (DRAGON) and it was checked for errors. It is possible that there are still dictated errors within this office note. If so, please bring any errors to my attention for an addendum. All efforts were made to ensure that this office note is accurate

## 2024-06-24 ENCOUNTER — INITIAL APN SNF VISIT (OUTPATIENT)
Dept: INTERNAL MEDICINE CLINIC | Age: 89
End: 2024-06-24

## 2024-06-24 VITALS
TEMPERATURE: 97 F | RESPIRATION RATE: 17 BRPM | OXYGEN SATURATION: 96 % | SYSTOLIC BLOOD PRESSURE: 110 MMHG | HEART RATE: 81 BPM | DIASTOLIC BLOOD PRESSURE: 57 MMHG

## 2024-06-24 DIAGNOSIS — Z79.4 TYPE 2 DIABETES MELLITUS WITH OTHER CIRCULATORY COMPLICATION, WITH LONG-TERM CURRENT USE OF INSULIN (HCC): ICD-10-CM

## 2024-06-24 DIAGNOSIS — E87.6 HYPOKALEMIA: ICD-10-CM

## 2024-06-24 DIAGNOSIS — E11.8 TYPE 2 DIABETES MELLITUS WITH UNSPECIFIED COMPLICATIONS (HCC): ICD-10-CM

## 2024-06-24 DIAGNOSIS — K59.00 CONSTIPATION, UNSPECIFIED CONSTIPATION TYPE: ICD-10-CM

## 2024-06-24 DIAGNOSIS — R63.0 DECREASED APPETITE: ICD-10-CM

## 2024-06-24 DIAGNOSIS — Z79.01 WARFARIN ANTICOAGULATION: ICD-10-CM

## 2024-06-24 DIAGNOSIS — K21.9 GASTROESOPHAGEAL REFLUX DISEASE, UNSPECIFIED WHETHER ESOPHAGITIS PRESENT: ICD-10-CM

## 2024-06-24 DIAGNOSIS — I48.21 PERMANENT ATRIAL FIBRILLATION (HCC): ICD-10-CM

## 2024-06-24 DIAGNOSIS — E11.59 TYPE 2 DIABETES MELLITUS WITH OTHER CIRCULATORY COMPLICATION, WITH LONG-TERM CURRENT USE OF INSULIN (HCC): ICD-10-CM

## 2024-06-24 DIAGNOSIS — T78.40XD ALLERGY, SUBSEQUENT ENCOUNTER: ICD-10-CM

## 2024-06-24 DIAGNOSIS — R53.1 WEAKNESS: ICD-10-CM

## 2024-06-24 DIAGNOSIS — I50.32 CHRONIC DIASTOLIC (CONGESTIVE) HEART FAILURE (HCC): Primary | ICD-10-CM

## 2024-06-24 DIAGNOSIS — E03.9 HYPOTHYROIDISM, UNSPECIFIED TYPE: ICD-10-CM

## 2024-06-24 DIAGNOSIS — D72.829 LEUKOCYTOSIS, UNSPECIFIED TYPE: ICD-10-CM

## 2024-06-24 RX ORDER — INSULIN GLARGINE 100 [IU]/ML
5 INJECTION, SOLUTION SUBCUTANEOUS NIGHTLY
COMMUNITY

## 2024-06-24 RX ORDER — SENNOSIDES 8.6 MG
8.6 TABLET ORAL 2 TIMES DAILY
COMMUNITY

## 2024-06-24 RX ORDER — MECLIZINE HCL 12.5 MG/1
12.5 TABLET ORAL 3 TIMES DAILY PRN
COMMUNITY

## 2024-06-24 RX ORDER — POLYETHYLENE GLYCOL 3350 17 G/17G
17 POWDER, FOR SOLUTION ORAL DAILY PRN
COMMUNITY

## 2024-06-24 RX ORDER — LORATADINE 10 MG/1
10 TABLET ORAL DAILY
COMMUNITY

## 2024-06-24 RX ORDER — OMEPRAZOLE 10 MG/1
10 CAPSULE, DELAYED RELEASE ORAL DAILY
COMMUNITY

## 2024-06-24 RX ORDER — SIMETHICONE 125 MG
125 TABLET,CHEWABLE ORAL DAILY PRN
COMMUNITY

## 2024-06-24 RX ORDER — ACETAMINOPHEN 500 MG
500 TABLET ORAL EVERY 6 HOURS PRN
COMMUNITY

## 2024-06-24 RX ORDER — MELATONIN
1000 DAILY
COMMUNITY

## 2024-06-24 RX ORDER — WARFARIN SODIUM 4 MG/1
4 TABLET ORAL DAILY
COMMUNITY

## 2024-06-24 NOTE — PROGRESS NOTES
Skilled Nursing Facility, Subacute Rehab  [facility]     Denae Kern Author: Sheyla JaimeIRMA     5/3/1930 MRN [id number]   Last Hospital  Admission 6/15/24      Last Hospital Discharge 24 PCP Willian Curry MD     Hospital Discharge Diagnoses:  Generalized weakness   Mild acute on chronic CHF   Mild Leukocytosis   Decreased appetite   Hypokalemia   Subtherapeutic INR   Atrial fibrillation on warfarin   CAD status post PCI   DM2   GERD  Hypothyroid       HPI:  Denae Kern  : 5/3/1930, Age 89 year old  female patient is admitted for subacute rehab. Patient has a past medical history of atrial fibrillation, DM2, hypertension, GERD, coronary disease, chronic heart failure with preserved ejection fraction, hypothyroidism, iron deficiency anemia. She had been progressively getting weaker at home it was believed she had a UTI and PCP started antibiotic. She is also having a poor appetite started on Remeron. The day of admission she was grabbing onto her walker missed and fell. On admission mild leukocytosis no infectious etiology, respiratory panel neg. Subtherapeutic INR coumadin held in hospital rechecked and resumed.  Admitting to [facility] for nursing care, medication management, and PT/OT/ST eval and tx.    Chief Complaint at visit:   Chief Complaint   Patient presents with    Follow - Up      Patient seen for initial APN visit   She is sitting up at bedside   She denies any current pain  Mild chronic cough. No shortness of breath or wheeze. Increased mucus  as has allergies states she typically takes Claritin so was ordered   NO chest pain or palpitations   No nausea, vomiting, diarrhea + intermittent constipation but had BM yesterday   Decreased appetite in hospital started on Remeron will monitor effects   B/l lower extremity swelling on torsemide daily- monitor weights. Elevate legs at rest.   Hx of a fib on coumadin- restarted at discharge at 5 mg nightly next day PT/INR checked at 7.46-  coumadin was held PT re checked today 6/24 INR 2.86 - resumed coumadin at 4 mg starting tonight 6/24 and will repeat pt/inr tomorrow 6/25   Repeat routine labs on Thursday 6/27   Answered all questions    ALLERGIES    She is allergic to penicillins, rosuvastatin, acyclovir, allergy, carbapenems, cephalosporins, codeine, codeine [opioid analgesics], demerol, diphenhydramine-zinc acetate, glucophage [metformin hydrochloride], meperidine, misc. sulfonamide containing compounds, other, penicillin g, statins, sulfa drugs cross reactors, and cardizem.      CURRENT MEDS:    Current Outpatient Medications on File Prior to Visit   Medication Sig    Omeprazole 10 MG Oral Capsule Delayed Release Take 1 capsule (10 mg total) by mouth daily.    acetaminophen 500 MG Oral Tab Take 1 tablet (500 mg total) by mouth every 6 (six) hours as needed for Pain.    cyanocobalamin 1000 MCG Oral Tab Take 1 tablet (1,000 mcg total) by mouth daily.    simethicone 125 MG Oral Chew Tab Chew 1 tablet (125 mg total) by mouth daily as needed for FLATULENCE.    meclizine 12.5 MG Oral Tab Take 1 tablet (12.5 mg total) by mouth 3 (three) times daily as needed.    warfarin 4 MG Oral Tab Take 1 tablet (4 mg total) by mouth daily.    loratadine 10 MG Oral Tab Take 1 tablet (10 mg total) by mouth daily.    Nystatin 527091 UNIT/GM External Powder Apply 1 Application topically 2 (two) times daily.    mirtazapine 7.5 MG Oral Tab Take 1 tablet (7.5 mg total) by mouth nightly.    LEVOTHYROXINE 75 MCG Oral Tab TAKE ONE TABLET BY MOUTH DAILY BEFORE breakfast    losartan 100 MG Oral Tab Take 1 tablet (100 mg total) by mouth daily.    potassium chloride 40 meq/30 ml (10%) Oral Solution Take 15 mL (20 mEq total) by mouth daily. (Patient taking differently: Take 30 mL (40 mEq total) by mouth daily.)    ACCU-CHEK SOFTCLIX LANCETS Does not apply Misc USE ONE TO THREE times a DAY    torsemide 20 MG Oral Tab Take 1 tablet (20 mg total) by mouth every morning. PATIENT  REQUESTS TO TAKE IM AM, SELF DISPENSE    Glucose Blood (ACCU-CHEK HAYLIE PLUS) In Vitro Strip Use 3-4 times a day as directed.    aspirin 81 MG Oral Chew Tab Chew 1 tablet (81 mg total) by mouth 3 (three) times a week.    metoprolol tartrate 50 MG Oral Tab Take 1 tablet (50 mg total) by mouth 2 (two) times daily.    MULTIPLE VITAMIN OR 1 by mouth daily    Calcium Carbonate-Vitamin D 600-125 MG-UNIT Oral Tab Take 1 tablet by mouth daily.    Iron, Ferrous Sulfate, 75 (15 Fe) MG/ML Oral Solution Take 90 mg by mouth daily. Taken as 1 tablespoon=15ml (45mg) bid after meals (Patient not taking: Reported on 6/24/2024)    Insulin Glargine-yfgn (SEMGLEE, YFGN,) 100 UNIT/ML Subcutaneous Solution Pen-injector Inject 5 Units into the skin at bedtime. (Patient not taking: Reported on 6/24/2024)    Lansoprazole 15 MG Oral Capsule Delayed Release Take 1 capsule (15 mg total) by mouth every morning. (Patient not taking: Reported on 6/24/2024)     No current facility-administered medications on file prior to visit.         HISTORY:    She  has a past medical history of AC joint arthropathy (12/01/2011), Actinic keratosis (03/21/2019), At high risk for falls, Atrial fibrillation ([facility]) (04/26/2012), Autonomic neuropathy (02/27/2012), Back pain (06/28/2012), Bunion, CAD (coronary artery disease) (04/26/2012), Cardiomegaly (08/26/2010), Chronic heart failure with preserved ejection fraction ([facility]) (12/16/2019), Class 1 obesity, Constipation, Diabetes mellitus ([facility]) (04/26/2012), Difficult intubation, Disc degeneration (01/08/2013), Diverticulosis, DVT of leg (deep venous thrombosis) ([facility]) (2007), Dysphagia (01/12/2011), Esophageal dilatation (2011), Esophageal stricture (04/26/2012), Fatty liver, Fecal impaction in rectum ([facility]) (02/19/2011), Flatulence/gas pain/belching, Frailty, Gastric polyp (01/12/2011), Gastroparesis (04/26/2012), GERD (gastroesophageal reflux disease), [place]oe, Hearing impairment, Hemorrhoids, Hiatal hernia  (10/12/2010), Hip pain (01/31/2011), History  of basal cell carcinoma (04/26/2012), History of blood transfusion, History of colon polyps, History of Meniere's disease (04/26/2012), History of myocardial infarction, History of PTCA (04/26/2012), HTN (hypertension) (04/26/2012), Hypothyroidism (04/26/2012), IBS (irritable bowel syndrome), Iron deficiency (04/26/2012), Mild mitral regurgitation (07/13/2010), Nausea and vomiting in adult (12/25/2023), Neck pain (03/31/2011), OA (osteoarthritis), Onychomycosis (04/26/2012), Osteoporosis (04/26/2012), Perirectal discomfort (02/18/2011), Presbyesophagus (10/12/2010), Pulmonary hypertension (HCC), Sinusitis, Squamous cell carcinoma, Thyroid nodule, Tinnitus, Tumor, thyroid, Venous disease (04/26/2012), and Vertigo (03/31/2011).    She  has a past surgical history that includes appendectomy; cataract; cholecystectomy (1994); knee replacement surgery; tonsillectomy; skin surgery (05/19/2011); foot/toes surgery proc unlisted (1991, 1998); angioplasty (coronary) (07/14/2010); upper gi endoscopy,exam (10/12/2010); diagnostic anoscopy; orthopedic surg (pbp); injection, w/wo contrast, dx/therapeutic substance, epidural/subarachnoid; lumbar/sacral (02/07/2014); fluor gid & loclzj ndl/cath spi dx/ther njx (02/07/2014); patient withough preoperative order for iv antibiotic surgical site infection prophylaxis. (02/07/2014); patient documented not to have experienced any of the following events (02/07/2014); injection, w/wo contrast, dx/therapeutic substance, epidural/subarachnoid; lumbar/sacral (03/27/2014); fluor gid & loclzj ndl/cath spi dx/ther njx (03/27/2014); patient withough preoperative order for iv antibiotic surgical site infection prophylaxis. (03/27/2014); patient documented not to have experienced any of the following events (03/27/2014); hysterectomy (1967); colonoscopy; colonoscopy; total knee replacement; other surgical history; other surgical history (1974); other  surgical history (01/12/2011); other surgical history; other surgical history (02/14/2018); and egd.        CODE STATUS:  Full Code  Need to discuss further at rehab     ADVANCED CARE PLANNING TEAM:  Pending further course at subacute rehab     SUBJECTIVE:    REVIEW OF SYSTEMS:  GENERAL HEALTH:feels well otherwise  SKIN: denies any unusual skin lesions or rashes  WOUNDS: see wound assessment    EYES:no visual complaints or deficits  HENT: denies nasal congestion, sinus pain or sore throat;  RESPIRATORY: denies shortness of breath, wheezing + mild cough mucus from allergies   CARDIOVASCULAR:denies chest pain, no palpitations  + CAD + A fib + HF   GI: denies nausea, vomiting, diarrhea + constipation   :no dysuria, urgency or frequency;  MUSCULOSKELETAL:no joint complaints upper or lower extremities + weakness   NEURO:no sensory or motor complaint  PSYCHE: + depressive symptoms   HEMATOLOGY: + iron deficiency anemia   ENDOCRINE: + diabetes   ALLERGY/IMM.: + allergies       OBJECTIVE:  VITALS:  /57   Pulse 81   Temp 97 °F (36.1 °C)   Resp 17   LMP  (LMP Unknown)   SpO2 96%     PHYSICAL EXAM:  GENERAL HEALTH: 89 year old female patient no acute distress   LINES, TUBES, DRAINS:  none  SKIN: no rashes, no suspicious lesions  WOUND: see wound assessment   EYES: PERRLA, conjunctiva normal; no drainage from eyes  HENT: normocephalic; normal nose, no nasal drainage, mucous membranes pink, moist  NECK: full range of motion observed  RESPIRATORY:clear to percussion and auscultation, No wheezing/cough/accessory muscle use; on room air  CARDIOVASCULAR: S1, S2 normal, RRR; no S3, no S4;   ABDOMEN: normal active BS+, soft, non-distended; no apparent masses; observed, non-tender, no guarding during physical exam  : Deferred  LYMPHATIC: no lymphedema  MUSCULOSKELETAL: no acute synovitis upper or lower extremity.  Weakness R/T recent hospitalization/diagnoses/sequelae; will undergo therapies to rehab and improve  strength, endurance and independence w/ ADLs as able  EXTREMITIES/VASCULAR: no cyanosis, clubbing 2+ edema lower legs   NEUROLOGIC: intact; no sensorimotor deficit  PSYCHIATRIC: alert and oriented x 3; affect appropriate    DIAGNOSTICS REVIEWED AT THIS VISIT:  Vital signs reviewed in [facility] EMR.  [facility] medical records, notes, lab and imaging results reviewed. And diagnostics available in rehab records/[facility].  Medication reconciliation completed.      6/24/24  Pt/inr 28.0/ 2.86     6/22/24   Wbc 10.38 hgb 11.8 hematocrit 37.0 plt 337   Glucose 102 bun 11 crt 0.66 bili 0.54 prot 5.2 alb 3.2 sodium 141 potassium 4.2 chl 107 co2 26 alt 43 ast 41 alk phos 105 ca 8.2 gfr >60   Pt/inr 68.7/ 7.46   Lab Results   Component Value Date     (H) 06/17/2024    BUN 15 06/17/2024    BUNCREA 14.0 08/04/2020    CREATSERUM 0.93 06/17/2024    ANIONGAP 8 06/17/2024    GFR 65 05/17/2017    GFRNAA 55 (L) 06/24/2022    GFRAA 63 06/24/2022    CA 8.4 (L) 06/17/2024    OSMOCALC 280 06/17/2024    ALKPHO 80 06/15/2024    AST 25 06/15/2024    ALT 23 06/15/2024    BILT 1.2 06/15/2024    TP 6.9 06/15/2024    ALB 3.0 (L) 06/15/2024    GLOBULIN 3.9 06/15/2024     (L) 06/17/2024    K 2.9 (LL) 06/17/2024     06/17/2024    CO2 22.0 06/17/2024       Lab Results   Component Value Date    WBC 11.1 (H) 06/17/2024    RBC 4.10 06/17/2024    HGB 12.0 06/17/2024    HCT 34.7 (L) 06/17/2024    .0 06/17/2024    MPV 10.2 (H) 03/31/2011    MCV 84.6 06/17/2024    MCH 29.3 06/17/2024    MCHC 34.6 06/17/2024    RDW 13.6 06/17/2024    NEPRELIM 9.90 (H) 06/16/2024    NEPERCENT 86.9 06/16/2024    LYPERCENT 3.5 06/16/2024    MOPERCENT 8.9 06/16/2024    EOPERCENT 0.0 06/16/2024    BAPERCENT 0.3 06/16/2024    NE 9.90 (H) 06/16/2024    LYMABS 0.40 (L) 06/16/2024    MOABSO 1.02 (H) 06/16/2024    EOABSO 0.00 06/16/2024    BAABSO 0.03 06/16/2024       Last COVID-19 Viral test: Not Detected, done on 6/17/2024.        XR CHEST AP PORTABLE   (IXQ=42287)  Narrative: PROCEDURE:  XR CHEST AP PORTABLE  (CPT=71045)     TECHNIQUE:  AP chest radiograph was obtained.     COMPARISON:  [facility] , XR, XR CHEST AP PORTABLE  (CPT=71045), 10/26/2018, 6:30 AM.     INDICATIONS:  unable to walk due to a fall     PATIENT STATED HISTORY: (As transcribed by Technologist)  Patient is experienced a fall earlier and is now unable to walk. Patient offered no additional history at this time.          FINDINGS:  Marked enlargement of the cardiac silhouette.  Mild perihilar opacities consistent with pulmonary edema.  Patchy bibasilar airspace disease may represent atelectasis or edema.  Minimal blunting of the costophrenic angles.  No measurable   pneumothorax.                   Impression: CONCLUSION:  See above.        LOCATION:  [facility]                 Dictated by (CST): Stromberg, LeRoy, MD on 6/15/2024 at 11:33 PM       Finalized by (CST): Stromberg, LeRoy, MD on 6/15/2024 at 11:35 PM     CT PELVIS (CPT=72192)  Narrative: PROCEDURE:  CT PELVIS (CPT=72192)     COMPARISON:  [facility] , CT, CT ABDOMEN PELVIS IV CONTRAST, NO ORAL (ER), 12/25/2023, 12:14 PM.     INDICATIONS:  fall, lower back and pelvic/buttock pain     TECHNIQUE:  Following oral contrast, unenhanced CT scanning is performed through the pelvis. Dose reduction techniques were used. Dose information is transmitted to the ACR (American College of Radiology) NRDR (National Radiology Data Registry) which   includes the Dose Index Registry.     PATIENT STATED HISTORY: (As transcribed by Technologist)  Patient is here post fall with lower back and pelvic/buttock pain.         FINDINGS:       No acute fracture or malalignment of the pelvis or hips.  Moderate osteoarthritis of bilateral hips.  Obturator rings are intact with moderate degenerative sclerosis of the pubic symphysis.  Normal sacroiliac joints.  Moderate disc and endplate   degenerative change of the visualized lower lumbar spine.     Soft tissue thickening of the  perianal region could represent a mass or inflammation.                      Impression: CONCLUSION:       1. No evidence of acute pelvic or hip fracture.  If the patient is unable to bear weight and there is clinical suspicion of occult fracture, MRI is the modality of choice for further assessment.     2. Soft tissue thickening of the perianal region could represent a mass or inflammation.  Recommend direct visual inspection.          LOCATION:  [facility]        Dictated by (CST): Lisa Beaver MD on 6/15/2024 at 3:23 PM       Finalized by (CST): Lisa Beaver MD on 6/15/2024 at 3:27 PM     CT SPINE LUMBAR (CPT=72131)  Narrative: PROCEDURE:  CT SPINE LUMBAR (CPT=72131)     COMPARISON:  [facility] , CT, CT SPINE LUMBAR (CPT=72131), 6/22/2018, 7:10 PM.     INDICATIONS:  fall, lower back and pelvic/buttock pain     TECHNIQUE:  Noncontrast CT scanning is performed through the lumbar spine.  Multiplanar reconstructions are generated.  Dose reduction techniques were used. Dose information is transmitted to the ACR (American College of Radiology) NRDR (National   Radiology Data Registry) which includes the Dose Index Registry.     PATIENT STATED HISTORY: (As transcribed by Technologist)  Patient is here post fall with lower back and pelvic pain.      FINDINGS:  There is right lateral listhesis L3 over L4 measuring approximately 8 mm.  There is left lateral ceases of L4 over L5 measuring 7 mm.  Levoscoliosis is noted.  Vertebral body heights are maintained throughout the lumbar spine.  Moderate to marked loss of L3-4 and L4-5 disc spaces with endplate osteoarthritic changes.  An acute fracture or dislocation is not identified.    T12-L1:  Posterior osteophyte and disc bulge is noted.  No spinal canal stenosis.  Mild to moderate bilateral neural foraminal stenosis.  L1-L2:  Broad-based disc bulge is noted.  No spinal canal stenosis.  Mild bilateral neural foraminal stenosis.  L2-L3:  Posterior osteophyte with facet hypertrophic  change.  Mild spinal canal stenosis.  Mild bilateral neural foraminal stenosis.  L3-L4:  Posterior osteophyte with disc bulge and facet hypertrophic changes.  Mild bilateral neural foraminal stenosis.  Moderate spinal canal stenosis.  L4-L5:  Posterior osteophyte with facet hypertrophic changes.  Moderate to severe right neural foraminal stenosis.  Mild to moderate spinal canal stenosis.  L5-S1:  Central disc bulge without spinal canal or neural foraminal stenosis.                   Impression: CONCLUSION:  No evidence of an acute fracture or dislocation lumbar spine.  Moderate osteoarthritic changes as described above.        LOCATION:  [facility]        Dictated by (CST): Allen Sparks MD on 6/15/2024 at 2:59 PM       Finalized by (CST): Allen Sparks MD on 6/15/2024 at 3:03 PM              SEE PLAN BELOW  Physical Deconditioning/Impaired mobility and ADLs/At risk for falling/ Deconditioning   Fall Precautions  PT/OT/ST to evaluate and treat  [facility] team to establish care plan meeting with patient and POA/family as appropriate  Anticipate DC on or before TBD; SW to assist patient/family w/ DC planning. From assisted living facility   DC Plan:  TBD     Mild acute on chronic HFeEF  Cardiology follow - Echo from 2022 EF of 60-65% mitral valve mild calcification, left atrium mod dilated  Vital signs q shift and prn   Labs weekly and prn   Daily weights notify if greater than 2 lb increase in 1 day or 5 lbs in 1 week   Aspirin 81 mg daily MWF  Losartan 100 mg daily   Metoprolol 50 mg bid   Torsemide 20 mg in am   Elevate legs at rest   Warfarin 5 mg given 6/21 PT/INR checked on 6/22 and INR in 7s. Coumadin held. PT/INR 6/24 28/2.86- Reduce coumadin to 4 mg starting 6/24- repeat PT/INR on 6/25    Atrial fibrillation on coumadin/ CAD status post PCI / Hypertension   Vital signs q shift and prn   Labs weekly and prn   Daily weights notify if greater than 2 lb increase in 1 day or 5 lbs in 1 week   Aspirin 81 mg daily  MWF  Losartan 100 mg daily   Metoprolol 50 mg bid   Torsemide 20 mg daily   Warfarin reduced to 4 mg nightly- repeat PT/INR on 6/25     DM2  A1c 6.4  Low carb diet   Accu checks tid and prn - 120-170s   Lantus 5 units daily     Mild leukocytosis   Worked up in hospital. No infectious etiology. Resp panel neg. 6/17 wbc 11.1. 6/22 wbc 10.38   Monitor labs   Monitor s/s of infection     Decreased appetite depressive symptoms   Discussed in hospital   Dietary to follow   In house psych to follow   Daily weights   Started on Remeron 7.5 mg nightly in hospital     Hypokalemia   Corrected in hospital 6/17 potassium 2.9 in hospital. 6/22 potassium 4.2   Potassium 40 meq daily     Allergies mucus  Add claritin 10 mg daily- taken in past     Hypothyroid   6/15/2024 TSH 1.50  Levothyroxine 75 mcg daily     DVT Prophylaxis   Encourage early mobilization and participation in PT/OT as able  On coumadin     GI Prophylaxis/ GERD   Omeprazole 10 mg daily     Pain Management  Offer to pre-medicate 30-60 min prior to therapy  Physiatry evaluation with management appreciated  Acetaminophen 500 mg every 6 hrs prn     Bowel Management Regimen/Constipation   Monitor BM status   Miralax daily prn   Senna s 1 tab bid      Vitamins/supplements as r/t deficiencies  [facility] RD to follow while in rehab; supplementation/diet as per [facility] RD  May continue home supplements  MVI daily   Had been on iron supplement not currently taking   Cyanocobalamin 1000 mcg daily   Calcium vit d supp daily     LABS  CBC/CMP weekly while in [facility]- 6/27   PT/INR 6/25    *Follow-Up with PCP within 1-2 weeks following [facility] discharge.   *Follow-Up with specialists as recommended.  Future Appointments   Date Time Provider Department Center   7/18/2024  8:00 AM [facility] LAB 24 [facility] 24 [facility] [place]   7/24/2024 10:40 AM Rajan Powers DO [facility] SUB GI   8/6/2024  2:00 PM Willian Curry MD EMG 24 [facility]G [place]   9/10/2024  2:15 PM Laith Leigh MD [facility]        *Greater than 65 minutes spent w/ patient and family, reviewing medical records, labs, completing medication reconciliation and entering orders to establish plan of care in [facility].    Sheyla Jaime, IRMA  06/24/24

## 2024-06-26 ENCOUNTER — SNF VISIT (OUTPATIENT)
Dept: INTERNAL MEDICINE CLINIC | Age: 89
End: 2024-06-26

## 2024-06-26 VITALS
RESPIRATION RATE: 18 BRPM | DIASTOLIC BLOOD PRESSURE: 71 MMHG | SYSTOLIC BLOOD PRESSURE: 118 MMHG | OXYGEN SATURATION: 97 % | HEART RATE: 86 BPM | TEMPERATURE: 97 F

## 2024-06-26 DIAGNOSIS — E11.59 TYPE 2 DIABETES MELLITUS WITH OTHER CIRCULATORY COMPLICATION, WITH LONG-TERM CURRENT USE OF INSULIN (HCC): ICD-10-CM

## 2024-06-26 DIAGNOSIS — I50.32 CHRONIC DIASTOLIC (CONGESTIVE) HEART FAILURE (HCC): Primary | ICD-10-CM

## 2024-06-26 DIAGNOSIS — E11.8 TYPE 2 DIABETES MELLITUS WITH UNSPECIFIED COMPLICATIONS (HCC): ICD-10-CM

## 2024-06-26 DIAGNOSIS — E87.6 HYPOKALEMIA: ICD-10-CM

## 2024-06-26 DIAGNOSIS — K21.9 GASTROESOPHAGEAL REFLUX DISEASE, UNSPECIFIED WHETHER ESOPHAGITIS PRESENT: ICD-10-CM

## 2024-06-26 DIAGNOSIS — Z79.4 TYPE 2 DIABETES MELLITUS WITH OTHER CIRCULATORY COMPLICATION, WITH LONG-TERM CURRENT USE OF INSULIN (HCC): ICD-10-CM

## 2024-06-26 DIAGNOSIS — D72.829 LEUKOCYTOSIS, UNSPECIFIED TYPE: ICD-10-CM

## 2024-06-26 DIAGNOSIS — D50.9 IRON DEFICIENCY ANEMIA, UNSPECIFIED IRON DEFICIENCY ANEMIA TYPE: ICD-10-CM

## 2024-06-26 DIAGNOSIS — E03.9 HYPOTHYROIDISM, UNSPECIFIED TYPE: ICD-10-CM

## 2024-06-26 DIAGNOSIS — R53.1 WEAKNESS: ICD-10-CM

## 2024-06-26 DIAGNOSIS — K59.00 CONSTIPATION, UNSPECIFIED CONSTIPATION TYPE: ICD-10-CM

## 2024-06-26 DIAGNOSIS — T78.40XD ALLERGY, SUBSEQUENT ENCOUNTER: ICD-10-CM

## 2024-06-26 DIAGNOSIS — I48.21 PERMANENT ATRIAL FIBRILLATION (HCC): ICD-10-CM

## 2024-06-26 DIAGNOSIS — R63.0 DECREASED APPETITE: ICD-10-CM

## 2024-06-26 DIAGNOSIS — Z79.01 WARFARIN ANTICOAGULATION: ICD-10-CM

## 2024-06-26 PROCEDURE — 99499 UNLISTED E&M SERVICE: CPT | Performed by: NURSE PRACTITIONER

## 2024-06-26 PROCEDURE — 99309 SBSQ NF CARE MODERATE MDM 30: CPT | Performed by: NURSE PRACTITIONER

## 2024-06-26 NOTE — PROGRESS NOTES
Denae Kern, 5/3/1930, 89 year old, female    [facility] admission 6/15-24     Hospital Discharge Diagnoses:  Generalized weakness   Mild acute on chronic CHF   Mild Leukocytosis   Decreased appetite   Hypokalemia   Subtherapeutic INR   Atrial fibrillation on warfarin   CAD status post PCI   DM2   GERD  Hypothyroid         HPI:  Denae Kern  : 5/3/1930, Age 89 year old  female patient is admitted for subacute rehab. Patient has a past medical history of atrial fibrillation, DM2, hypertension, GERD, coronary disease, chronic heart failure with preserved ejection fraction, hypothyroidism, iron deficiency anemia. She had been progressively getting weaker at home it was believed she had a UTI and PCP started antibiotic. She is also having a poor appetite started on Remeron. The day of admission she was grabbing onto her walker missed and fell. On admission mild leukocytosis no infectious etiology, respiratory panel neg. Subtherapeutic INR coumadin held in hospital rechecked and resumed.  Admitting to [facility] for nursing care, medication management, and PT/OT/ST obi and tx.    Chief Complaint:    Chief Complaint   Patient presents with    Follow - Up        Subjective:   TODAY:  Patient is seen today for follow up visit   She is sitting up in chair   No current pain   Allergies mild cough but now taking Claritin. No shortness of breath or wheeze  No chest pain or palpitations  No nausea, vomiting, diarrhea + intermittent constipation but had BM today   No urinary symptoms   Decreased appetite in hospital/depression in hospital started on Remeron 7.5 mg but now increased to 15 mg.   Nurse reports eating % of meals   PT/INR  - 2.53 - her coumadin dose was lowered from 5 to 4 mg as patient INR was 7.46 on    Lower leg swelling stable   Answered all patient questions     Objective:  /71   Pulse 86   Temp 97.4 °F (36.3 °C)   Resp 18   LMP  (LMP Unknown)   SpO2 97%     PHYSICAL  EXAM:  GENERAL HEALTH: 89 year old female patient no acute distress   LINES, TUBES, DRAINS:  none  SKIN: no rashes, no suspicious lesions  WOUND: see wound assessment   EYES: PERRLA, conjunctiva normal; no drainage from eyes  HENT: normocephalic; normal nose, no nasal drainage, mucous membranes pink, moist  NECK: full range of motion observed  RESPIRATORY:clear to percussion and auscultation, No wheezing/cough/accessory muscle use; on room air  CARDIOVASCULAR: S1, S2 normal, RRR; no S3, no S4;   ABDOMEN: normal active BS+, soft, non-distended; no apparent masses; observed, non-tender, no guarding during physical exam  : Deferred  LYMPHATIC: no lymphedema  MUSCULOSKELETAL: no acute synovitis upper or lower extremity.  Weakness R/T recent hospitalization/diagnoses/sequelae; will undergo therapies to rehab and improve strength, endurance and independence w/ ADLs as able  EXTREMITIES/VASCULAR: no cyanosis, clubbing 2+ edema lower legs   NEUROLOGIC: intact; no sensorimotor deficit  PSYCHIATRIC: alert and oriented x 3; affect appropriate       Medications reviewed: Yes      Current Outpatient Medications:     Omeprazole 10 MG Oral Capsule Delayed Release, Take 1 capsule (10 mg total) by mouth daily., Disp: , Rfl:     acetaminophen 500 MG Oral Tab, Take 1 tablet (500 mg total) by mouth every 6 (six) hours as needed for Pain., Disp: , Rfl:     cyanocobalamin 1000 MCG Oral Tab, Take 1 tablet (1,000 mcg total) by mouth daily., Disp: , Rfl:     simethicone 125 MG Oral Chew Tab, Chew 1 tablet (125 mg total) by mouth daily as needed for FLATULENCE., Disp: , Rfl:     meclizine 12.5 MG Oral Tab, Take 1 tablet (12.5 mg total) by mouth 3 (three) times daily as needed., Disp: , Rfl:     warfarin 4 MG Oral Tab, Take 1 tablet (4 mg total) by mouth daily., Disp: , Rfl:     loratadine 10 MG Oral Tab, Take 1 tablet (10 mg total) by mouth daily., Disp: , Rfl:     insulin glargine 100 UNIT/ML Subcutaneous Solution, Inject 5 Units into the  skin nightly., Disp: , Rfl:     polyethylene glycol, PEG 3350, 17 g Oral Powd Pack, Take 17 g by mouth daily as needed (constipation)., Disp: , Rfl:     sennosides 8.6 MG Oral Tab, Take 1 tablet (8.6 mg total) by mouth 2 (two) times daily., Disp: , Rfl:     Nystatin [id number] UNIT/GM External Powder, Apply 1 Application topically 2 (two) times daily., Disp: 60 g, Rfl: 0    mirtazapine 7.5 MG Oral Tab, Take 1 tablet (7.5 mg total) by mouth nightly. (Patient taking differently: Take 2 tablets (15 mg total) by mouth nightly.), Disp: 30 tablet, Rfl: 0    LEVOTHYROXINE 75 MCG Oral Tab, TAKE ONE TABLET BY MOUTH DAILY BEFORE breakfast, Disp: 90 tablet, Rfl: 0    losartan 100 MG Oral Tab, Take 1 tablet (100 mg total) by mouth daily., Disp: 90 tablet, Rfl: 3    potassium chloride 40 meq/30 ml (10%) Oral Solution, Take 15 mL (20 mEq total) by mouth daily. (Patient taking differently: Take 30 mL (40 mEq total) by mouth daily.), Disp: 473 mL, Rfl: 3    torsemide 20 MG Oral Tab, Take 1 tablet (20 mg total) by mouth every morning. PATIENT REQUESTS TO TAKE IM AM, SELF DISPENSE, Disp: , Rfl:     Glucose Blood (ACCU-CHEK HAYLIE PLUS) In Vitro Strip, Use 3-4 times a day as directed., Disp: 100 strip, Rfl: 1    aspirin 81 MG Oral Chew Tab, Chew 1 tablet (81 mg total) by mouth 3 (three) times a week., Disp: , Rfl:     metoprolol tartrate 50 MG Oral Tab, Take 1 tablet (50 mg total) by mouth 2 (two) times daily., Disp: , Rfl:     MULTIPLE VITAMIN OR, 1 by mouth daily, Disp: , Rfl:     Calcium Carbonate-Vitamin D 600-125 MG-UNIT Oral Tab, Take 1 tablet by mouth daily., Disp: , Rfl:     Iron, Ferrous Sulfate, 75 (15 Fe) MG/ML Oral Solution, Take 90 mg by mouth daily. Taken as 1 tablespoon=15ml (45mg) bid after meals (Patient not taking: Reported on 6/24/2024), Disp: 50 mL, Rfl: 3    Insulin Glargine-yfgn (SEMGLEE, YFGN,) 100 UNIT/ML Subcutaneous Solution Pen-injector, Inject 5 Units into the skin at bedtime. (Patient not taking: Reported on  6/24/2024), Disp: 9 mL, Rfl: 3    ACCU-CHEK SOFTCLIX LANCETS Does not apply Misc, USE ONE TO THREE times a DAY (Patient not taking: Reported on 6/26/2024), Disp: 100 each, Rfl: 3    Lansoprazole 15 MG Oral Capsule Delayed Release, Take 1 capsule (15 mg total) by mouth every morning. (Patient not taking: Reported on 6/26/2024), Disp: , Rfl:       Diagnostics reviewed:    6/25/24   Pt/inr- 25/2.53     6/24/24  Pt/inr 28.0/ 2.86      6/22/24   Wbc 10.38 hgb 11.8 hematocrit 37.0 plt 337   Glucose 102 bun 11 crt 0.66 bili 0.54 prot 5.2 alb 3.2 sodium 141 potassium 4.2 chl 107 co2 26 alt 43 ast 41 alk phos 105 ca 8.2 gfr >60   Pt/inr 68.7/ 7.46   Assessment and plan:  Physical Deconditioning/Impaired mobility and ADLs/At risk for falling/ Deconditioning   Fall Precautions  PT/OT/ST to evaluate and treat  [facility] team to establish care plan meeting with patient and POA/family as appropriate  Anticipate DC on or before TBD; SW to assist patient/family w/ DC planning. From assisted living facility   DC Plan:  TBD     Mild acute on chronic HFeEF  Cardiology follow - Echo from 2022 EF of 60-65% mitral valve mild calcification, left atrium mod dilated  Vital signs q shift and prn   Labs weekly and prn   Daily weights notify if greater than 2 lb increase in 1 day or 5 lbs in 1 week - stable   Aspirin 81 mg daily [facility]  Losartan 100 mg daily   Metoprolol 50 mg bid   Torsemide 20 mg in am   Elevate legs at rest   Warfarin 5 mg given 6/21 PT/INR checked on 6/22 and INR in 7s. Coumadin held. PT/INR 6/24 28/2.86- Reduce coumadin to 4 mg starting 6/24- repeat PT/INR on 6/25 showed inr at 2.53 - continued Coumadin 4 mg nightly- repeat PT/INR on 6/28      Atrial fibrillation on coumadin/ CAD status post PCI / Hypertension   Vital signs q shift and prn   Labs weekly and prn   Daily weights notify if greater than 2 lb increase in 1 day or 5 lbs in 1 week - stable   Aspirin 81 mg daily MWF  Losartan 100 mg daily   Metoprolol 50 mg bid    Torsemide 20 mg daily   Warfarin reduced to 4 mg nightly- repeat PT/INR on 6/28      DM2  A1c 6.4  Low carb diet   Accu checks tid and prn - 100-170s   Lantus 5 units daily      Mild leukocytosis   Worked up in hospital. No infectious etiology. Resp panel neg. 6/17 wbc 11.1. 6/22 wbc 10.38   Monitor labs   Monitor s/s of infection      Decreased appetite depressive symptoms   Discussed in hospital   Dietary to follow   In house psych to follow   Daily weights   Started on Remeron 7.5 mg nightly in hospital --> increased to 15 mg nightly   6/26 appetite improving per staff eating % of meals      Hypokalemia   Corrected in hospital 6/17 potassium 2.9 in hospital. 6/22 potassium 4.2   Potassium 40 meq daily      Allergies mucus  Claritin 10 mg daily- taken in past   Monitor symptoms      Hypothyroid   6/15/2024 TSH 1.50  Levothyroxine 75 mcg daily      DVT Prophylaxis   Encourage early mobilization and participation in PT/OT as able  On coumadin      GI Prophylaxis/ GERD   Omeprazole 10 mg daily      Pain Management  Offer to pre-medicate 30-60 min prior to therapy  Physiatry evaluation with management appreciated  Acetaminophen 500 mg every 6 hrs prn      Bowel Management Regimen/Constipation   Monitor BM status   Miralax daily prn   Senna s 1 tab bid      Vitamins/supplements as r/t deficiencies  [facility] to follow while in rehab; supplementation/diet as per [facility] RD  May continue home supplements  MVI daily   Had been on iron supplement not currently taking   Cyanocobalamin 1000 mcg daily   Calcium vit d supp daily      LABS  CBC/CMP weekly while in [facility]- 6/27   PT/INR 6/28     *Follow-Up with PCP within 1-2 weeks following [facility] discharge.   *Follow-Up with specialists as recommended.  Your Appointments      Thursday July 18, 2024 8:00 AM  Lab Visit with [facility] LAB 24  [facility] ([facility]) [address] [place] [address] [place] [address]  [address]  [phone]        Wednesday July 24, 2024 10:40 AM  Follow-Up OV with Rajan Powers DO  [facility],  [facility] ([facility]) [address] [id number]  [phone]   Please arrive 15 minutes prior to your scheduled appointment time.          Tuesday August 06, 2024 2:00 PM  Medicare Annual Well Visit with Willian Curry MD  [facility] ([facility]) [address]  [facility] [address]  [phone]        Tuesday September 10, 2024 2:15 PM  Established Patient with [facility] ([facility]) [address]  [facility] [address]  [phone]               This is a 45 minute visit and greater than 50% of the time was spent counseling the patient and/or coordinating care.        Note to patient: The 21st Century Cures Act makes medical notes like these available to patients in the interest of transparency. However, this is a medical document intended as peer to peer communication. It is written in medical language and may contain abbreviations or verbiage that are unfamiliar. It may appear blunt or direct. Medical documents are intended to carry relevant information, facts as evident, and the clinical opinion of the practitioner who signs the document.    Sheyla Jaime, IRMA  6/26/2024

## 2024-07-01 ENCOUNTER — SNF VISIT (OUTPATIENT)
Dept: INTERNAL MEDICINE CLINIC | Age: 89
End: 2024-07-01

## 2024-07-01 VITALS
SYSTOLIC BLOOD PRESSURE: 132 MMHG | RESPIRATION RATE: 18 BRPM | WEIGHT: 152.88 LBS | DIASTOLIC BLOOD PRESSURE: 54 MMHG | TEMPERATURE: 97 F | HEART RATE: 73 BPM | BODY MASS INDEX: 31 KG/M2 | OXYGEN SATURATION: 97 %

## 2024-07-01 DIAGNOSIS — R53.1 WEAKNESS: ICD-10-CM

## 2024-07-01 DIAGNOSIS — I48.21 PERMANENT ATRIAL FIBRILLATION (HCC): ICD-10-CM

## 2024-07-01 DIAGNOSIS — E11.8 TYPE 2 DIABETES MELLITUS WITH UNSPECIFIED COMPLICATIONS (HCC): ICD-10-CM

## 2024-07-01 DIAGNOSIS — D50.9 IRON DEFICIENCY ANEMIA, UNSPECIFIED IRON DEFICIENCY ANEMIA TYPE: ICD-10-CM

## 2024-07-01 DIAGNOSIS — I50.32 CHRONIC DIASTOLIC (CONGESTIVE) HEART FAILURE (HCC): Primary | ICD-10-CM

## 2024-07-01 NOTE — PROGRESS NOTES
Denae Kern, 5/3/1930, 89 year old, female    Chief Complaint   Patient presents with    Follow - Up     Chronic Diastolic Heart Failure        Subjective:  Denae Kern is a 89 year old female resident of [facility] with history of multiple medical problems including type 2 diabetes mellitus hypertension hyperlipidemia atrial fibrillation coronary artery disease chronic congestive heart failure hypothyroidism was brought into the emergency room, patient has been feeling progressively weak.  UTI was suspected patient was started on oral antibiotics unfortunately did not improve and fell on the day of admission to the hospital.  Patient was found to be fluid overloaded, treated with diuresis with improvement and was transferred back to assisted living 6/18/2024.  After going back to assisted living patient started refusing a water pill and continued to feel weak.   was contacted and patient was accepted at [facility] for subacute rehab.    Today:  Seen in room.  Doing well with no complaints at present.  Assessed appetite.  Ate 100% of her lunch today.  Much improved.  RN staff reported an INR of 1.97 on 6/28.  Increased Coumadin to 4.5 mg of Coumadin.  Today INR 2.09. Ordered to continue the same Coumadin and repeat an INR on 7/3/24.  Weight stable.  Continue the same Torsemide with daily weights.  Will continue to monitor.     Denies insomnia, fatigue, fever/chills, cough, SOB, dyspnea, angina, palpitations, n/v, diarrhea, constipation, and urinary sxs.    Objective:  /54   Pulse 73   Temp 97.1 °F (36.2 °C)   Resp 18   Wt 152 lb 14.4 oz (69.4 kg)   LMP  (LMP Unknown)   SpO2 97%   BMI 30.88 kg/m²   PHYSICAL EXAM:  GENERAL HEALTH: 89 year old female patient no acute distress   LINES, TUBES, DRAINS:  none  SKIN: no rashes, no suspicious lesions  WOUND: see wound assessment   EYES: PERRLA, conjunctiva normal; no drainage from eyes  HENT: normocephalic; normal nose,  no nasal drainage, mucous membranes pink, moist  NECK: full range of motion observed  RESPIRATORY:clear to percussion and auscultation, No wheezing/cough/accessory muscle use; on room air  CARDIOVASCULAR: S1, S2 normal, RRR; no S3, no S4;   ABDOMEN: normal active BS+, soft, non-distended; no apparent masses; observed, non-tender, no guarding during physical exam  : Deferred  LYMPHATIC: no lymphedema  MUSCULOSKELETAL: no acute synovitis upper or lower extremity.  Weakness R/T recent hospitalization/diagnoses/sequelae; will undergo therapies to rehab and improve strength, endurance and independence w/ ADLs as able  EXTREMITIES/VASCULAR: no cyanosis, clubbing 1+ edema lower legs   NEUROLOGIC: intact; no sensorimotor deficit  PSYCHIATRIC: alert and oriented x 3; affect appropriate    Medications reviewed: Yes    Diagnostics reviewed:    Dated:  6/28/24  PROTIME, THERAPEUTIC  PROTHROMBIN TIME 19.8 s 19.7 - 28.8 Final  INR 1.97 RATIO 2.00 - 3.00 L Final    Dated:  7/1/24  PROTIME, THERAPEUTIC  PROTHROMBIN TIME 20.9 s 19.7 - 28.8 Final  INR 2.09 RATIO 2.00 - 3.00 Final    Dated:  7/1/24  CBC W/DIFF AND PLATELETS  WHITE BLOOD CELLS 8.09 THO/mm3 4.80 - 10.80 Final  RED BLOOD CELLS 4.18 MIL/mm3 4.20 - 5.40 L Final  HEMOGLOBIN 12.0 g/dL 12.0 - 16.0 Final  HEMATOCRIT 38.5 % 37.0 - 47.0 Final  MCV 92.1 fL 81.0 - 99.0 Final  MCH 28.7 pg 27.0 - 33.0 Final  MCHC 31.2 g/dL 32.0 - 36.0 L Final  RDW-CV 14.2 % 11.5 - 15.2 Final  PLATELET COUNT 306 THO/mm3 150 - 400 Final  MPV 9.6 fL 9.5 - 13.1 Final  NEUTROPHILS, ABS 5.82 THO/mm3 1.40 - 6.80 Final  LYMPHOCYTES, ABS 1.27 THO/mm3 0.80 - 3.00 Final  MONOCYTES, ABS 0.58 THO/mm3 0.20 - 1.00 Final  EOSINOPHILS, ABS 0.25 THO/mm3 0.00 - 0.50 Final  BASOPHILS, ABS 0.10 THO/mm3 0.00 - 0.10 Final  IMMATURE GRANS, ABS 0.07 THO/mm3 0.00 - 0.10 Final  1 of 6  NEUTROPHILS % 71.9 % Final  LYMPHOCYTES % 15.7 % Final  MONOCYTES % 7.2 % Final  EOSINOPHILS % 3.1 % Final  BASOPHILS % 1.2 %  Final  IMMATURE GRANS % 0.9 % Final    COMPREHENSIVE METABOLIC  GLUCOSE 114 mg/dL 70 - 110 H Final  Fasting glucose levels between 100-125 mg/dL are  associated with an increased risk for diabetes.  BUN 16 mg/dL 7 - 28 Final  CREATININE 0.92 mg/dL 0.44 - 1.32 Final  BILIRUBIN, TOTAL 0.36 mg/dL 0.16 - 1.30 Final  PROTEIN, TOTAL 5.5 g/dL 5.6 - 8.2 L Final  ALBUMIN 3.2 g/dL 3.2 - 4.9 Final  SODIUM 143 mEq/L 138 - 147 Final  POTASSIUM 4.1 mEq/L 3.6 - 5.0 Final  CHLORIDE 108 mEq/L 99 - 110 Final  CARBON DIOXIDE 27 mmol/L 18 - 30 Final  SGPT(ALT) 24 U/L 0 - 42 Final  SGOT(AST) 27 U/L 9 - 35 Final  ALK. PHOSPHATASE 112 U/L 34 - 143 Final  CALCIUM 8.6 mg/dL 8.7 - 10.5 L Final  eGFR If African American > 60 mL/m/1.73m  2  >=60 Final  eGFR If Non-African American 57 mL/m/1.73m  2  >=60 L Final    Assessment and plan:  Physical Deconditioning/Impaired mobility and ADLs/At risk for falling/ Deconditioning  -Fall Precautions  -PT/OT/ST to evaluate and treat  -Acetaminophen 500 mg every 6 hrs prn for fever or pain, if given for fever, notify MD/NP  -[facility] team to establish care plan meeting with patient and POA/family as appropriate  -Anticipate DC on or before TBD; SW to assist patient/family w/ DC planning. From assisted living facility   -DC Plan:  TBD     Mild acute on chronic HFeEF/Afib/CAD status post PCI/HTN  -Cardiology follow - Echo from  EF of 60-65% mitral valve mild calcification, left atrium mod dilated  -Vital signs q shift and prn   -Daily weights notify MD/NP if greater than 2 lb increase in 1 day or 5 lbs in 1 week   -Weight stable  -Continue Torsemide 20 mg qam  -Aspirin 81 mg daily MWF  -Losartan 100 mg daily   -Metoprolol 50 mg bid, hold for sbp <100 or hr <60  -Elevate legs at rest  -INR 1.97-->2.09  -Coumadin to 4.5 mg on  and 24  -INR level on 7/3/24     DM2  -A1c 6.4  -Low carb diet  -Accuchecks tid, notify MD/NP for blood sugars <60 or >350  -Fastin-150; PP: 139-214  -Lantus 5 units daily       Mild leukocytosis-resolved  -Worked up in hospital  -No infectious etiology-respiratory panel negative  -WBCs:  11.1-->10.38-->8.09  -Monitor labs     Decreased appetite depressive symptoms  -Appetite improved today, ate 100% of lunch  -Dietary monitor  -In house psych to follow   -Daily weights   -Continue Remeron 7.5 mg nightly      Hypokalemia-resolved  -in hospital potassium 2.9-->4.2-->4.1  -Potassium 40 meq daily      Allergies mucus  -Claritin 10 mg daily- taken in past      Hypothyroidism  -6/15/2024 TSH 1.50  -Levothyroxine 75 mcg daily      DVT Prophylaxis   -Encourage early mobilization and participation in PT/OT as able  -On coumadin      GI Prophylaxis/ GERD   -Omeprazole 10 mg daily      Bowel Management Regimen/Constipation   -Monitor BM status   -Miralax daily prn   -Senna s 1 tab bid      Supplements  -MVI daily   -Ferrous Sulfate Oral Solution 75 (15 Fe) MG/ML (Ferrous Sulfate)   -Ferrous Sulfate 1.2 ml by mouth two times a day  -B12 1000 mcg daily   -Calcium vit d supp daily      LABS  -CBC/CMP weekly while in [facility]-->7/8/24  -PT/INR 7/3/24     *Follow-Up with PCP within 1-2 weeks following [facility] discharge.     Future Appointments   Date Time Provider Department Center   7/18/2024  8:00 AM [facility] LAB 24 [facility] 24 [facility] [place]   7/24/2024 10:40 AM Rajan Poewrs DO [facility] SUB GI   8/6/2024  2:00 PM Willian Curry MD EM[facility] 24 [facility] [place]   9/10/2024  2:15 PM Laith Leigh MD [facility] TARAH Patel, IRMA  7/1/2024  3:31 PM

## 2024-07-08 ENCOUNTER — SNF VISIT (OUTPATIENT)
Dept: INTERNAL MEDICINE CLINIC | Age: 89
End: 2024-07-08

## 2024-07-08 ENCOUNTER — SNF DISCHARGE (OUTPATIENT)
Dept: INTERNAL MEDICINE CLINIC | Age: 89
End: 2024-07-08

## 2024-07-08 DIAGNOSIS — I50.32 CHRONIC DIASTOLIC (CONGESTIVE) HEART FAILURE (HCC): Primary | ICD-10-CM

## 2024-07-08 DIAGNOSIS — I48.21 PERMANENT ATRIAL FIBRILLATION (HCC): ICD-10-CM

## 2024-07-08 DIAGNOSIS — Z79.01 WARFARIN ANTICOAGULATION: ICD-10-CM

## 2024-07-08 DIAGNOSIS — E11.8 TYPE 2 DIABETES MELLITUS WITH UNSPECIFIED COMPLICATIONS (HCC): ICD-10-CM

## 2024-07-08 DIAGNOSIS — R53.1 WEAKNESS: ICD-10-CM

## 2024-07-08 PROCEDURE — 99316 NF DSCHRG MGMT 30 MIN+: CPT | Performed by: NURSE PRACTITIONER

## 2024-07-10 ENCOUNTER — TELEPHONE (OUTPATIENT)
Dept: INTERNAL MEDICINE CLINIC | Facility: CLINIC | Age: 89
End: 2024-07-10

## 2024-07-10 VITALS
SYSTOLIC BLOOD PRESSURE: 132 MMHG | BODY MASS INDEX: 31 KG/M2 | HEART RATE: 75 BPM | WEIGHT: 152.88 LBS | DIASTOLIC BLOOD PRESSURE: 62 MMHG | RESPIRATION RATE: 18 BRPM | TEMPERATURE: 97 F

## 2024-07-10 NOTE — PROGRESS NOTES
Denae Kern, 5/3/1930, 89 year old, female is being discharged from Facility: [facility]      DISCHARGE SUMMARY    Date of Admission:6/21/24    Date of Anticipated Discharge: 7/12/24                                Admitting Diagnoses: Chronic Diastolic Heart Failure    Subjective: Denae Kern is a 89 year old female resident of [facility] with history of multiple medical problems including type 2 diabetes mellitus hypertension hyperlipidemia atrial fibrillation coronary artery disease chronic congestive heart failure hypothyroidism was brought into the emergency room, patient has been feeling progressively weak.  UTI was suspected patient was started on oral antibiotics unfortunately did not improve and fell on the day of admission to the hospital.  Patient was found to be fluid overloaded, treated with diuresis with improvement and was transferred back to assisted living 6/18/2024.  After going back to assisted living patient started refusing a water pill and continued to feel weak.   was contacted and patient was accepted at [facility] for subacute rehab.    Vital signs:  /62   Pulse 75   Temp 97.3 °F (36.3 °C)   Resp 18   Wt 152 lb 14.4 oz (69.4 kg)   LMP  (LMP Unknown)   BMI 30.88 kg/m²     ROS and Physical Exam:    REVIEW OF SYSTEMS:  GENERAL HEALTH:feels well otherwise  SKIN: denies any unusual skin lesions or rashes  WOUNDS: see wound assessment    EYES:no visual complaints or deficits  HENT: denies nasal congestion, sinus pain or sore throat;  RESPIRATORY: denies shortness of breath, wheezing + mild cough mucus from allergies   CARDIOVASCULAR:denies chest pain, no palpitations  + CAD + A fib + HF   GI: denies nausea, vomiting, diarrhea + constipation   :no dysuria, urgency or frequency;  MUSCULOSKELETAL:no joint complaints upper or lower extremities + weakness   NEURO:no sensory or motor complaint  PSYCHE: + depressive symptoms    HEMATOLOGY: + iron deficiency anemia   ENDOCRINE: + diabetes   ALLERGY/IMM.: + allergies     PHYSICAL EXAM:  GENERAL HEALTH: 89 year old female patient no acute distress   LINES, TUBES, DRAINS:  none  SKIN: no rashes, no suspicious lesions  WOUND: see wound assessment   EYES: PERRLA, conjunctiva normal; no drainage from eyes  HENT: normocephalic; normal nose, no nasal drainage, mucous membranes pink, moist  NECK: full range of motion observed  RESPIRATORY:clear to percussion and auscultation, No wheezing/cough/accessory muscle use; on room air  CARDIOVASCULAR: S1, S2 normal, RRR; no S3, no S4;   ABDOMEN: normal active BS+, soft, non-distended; no apparent masses; observed, non-tender, no guarding during physical exam  : Deferred  LYMPHATIC: no lymphedema  MUSCULOSKELETAL: no acute synovitis upper or lower extremity.  Weakness R/T recent hospitalization/diagnoses/sequelae; will undergo therapies to rehab and improve strength, endurance and independence w/ ADLs as able  EXTREMITIES/VASCULAR: no cyanosis, clubbing 1+ edema lower legs   NEUROLOGIC: intact; no sensorimotor deficit  PSYCHIATRIC: alert and oriented x 3; affect appropriate    Therapy Updates:   Ambulation:  300 ft SBA  ADLs/transfers:  Supervision  DC Plan: Transition to [facility] LTC    Skilled Nursing Facility Course:    Progressing well with PT/OT.  Medically cleared for anticipated discharge to LTC at [facility]    Most recent lab results:  7/8/24  CBC W/DIFF AND PLATELETS  WHITE BLOOD CELLS 6.00 THO/mm3 4.80 - 10.80 Final  RED BLOOD CELLS 4.12 MIL/mm3 4.20 - 5.40 L Final  HEMOGLOBIN 11.9 g/dL 12.0 - 16.0 L Final  HEMATOCRIT 37.2 % 37.0 - 47.0 Final  MCV 90.3 fL 81.0 - 99.0 Final  MCH 28.9 pg 27.0 - 33.0 Final  MCHC 32.0 g/dL 32.0 - 36.0 Final  RDW-CV 14.3 % 11.5 - 15.2 Final  PLATELET COUNT 186 THO/mm3 150 - 400 Final  MPV 10.9 fL 9.5 - 13.1 Final  NEUTROPHILS, ABS 3.92 THO/mm3 1.40 - 6.80 Final  LYMPHOCYTES, ABS 1.19  THO/mm3 0.80 - 3.00 Final  MONOCYTES, ABS 0.53 THO/mm3 0.20 - 1.00 Final  EOSINOPHILS, ABS 0.27 THO/mm3 0.00 - 0.50 Final  BASOPHILS, ABS 0.06 THO/mm3 0.00 - 0.10 Final  IMMATURE GRANS, ABS 0.03 THO/mm3 0.00 - 0.10 Final  NEUTROPHILS % 65.4 % Final  LYMPHOCYTES % 19.8 % Final  MONOCYTES % 8.8 % Final  EOSINOPHILS % 4.5 % Final  BASOPHILS % 1.0 % Final  IMMATURE GRANS % 0.5 % Final    COMPREHENSIVE METABOLIC  GLUCOSE 100 mg/dL 70 - 110 Final  Fasting glucose levels between 100-125 mg/dL are  associated with an increased risk for diabetes.  BUN 22 mg/dL 7 - 28 Final  CREATININE 1.08 mg/dL 0.44 - 1.32 Final  BILIRUBIN, TOTAL 0.42 mg/dL 0.16 - 1.30 Final  PROTEIN, TOTAL 5.3 g/dL 5.6 - 8.2 L Final  ALBUMIN 3.2 g/dL 3.2 - 4.9 Final  SODIUM 144 mEq/L 138 - 147 Final  POTASSIUM 3.7 mEq/L 3.6 - 5.0 Final  CHLORIDE 109 mEq/L 99 - 110 Final  CARBON DIOXIDE 27 mmol/L 18 - 30 Final  SGPT(ALT) 16 U/L 0 - 42 Final  SGOT(AST) 20 U/L 9 - 35 Final  ALK. PHOSPHATASE 90 U/L 34 - 143 Final  CALCIUM 8.7 mg/dL 8.7 - 10.5 Final  eGFR If African American 57 mL/m/1.73m  2  >=60 L Final  eGFR If Non-African American 47 mL/m/1.73m  2  >=60 L Final    Dated 7/5/24  PROTIME, THERAPEUTIC  PROTHROMBIN TIME 22.1 s 19.7 - 28.8 Final  INR 2.22 RATIO 2.00 - 3.00 Final    Discharge Diagnoses w/ current management:  Physical Deconditioning/Impaired mobility and ADLs/At risk for falling/ Deconditioning  -Fall Precautions  -PT/OT/ST to evaluate and treat  -Acetaminophen 500 mg every 6 hrs prn for fever or pain, if given for fever, notify MD/NP  -[facility] team to establish care plan meeting with patient and POA/family as appropriate  -Anticipate DC on or before 7/12/24; SW to assist patient/family w/ DC planning. From assisted living facility   -DC Plan:  LTC at [facility]     Mild acute on chronic HFeEF/Afib/CAD status post PCI/HTN  -Cardiology follow - Echo from 2022 EF of 60-65% mitral valve mild calcification, left atrium mod dilated  -Vital  signs q shift and prn   -Daily weights notify MD/NP if greater than 2 lb increase in 1 day or 5 lbs in 1 week   -Weight stable  -Continue Torsemide 20 mg qam  -Aspirin 81 mg daily MWF  -Losartan 100 mg daily   -Metoprolol 50 mg bid, hold for sbp <100 or hr <60  -Elevate legs at rest  -INR 1.97-->2.09-->2.22  -Coumadin to 4.5 mg every evening  -INR level on 24     DM2  -A1c 6.4  -Low carb diet  -Accuchecks tid, notify MD/NP for blood sugars <60 or >350  -Dated: -24  -Fastin-173; PP: 143-232  -Lantus 5 units daily      Mild leukocytosis-resolved  -Monitor labs     Decreased appetite depressive symptoms  -Appetite improved today, ate 100% of lunch  -Dietary monitor  -In house psych to follow   -Daily weights   -Continue Remeron 7.5 mg nightly      Hypokalemia-resolved  -Potassium 40 meq daily      Allergies mucus  -Claritin 10 mg daily- taken in past      Hypothyroidism  -6/15/2024 TSH 1.50  -Levothyroxine 75 mcg daily      DVT Prophylaxis   -Encourage early mobilization and participation in PT/OT as able  -On coumadin      GI Prophylaxis/ GERD   -Omeprazole 10 mg daily      Bowel Management Regimen/Constipation   -Monitor BM status   -Miralax daily prn   -Senna s 1 tab bid      Supplements  -MVI daily   -Ferrous Sulfate Oral Solution 75 (15 Fe) MG/ML (Ferrous Sulfate)   -Ferrous Sulfate 1.2 ml by mouth two times a day  -B12 1000 mcg daily   -Calcium vit d supp daily      LABS  -CBC/CMP per protocol  -PT/INR 24     *Follow-Up with PCP within 1-2 weeks following [facility] discharge.             Future Appointments   Date Time Provider Department Center   2024  8:00 AM EM[facility] LAB 24 EMG 24 EMG [place]   2024 10:40 AM Rajan Powers DO [facility] E[facility] SUB GI   2024  2:00 PM Willian Curry MD EMG 24 EMG [place]   9/10/2024  2:15 PM Laith Leigh MD [facility] DERM [facility] [place]       Medication Reconciliation Completed:  Yes    *Greater than 30 minutes spent in coordination of care in preparation for  discharge.    [facility], APRN  7/8/2024   5 p.m.

## 2024-07-10 NOTE — TELEPHONE ENCOUNTER
Patient will be leaving practice as of 7/12/24. She will be seen by Dr. El at [facility].  Dr. Curry will be removed as PCP on 7/12/24. Dr Curry spoke to daughter Jazzmine.

## 2024-07-12 ENCOUNTER — EXTERNAL FACILITY (OUTPATIENT)
Dept: FAMILY MEDICINE CLINIC | Facility: CLINIC | Age: 89
End: 2024-07-12

## 2024-07-12 DIAGNOSIS — E11.65 UNCONTROLLED TYPE 2 DIABETES MELLITUS WITH HYPERGLYCEMIA, WITH LONG-TERM CURRENT USE OF INSULIN (HCC): ICD-10-CM

## 2024-07-12 DIAGNOSIS — Z79.01 WARFARIN ANTICOAGULATION: ICD-10-CM

## 2024-07-12 DIAGNOSIS — R53.1 GENERALIZED WEAKNESS: Primary | ICD-10-CM

## 2024-07-12 DIAGNOSIS — Z79.4 UNCONTROLLED TYPE 2 DIABETES MELLITUS WITH HYPERGLYCEMIA, WITH LONG-TERM CURRENT USE OF INSULIN (HCC): ICD-10-CM

## 2024-07-12 DIAGNOSIS — Z91.81 AT RISK FOR FALLS: ICD-10-CM

## 2024-07-12 DIAGNOSIS — I50.32 CHRONIC HEART FAILURE WITH PRESERVED EJECTION FRACTION (HCC): ICD-10-CM

## 2024-07-12 DIAGNOSIS — I48.20 CHRONIC ATRIAL FIBRILLATION (HCC): ICD-10-CM

## 2024-07-12 PROCEDURE — 99306 1ST NF CARE HIGH MDM 50: CPT | Performed by: FAMILY MEDICINE

## 2024-07-13 NOTE — PROGRESS NOTES
Denae Kern Author: Henrik Gunderson MD     5/3/1930 MRN [id number]   Last Hospital  Admission 6/15/24      Last Hospital Discharge 24 PCP Willian Curry MD   [facility]        CC -, patient completed the subacute rehab, now admitted to long-term care at [facility]  Generalized weakness/deconditioning    H.P.I Denae Kern is a 89 year old female resident of [facility] with history of multiple medical problems including type 2 diabetes mellitus hypertension hyperlipidemia atrial fibrillation coronary artery disease chronic congestive heart failure hypothyroidism was brought into the emergency room, patient has been feeling progressively weak.  UTI was suspected patient was started on oral antibiotics unfortunately did not improve and fell on the day of admission to the hospital.  Patient was found to be fluid overloaded, treated with diuresis with improvement and was transferred back to assisted living 2024  After going back to [facility] living patient started refusing a water pill and continued to feel weak   were contacted and patient was accepted at [facility] for subacute rehab        2024  Patient seen in her room today, she is doing well sitting comfortably on the recliner denies any chest pain or shortness of breath, denies any dizziness or headache nausea vomiting or diarrhea  She denies dysuria  No falls  She has gained some weight since her admission  She weighs about 255.9 pounds        Past Medical History:    AC joint arthropathy    Actinic keratosis    right anterior lower leg    At high risk for falls    Atrial fibrillation (HCC)    PERSISTENT DR IRWIN  On Coumadin. Stable.    Autonomic neuropathy    Back pain    Bunion    CAD (coronary artery disease)    Cardiomegaly    mild, w/ mildly increased pulmonary vascularity, increased interstitial markings in the mid to lower lung fields, and B/L perihilar opacities is  concerning for congestive failure    Chronic heart failure with preserved ejection fraction (HCC)    Class 1 obesity    Constipation    Diabetes mellitus (HCC)    Difficult intubation    Disc degeneration    has several levels of degenerative discs and several areas of stenosis, both foraminal and central canal stenosis; and hx DDD, DJD    Diverticulosis    DVT of leg (deep venous thrombosis) (HCC)    post knee replacement    Dysphagia    Esophageal dilatation    Esophageal stricture    w/ variability in terms of tolerating that and tolerating meds    Fatty liver    Fecal impaction in rectum (HCC)    Flatulence/gas pain/belching    Frailty    Gastric polyp    Gastroparesis    primarily effecting insulin treatment    GERD (gastroesophageal reflux disease)    and motility disorder    Hammertoe    Hearing impairment    HA's bilateral    Hemorrhoids    Hiatal hernia    small    Hip pain    History  of basal cell carcinoma    basal cell and squamous cell    History of blood transfusion    with hysterectomy    History of colon polyps    History of Meniere's disease    History of myocardial infarction    History of PTCA    HTN (hypertension)    Hypothyroidism    IBS (irritable bowel syndrome)    Iron deficiency    Need iron infusion.    Mild mitral regurgitation    Nausea and vomiting in adult    Neck pain    head and neck discomfort    OA (osteoarthritis)    an hx low grade arthritis    Onychomycosis    severe, toenails, received podiatric treatment 2/29/2012 w/ Dr. Bowers    Osteoporosis    and osteopenia; 3/2009 \"Reclast infusion\"    Perirectal discomfort    perirectus discomfort    Presbyesophagus    Pulmonary hypertension (HCC)    Sinusitis    Squamous cell carcinoma    Thyroid nodule    right-on Carotid US    Tinnitus    Tumor, thyroid    Venous disease    w/ peripheral edema; and hx varicose veins    Vertigo    vertiginous symptoms     Past Surgical History:   Procedure Laterality Date    Angioplasty (coronary)   07/14/2010    PTCA, bare-metal stent to the R coronary artery, catheterization, subtotal stenosis of the mid to distal R coronary artery    Appendectomy      Cataract      B/L    Cholecystectomy  1994    Colonoscopy      Colonoscopy      Diagnostic anoscopy      Egd      Fluor gid & loclzj ndl/cath spi dx/ther njx  02/07/2014    Procedure: SI JOINT INJECTION;  Surgeon: Willian Orellana MD;  Location: [facility]    Fluor gid & loclzj ndl/cath spi dx/ther njx  03/27/2014    Procedure: LUMBAR EPIDURAL;  Surgeon: Willian Orellana MD;  Location: [facility]    Foot/toes surgery proc unlisted  1991, 1998    bunions B/L, hammertoe B/L    Hysterectomy  1967    partial    Injection, w/wo contrast, dx/therapeutic substance, epidural/subarachnoid; lumbar/sacral  02/07/2014    Procedure: LUMBAR EPIDURAL;  Surgeon: Willian Orellana MD;  Location: [facility]    Injection, w/wo contrast, dx/therapeutic substance, epidural/subarachnoid; lumbar/sacral  03/27/2014    Procedure: LUMBAR EPIDURAL;  Surgeon: Willian Orellana MD;  Location: [facility]    Knee replacement surgery      R TKR-1995, L TKR-5/2007    Orthopedic surg (pbp)      kael bunions    Other surgical history      thyroid nodule; benign tumor on thyroid removed 1996; subtotal thyroidectomy 1996    Other surgical history  1974    varicose vein strip R leg    Other surgical history  01/12/2011    mid esophagus bx, gastric polyp bx    Other surgical history      esophageal dilatation    Other surgical history  02/14/2018    vulva biopsy: lichenoid inflammation with stromal fibrosis, negative for dysplasia    Patient documented not to have experienced any of the following events  02/07/2014    Procedure: SI JOINT INJECTION;  Surgeon: Willian Orellana MD;  Location: [facility]    Patient documented not to have experienced any of the following events  03/27/2014    Procedure: LUMBAR  EPIDURAL;  Surgeon: Willian Orellana MD;  Location: [facility]    Patient withough preoperative order for iv antibiotic surgical site infection prophylaxis.  02/07/2014    Procedure: SI JOINT INJECTION;  Surgeon: Willian Orellana MD;  Location: [facility]    Patient withough preoperative order for iv antibiotic surgical site infection prophylaxis.  03/27/2014    Procedure: LUMBAR EPIDURAL;  Surgeon: Willian Orellana MD;  Location: [facility]    Skin surgery  05/19/2011    SCCIS to R temple / Mohs surgery    Tonsillectomy      T&A, childhood    Total knee replacement      bilateral    Upper gi endoscopy,exam  10/12/2010    upper gi series (air contrast) w/ esophogram     Family History   Problem Relation Age of Onset    Other (Parkinson's) Father     Other (pneumonia) Father     Heart Disorder Mother     Heart Disease Mother     Heart Attack Mother     Arthritis Mother     Circulatory Problems Mother     Other (Other) Mother     Heart Disorder Brother     Heart Attack Paternal Grandfather     Heart Attack Paternal Grandmother     Heart Disorder Paternal Grandmother     Diabetes Daughter     Cancer Brother         lung ca    Diabetes Brother     Suicide History Son     Cancer Other         colorectal ca, skin ca, fam hx    High Blood Pressure Other         fam hx    Heart Disorder Other         heart condition, mat grandparen    Heart Attack Other         mat grandparent    Stroke Other         mat grandparent    Other (Other) Other     Gastro-Intestinal Disorder Other         IBS, fam hx    Blood Disorder Other         blood blots, fam hx    Seizure Disorder Son         Epilepsy    Mental Disorder Son         paranoia, OCD     Social History     Socioeconomic History    Marital status:    Tobacco Use    Smoking status: Never    Smokeless tobacco: Never   Vaping Use    Vaping status: Never Used   Substance and Sexual Activity    Alcohol use: No      Alcohol/week: 0.0 standard drinks of alcohol     Comment: NONE    Drug use: Never   Other Topics Concern    Caffeine Concern Yes    Exercise Yes     Comment: not much    Seat Belt Yes     Social Determinants of Health     Financial Resource Strain: Low Risk  (6/19/2024)    Financial Resource Strain     Difficulty of Paying Living Expenses: Not hard at all   Food Insecurity: No Food Insecurity (6/15/2024)    Food Insecurity     Food Insecurity: Never true   Transportation Needs: No Transportation Needs (6/19/2024)    Transportation Needs     Lack of Transportation: No   Physical Activity: Insufficiently Active (8/24/2020)    [facility] from [email or web address]    Exercise Vital Sign     Days of Exercise per Week: 5 days     Minutes of Exercise per Session: 10 min   Housing Stability: Low Risk  (6/15/2024)    Housing Stability     Housing Instability: No       ALLERGIES:  Allergies   Allergen Reactions    Penicillins HIVES and OTHER (SEE COMMENTS)     CLASS    Rosuvastatin OTHER (SEE COMMENTS)     Reaction: muscle aches  Reaction: muscle aches    Acyclovir DIARRHEA    Allergy      BETA LACTAMS      Carbapenems UNKNOWN and OTHER (SEE COMMENTS)    Cephalosporins UNKNOWN    Codeine NAUSEA ONLY    Codeine [Opioid Analgesics] NAUSEA AND VOMITING    Demerol NAUSEA AND VOMITING    Diphenhydramine-Zinc Acetate UNKNOWN     BETA LACTAMS    Glucophage [Metformin Hydrochloride]      \"problematic\"    Meperidine NAUSEA ONLY and NAUSEA AND VOMITING    Misc. Sulfonamide Containing Compounds OTHER (SEE COMMENTS)    Other UNKNOWN    Penicillin G HIVES    Statins PAIN     Reaction: muscle aches      Sulfa Drugs Cross Reactors NAUSEA AND VOMITING     \"Sulfa\"      Cardizem RASH       CODE STATUS:  Full Code    CURRENT MEDICATIONS   Current Outpatient Medications   Medication Sig Dispense Refill    Omeprazole 10 MG Oral Capsule Delayed Release Take 1 capsule (10 mg total) by mouth daily.      acetaminophen 500 MG  Oral Tab Take 1 tablet (500 mg total) by mouth every 6 (six) hours as needed for Pain.      cyanocobalamin 1000 MCG Oral Tab Take 1 tablet (1,000 mcg total) by mouth daily.      simethicone 125 MG Oral Chew Tab Chew 1 tablet (125 mg total) by mouth daily as needed for FLATULENCE.      meclizine 12.5 MG Oral Tab Take 1 tablet (12.5 mg total) by mouth 3 (three) times daily as needed.      warfarin 4 MG Oral Tab Take 4.5 mg by mouth daily.      loratadine 10 MG Oral Tab Take 1 tablet (10 mg total) by mouth daily.      insulin glargine 100 UNIT/ML Subcutaneous Solution Inject 5 Units into the skin nightly.      polyethylene glycol, PEG 3350, 17 g Oral Powd Pack Take 17 g by mouth daily as needed (constipation).      sennosides 8.6 MG Oral Tab Take 1 tablet (8.6 mg total) by mouth 2 (two) times daily.      Nystatin 734460 UNIT/GM External Powder Apply 1 Application topically 2 (two) times daily. 60 g 0    Iron, Ferrous Sulfate, 75 (15 Fe) MG/ML Oral Solution Take 90 mg by mouth daily. Taken as 1 tablespoon=15ml (45mg) bid after meals 50 mL 3    mirtazapine 7.5 MG Oral Tab Take 1 tablet (7.5 mg total) by mouth nightly. (Patient taking differently: Take 2 tablets (15 mg total) by mouth nightly.) 30 tablet 0    LEVOTHYROXINE 75 MCG Oral Tab TAKE ONE TABLET BY MOUTH DAILY BEFORE breakfast 90 tablet 0    losartan 100 MG Oral Tab Take 1 tablet (100 mg total) by mouth daily. 90 tablet 3    potassium chloride 40 meq/30 ml (10%) Oral Solution Take 15 mL (20 mEq total) by mouth daily. (Patient taking differently: Take 30 mL (40 mEq total) by mouth daily.) 473 mL 3    ACCU-CHEK SOFTCLIX LANCETS Does not apply Misc USE ONE TO THREE times a  each 3    torsemide 20 MG Oral Tab Take 1 tablet (20 mg total) by mouth every morning. PATIENT REQUESTS TO TAKE IM AM, SELF DISPENSE      Glucose Blood (ACCU-CHEK HAYLIE PLUS) In Vitro Strip Use 3-4 times a day as directed. 100 strip 1    aspirin 81 MG Oral Chew Tab Chew 1 tablet (81 mg  total) by mouth 3 (three) times a week.      metoprolol tartrate 50 MG Oral Tab Take 1 tablet (50 mg total) by mouth 2 (two) times daily.      MULTIPLE VITAMIN OR 1 by mouth daily      Calcium Carbonate-Vitamin D 600-125 MG-UNIT Oral Tab Take 1 tablet by mouth daily.         REVIEW OF SYSTEMS:  Review of Systems:   Constitutional: No fevers, chills, fatigue or night sweats.  ENT: No mouth pain, neck pain, running nose, headaches or swollen glands.  Skin: No rashes, pruritus or skin changes,  Respiratory: Denies cough, wheezing or shortness of breath.  CV: Denies chest pain, palpitations, orthopnea, PND or dizziness.  Musculoskeletal: No joint pain, stiffness or swelling.  GI: No nausea, vomiting or diarrhea. No blood in stools.  Neurologic: No seizures, tremors, weakness or numbness    VITALS: Pulse 76/min respiration 18/min temperature 98.7 blood pressure of 126/62    PHYSICAL EXAM:  GENERAL: well developed, well nourished, in no apparent distress  SKIN: no rashes, no suspicious lesions  Wound; no open wounds  HEENT: atraumatic, normocephalic, ears and throat are clear  EYES: PERRLA, EOMI, conjunctiva are clear  NECK: supple, no adenopathy, no bruits  CHEST: no chest tenderness  LUNGS: clear to auscultation  CARDIO: RRR without murmur  GI: good BS's, no masses, HSM or tenderness  : deferred  RECTAL: deferred  MUSCULOSKELETAL: back is not tender, FROM of the back  EXTREMITIES: n 1+ bilateral pedal edema  NEURO: Oriented times three, cranial nerves are intact, motor and sensory are grossly intact      DIAGNOSTICS REVIEWED AT THIS VISIT:  Recent labs from July 8 and chemistry showed normal electrolytes, BUN of 22 creatinine 1.08 her CBC is also within normal with hemoglobin of 11.9  PT/INR was 20 and 2.06 on July 9  ASSESSMENT AND PLAN:      Diagnoses and all orders for this visit:    Generalized weakness  At risk for falls  Continue restorative PT to work on ambulation, gait, balance, strength, endurance,  transfers, safety  -Continue restorative OT to work on fine motor skills, ADLs, hygiene, toileting, transfers, safety  Chronic heart failure with preserved ejection fraction (HCC)  Chronic atrial fibrillation (HCC)  Daily weights  Torsemide 20 mg daily  Warfarin anticoagulation  INR 2.06  Continue Coumadin 5 mg daily   check PT/INR monthly  Essential hypertension  Losartan 100 mg daily  Metoprolol 50 mg daily  Chronic 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), lumbar arthritis, and lumbar spinal stenosis  PT/OT  Tylenol as needed for pain  Type 2 diabetes mellitus  Check sugars before meals and at bedtime  Continue insulin glargine 5 units at bedtime  - 24h nursing for medication administration, bowel/bladder care, pain/sleep assessment  - Physician supervision for multiple medical comorbidities, fall risk, DVT risk, infection risk, pain management      Henrik Gunderson MD   [facility]  [address], [place] [address]    Electronically signed      This dictation was performed with a verbal recognition program (DRAGON) and it was checked for errors. It is possible that there are still dictated errors within this office note. If so, please bring any errors to my attention for an addendum. All efforts were made to ensure that this office note is accurate

## 2024-08-21 ENCOUNTER — TELEPHONE (OUTPATIENT)
Dept: INTERNAL MEDICINE CLINIC | Facility: CLINIC | Age: 89
End: 2024-08-21

## 2024-08-21 NOTE — TELEPHONE ENCOUNTER
Patient move to nursing home and will be seen by the in house doctor. This adjustment took place in June of 2024. Please remove Dr. Curry as PCP.

## 2024-09-12 ENCOUNTER — PATIENT OUTREACH (OUTPATIENT)
Dept: CASE MANAGEMENT | Age: 89
End: 2024-09-12

## 2024-09-12 NOTE — PROCEDURES
The office order for PCP removal request is Approved and finalized on September 12, 2024.    Removed Willian Curry MD as the patient's Primary Care Physician

## 2024-09-26 ENCOUNTER — APPOINTMENT (OUTPATIENT)
Dept: GENERAL RADIOLOGY | Facility: HOSPITAL | Age: 89
DRG: 493 | End: 2024-09-26
Attending: EMERGENCY MEDICINE
Payer: MEDICARE

## 2024-09-26 ENCOUNTER — HOSPITAL ENCOUNTER (INPATIENT)
Facility: HOSPITAL | Age: 89
LOS: 6 days | Discharge: NURSING, REHAB OR LONG-TERM CARE | DRG: 493 | End: 2024-10-02
Attending: EMERGENCY MEDICINE | Admitting: HOSPITALIST
Payer: MEDICARE

## 2024-09-26 DIAGNOSIS — D50.9 IRON DEFICIENCY ANEMIA, UNSPECIFIED IRON DEFICIENCY ANEMIA TYPE: ICD-10-CM

## 2024-09-26 DIAGNOSIS — S82.842A BIMALLEOLAR FRACTURE OF LEFT ANKLE, CLOSED, INITIAL ENCOUNTER: Primary | ICD-10-CM

## 2024-09-26 PROBLEM — S82.892A CLOSED FRACTURE OF LEFT ANKLE: Status: ACTIVE | Noted: 2024-01-01

## 2024-09-26 PROBLEM — S82.892A ANKLE FRACTURE, LEFT: Status: ACTIVE | Noted: 2024-09-26

## 2024-09-26 PROBLEM — R73.9 HYPERGLYCEMIA: Status: ACTIVE | Noted: 2024-09-26

## 2024-09-26 PROBLEM — S82.892A CLOSED FRACTURE OF LEFT ANKLE: Status: ACTIVE | Noted: 2024-09-26

## 2024-09-26 PROBLEM — S82.892A ANKLE FRACTURE, LEFT: Status: ACTIVE | Noted: 2024-01-01

## 2024-09-26 PROBLEM — R73.9 HYPERGLYCEMIA: Status: ACTIVE | Noted: 2024-01-01

## 2024-09-26 LAB
ALBUMIN SERPL-MCNC: 4.1 G/DL (ref 3.2–4.8)
ALBUMIN/GLOB SERPL: 1.7 {RATIO} (ref 1–2)
ALP LIVER SERPL-CCNC: 101 U/L
ALT SERPL-CCNC: 10 U/L
ANION GAP SERPL CALC-SCNC: 3 MMOL/L (ref 0–18)
APTT PPP: 29.9 SECONDS (ref 23–36)
AST SERPL-CCNC: 21 U/L (ref ?–34)
BASOPHILS # BLD AUTO: 0.06 X10(3) UL (ref 0–0.2)
BASOPHILS NFR BLD AUTO: 0.8 %
BILIRUB SERPL-MCNC: 0.5 MG/DL (ref 0.2–0.9)
BUN BLD-MCNC: 16 MG/DL (ref 9–23)
CALCIUM BLD-MCNC: 9.6 MG/DL (ref 8.7–10.4)
CHLORIDE SERPL-SCNC: 109 MMOL/L (ref 98–112)
CO2 SERPL-SCNC: 26 MMOL/L (ref 21–32)
CREAT BLD-MCNC: 0.94 MG/DL
EGFRCR SERPLBLD CKD-EPI 2021: 56 ML/MIN/1.73M2 (ref 60–?)
EOSINOPHIL # BLD AUTO: 0.24 X10(3) UL (ref 0–0.7)
EOSINOPHIL NFR BLD AUTO: 3.2 %
ERYTHROCYTE [DISTWIDTH] IN BLOOD BY AUTOMATED COUNT: 13.9 %
EST. AVERAGE GLUCOSE BLD GHB EST-MCNC: 126 MG/DL (ref 68–126)
GLOBULIN PLAS-MCNC: 2.4 G/DL (ref 2–3.5)
GLUCOSE BLD-MCNC: 150 MG/DL (ref 70–99)
GLUCOSE BLD-MCNC: 172 MG/DL (ref 70–99)
HBA1C MFR BLD: 6 % (ref ?–5.7)
HCT VFR BLD AUTO: 37.2 %
HGB BLD-MCNC: 12.7 G/DL
IMM GRANULOCYTES # BLD AUTO: 0.04 X10(3) UL (ref 0–1)
IMM GRANULOCYTES NFR BLD: 0.5 %
INR BLD: 1.75 (ref 0.8–1.2)
LYMPHOCYTES # BLD AUTO: 1.51 X10(3) UL (ref 1–4)
LYMPHOCYTES NFR BLD AUTO: 19.9 %
MCH RBC QN AUTO: 29.5 PG (ref 26–34)
MCHC RBC AUTO-ENTMCNC: 34.1 G/DL (ref 31–37)
MCV RBC AUTO: 86.3 FL
MONOCYTES # BLD AUTO: 0.83 X10(3) UL (ref 0.1–1)
MONOCYTES NFR BLD AUTO: 11 %
NEUTROPHILS # BLD AUTO: 4.89 X10 (3) UL (ref 1.5–7.7)
NEUTROPHILS # BLD AUTO: 4.89 X10(3) UL (ref 1.5–7.7)
NEUTROPHILS NFR BLD AUTO: 64.6 %
OSMOLALITY SERPL CALC.SUM OF ELEC: 290 MOSM/KG (ref 275–295)
PLATELET # BLD AUTO: 183 10(3)UL (ref 150–450)
POTASSIUM SERPL-SCNC: 4.2 MMOL/L (ref 3.5–5.1)
PROT SERPL-MCNC: 6.5 G/DL (ref 5.7–8.2)
PROTHROMBIN TIME: 20.6 SECONDS (ref 11.6–14.8)
RBC # BLD AUTO: 4.31 X10(6)UL
SODIUM SERPL-SCNC: 138 MMOL/L (ref 136–145)
WBC # BLD AUTO: 7.6 X10(3) UL (ref 4–11)

## 2024-09-26 PROCEDURE — 73502 X-RAY EXAM HIP UNI 2-3 VIEWS: CPT | Performed by: EMERGENCY MEDICINE

## 2024-09-26 PROCEDURE — 73610 X-RAY EXAM OF ANKLE: CPT | Performed by: EMERGENCY MEDICINE

## 2024-09-26 PROCEDURE — 99223 1ST HOSP IP/OBS HIGH 75: CPT | Performed by: HOSPITALIST

## 2024-09-26 PROCEDURE — 73562 X-RAY EXAM OF KNEE 3: CPT | Performed by: EMERGENCY MEDICINE

## 2024-09-26 PROCEDURE — 0QSMXZZ REPOSITION LEFT TARSAL, EXTERNAL APPROACH: ICD-10-PCS | Performed by: EMERGENCY MEDICINE

## 2024-09-26 RX ORDER — CIPROFLOXACIN 0.5 MG/.25ML
4 SOLUTION/ DROPS AURICULAR (OTIC) 2 TIMES DAILY
COMMUNITY

## 2024-09-26 RX ORDER — LIDOCAINE HYDROCHLORIDE 10 MG/ML
INJECTION, SOLUTION INFILTRATION; PERINEURAL
Status: COMPLETED
Start: 2024-09-26 | End: 2024-09-26

## 2024-09-26 RX ORDER — INSULIN GLARGINE-YFGN 100 [IU]/ML
5 INJECTION, SOLUTION SUBCUTANEOUS NIGHTLY
COMMUNITY
Start: 2024-09-09

## 2024-09-26 RX ORDER — HEPARIN SODIUM 5000 [USP'U]/ML
5000 INJECTION, SOLUTION INTRAVENOUS; SUBCUTANEOUS EVERY 8 HOURS SCHEDULED
Status: DISCONTINUED | OUTPATIENT
Start: 2024-09-27 | End: 2024-10-01

## 2024-09-26 RX ORDER — BISACODYL 10 MG
10 SUPPOSITORY, RECTAL RECTAL
Status: DISCONTINUED | OUTPATIENT
Start: 2024-09-26 | End: 2024-10-02

## 2024-09-26 RX ORDER — POLYETHYLENE GLYCOL 3350 17 G/17G
17 POWDER, FOR SOLUTION ORAL DAILY PRN
Status: DISCONTINUED | OUTPATIENT
Start: 2024-09-26 | End: 2024-10-02

## 2024-09-26 RX ORDER — MORPHINE SULFATE 2 MG/ML
1 INJECTION, SOLUTION INTRAMUSCULAR; INTRAVENOUS EVERY 2 HOUR PRN
Status: DISCONTINUED | OUTPATIENT
Start: 2024-09-26 | End: 2024-10-02

## 2024-09-26 RX ORDER — NICOTINE POLACRILEX 4 MG
30 LOZENGE BUCCAL
Status: DISCONTINUED | OUTPATIENT
Start: 2024-09-26 | End: 2024-10-02

## 2024-09-26 RX ORDER — LIDOCAINE HYDROCHLORIDE AND EPINEPHRINE 10; 10 MG/ML; UG/ML
INJECTION, SOLUTION INFILTRATION; PERINEURAL
Status: DISPENSED
Start: 2024-09-26 | End: 2024-09-27

## 2024-09-26 RX ORDER — SIMETHICONE 125 MG
125 TABLET,CHEWABLE ORAL
COMMUNITY

## 2024-09-26 RX ORDER — ECHINACEA PURPUREA EXTRACT 125 MG
1 TABLET ORAL
Status: DISCONTINUED | OUTPATIENT
Start: 2024-09-26 | End: 2024-10-02

## 2024-09-26 RX ORDER — SENNOSIDES 8.6 MG
17.2 TABLET ORAL NIGHTLY PRN
Status: DISCONTINUED | OUTPATIENT
Start: 2024-09-26 | End: 2024-10-02

## 2024-09-26 RX ORDER — OMEPRAZOLE 10 MG/1
10 CAPSULE, DELAYED RELEASE ORAL EVERY MORNING
COMMUNITY

## 2024-09-26 RX ORDER — SODIUM CHLORIDE 9 MG/ML
INJECTION, SOLUTION INTRAVENOUS CONTINUOUS
Status: ACTIVE | OUTPATIENT
Start: 2024-09-26 | End: 2024-09-30

## 2024-09-26 RX ORDER — ONDANSETRON 2 MG/ML
4 INJECTION INTRAMUSCULAR; INTRAVENOUS EVERY 4 HOURS PRN
Status: DISCONTINUED | OUTPATIENT
Start: 2024-09-26 | End: 2024-09-26 | Stop reason: HOSPADM

## 2024-09-26 RX ORDER — MELATONIN
3 NIGHTLY PRN
Status: DISCONTINUED | OUTPATIENT
Start: 2024-09-26 | End: 2024-10-02

## 2024-09-26 RX ORDER — TRAMADOL HYDROCHLORIDE 50 MG/1
50 TABLET ORAL EVERY 6 HOURS PRN
Status: DISCONTINUED | OUTPATIENT
Start: 2024-09-26 | End: 2024-10-02

## 2024-09-26 RX ORDER — DEXTROSE MONOHYDRATE 25 G/50ML
50 INJECTION, SOLUTION INTRAVENOUS
Status: DISCONTINUED | OUTPATIENT
Start: 2024-09-26 | End: 2024-10-02

## 2024-09-26 RX ORDER — ACETAMINOPHEN 500 MG
500 TABLET ORAL EVERY 4 HOURS PRN
Status: DISCONTINUED | OUTPATIENT
Start: 2024-09-26 | End: 2024-10-02

## 2024-09-26 RX ORDER — GUAIFENESIN/DEXTROMETHORPHAN 100-10MG/5
10 SYRUP ORAL EVERY 4 HOURS PRN
COMMUNITY

## 2024-09-26 RX ORDER — HYDROMORPHONE HYDROCHLORIDE 1 MG/ML
0.5 INJECTION, SOLUTION INTRAMUSCULAR; INTRAVENOUS; SUBCUTANEOUS EVERY 30 MIN PRN
Status: DISCONTINUED | OUTPATIENT
Start: 2024-09-26 | End: 2024-09-26 | Stop reason: HOSPADM

## 2024-09-26 RX ORDER — BENZONATATE 100 MG/1
200 CAPSULE ORAL 3 TIMES DAILY PRN
Status: DISCONTINUED | OUTPATIENT
Start: 2024-09-26 | End: 2024-10-02

## 2024-09-26 RX ORDER — ONDANSETRON 2 MG/ML
8 INJECTION INTRAMUSCULAR; INTRAVENOUS EVERY 6 HOURS PRN
Status: DISCONTINUED | OUTPATIENT
Start: 2024-09-26 | End: 2024-10-02

## 2024-09-26 RX ORDER — NICOTINE POLACRILEX 4 MG
15 LOZENGE BUCCAL
Status: DISCONTINUED | OUTPATIENT
Start: 2024-09-26 | End: 2024-10-02

## 2024-09-26 RX ORDER — KETOROLAC TROMETHAMINE 15 MG/ML
15 INJECTION, SOLUTION INTRAMUSCULAR; INTRAVENOUS EVERY 6 HOURS PRN
Status: DISPENSED | OUTPATIENT
Start: 2024-09-26 | End: 2024-09-28

## 2024-09-26 RX ORDER — SODIUM CHLORIDE 9 MG/ML
INJECTION, SOLUTION INTRAVENOUS CONTINUOUS
Status: DISCONTINUED | OUTPATIENT
Start: 2024-09-26 | End: 2024-09-27

## 2024-09-26 NOTE — H&P
[facility]     Denae Kern Patient Status:  Emergency    5/3/1930 MRN [id number]   Location [facility] EMERGENCY DEPARTMENT Attending Tono Turner MD   Hosp Day # 0 PCP No primary care provider on file.     Chief Complaint: fall    Subjective:    History of Present Illness:     Denea Kern is a 89 year old female with fall when trying to walk up step into daughter's home.  Fell and had immediate left ankle pain.  Had recent \"lung infection\" but Is now off abx for this.  Denies any cough or sob.  Uses walker but has no chest pain or dyspnea when walking.    History/Other:    Past Medical History:  Past Medical History:    AC joint arthropathy    Actinic keratosis    right anterior lower leg    Anxiety    Arthritis    At high risk for falls    Atrial fibrillation (HCC)    PERSISTENT DR IRWIN  On Coumadin. Stable.    Autonomic neuropathy    Back pain    Bunion    CAD (coronary artery disease)    Cardiomegaly    mild, w/ mildly increased pulmonary vascularity, increased interstitial markings in the mid to lower lung fields, and B/L perihilar opacities is concerning for congestive failure    Chronic heart failure with preserved ejection fraction (HCC)    Class 1 obesity    Constipation    Diabetes mellitus (HCC)    Difficult intubation    Disc degeneration    has several levels of degenerative discs and several areas of stenosis, both foraminal and central canal stenosis; and hx DDD, DJD    Diverticulosis    DVT of leg (deep venous thrombosis) (HCC)    post knee replacement    Dysphagia    Easy bruising    Esophageal dilatation    Esophageal stricture    w/ variability in terms of tolerating that and tolerating meds    Fatigue    Fatty liver    Fecal impaction in rectum (HCC)    Flatulence/gas pain/belching    Frailty    Frequent urination    Gastric polyp    Gastroparesis    primarily effecting insulin treatment    GERD (gastroesophageal reflux disease)    and motility  disorder    Hammertoe    Hearing impairment    HA's bilateral    Hearing loss    Heart palpitations    Hemorrhoids    Hiatal hernia    small    Hip pain    History  of basal cell carcinoma    basal cell and squamous cell    History of blood transfusion    with hysterectomy    History of colon polyps    History of Meniere's disease    History of myocardial infarction    History of PTCA    HTN (hypertension)    Hypothyroidism    IBS (irritable bowel syndrome)    Iron deficiency    Need iron infusion.    Irregular bowel habits    Mild mitral regurgitation    Nausea and vomiting in adult    Neck pain    head and neck discomfort    OA (osteoarthritis)    an hx low grade arthritis    Onychomycosis    severe, toenails, received podiatric treatment 2/29/2012 w/ Dr. Bowers    Osteoporosis    and osteopenia; 3/2009 \"Reclast infusion\"    Perirectal discomfort    perirectus discomfort    Presbyesophagus    Problems with swallowing    Pulmonary hypertension (HCC)    Sinusitis    Squamous cell carcinoma    Stress    Thyroid nodule    right-on Carotid US    Tinnitus    Tumor, thyroid    Venous disease    w/ peripheral edema; and hx varicose veins    Vertigo    vertiginous symptoms    Wears glasses     Past Surgical History:   Past Surgical History:   Procedure Laterality Date    Angioplasty (coronary)  07/14/2010    PTCA, bare-metal stent to the R coronary artery, catheterization, subtotal stenosis of the mid to distal R coronary artery    Appendectomy      Cataract      B/L    Cholecystectomy  1994    Colonoscopy      Colonoscopy      Diagnostic anoscopy      Egd      Fluor gid & loclzj ndl/cath spi dx/ther njx  02/07/2014    Procedure: SI JOINT INJECTION;  Surgeon: Willian Orellana MD;  Location: [facility]    Fluor gid & loclzj ndl/cath spi dx/ther njx  03/27/2014    Procedure: LUMBAR EPIDURAL;  Surgeon: Willian Orellana MD;  Location: [facility]    Foot/toes surgery proc unlisted  1991,  1998    bunions B/L, hammertoe B/L    Hysterectomy  1967    partial    Injection, w/wo contrast, dx/therapeutic substance, epidural/subarachnoid; lumbar/sacral  02/07/2014    Procedure: LUMBAR EPIDURAL;  Surgeon: Willian Orellana MD;  Location: [facility]    Injection, w/wo contrast, dx/therapeutic substance, epidural/subarachnoid; lumbar/sacral  03/27/2014    Procedure: LUMBAR EPIDURAL;  Surgeon: Willian Orellana MD;  Location: [facility]    Knee replacement surgery      R TKR-1995, L TKR-5/2007    Orthopedic surg ([facility])      kael bunions    Other surgical history      thyroid nodule; benign tumor on thyroid removed 1996; subtotal thyroidectomy 1996    Other surgical history  1974    varicose vein strip R leg    Other surgical history  01/12/2011    mid esophagus bx, gastric polyp bx    Other surgical history      esophageal dilatation    Other surgical history  02/14/2018    vulva biopsy: lichenoid inflammation with stromal fibrosis, negative for dysplasia    Patient documented not to have experienced any of the following events  02/07/2014    Procedure: SI JOINT INJECTION;  Surgeon: Willian Orellana MD;  Location: [facility]    Patient documented not to have experienced any of the following events  03/27/2014    Procedure: LUMBAR EPIDURAL;  Surgeon: Willian Orellana MD;  Location: [facility]    Patient withough preoperative order for iv antibiotic surgical site infection prophylaxis.  02/07/2014    Procedure: SI JOINT INJECTION;  Surgeon: Willian Orellana MD;  Location: [facility]    Patient withough preoperative order for iv antibiotic surgical site infection prophylaxis.  03/27/2014    Procedure: LUMBAR EPIDURAL;  Surgeon: Willian Orellana MD;  Location: [facility]    Skin surgery  05/19/2011    SCCIS to R temple / Mohs surgery    Tonsillectomy      T&A, childhood    Total knee replacement       bilateral    Upper gi endoscopy,exam  10/12/2010    upper gi series (air contrast) w/ esophogram      Family History:   Family History   Problem Relation Age of Onset    Heart Attack Father     Other (Parkinson's) Father     Other (pneumonia) Father     Heart Disorder Mother     Heart Disease Mother     Heart Attack Mother     Arthritis Mother     Circulatory Problems Mother     Other (Other) Mother     Diabetes Daughter     Suicide History Son     Seizure Disorder Son         Epilepsy    Mental Disorder Son         paranoia, OCD    Heart Attack Paternal Grandmother     Heart Disorder Paternal Grandmother     Heart Attack Paternal Grandfather     Heart Disorder Brother     Cancer Brother         lung ca    Diabetes Brother     Cancer Other         colorectal ca, skin ca, fam hx    High Blood Pressure Other         fam hx    Heart Disorder Other         heart condition, mat grandparen    Heart Attack Other         mat grandparent    Stroke Other         mat grandparent    Other (Other) Other     Gastro-Intestinal Disorder Other         IBS, fam hx    Blood Disorder Other         blood blots, fam hx       hypertension Social History:    reports that she has never smoked. She has never used smokeless tobacco. She reports that she does not drink alcohol and does not use drugs.     Allergies:   Allergies   Allergen Reactions    Penicillins HIVES and OTHER (SEE COMMENTS)     CLASS    Rosuvastatin OTHER (SEE COMMENTS)     Reaction: muscle aches  Reaction: muscle aches    Acyclovir DIARRHEA    Allergy      BETA LACTAMS      Carbapenems UNKNOWN and OTHER (SEE COMMENTS)    Cephalosporins UNKNOWN    Codeine NAUSEA ONLY    Codeine [Opioid Analgesics] NAUSEA AND VOMITING    Demerol NAUSEA AND VOMITING    Diphenhydramine-Zinc Acetate UNKNOWN     BETA LACTAMS    Glucophage [Metformin Hydrochloride]      \"problematic\"    Meperidine NAUSEA ONLY and NAUSEA AND VOMITING    Misc. Sulfonamide Containing Compounds OTHER (SEE COMMENTS)     Other UNKNOWN    Penicillin G HIVES    Statins PAIN     Reaction: muscle aches      Sulfa Drugs Cross Reactors NAUSEA AND VOMITING     \"Sulfa\"      Cardizem RASH       Medications:    No current facility-administered medications on file prior to encounter.     Current Outpatient Medications on File Prior to Encounter   Medication Sig Dispense Refill    acetaminophen 500 MG Oral Tab Take 1 tablet (500 mg total) by mouth every 6 (six) hours as needed for Pain.      cyanocobalamin 1000 MCG Oral Tab Take 1 tablet (1,000 mcg total) by mouth daily.      meclizine 12.5 MG Oral Tab Take 1 tablet (12.5 mg total) by mouth 3 (three) times daily as needed.      warfarin 4 MG Oral Tab Take 4.5 mg by mouth daily.      loratadine 10 MG Oral Tab Take 1 tablet (10 mg total) by mouth daily.      insulin glargine 100 UNIT/ML Subcutaneous Solution Inject 5 Units into the skin nightly.      sennosides 8.6 MG Oral Tab Take 1 tablet (8.6 mg total) by mouth 2 (two) times daily.      Nystatin 945277 UNIT/GM External Powder Apply 1 Application topically 2 (two) times daily. (Patient not taking: Reported on 8/29/2024) 60 g 0    Iron, Ferrous Sulfate, 75 (15 Fe) MG/ML Oral Solution Take 90 mg by mouth daily. Taken as 1 tablespoon=15ml (45mg) bid after meals 50 mL 3    mirtazapine 7.5 MG Oral Tab Take 1 tablet (7.5 mg total) by mouth nightly. (Patient taking differently: Take 2 tablets (15 mg total) by mouth nightly.) 30 tablet 0    LEVOTHYROXINE 75 MCG Oral Tab TAKE ONE TABLET BY MOUTH DAILY BEFORE breakfast 90 tablet 0    losartan 100 MG Oral Tab Take 1 tablet (100 mg total) by mouth daily. 90 tablet 3    potassium chloride 40 meq/30 ml (10%) Oral Solution Take 15 mL (20 mEq total) by mouth daily. (Patient taking differently: Take 30 mL (40 mEq total) by mouth daily.) 473 mL 3    ACCU-CHEK SOFTCLIX LANCETS Does not apply Misc USE ONE TO THREE times a  each 3    torsemide 20 MG Oral Tab Take 1 tablet (20 mg total) by mouth every  morning. PATIENT REQUESTS TO TAKE IM AM, SELF DISPENSE      Glucose Blood (ACCU-CHEK HAYLIE PLUS) In Vitro Strip Use 3-4 times a day as directed. 100 strip 1    aspirin 81 MG Oral Chew Tab Chew 1 tablet (81 mg total) by mouth 3 (three) times a week.      metoprolol tartrate 50 MG Oral Tab Take 1 tablet (50 mg total) by mouth 2 (two) times daily.      MULTIPLE VITAMIN OR 1 by mouth daily      Calcium Carbonate-Vitamin D 600-125 MG-UNIT Oral Tab Take 1 tablet by mouth daily.         Review of Systems:   A comprehensive 12 point review of systems was completed.    Pertinent positives and negatives noted in the HPI.    Objective:   Physical Exam:    /65   Pulse 63   Temp 97.6 °F (36.4 °C) (Temporal)   Resp 15   Ht 4' 11\" (1.499 m)   Wt 160 lb (72.6 kg)   LMP  (LMP Unknown)   SpO2 92%   BMI 32.32 kg/m²   General: No acute distress, Alert  Respiratory: No rhonchi, no wheezes  Cardiovascular: S1, S2. Regular rate and rhythm  Abdomen: Soft, NT/ND, +BS  Neuro: No new focal deficits  Extremities: left ankle swollen and deformed.      Results:    Labs:      Labs Last 24 Hours:    Recent Labs   Lab 09/26/24  1403   RBC 4.31   HGB 12.7   HCT 37.2   MCV 86.3   MCH 29.5   MCHC 34.1   RDW 13.9   NEPRELIM 4.89   WBC 7.6   .0       Recent Labs   Lab 09/26/24  1403   *   BUN 16   CREATSERUM 0.94   EGFRCR 56*   CA 9.6   ALB 4.1      K 4.2      CO2 26.0   ALKPHO 101   AST 21   ALT 10   BILT 0.5   TP 6.5       Lab Results   Component Value Date    PT 28.6 (H) 03/03/2014    PT 25.4 (H) 01/23/2014    PT 22.9 (H) 07/27/2013    INR 2.73 (H) 08/19/2024    INR 3.88 (H) 06/17/2024    INR 3.21 (H) 06/16/2024       No results for input(s): \"TROP\", \"TROPHS\", \"CK\" in the last 168 hours.    No results for input(s): \"TROP\", \"PBNP\" in the last 168 hours.    No results for input(s): \"PCT\" in the last 168 hours.    Imaging: Imaging data reviewed in Epic.    Assessment & Plan:      #mechanical fall with Moderately  displaced and angulated fractures along the medial malleolus and lateral malleolus with moderate subluxation of the tibiotalar joint and widening of the ankle mortise; ortho consult; NPO for possible surgery; iv fluids; check coags, since on warfarin  #chronic HFpEF; compensated  #Atrial fibrillation on warfarin at home- warfarin held for possible surgery; would let INR drift down; if Ortho wants to do surgery today, could give FFP/kcentra  #CAD status post PCI  #Hypertension  #Type 2 diabetes-monitor on insulin  #GERD  #Hypothyroidism  #hx postop DVT    Quality:  DVT prophylaxis:  SCDs, INR Pending; when INR<2, will start heparin subQ  Code Status: see chart  Ramirez: NO  Ramirez Duration (in days): N/A  Central line: NO  Estimated discharge date: tbd    Plan of care discussed with patient and ER MD Willian Stark MD    Supplementary Documentation:

## 2024-09-26 NOTE — ED QUICK NOTES
Orders for admission, patient is aware of plan and ready to go upstairs. Any questions, please call ED RN Taqueria at extension [phone].     Patient Covid vaccination status: Fully vaccinated     COVID Test Ordered in ED: None    COVID Suspicion at Admission: N/A    Running Infusions:  None    Mental Status/LOC at time of transport: Alert    Other pertinent information:     Cast on left leg, Fall Risk    CIWA score: N/A   NIH score:  N/A

## 2024-09-26 NOTE — CONSULTS
ORTHOPAEDIC SURGERY CONSULT NOTE    Consulting Physician: Mercedes Watkins MD    Patient Name: Denae Kern  Age:  89 year old  Sex: Female  MRN: [id number]  : 5/3/1930  Date of Admission: 2024    REASON FOR CONSULTATION:  Left ankle fracture     HISTORY OF PRESENT ILLNESS:  This is a 89F ambulatory diabetic who presented to the ED with left ankle pain and inability to ambulate after a mechanical trip and fall earlier today when stepping over her daughter's entryway. Radiographs confirmed an ankle fracture which was close reduced and splinted by the ED. She is being admitted for pain control and social assistance given NWB status. She denies numbness/tingling or pain elsewhere.     Past Medical History:    AC joint arthropathy    Actinic keratosis    right anterior lower leg    Anxiety    Arthritis    At high risk for falls    Atrial fibrillation (HCC)    PERSISTENT DR IRWIN  On Coumadin. Stable.    Autonomic neuropathy    Back pain    Bunion    CAD (coronary artery disease)    Cardiomegaly    mild, w/ mildly increased pulmonary vascularity, increased interstitial markings in the mid to lower lung fields, and B/L perihilar opacities is concerning for congestive failure    Chronic heart failure with preserved ejection fraction (HCC)    Class 1 obesity    Constipation    Diabetes mellitus (HCC)    Difficult intubation    Disc degeneration    has several levels of degenerative discs and several areas of stenosis, both foraminal and central canal stenosis; and hx DDD, DJD    Diverticulosis    DVT of leg (deep venous thrombosis) (HCC)    post knee replacement    Dysphagia    Easy bruising    Esophageal dilatation    Esophageal stricture    w/ variability in terms of tolerating that and tolerating meds    Fatigue    Fatty liver    Fecal impaction in rectum (HCC)    Flatulence/gas pain/belching    Frailty    Frequent urination    Gastric polyp    Gastroparesis    primarily effecting insulin treatment    GERD  (gastroesophageal reflux disease)    and motility disorder    Hammertoe    Hearing impairment    HA's bilateral    Hearing loss    Heart palpitations    Hemorrhoids    Hiatal hernia    small    Hip pain    History  of basal cell carcinoma    basal cell and squamous cell    History of blood transfusion    with hysterectomy    History of colon polyps    History of Meniere's disease    History of myocardial infarction    History of PTCA    HTN (hypertension)    Hypothyroidism    IBS (irritable bowel syndrome)    Iron deficiency    Need iron infusion.    Irregular bowel habits    Mild mitral regurgitation    Nausea and vomiting in adult    Neck pain    head and neck discomfort    OA (osteoarthritis)    an hx low grade arthritis    Onychomycosis    severe, toenails, received podiatric treatment 2/29/2012 w/ Dr. Bowers    Osteoporosis    and osteopenia; 3/2009 \"Reclast infusion\"    Perirectal discomfort    perirectus discomfort    Presbyesophagus    Problems with swallowing    Pulmonary hypertension (HCC)    Sinusitis    Squamous cell carcinoma    Stress    Thyroid nodule    right-on Carotid US    Tinnitus    Tumor, thyroid    Venous disease    w/ peripheral edema; and hx varicose veins    Vertigo    vertiginous symptoms    Wears glasses     Past Surgical History:   Procedure Laterality Date    Angioplasty (coronary)  07/14/2010    PTCA, bare-metal stent to the R coronary artery, catheterization, subtotal stenosis of the mid to distal R coronary artery    Appendectomy      Cataract      B/L    Cholecystectomy  1994    Colonoscopy      Colonoscopy      Diagnostic anoscopy      Egd      Fluor gid & loclzj ndl/cath spi dx/ther njx  02/07/2014    Procedure: SI JOINT INJECTION;  Surgeon: Willian Orellana MD;  Location: [facility]    Fluor gid & loclzj ndl/cath spi dx/ther njx  03/27/2014    Procedure: LUMBAR EPIDURAL;  Surgeon: Willian Orellana MD;  Location: [facility]    Foot/toes surgery  proc unlisted  1991, 1998    bunions B/L, hammertoe B/L    Hysterectomy  1967    partial    Injection, w/wo contrast, dx/therapeutic substance, epidural/subarachnoid; lumbar/sacral  02/07/2014    Procedure: LUMBAR EPIDURAL;  Surgeon: Willian Orellana MD;  Location: [facility]    Injection, w/wo contrast, dx/therapeutic substance, epidural/subarachnoid; lumbar/sacral  03/27/2014    Procedure: LUMBAR EPIDURAL;  Surgeon: Willian Orellana MD;  Location: [facility]    Knee replacement surgery      R TKR-1995, L TKR-5/2007    Orthopedic surg ([facility])      kael bunions    Other surgical history      thyroid nodule; benign tumor on thyroid removed 1996; subtotal thyroidectomy 1996    Other surgical history  1974    varicose vein strip R leg    Other surgical history  01/12/2011    mid esophagus bx, gastric polyp bx    Other surgical history      esophageal dilatation    Other surgical history  02/14/2018    vulva biopsy: lichenoid inflammation with stromal fibrosis, negative for dysplasia    Patient documented not to have experienced any of the following events  02/07/2014    Procedure: SI JOINT INJECTION;  Surgeon: Willian Orellana MD;  Location: [facility]    Patient documented not to have experienced any of the following events  03/27/2014    Procedure: LUMBAR EPIDURAL;  Surgeon: Willian Orellana MD;  Location: [facility]    Patient withough preoperative order for iv antibiotic surgical site infection prophylaxis.  02/07/2014    Procedure: SI JOINT INJECTION;  Surgeon: Willian Orellana MD;  Location: [facility]    Patient withough preoperative order for iv antibiotic surgical site infection prophylaxis.  03/27/2014    Procedure: LUMBAR EPIDURAL;  Surgeon: Willian Orellana MD;  Location: [facility]    Skin surgery  05/19/2011    SCCIS to R temple / Mohs surgery    Tonsillectomy      T&A, childhood    Total knee  replacement      bilateral    Upper gi endoscopy,exam  10/12/2010    upper gi series (air contrast) w/ esophogram       Current Outpatient Medications:     acetaminophen 500 MG Oral Tab, Take 1 tablet (500 mg total) by mouth every 6 (six) hours as needed for Pain., Disp: , Rfl:     cyanocobalamin 1000 MCG Oral Tab, Take 1 tablet (1,000 mcg total) by mouth daily., Disp: , Rfl:     meclizine 12.5 MG Oral Tab, Take 1 tablet (12.5 mg total) by mouth 3 (three) times daily as needed., Disp: , Rfl:     warfarin 4 MG Oral Tab, Take 4.5 mg by mouth daily., Disp: , Rfl:     loratadine 10 MG Oral Tab, Take 1 tablet (10 mg total) by mouth daily., Disp: , Rfl:     insulin glargine 100 UNIT/ML Subcutaneous Solution, Inject 5 Units into the skin nightly., Disp: , Rfl:     sennosides 8.6 MG Oral Tab, Take 1 tablet (8.6 mg total) by mouth 2 (two) times daily., Disp: , Rfl:     Nystatin 048935 UNIT/GM External Powder, Apply 1 Application topically 2 (two) times daily. (Patient not taking: Reported on 8/29/2024), Disp: 60 g, Rfl: 0    Iron, Ferrous Sulfate, 75 (15 Fe) MG/ML Oral Solution, Take 90 mg by mouth daily. Taken as 1 tablespoon=15ml (45mg) bid after meals, Disp: 50 mL, Rfl: 3    mirtazapine 7.5 MG Oral Tab, Take 1 tablet (7.5 mg total) by mouth nightly. (Patient taking differently: Take 2 tablets (15 mg total) by mouth nightly.), Disp: 30 tablet, Rfl: 0    LEVOTHYROXINE 75 MCG Oral Tab, TAKE ONE TABLET BY MOUTH DAILY BEFORE breakfast, Disp: 90 tablet, Rfl: 0    losartan 100 MG Oral Tab, Take 1 tablet (100 mg total) by mouth daily., Disp: 90 tablet, Rfl: 3    potassium chloride 40 meq/30 ml (10%) Oral Solution, Take 15 mL (20 mEq total) by mouth daily. (Patient taking differently: Take 30 mL (40 mEq total) by mouth daily.), Disp: 473 mL, Rfl: 3    ACCU-CHEK SOFTCLIX LANCETS Does not apply Misc, USE ONE TO THREE times a DAY, Disp: 100 each, Rfl: 3    torsemide 20 MG Oral Tab, Take 1 tablet (20 mg total) by mouth every morning.  PATIENT REQUESTS TO TAKE IM AM, SELF DISPENSE, Disp: , Rfl:     Glucose Blood (ACCU-CHEK HAYLIE PLUS) In Vitro Strip, Use 3-4 times a day as directed., Disp: 100 strip, Rfl: 1    aspirin 81 MG Oral Chew Tab, Chew 1 tablet (81 mg total) by mouth 3 (three) times a week., Disp: , Rfl:     metoprolol tartrate 50 MG Oral Tab, Take 1 tablet (50 mg total) by mouth 2 (two) times daily., Disp: , Rfl:     MULTIPLE VITAMIN OR, 1 by mouth daily, Disp: , Rfl:     Calcium Carbonate-Vitamin D 600-125 MG-UNIT Oral Tab, Take 1 tablet by mouth daily., Disp: , Rfl:   Allergies   Allergen Reactions    Penicillins HIVES and OTHER (SEE COMMENTS)     CLASS    Rosuvastatin OTHER (SEE COMMENTS)     Reaction: muscle aches  Reaction: muscle aches    Acyclovir DIARRHEA    Allergy      BETA LACTAMS      Carbapenems UNKNOWN and OTHER (SEE COMMENTS)    Cephalosporins UNKNOWN    Codeine NAUSEA ONLY    Codeine [Opioid Analgesics] NAUSEA AND VOMITING    Demerol NAUSEA AND VOMITING    Diphenhydramine-Zinc Acetate UNKNOWN     BETA LACTAMS    Glucophage [Metformin Hydrochloride]      \"problematic\"    Meperidine NAUSEA ONLY and NAUSEA AND VOMITING    Misc. Sulfonamide Containing Compounds OTHER (SEE COMMENTS)    Other UNKNOWN    Penicillin G HIVES    Statins PAIN     Reaction: muscle aches      Sulfa Drugs Cross Reactors NAUSEA AND VOMITING     \"Sulfa\"      Cardizem RASH     Family History   Problem Relation Age of Onset    Heart Attack Father     Other (Parkinson's) Father     Other (pneumonia) Father     Heart Disorder Mother     Heart Disease Mother     Heart Attack Mother     Arthritis Mother     Circulatory Problems Mother     Other (Other) Mother     Diabetes Daughter     Suicide History Son     Seizure Disorder Son         Epilepsy    Mental Disorder Son         paranoia, OCD    Heart Attack Paternal Grandmother     Heart Disorder Paternal Grandmother     Heart Attack Paternal Grandfather     Heart Disorder Brother     Cancer Brother         lung  ca    Diabetes Brother     Cancer Other         colorectal ca, skin ca, fam hx    High Blood Pressure Other         fam hx    Heart Disorder Other         heart condition, mat grandparen    Heart Attack Other         mat grandparent    Stroke Other         mat grandparent    Other (Other) Other     Gastro-Intestinal Disorder Other         IBS, fam hx    Blood Disorder Other         blood blots, fam hx       Review of Systems:  Pertinent orthopedic positives include + L ankle pain     PHYSICAL EXAM:  Vitals:    09/26/24 1826   BP:    Pulse: 79   Resp: 12   Temp:       No acute distress, spontaneously moving all extremities with exception of LLE   LLE is in short leg splint which is padded  Wiggles toes and sensate distally   WWP    Diagnostics:  Lab Results   Component Value Date    WBC 7.6 09/26/2024    HGB 12.7 09/26/2024    HCT 37.2 09/26/2024    .0 09/26/2024    CREATSERUM 0.94 09/26/2024    BUN 16 09/26/2024     09/26/2024    K 4.2 09/26/2024     09/26/2024    CO2 26.0 09/26/2024     09/26/2024    CA 9.6 09/26/2024    ALB 4.1 09/26/2024    ALKPHO 101 09/26/2024    BILT 0.5 09/26/2024    TP 6.5 09/26/2024    AST 21 09/26/2024    ALT 10 09/26/2024     XR ANKLE (MIN 3 VIEWS), LEFT (CPT=73610)    Result Date: 9/26/2024  CONCLUSION:  Diffuse demineralization of the left ankle with displaced fractures redemonstrated through the lateral malleolus and medial malleolus.  There is soft tissue swelling about the ankle joint.   LOCATION:  [facility]   Dictated by (CST): Alisia Ruvalcaba MD on 9/26/2024 at 5:14 PM     Finalized by (CST): Alisia Ruvalcaba MD on 9/26/2024 at 5:15 PM       XR ANKLE (MIN 3 VIEWS), LEFT (CPT=73610)    Result Date: 9/26/2024  CONCLUSION:  Moderately displaced and angulated fractures along the medial malleolus and lateral malleolus with moderate subluxation of the tibiotalar joint and widening of the ankle mortise.  Marked soft tissue swelling surrounding the left ankle.     LOCATION:  [id number]   Dictated by (CST): Ashish Valenzuela MD on 9/26/2024 at 2:32 PM     Finalized by (CST): Ashish Valenzuela MD on 9/26/2024 at 2:33 PM       XR KNEE (3 VIEWS), LEFT (CPT=73562)    Result Date: 9/26/2024  CONCLUSION:  No evidence of acute displaced fracture or dislocation in the left knee.  LOCATION:  [id number]   Dictated by (CST): Ashish Valenzuela MD on 9/26/2024 at 2:32 PM     Finalized by (CST): Ashish Valenzuela MD on 9/26/2024 at 2:32 PM       XR HIP W OR WO PELVIS 2 OR 3 VIEWS, RIGHT (CPT=73502)    Result Date: 9/26/2024  CONCLUSION:  No evidence of acute displaced fracture or dislocation in the right hip.  LOCATION:  [id number]   Dictated by (CST): Ashish Valenzuela MD on 9/26/2024 at 2:31 PM     Finalized by (CST): Ashish Valenzuela MD on 9/26/2024 at 2:31 PM          ASSESSMENT AND PLAN:  This is a 89F ambulatory on Coumadin and diabetic with a left bimalleolar ankle fracture s/p closed reduction.     - Will discuss the above with my FA partner Dr. Kimble - fracture pattern is surgical but recently role for discussion of non-operative management given age and activity level. Make NPO @ midnight and hold Coumadin pending the above. I did discuss with the patient and daughter that even if surgery is agreed upon, it may not happen tomorrow as it is not urgent.    Thank you for allowing me to participate in this patient’s care.    Mercedes Watkins MD  [facility]  Orthopaedic Surgery

## 2024-09-27 LAB
ANION GAP SERPL CALC-SCNC: 6 MMOL/L (ref 0–18)
APTT PPP: 31.9 SECONDS (ref 23–36)
BUN BLD-MCNC: 20 MG/DL (ref 9–23)
CALCIUM BLD-MCNC: 9.1 MG/DL (ref 8.7–10.4)
CHLORIDE SERPL-SCNC: 110 MMOL/L (ref 98–112)
CO2 SERPL-SCNC: 26 MMOL/L (ref 21–32)
CREAT BLD-MCNC: 1 MG/DL
EGFRCR SERPLBLD CKD-EPI 2021: 52 ML/MIN/1.73M2 (ref 60–?)
ERYTHROCYTE [DISTWIDTH] IN BLOOD BY AUTOMATED COUNT: 14 %
GLUCOSE BLD-MCNC: 125 MG/DL (ref 70–99)
GLUCOSE BLD-MCNC: 131 MG/DL (ref 70–99)
GLUCOSE BLD-MCNC: 132 MG/DL (ref 70–99)
GLUCOSE BLD-MCNC: 135 MG/DL (ref 70–99)
GLUCOSE BLD-MCNC: 165 MG/DL (ref 70–99)
GLUCOSE BLD-MCNC: 195 MG/DL (ref 70–99)
HCT VFR BLD AUTO: 31.8 %
HGB BLD-MCNC: 10.8 G/DL
INR BLD: 1.69 (ref 0.8–1.2)
MAGNESIUM SERPL-MCNC: 1.9 MG/DL (ref 1.6–2.6)
MCH RBC QN AUTO: 29.6 PG (ref 26–34)
MCHC RBC AUTO-ENTMCNC: 34 G/DL (ref 31–37)
MCV RBC AUTO: 87.1 FL
OSMOLALITY SERPL CALC.SUM OF ELEC: 298 MOSM/KG (ref 275–295)
PLATELET # BLD AUTO: 163 10(3)UL (ref 150–450)
POTASSIUM SERPL-SCNC: 3.8 MMOL/L (ref 3.5–5.1)
PROTHROMBIN TIME: 20 SECONDS (ref 11.6–14.8)
RBC # BLD AUTO: 3.65 X10(6)UL
SODIUM SERPL-SCNC: 142 MMOL/L (ref 136–145)
WBC # BLD AUTO: 8.1 X10(3) UL (ref 4–11)

## 2024-09-27 PROCEDURE — 99232 SBSQ HOSP IP/OBS MODERATE 35: CPT | Performed by: INTERNAL MEDICINE

## 2024-09-27 RX ORDER — MIRTAZAPINE 7.5 MG/1
15 TABLET, FILM COATED ORAL NIGHTLY
Status: DISCONTINUED | OUTPATIENT
Start: 2024-09-27 | End: 2024-10-02

## 2024-09-27 RX ORDER — POTASSIUM CHLORIDE 1.5 G/1.58G
40 POWDER, FOR SOLUTION ORAL ONCE
Status: COMPLETED | OUTPATIENT
Start: 2024-09-27 | End: 2024-09-27

## 2024-09-27 RX ORDER — INSULIN DEGLUDEC 100 U/ML
5 INJECTION, SOLUTION SUBCUTANEOUS NIGHTLY
Status: DISCONTINUED | OUTPATIENT
Start: 2024-09-27 | End: 2024-10-02

## 2024-09-27 RX ORDER — MECLIZINE HCL 12.5 MG 12.5 MG/1
12.5 TABLET ORAL 3 TIMES DAILY PRN
Status: DISCONTINUED | OUTPATIENT
Start: 2024-09-27 | End: 2024-10-02

## 2024-09-27 RX ORDER — TORSEMIDE 20 MG/1
20 TABLET ORAL EVERY MORNING
Status: DISCONTINUED | OUTPATIENT
Start: 2024-09-27 | End: 2024-10-02

## 2024-09-27 RX ORDER — ASPIRIN 81 MG/1
81 TABLET, CHEWABLE ORAL
Status: DISCONTINUED | OUTPATIENT
Start: 2024-09-27 | End: 2024-10-02

## 2024-09-27 RX ORDER — METOPROLOL TARTRATE 50 MG
50 TABLET ORAL
Status: DISCONTINUED | OUTPATIENT
Start: 2024-09-27 | End: 2024-10-02

## 2024-09-27 RX ORDER — SIMETHICONE 125 MG
125 TABLET,CHEWABLE ORAL
Status: DISCONTINUED | OUTPATIENT
Start: 2024-09-27 | End: 2024-10-02

## 2024-09-27 RX ORDER — CETIRIZINE HYDROCHLORIDE 10 MG/1
10 TABLET ORAL DAILY
Status: DISCONTINUED | OUTPATIENT
Start: 2024-09-27 | End: 2024-10-01

## 2024-09-27 RX ORDER — LOSARTAN POTASSIUM 100 MG/1
100 TABLET ORAL DAILY
Status: DISCONTINUED | OUTPATIENT
Start: 2024-09-27 | End: 2024-10-02

## 2024-09-27 RX ORDER — FERROUS SULFATE 300 MG/5ML
90 LIQUID (ML) ORAL 2 TIMES DAILY
Status: DISCONTINUED | OUTPATIENT
Start: 2024-09-27 | End: 2024-10-02

## 2024-09-27 RX ORDER — LEVOTHYROXINE SODIUM 75 UG/1
75 TABLET ORAL
Status: DISCONTINUED | OUTPATIENT
Start: 2024-09-27 | End: 2024-10-02

## 2024-09-27 RX ORDER — PANTOPRAZOLE SODIUM 20 MG/1
20 TABLET, DELAYED RELEASE ORAL
Status: DISCONTINUED | OUTPATIENT
Start: 2024-09-27 | End: 2024-10-02

## 2024-09-27 RX ORDER — SENNOSIDES 8.6 MG
8.6 TABLET ORAL 2 TIMES DAILY
Status: DISCONTINUED | OUTPATIENT
Start: 2024-09-27 | End: 2024-10-02

## 2024-09-27 RX ORDER — OFLOXACIN 3 MG/ML
10 SOLUTION AURICULAR (OTIC) DAILY
Status: DISCONTINUED | OUTPATIENT
Start: 2024-09-27 | End: 2024-10-02

## 2024-09-27 NOTE — PLAN OF CARE
Patient alert and oriented . On room air . Vital signs stable .  Tele controlled A-Fib . Denies pain when resting . Left leg with post mold clean and dry ,elevated on pillows . NPO for possible surgery . Plan of care updated . Refused scd's . 2 person Skin check done with JUAREZ PCT . Noted some redness and a small skin breakdown in the coccyx , picture in chart . Also some bruising to right knee and left upper arm .  Pure wick placed. All safety precautions in place . Reminded to \"call don't fall\" . Will continue to monitor .     2300 . Patient refusing for vital signs , night meds also don't want to start the fluids till am .

## 2024-09-27 NOTE — PLAN OF CARE
Pt A&Ox4 w/ occasional forgetfulness. VSS on RA. Moderate pain controlled via medication. Post mold to left ankle intact. LLE elevated. Mepilex applied to sacrum for redness/breakdown. Last BM 9/27. Voiding via pure wick. Plan for surgery pending. POC discussed with family.

## 2024-09-27 NOTE — PROGRESS NOTES
Dr Cummings contacted me to take over orthopaedic care of this patient.    She has a displaced, comminuted bi (Tri?) malleolar ankle fracture disloction. The reduction in the ER improved the alignment significantly, but there is residual displacement. The bones are osteopenic. Also the ER xrays demonstrate moderate to severe sot tissue swelling as a result of her fracture dislocation.      She is on Coumadin for Afib and is a controlled diabetic (HgbA1c is 6.0).     This is an unstable ankle fracture pattern. ORIF is a strong consideration for a patient who was ambulatory before her injury. However, her INR is 1.75 and there is a lot of soft tissue swelling.       I will discuss ORIF surgery vs. NON-operative treatment, but pending further discussion with patient, family and the primary service, she should consider surgery on this admission to address the injury. The Coumadin should continue to be held and I will see the patient in the late morning to discuss her condition with her.    Please discontinue NPO order as she will not be going to surgery today.    Dr. Kimble

## 2024-09-27 NOTE — ED PROVIDER NOTES
At  Patient Seen in: [facility]      History     Chief Complaint   Patient presents with    Fall    Ankle Injury     Stated Complaint: Mechanical Fall, ankle injury    Subjective:   HPI    89-year-old female who presents to the emergency department after she tripped and fell injuring her left ankle.  The patient was unable to ambulate due to deformity of the left ankle.  EMS arrived and brought the patient to the hospital for evaluation.  She received 80 mics of fentanyl and 4 mg of Zofran in the field.  She has pain in her left ankle and right hip.  No loss of consciousness from the fall but did hit her head.  Reviewing her record she was seen on 9/10/2020 for for her dermatologic solar lentigo    Objective:   No pertinent past medical history.            No pertinent past surgical history.              No pertinent social history.            Review of Systems    Positive for stated Chief Complaint: Fall and Ankle Injury    Other systems are as noted in HPI.  Constitutional and vital signs reviewed.      All other systems reviewed and negative except as noted above.    Physical Exam     ED Triage Vitals [09/26/24 1353]   /64   Pulse 69   Resp 12   Temp 97.6 °F (36.4 °C)   Temp src Temporal   SpO2 93 %   O2 Device None (Room air)       Current Vitals:   Vital Signs  BP: 130/61  Pulse: 85  Resp: 18  Temp: 98 °F (36.7 °C)  Temp src: Oral  MAP (mmHg): 77    Oxygen Therapy  SpO2: 93 %  O2 Device: None (Room air)  ETCO2 (mmHg): 35 mmHg  O2 Flow Rate (L/min): 0 L/min  Pulse Oximetry Type: Continuous  Oximetry Probe Site Changed: No  Pulse Ox Probe Location: Left hand            Physical Exam  General: 89-year-old female complaining of left ankle discomfort at this time.  HEENT: No neck pain or head discomfort.  Pupils are reactive to light.  Extraocular motions are intact.  Lungs: Clear bilateral without wheezes or rhonchi.  Cardiac: Heart rate of 70 normal S1 and 2 without murmurs or ectopy  Abdomen:  Soft without tenderness.  Extremities: She does have some mild right hip discomfort on palpation but there is no shortening the extremities.  Her left ankle is obviously deformed without any open wounds.  Pulses are +2 in the dorsalis pedis pulse.  She does have sensation to pinprick and light touch that is intact.       ED Course     Labs Reviewed   COMP METABOLIC PANEL (14) - Abnormal; Notable for the following components:       Result Value    Glucose 150 (*)     eGFR-Cr 56 (*)     All other components within normal limits   HEMOGLOBIN A1C - Abnormal; Notable for the following components:    HgbA1C 6.0 (*)     All other components within normal limits   PROTHROMBIN TIME (PT) - Abnormal; Notable for the following components:    PT 20.6 (*)     INR 1.75 (*)     All other components within normal limits   POCT GLUCOSE - Abnormal; Notable for the following components:    POC Glucose 172 (*)     All other components within normal limits   PTT, ACTIVATED - Normal   CBC WITH DIFFERENTIAL WITH PLATELET   RAINBOW DRAW LAVENDER   RAINBOW DRAW LIGHT GREEN   RAINBOW DRAW BLUE     Reading Physician Reading Date Result Priority   Ashish Valenzuela MD  [phone] 9/26/2024      Narrative  PROCEDURE:  XR ANKLE (MIN 3 VIEWS), LEFT (CPT=73610)     TECHNIQUE:  Three views were obtained.     COMPARISON:  None.     INDICATIONS:  Mechanical Fall, ankle injury/pain     PATIENT STATED HISTORY: (As transcribed by Technologist)           FINDINGS:  Moderately displaced and angulated fractures along the medial malleolus and lateral malleolus with moderate subluxation of the tibiotalar joint and widening of the ankle mortise.  Marked soft tissue swelling surrounding the left ankle.    Vascular calcifications.  Osteopenia.  Moderate plantar calcaneal spurring.  Mild-to-moderate osteoarthritic changes in the TMT joints.                  Impression  CONCLUSION:  Moderately displaced and angulated fractures along the medial malleolus  and lateral malleolus with moderate subluxation of the tibiotalar joint and widening of the ankle mortise.  Marked soft tissue swelling surrounding the left ankle.          LOCATION:  [id number]        Dictated by (CST): Ashish Valenzuela MD on 9/26/2024 at 2:32 PM      Finalized by (CST): Ashish Valenzuela MD on 9/26/2024 at 2:33 PM        Narrative  PROCEDURE:  XR ANKLE (MIN 3 VIEWS), LEFT (CPT=73610)     TECHNIQUE:  Three views were obtained.     COMPARISON:  [facility] , XR, XR ANKLE (MIN 3 VIEWS), LEFT (CPT=73610), 9/26/2024, 2:18 PM.     INDICATIONS:  Mechanical Fall, ankle injury     PATIENT STATED HISTORY: (As transcribed by Technologist)  post cast placement.                         Impression  CONCLUSION:  Diffuse demineralization of the left ankle with displaced fractures redemonstrated through the lateral malleolus and medial malleolus.  There is soft tissue swelling about the ankle joint.        LOCATION:  [facility]        Dictated by (CST): Alisia Ruvalcaba MD on 9/26/2024 at 5:14 PM      Finalized by (CST): Alisia Ruvalcaba MD on 9/26/2024 at 5:15 PM             MDM    Differential diagnosis includes but is not limited to fracture dislocation of the left ankle, neurovascular injury, right hip fracture, left knee injury.  The patient had x-rays performed.  Laboratories were sent.  Glucose 150 the rest the metabolic panel essentially normal.  CBC was normal.  Left ankle x-ray  I reviewed the x-rays and agree with the radiologist report that showed moderately displaced and angulated fractures along the medial malleolus and lateral malleolus with moderate subluxation of the tibial talar joint and widening of the ankle mortise.  Marked soft tissue swelling surrounding the left ankle.  The x-rays of the knee and hip did not show any fractures or dislocations.  I performed a hematoma block on the patient's left ankle.  We did obtain consent on the patient to reduce the ankle fracture.  The patient required sedation for closed  reduction of left ankle fracture dislocation.  The risks, benefits, and alternatives were discussed with the patient and/or the family who consented.  The patient had a screening history and exam completed and there are no contraindications to sedation.  The patient has been NPO for 6 hours. ASA designation is ASA 3 - Patient with moderate systemic disease with functional limitations.  Mallampati score: II (hard and soft palate, upper portion of tonsils anduvula visible).  The patient was placed on monitors and was under constant nursing observation.  Under my direct supervision the patient was given fentanyl and propofol.  An appropriate level of sedation was achieved.  The patient remained hemodynamically stable with normal oxygen saturations during the procedure.  There were no complications related to the sedation.  Patient was observed until mental status returned to baseline.  Patient was subsequently deemed appropriate for discharge home with responsible caretaker.  The sedation lasted 8 minutes during which I was present.  The left ankle fracture dislocation was reduced using reversal of injury.  A palpable reduction was positively noted.  Post reduction xrays revealed adequate reduction.   Placement was placed in a posterior mold CMS intact splint position normal.  I spoke to the hospitalist service as well as orthopedics.  The patient will be admitted to the hospital for continued care.  Admission disposition: 9/26/2024  5:56 PM                               Medical Decision Making      Disposition and Plan     Clinical Impression:  1. Bimalleolar fracture of left ankle, closed, initial encounter         Disposition:  Admit  9/26/2024  5:56 pm    Follow-up:  No follow-up provider specified.        Medications Prescribed:  Current Discharge Medication List                            Hospital Problems       Present on Admission  Date Reviewed: 9/10/2024            ICD-10-CM Noted POA    * (Principal)  Bimalleolar fracture of left ankle, closed, initial encounter S82.842A 9/26/2024 Unknown    Ankle fracture, left S82.892A 9/26/2024 Unknown    Closed fracture of left ankle S82.892A 9/26/2024 Unknown    Hyperglycemia R73.9 9/26/2024 Yes

## 2024-09-27 NOTE — CM/SW NOTE
09/27/24 0900   CM/SW Referral Data   Referral Source Social Work (self-referral)   Reason for Referral Discharge planning   Informant Patient;EMR;Clinical Staff Member       Sangeetha met with pt to discuss dc planning.  Pt is 89 yo female, admitted following a fall while going up a step to her daughter's home.  Pt has ankle fracture/dislocation. Ortho following- deciding on need for surgery.    Pt used to live at [facility]. She was admitted to [facility] in June-when she returned to  , she had difficulty standing so was sent to ER and placed at [facility] for rehab. Pt states she is now there long term care and plan is to return there at [place]. Pt states she usually ambulates with a walker.    Updates sent to [facility].     will continue to follow.    Na Shepard LCSW  /Discharge Planner

## 2024-09-27 NOTE — PROGRESS NOTES
[facility]   part of [facility]     Hospitalist Progress Note     Denae Kern Patient Status:  Inpatient    5/3/1930 MRN [id number]   Location [facility] 3SW-A Attending Lia Ash MD   Hosp Day # 1 PCP No primary care provider on file.     Chief Complaint: mechanical fall with left ankle fracture    Subjective:     Patient reports pain is controlled.     Objective:    Review of Systems:   A comprehensive review of systems was completed; pertinent positive and negatives stated in subjective.    Vital signs:  Temp:  [97.6 °F (36.4 °C)-98.7 °F (37.1 °C)] 98.2 °F (36.8 °C)  Pulse:  [58-86] 76  Resp:  [12-22] 18  BP: (123-160)/(49-88) 149/49  SpO2:  [89 %-100 %] 91 %    Physical Exam:    General: No acute distress  Respiratory: No wheezes, no rhonchi  Cardiovascular: S1, S2, regular rate and rhythm  Abdomen: Soft, Non-tender, non-distended, positive bowel sounds  Neuro: No new focal deficits.   Extremities: left ankle in brace.    Diagnostic Data:    Labs:  Recent Labs   Lab 24  1403 24   WBC 7.6  --    HGB 12.7  --    MCV 86.3  --    .0  --    INR  --  1.75*       Recent Labs   Lab 24  1403   *   BUN 16   CREATSERUM 0.94   CA 9.6   ALB 4.1      K 4.2      CO2 26.0   ALKPHO 101   AST 21   ALT 10   BILT 0.5   TP 6.5       Estimated Creatinine Clearance: 41.9 mL/min (based on SCr of 0.94 mg/dL).    No results for input(s): \"TROP\", \"TROPHS\", \"CK\" in the last 168 hours.    Recent Labs   Lab 24   PTP 20.6*   INR 1.75*                  Microbiology    No results found for this visit on 24.      Imaging: Reviewed in Epic.    Medications:    ferrous sulfate  90 mg Oral BID    mirtazapine  15 mg Oral Nightly    aspirin  81 mg Oral Once per day on     levothyroxine  75 mcg Oral Before breakfast    cetirizine  10 mg Oral Daily    metoprolol tartrate  50 mg Oral 2x Daily(Beta Blocker)    pantoprazole  20 mg Oral QAM  AC    sennosides  8.6 mg Oral BID    losartan  100 mg Oral Daily    torsemide  20 mg Oral QAM    insulin aspart  1-68 Units Subcutaneous TID CC    insulin degludec  5 Units Subcutaneous Nightly    insulin aspart  1-5 Units Subcutaneous TID AC and HS    propofol  1 mg/kg Intravenous Once    heparin  5,000 Units Subcutaneous Q8H BRITTANI       Assessment & Plan:      #mechanical fall with Moderately displaced and angulated fractures along the L medial malleolus and lateral malleolus with moderate subluxation of the tibiotalar joint and widening of the ankle mortise  -ortho following   -Supportive care until OR   -Hold warfarin; let INR trend down     #chronic HFpEF; compensated    #Atrial fibrillation on warfarin at home- warfarin held for possible surgery; would let INR drift down    #CAD status post PCI  #Hypertension-metoprolol, losartan  #Type 2 diabetes-monitor on insulin  #GERD  #Hypothyroidism-levothyroxine  #hx postop DVT  #Obesity, BMI 32    Lia Ash MD    Supplementary Documentation:     Quality:  DVT Mechanical Prophylaxis:   SCDs,    DVT Pharmacologic Prophylaxis   Medication    heparin (Porcine) 5000 UNIT/ML injection 5,000 Units      DVT Pharmacologic prophylaxis: Aspirin 162 mg         Code Status: DNAR/Selective Treatment  Ramirez: External urinary catheter in place  Ramirez Duration (in days):   Central line:    CADY:     Discharge is dependent on: course  At this point Ms. Kern is expected to be discharge to: pending surgery    The 21st Century Cures Act makes medical notes like these available to patients in the interest of transparency. Please be advised this is a medical document. Medical documents are intended to carry relevant information, facts as evident, and the clinical opinion of the practitioner. The medical note is intended as peer to peer communication and may appear blunt or direct. It is written in medical language and may contain abbreviations or verbiage that are unfamiliar.               **Certification      PHYSICIAN Certification of Need for Inpatient Hospitalization - Initial Certification    Patient will require inpatient services that will reasonably be expected to span two midnight's based on the clinical documentation in H+P.   Based on patients current state of illness, I anticipate that, after discharge, patient will require TBD.

## 2024-09-27 NOTE — ED QUICK NOTES
Pt is resting on cart, eating dinner. Pt denies needs. Pain 4/10. Daughter at bedside. Awaiting admit.

## 2024-09-27 NOTE — PLAN OF CARE
Patient is forgetful and slightly confused , refused lab draw this am . Asked lab to come back little later when patient is more awake . Will continue to monitor .

## 2024-09-28 LAB
ANION GAP SERPL CALC-SCNC: 6 MMOL/L (ref 0–18)
BUN BLD-MCNC: 17 MG/DL (ref 9–23)
CALCIUM BLD-MCNC: 9 MG/DL (ref 8.7–10.4)
CHLORIDE SERPL-SCNC: 111 MMOL/L (ref 98–112)
CO2 SERPL-SCNC: 26 MMOL/L (ref 21–32)
CREAT BLD-MCNC: 0.92 MG/DL
EGFRCR SERPLBLD CKD-EPI 2021: 58 ML/MIN/1.73M2 (ref 60–?)
ERYTHROCYTE [DISTWIDTH] IN BLOOD BY AUTOMATED COUNT: 14.1 %
GLUCOSE BLD-MCNC: 107 MG/DL (ref 70–99)
GLUCOSE BLD-MCNC: 109 MG/DL (ref 70–99)
GLUCOSE BLD-MCNC: 146 MG/DL (ref 70–99)
GLUCOSE BLD-MCNC: 164 MG/DL (ref 70–99)
GLUCOSE BLD-MCNC: 208 MG/DL (ref 70–99)
HCT VFR BLD AUTO: 29.7 %
HGB BLD-MCNC: 10.1 G/DL
INR BLD: 1.56 (ref 0.8–1.2)
MCH RBC QN AUTO: 29.9 PG (ref 26–34)
MCHC RBC AUTO-ENTMCNC: 34 G/DL (ref 31–37)
MCV RBC AUTO: 87.9 FL
OSMOLALITY SERPL CALC.SUM OF ELEC: 298 MOSM/KG (ref 275–295)
PLATELET # BLD AUTO: 139 10(3)UL (ref 150–450)
POTASSIUM SERPL-SCNC: 3.8 MMOL/L (ref 3.5–5.1)
POTASSIUM SERPL-SCNC: 3.8 MMOL/L (ref 3.5–5.1)
PROTHROMBIN TIME: 18.8 SECONDS (ref 11.6–14.8)
RBC # BLD AUTO: 3.38 X10(6)UL
SODIUM SERPL-SCNC: 143 MMOL/L (ref 136–145)
WBC # BLD AUTO: 8.2 X10(3) UL (ref 4–11)

## 2024-09-28 PROCEDURE — 99232 SBSQ HOSP IP/OBS MODERATE 35: CPT | Performed by: INTERNAL MEDICINE

## 2024-09-28 RX ORDER — POTASSIUM CHLORIDE 1.5 G/1.58G
40 POWDER, FOR SOLUTION ORAL ONCE
Status: COMPLETED | OUTPATIENT
Start: 2024-09-28 | End: 2024-09-28

## 2024-09-28 NOTE — PROGRESS NOTES
[facility]   part of [facility]     Hospitalist Progress Note     Denae Kern Patient Status:  Inpatient    5/3/1930 MRN [id number]   Location [facility] 3SW-A Attending Lia Ash MD   Hosp Day # 2 PCP No primary care provider on file.     Chief Complaint: mechanical fall with left ankle fracture    Subjective:     No acute complaints.     Objective:    Review of Systems:   A comprehensive review of systems was completed; pertinent positive and negatives stated in subjective.    Vital signs:  Temp:  [97.8 °F (36.6 °C)-98.4 °F (36.9 °C)] 97.8 °F (36.6 °C)  Pulse:  [63-82] 79  Resp:  [16-18] 16  BP: (122-149)/(49-72) 123/54  SpO2:  [91 %-98 %] 97 %    Physical Exam:    General: No acute distress  Respiratory: No wheezes, no rhonchi  Cardiovascular: S1, S2, regular rate and rhythm  Abdomen: Soft, Non-tender, non-distended, positive bowel sounds  Neuro: No new focal deficits.   Extremities: left ankle in cast    Diagnostic Data:    Labs:  Recent Labs   Lab 24  1403 24  1424 24  0537   WBC 7.6  --  8.1 8.2   HGB 12.7  --  10.8* 10.1*   MCV 86.3  --  87.1 87.9   .0  --  163.0 139.0*   INR  --  1.75* 1.69* 1.56*       Recent Labs   Lab 24  1403 24  1424 24  0537   * 132* 107*   BUN 16 20 17   CREATSERUM 0.94 1.00 0.92   CA 9.6 9.1 9.0   ALB 4.1  --   --     142 143   K 4.2 3.8 3.8  3.8    110 111   CO2 26.0 26.0 26.0   ALKPHO 101  --   --    AST 21  --   --    ALT 10  --   --    BILT 0.5  --   --    TP 6.5  --   --        Estimated Creatinine Clearance: 42.9 mL/min (based on SCr of 0.92 mg/dL).    No results for input(s): \"TROP\", \"TROPHS\", \"CK\" in the last 168 hours.    Recent Labs   Lab 24  2050 24  1424 24  0537   PTP 20.6* 20.0* 18.8*   INR 1.75* 1.69* 1.56*                  Microbiology    No results found for this visit on 24.      Imaging: Reviewed in Epic.    Medications:    ferrous sulfate   90 mg Oral BID    mirtazapine  15 mg Oral Nightly    aspirin  81 mg Oral Once per day on Monday Wednesday Friday    levothyroxine  75 mcg Oral Before breakfast    cetirizine  10 mg Oral Daily    metoprolol tartrate  50 mg Oral 2x Daily(Beta Blocker)    pantoprazole  20 mg Oral QAM AC    sennosides  8.6 mg Oral BID    losartan  100 mg Oral Daily    torsemide  20 mg Oral QAM    insulin aspart  1-68 Units Subcutaneous TID CC    insulin degludec  5 Units Subcutaneous Nightly    ofloxacin  10 drop Left Ear Daily    insulin aspart  1-5 Units Subcutaneous TID AC and HS    propofol  1 mg/kg Intravenous Once    heparin  5,000 Units Subcutaneous Q8H BRITTANI       Assessment & Plan:      #mechanical fall with Moderately displaced and angulated fractures along the L medial malleolus and lateral malleolus with moderate subluxation of the tibiotalar joint and widening of the ankle mortise  -ortho following   -Supportive care until OR   -Hold warfarin; let INR trend down     #chronic HFpEF; compensated    #Atrial fibrillation on warfarin at home- warfarin held for possible surgery; would let INR drift down    #CAD status post PCI  #Hypertension-metoprolol, losartan  #Type 2 diabetes-monitor on insulin  #GERD  #Hypothyroidism-levothyroxine  #hx postop DVT  #Obesity, BMI 32    Lia Ash MD    Supplementary Documentation:     Quality:  DVT Mechanical Prophylaxis:   SCDs,    DVT Pharmacologic Prophylaxis   Medication    heparin (Porcine) 5000 UNIT/ML injection 5,000 Units      DVT Pharmacologic prophylaxis: Aspirin 162 mg         Code Status: DNAR/Selective Treatment  Ramirez: External urinary catheter in place  Ramirez Duration (in days):   Central line:    CADY:     Discharge is dependent on: course  At this point Ms. Kern is expected to be discharge to: pending surgery    The 21st Century Cures Act makes medical notes like these available to patients in the interest of transparency. Please be advised this is a medical document.  Medical documents are intended to carry relevant information, facts as evident, and the clinical opinion of the practitioner. The medical note is intended as peer to peer communication and may appear blunt or direct. It is written in medical language and may contain abbreviations or verbiage that are unfamiliar.              **Certification      PHYSICIAN Certification of Need for Inpatient Hospitalization - Initial Certification    Patient will require inpatient services that will reasonably be expected to span two midnight's based on the clinical documentation in H+P.   Based on patients current state of illness, I anticipate that, after discharge, patient will require TBD.

## 2024-09-28 NOTE — PLAN OF CARE
Pt A&Ox4. VSS on RA. . Telemetry monitoring, Afib. Voiding with purewick. Ace wrap dressing and post mold in place to left leg, elevated above heart level with pillows. Mepilex to sacrum intact. Plan for  ORIF vs non-operative tx. Call light within reach. Will continue to monitor.

## 2024-09-28 NOTE — PLAN OF CARE
Pt A&Ox2, forgetful and confused. VSS on RA. Tele afib. Coumadin held, on heparin and ASA. SCD to RLE. Post mold splint to LLE, elevated with pillow. Voiding via purewick. Incontinent. Plan for possible ORIF next week. Ortho to see in the morning. Call light in reach, safety measures in place.

## 2024-09-29 ENCOUNTER — ANESTHESIA EVENT (OUTPATIENT)
Dept: SURGERY | Facility: HOSPITAL | Age: 89
End: 2024-09-29
Payer: MEDICARE

## 2024-09-29 LAB
GLUCOSE BLD-MCNC: 111 MG/DL (ref 70–99)
GLUCOSE BLD-MCNC: 129 MG/DL (ref 70–99)
GLUCOSE BLD-MCNC: 177 MG/DL (ref 70–99)
GLUCOSE BLD-MCNC: 193 MG/DL (ref 70–99)
GLUCOSE BLD-MCNC: 221 MG/DL (ref 70–99)
INR BLD: 1.33 (ref 0.8–1.2)
POTASSIUM SERPL-SCNC: 3.2 MMOL/L (ref 3.5–5.1)
POTASSIUM SERPL-SCNC: 3.3 MMOL/L (ref 3.5–5.1)
PROTHROMBIN TIME: 16.6 SECONDS (ref 11.6–14.8)

## 2024-09-29 PROCEDURE — 99232 SBSQ HOSP IP/OBS MODERATE 35: CPT | Performed by: INTERNAL MEDICINE

## 2024-09-29 RX ORDER — POTASSIUM CHLORIDE 1500 MG/1
40 TABLET, EXTENDED RELEASE ORAL EVERY 4 HOURS
Status: ACTIVE | OUTPATIENT
Start: 2024-09-29 | End: 2024-09-29

## 2024-09-29 NOTE — PROGRESS NOTES
[facility]   part of [facility]     Hospitalist Progress Note     Denae Kern Patient Status:  Inpatient    5/3/1930 MRN [id number]   Location [facility] 3SW-A Attending Lia Ash MD   Hosp Day # 3 PCP No primary care provider on file.     Chief Complaint: mechanical fall with left ankle fracture    Subjective:     Pain controlled. OR tomorrow.     Objective:    Review of Systems:   A comprehensive review of systems was completed; pertinent positive and negatives stated in subjective.    Vital signs:  Temp:  [97.8 °F (36.6 °C)-98.6 °F (37 °C)] 98.2 °F (36.8 °C)  Pulse:  [59-79] 63  Resp:  [16-18] 16  BP: (108-136)/(48-63) 132/58  SpO2:  [96 %-98 %] 96 %    Physical Exam:    General: No acute distress  Respiratory: No wheezes, no rhonchi  Cardiovascular: S1, S2, regular rate and rhythm  Abdomen: Soft, Non-tender, non-distended, positive bowel sounds  Neuro: No new focal deficits.   Extremities: left ankle in cast    Diagnostic Data:    Labs:  Recent Labs   Lab 24  1403 24  1424 24  0537 24  0519   WBC 7.6  --  8.1 8.2  --    HGB 12.7  --  10.8* 10.1*  --    MCV 86.3  --  87.1 87.9  --    .0  --  163.0 139.0*  --    INR  --  1.75* 1.69* 1.56* 1.33*       Recent Labs   Lab 24  1403 24  1424 24  0537 24  0519   * 132* 107*  --    BUN 16 20 17  --    CREATSERUM 0.94 1.00 0.92  --    CA 9.6 9.1 9.0  --    ALB 4.1  --   --   --     142 143  --    K 4.2 3.8 3.8  3.8 3.3*    110 111  --    CO2 26.0 26.0 26.0  --    ALKPHO 101  --   --   --    AST 21  --   --   --    ALT 10  --   --   --    BILT 0.5  --   --   --    TP 6.5  --   --   --        Estimated Creatinine Clearance: 42.9 mL/min (based on SCr of 0.92 mg/dL).    No results for input(s): \"TROP\", \"TROPHS\", \"CK\" in the last 168 hours.    Recent Labs   Lab 24  1424 24  0537 24  0519   PTP 20.0* 18.8* 16.6*   INR 1.69* 1.56* 1.33*                   Microbiology    No results found for this visit on 09/26/24.      Imaging: Reviewed in Epic.    Medications:    potassium chloride  40 mEq Oral Q4H    ferrous sulfate  90 mg Oral BID    mirtazapine  15 mg Oral Nightly    aspirin  81 mg Oral Once per day on Monday Wednesday Friday    levothyroxine  75 mcg Oral Before breakfast    cetirizine  10 mg Oral Daily    metoprolol tartrate  50 mg Oral 2x Daily(Beta Blocker)    pantoprazole  20 mg Oral QAM AC    sennosides  8.6 mg Oral BID    losartan  100 mg Oral Daily    torsemide  20 mg Oral QAM    insulin aspart  1-68 Units Subcutaneous TID CC    insulin degludec  5 Units Subcutaneous Nightly    ofloxacin  10 drop Left Ear Daily    insulin aspart  1-5 Units Subcutaneous TID AC and HS    propofol  1 mg/kg Intravenous Once    heparin  5,000 Units Subcutaneous Q8H BRITTANI       Assessment & Plan:      #mechanical fall with Moderately displaced and angulated fractures along the L medial malleolus and lateral malleolus with moderate subluxation of the tibiotalar joint and widening of the ankle mortise  -ortho following   -Supportive care until OR   -Hold warfarin; let INR trend down     #chronic HFpEF; compensated    #Atrial fibrillation on warfarin at home- warfarin held for possible surgery; would let INR drift down    #CAD status post PCI  #Hypertension-metoprolol, losartan  #Type 2 diabetes-monitor on insulin  #GERD  #Hypothyroidism-levothyroxine  #hx postop DVT  #Obesity, BMI 32    Lia Ash MD    Supplementary Documentation:     Quality:  DVT Mechanical Prophylaxis:   SCDs,    DVT Pharmacologic Prophylaxis   Medication    heparin (Porcine) 5000 UNIT/ML injection 5,000 Units      DVT Pharmacologic prophylaxis: Aspirin 162 mg         Code Status: DNAR/Selective Treatment  Ramirez: External urinary catheter in place  Ramirez Duration (in days):   Central line:    CADY:     Discharge is dependent on: course  At this point Ms. Kern is expected to be discharge to:  pending surgery    The 21st Century Cures Act makes medical notes like these available to patients in the interest of transparency. Please be advised this is a medical document. Medical documents are intended to carry relevant information, facts as evident, and the clinical opinion of the practitioner. The medical note is intended as peer to peer communication and may appear blunt or direct. It is written in medical language and may contain abbreviations or verbiage that are unfamiliar.              **Certification      PHYSICIAN Certification of Need for Inpatient Hospitalization - Initial Certification    Patient will require inpatient services that will reasonably be expected to span two midnight's based on the clinical documentation in H+P.   Based on patients current state of illness, I anticipate that, after discharge, patient will require TBD.

## 2024-09-29 NOTE — ANESTHESIA PREPROCEDURE EVALUATION
PRE-OP EVALUATION    Patient Name: Denae Kern    Admit Diagnosis: Bimalleolar fracture of left ankle, closed, initial encounter [T47.937T]    Pre-op Diagnosis: LEFT ANKLE FX    OPEN REDUCTION INTERNAL FIXATION LEFT ANKLE    Anesthesia Procedure: OPEN REDUCTION INTERNAL FIXATION LEFT ANKLE (Left)    Surgeons and Role:     * Sukumar Kimble MD - Primary    Pre-op vitals reviewed.  Temp: 98.2 °F (36.8 °C)  Pulse: 70  Resp: 16  BP: 131/49  SpO2: 95 %  Body mass index is 32.32 kg/m².    Current medications reviewed.  Hospital Medications:   potassium chloride (Klor-Con M20) tab 40 mEq  40 mEq Oral Q4H    [COMPLETED] potassium chloride (Klor-Con) 20 MEQ oral powder 40 mEq  40 mEq Oral Once    ferrous sulfate 300 (60 Fe) MG/5ML oral syrup 90 mg  90 mg Oral BID    meclizine (Antivert) tab 12.5 mg  12.5 mg Oral TID PRN    mirtazapine (Remeron) tab 15 mg  15 mg Oral Nightly    aspirin chewable tab 81 mg  81 mg Oral Once per day on Monday Wednesday Friday    levothyroxine (Synthroid) tab 75 mcg  75 mcg Oral Before breakfast    cetirizine (ZyrTEC) tab 10 mg  10 mg Oral Daily    metoprolol tartrate (Lopressor) tab 50 mg  50 mg Oral 2x Daily(Beta Blocker)    pantoprazole (Protonix) DR tab 20 mg  20 mg Oral QAM AC    sennosides (Senokot) tab 8.6 mg  8.6 mg Oral BID    simethicone (Mylicon) chewable tab 125 mg  125 mg Oral Daily PRN    losartan (Cozaar) tab 100 mg  100 mg Oral Daily    torsemide (Demadex) tab 20 mg  20 mg Oral QAM    insulin aspart (NovoLOG) 100 Units/mL FlexPen 1-68 Units  1-68 Units Subcutaneous TID CC    insulin degludec (Tresiba) 100 units/mL flextouch 5 Units  5 Units Subcutaneous Nightly    ofloxacin (Floxin) 0.3 % otic solution 10 drop  10 drop Left Ear Daily    [COMPLETED] potassium chloride (Klor-Con) 20 MEQ oral powder 40 mEq  40 mEq Oral Once    acetaminophen (Tylenol Extra Strength) tab 500 mg  500 mg Oral Q4H PRN    melatonin tab 3 mg  3 mg Oral Nightly PRN    polyethylene glycol (PEG 3350)  (Miralax) 17 g oral packet 17 g  17 g Oral Daily PRN    sennosides (Senokot) tab 17.2 mg  17.2 mg Oral Nightly PRN    bisacodyl (Dulcolax) 10 MG rectal suppository 10 mg  10 mg Rectal Daily PRN    ondansetron (Zofran) 4 MG/2ML injection 8 mg  8 mg Intravenous Q6H PRN    benzonatate (Tessalon) cap 200 mg  200 mg Oral TID PRN    glycerin-hypromellose- (Artificial Tears) 0.2-0.2-1 % ophthalmic solution 1 drop  1 drop Both Eyes QID PRN    sodium chloride (Saline Mist) 0.65 % nasal solution 1 spray  1 spray Each Nare Q3H PRN    traMADol (Ultram) tab 50 mg  50 mg Oral Q6H PRN    morphINE PF 2 MG/ML injection 1 mg  1 mg Intravenous Q2H PRN    [] ketorolac (Toradol) 15 MG/ML injection 15 mg  15 mg Intravenous Q6H PRN    [] lidocaine-EPINEPHrine (Xylocaine-Epinephrine) 1 %-1:471204 injection        [COMPLETED] lidocaine (Xylocaine) 1 % injection        glucose (Dex4) 15 GM/59ML oral liquid 15 g  15 g Oral Q15 Min PRN    Or    glucose (Glutose) 40% oral gel 15 g  15 g Oral Q15 Min PRN    Or    glucose-vitamin C (Dex-4) chewable tab 4 tablet  4 tablet Oral Q15 Min PRN    Or    dextrose 50% injection 50 mL  50 mL Intravenous Q15 Min PRN    Or    glucose (Dex4) 15 GM/59ML oral liquid 30 g  30 g Oral Q15 Min PRN    Or    glucose (Glutose) 40% oral gel 30 g  30 g Oral Q15 Min PRN    Or    glucose-vitamin C (Dex-4) chewable tab 8 tablet  8 tablet Oral Q15 Min PRN    insulin aspart (NovoLOG) 100 Units/mL FlexPen 1-5 Units  1-5 Units Subcutaneous TID AC and HS    propofol (Diprivan) 10 MG/ML injection 72.6 mg  1 mg/kg Intravenous Once    [COMPLETED] fentaNYL (Sublimaze) 50 mcg/mL injection 25 mcg  25 mcg Intravenous Once    [COMPLETED] propofol (Diprivan) 10 MG/ML injection   Intravenous Code/Trauma Med    [] sodium chloride 0.9% infusion   Intravenous Continuous    heparin (Porcine) 5000 UNIT/ML injection 5,000 Units  5,000 Units Subcutaneous Q8H BRITTANI       Outpatient Medications:     Medications Prior to  Admission   Medication Sig Dispense Refill Last Dose    Ciprofloxacin HCl 0.2 % Otic Solution Place 4 mL into the left ear 2 (two) times daily. 4 drops to left ear x 7 days til 10/2   9/26/2024 at 0900    guaiFENesin-dextromethorphan 100-10 MG/5ML Oral Syrup Take 10 mL by mouth every 4 (four) hours as needed for cough.       SEMGLEE, YFGN, 100 UNIT/ML Subcutaneous Solution Pen-injector Inject 5 Units into the skin nightly.       omeprazole 20 MG Oral Capsule Delayed Release Take 10 mg by mouth every morning.       simethicone 125 MG Oral Chew Tab Chew 1 tablet (125 mg total) by mouth daily as needed for FLATULENCE.       acetaminophen 500 MG Oral Tab Take 1 tablet (500 mg total) by mouth every 6 (six) hours as needed for Pain.       cyanocobalamin 1000 MCG Oral Tab Take 1 tablet (1,000 mcg total) by mouth daily.       meclizine 12.5 MG Oral Tab Take 1 tablet (12.5 mg total) by mouth 3 (three) times daily as needed.       warfarin 4 MG Oral Tab Take 5 mg by mouth daily. 5.5 mg nightly       loratadine 10 MG Oral Tab Take 1 tablet (10 mg total) by mouth daily.       sennosides 8.6 MG Oral Tab Take 1 tablet (8.6 mg total) by mouth 2 (two) times daily.       Iron, Ferrous Sulfate, 75 (15 Fe) MG/ML Oral Solution Take 90 mg by mouth daily. Taken as 1 tablespoon=15ml (45mg) bid after meals (Patient taking differently: Take 90 mg by mouth in the morning and 90 mg before bedtime. Taken as 1 tablespoon=15ml (45mg) bid after meals.) 50 mL 3     mirtazapine 7.5 MG Oral Tab Take 1 tablet (7.5 mg total) by mouth nightly. (Patient taking differently: Take 2 tablets (15 mg total) by mouth nightly.) 30 tablet 0     LEVOTHYROXINE 75 MCG Oral Tab TAKE ONE TABLET BY MOUTH DAILY BEFORE breakfast 90 tablet 0     losartan 100 MG Oral Tab Take 1 tablet (100 mg total) by mouth daily. 90 tablet 3 9/26/2024 at 0900    potassium chloride 40 meq/30 ml (10%) Oral Solution Take 15 mL (20 mEq total) by mouth daily. (Patient taking differently:  Take 30 mL (40 mEq total) by mouth daily.) 473 mL 3     torsemide 20 MG Oral Tab Take 1 tablet (20 mg total) by mouth every morning. PATIENT REQUESTS TO TAKE IM AM, SELF DISPENSE       aspirin 81 MG Oral Chew Tab Chew 1 tablet (81 mg total) by mouth 3 (three) times a week.   9/25/2024 at 0900    metoprolol tartrate 50 MG Oral Tab Take 1 tablet (50 mg total) by mouth 2 (two) times daily.       MULTIPLE VITAMIN OR 1 by mouth daily       Calcium Carbonate-Vitamin D 600-125 MG-UNIT Oral Tab Take 1 tablet by mouth daily.       insulin glargine 100 UNIT/ML Subcutaneous Solution Inject 5 Units into the skin nightly.       Nystatin 145362 UNIT/GM External Powder Apply 1 Application topically 2 (two) times daily. (Patient not taking: Reported on 8/29/2024) 60 g 0     ACCU-CHEK SOFTCLIX LANCETS Does not apply Misc USE ONE TO THREE times a  each 3     Glucose Blood (ACCU-CHEK HAYLIE PLUS) In Vitro Strip Use 3-4 times a day as directed. 100 strip 1        Allergies: Penicillins, Rosuvastatin, Acyclovir, Allergy, Carbapenems, Cephalosporins, Codeine, Codeine [opioid analgesics], Demerol, Diphenhydramine-zinc acetate, Glucophage [metformin hydrochloride], Meperidine, Misc. sulfonamide containing compounds, Other, Penicillin g, Statins, Sulfa drugs cross reactors, and Cardizem      Anesthesia Evaluation        Anesthetic Complications           GI/Hepatic/Renal      (+) GERD                           Cardiovascular  Comment: Echo 2022:    1. Left ventricle: The cavity size was normal. Wall thickness was normal.      The estimated ejection fraction was 60-65%. No diagnostic evidence for      regional wall motion abnormalities.   2. Ventricular septum: The outflow septum had a sigmoid appearance.   3. Mitral valve: Mildly calcified annulus. There was mild regurgitation.   4. Left atrium: The left atrium was moderately dilated. The left atrial      volume was moderately increased.   5. Right ventricle: Systolic pressure was  mildly increased.   6. Right atrium: The atrium was mildly to moderately dilated.   7. Pulmonary arteries: Systolic pressure was mildly increased, in the range      of 35mm Hg to 40mm Hg. Estimated pulmonary artery diastolic pressure was      10mm Hg.                 (+) obesity  (+) hypertension     (+) CAD (s/p PCI)             (+) dysrhythmias (holding warfarin. INR 1.3 on 9/29) and atrial fibrillation  (+) CHF (preserved EF, compensated)                Endo/Other      (+) diabetes and well controlled,  using insulin      (+) anemia                   Pulmonary                           Neuro/Psych                                      Past Surgical History:   Procedure Laterality Date    Angioplasty (coronary)  07/14/2010    PTCA, bare-metal stent to the R coronary artery, catheterization, subtotal stenosis of the mid to distal R coronary artery    Appendectomy      Cataract      B/L    Cholecystectomy  1994    Colonoscopy      Colonoscopy      Diagnostic anoscopy      Egd      Fluor gid & loclzj ndl/cath spi dx/ther njx  02/07/2014    Procedure: SI JOINT INJECTION;  Surgeon: Willian Orellana MD;  Location: [facility]    Fluor gid & loclzj ndl/cath spi dx/ther njx  03/27/2014    Procedure: LUMBAR EPIDURAL;  Surgeon: Willian Orellana MD;  Location: [facility]    Foot/toes surgery proc unlisted  1991, 1998    bunions B/L, hammertoe B/L    Hysterectomy  1967    partial    Injection, w/wo contrast, dx/therapeutic substance, epidural/subarachnoid; lumbar/sacral  02/07/2014    Procedure: LUMBAR EPIDURAL;  Surgeon: Willian Orellana MD;  Location: [facility]    Injection, w/wo contrast, dx/therapeutic substance, epidural/subarachnoid; lumbar/sacral  03/27/2014    Procedure: LUMBAR EPIDURAL;  Surgeon: Willian Orellana MD;  Location: [facility]    Knee replacement surgery      R TKR-1995, L TKR-5/2007    [facility]thopedic surg ([facility])      kael salina     Other surgical history      thyroid nodule; benign tumor on thyroid removed 1996; subtotal thyroidectomy 1996    Other surgical history  1974    varicose vein strip R leg    Other surgical history  01/12/2011    mid esophagus bx, gastric polyp bx    Other surgical history      esophageal dilatation    Other surgical history  02/14/2018    vulva biopsy: lichenoid inflammation with stromal fibrosis, negative for dysplasia    Patient documented not to have experienced any of the following events  02/07/2014    Procedure: SI JOINT INJECTION;  Surgeon: Willian Orellana MD;  Location: [facility]IN [facility]    Patient documented not to have experienced any of the following events  03/27/2014    Procedure: LUMBAR EPIDURAL;  Surgeon: Willian Orellana MD;  Location: [facility]IN [facility]    Patient withough preoperative order for iv antibiotic surgical site infection prophylaxis.  02/07/2014    Procedure: SI JOINT INJECTION;  Surgeon: Willian Orellana MD;  Location: [facility]    Patient withough preoperative order for iv antibiotic surgical site infection prophylaxis.  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    Tonsillectomy      T&A, childhood    Total knee replacement      bilateral    Upper gi endoscopy,exam  10/12/2010    upper gi series (air contrast) w/ esophogram     Social History     Socioeconomic History    Marital status:    Tobacco Use    Smoking status: Never    Smokeless tobacco: Never   Vaping Use    Vaping status: Never Used   Substance and Sexual Activity    Alcohol use: No     Alcohol/week: 0.0 standard drinks of alcohol     Comment: NONE    Drug use: Never   Other Topics Concern    Caffeine Concern Yes    Exercise Yes     Comment: not much    Seat Belt Yes     History   Drug Use Unknown     Available pre-op labs reviewed.  Lab  Results   Component Value Date    WBC 8.2 09/28/2024    RBC 3.38 (L) 09/28/2024    HGB 10.1 (L) 09/28/2024    HCT 29.7 (L) 09/28/2024    MCV 87.9 09/28/2024    MCH 29.9 09/28/2024    MCHC 34.0 09/28/2024    RDW 14.1 09/28/2024    .0 (L) 09/28/2024     Lab Results   Component Value Date     09/28/2024    K 3.2 (L) 09/29/2024     09/28/2024    CO2 26.0 09/28/2024    BUN 17 09/28/2024    CREATSERUM 0.92 09/28/2024     (H) 09/28/2024    CA 9.0 09/28/2024     Lab Results   Component Value Date    INR 1.33 (H) 09/29/2024         Airway      Mallampati: III  Mouth opening: <3 FB  TM distance: < 4 cm  Neck ROM: full Cardiovascular      Rhythm: regular       Dental             Pulmonary      Breath sounds clear to auscultation bilaterally.               Other findings              ASA: 3   Plan: general and regional  NPO status verified and     Post-procedure pain management plan discussed with surgeon and patient.    Comment: This note was written per chart review for a future surgical encounter  Plan/risks discussed with: patient and child/children  Use of blood product(s) discussed with: significant other              Present on Admission:   Hyperglycemia

## 2024-09-29 NOTE — PLAN OF CARE
Pt A&Ox2, VSS on RA. Tele afib. SCD to RLE. Denies pain at this time. Voiding via purewick. Mepilex to sacrum. Ortho to see. Call light in reach, safety measures in place.

## 2024-09-29 NOTE — PLAN OF CARE
Pt A&Ox4. VSS on RA. . Right arm precautions. Telemetry monitoring, Afib. Voiding with purewick. Ace wrap dressing and post mold in place to left leg, elevated above heart level with pillows. Mepilex to sacrum intact. Plan for ORIF vs non-operative tx. INR trending down. Call light within reach. Will continue to monitor.

## 2024-09-30 ENCOUNTER — APPOINTMENT (OUTPATIENT)
Dept: GENERAL RADIOLOGY | Facility: HOSPITAL | Age: 89
DRG: 493 | End: 2024-09-30
Attending: ORTHOPAEDIC SURGERY
Payer: MEDICARE

## 2024-09-30 ENCOUNTER — ANESTHESIA (OUTPATIENT)
Dept: SURGERY | Facility: HOSPITAL | Age: 89
End: 2024-09-30
Payer: MEDICARE

## 2024-09-30 LAB
ANION GAP SERPL CALC-SCNC: 6 MMOL/L (ref 0–18)
BUN BLD-MCNC: 16 MG/DL (ref 9–23)
CALCIUM BLD-MCNC: 9 MG/DL (ref 8.7–10.4)
CHLORIDE SERPL-SCNC: 107 MMOL/L (ref 98–112)
CO2 SERPL-SCNC: 27 MMOL/L (ref 21–32)
CREAT BLD-MCNC: 0.81 MG/DL
EGFRCR SERPLBLD CKD-EPI 2021: 67 ML/MIN/1.73M2 (ref 60–?)
ERYTHROCYTE [DISTWIDTH] IN BLOOD BY AUTOMATED COUNT: 13.7 %
GLUCOSE BLD-MCNC: 117 MG/DL (ref 70–99)
GLUCOSE BLD-MCNC: 118 MG/DL (ref 70–99)
GLUCOSE BLD-MCNC: 120 MG/DL (ref 70–99)
GLUCOSE BLD-MCNC: 146 MG/DL (ref 70–99)
GLUCOSE BLD-MCNC: 253 MG/DL (ref 70–99)
HCT VFR BLD AUTO: 30.9 %
HGB BLD-MCNC: 10.7 G/DL
INR BLD: 1.18 (ref 0.8–1.2)
MCH RBC QN AUTO: 30 PG (ref 26–34)
MCHC RBC AUTO-ENTMCNC: 34.6 G/DL (ref 31–37)
MCV RBC AUTO: 86.6 FL
OSMOLALITY SERPL CALC.SUM OF ELEC: 292 MOSM/KG (ref 275–295)
PLATELET # BLD AUTO: 177 10(3)UL (ref 150–450)
POTASSIUM SERPL-SCNC: 2.9 MMOL/L (ref 3.5–5.1)
POTASSIUM SERPL-SCNC: 4.9 MMOL/L (ref 3.5–5.1)
PROTHROMBIN TIME: 15.1 SECONDS (ref 11.6–14.8)
RBC # BLD AUTO: 3.57 X10(6)UL
SODIUM SERPL-SCNC: 140 MMOL/L (ref 136–145)
WBC # BLD AUTO: 11.3 X10(3) UL (ref 4–11)

## 2024-09-30 PROCEDURE — 76000 FLUOROSCOPY <1 HR PHYS/QHP: CPT | Performed by: ORTHOPAEDIC SURGERY

## 2024-09-30 PROCEDURE — 0SSG04Z REPOSITION LEFT ANKLE JOINT WITH INTERNAL FIXATION DEVICE, OPEN APPROACH: ICD-10-PCS | Performed by: ORTHOPAEDIC SURGERY

## 2024-09-30 PROCEDURE — 76942 ECHO GUIDE FOR BIOPSY: CPT | Performed by: STUDENT IN AN ORGANIZED HEALTH CARE EDUCATION/TRAINING PROGRAM

## 2024-09-30 PROCEDURE — 3E0T3BZ INTRODUCTION OF ANESTHETIC AGENT INTO PERIPHERAL NERVES AND PLEXI, PERCUTANEOUS APPROACH: ICD-10-PCS | Performed by: STUDENT IN AN ORGANIZED HEALTH CARE EDUCATION/TRAINING PROGRAM

## 2024-09-30 PROCEDURE — 0QSM04Z REPOSITION LEFT TARSAL WITH INTERNAL FIXATION DEVICE, OPEN APPROACH: ICD-10-PCS | Performed by: ORTHOPAEDIC SURGERY

## 2024-09-30 DEVICE — SCREW BNE 3.5X12MM CORT HEX DRV RECESS FT ST: Type: IMPLANTABLE DEVICE | Site: ANKLE | Status: FUNCTIONAL

## 2024-09-30 DEVICE — IMPLANTABLE DEVICE: Type: IMPLANTABLE DEVICE | Site: ANKLE | Status: FUNCTIONAL

## 2024-09-30 DEVICE — 4.0MM TI CANN SCREW + LT - 50MM-STER
Type: IMPLANTABLE DEVICE | Site: ANKLE | Status: FUNCTIONAL
Brand: CANNULATED SCREW SYSTEM +

## 2024-09-30 DEVICE — IMPLANTABLE DEVICE: Type: IMPLANTABLE DEVICE | Site: ANKLE

## 2024-09-30 DEVICE — K WIRE 1.25X150MM TRCR PT SS: Type: IMPLANTABLE DEVICE | Site: ANKLE

## 2024-09-30 DEVICE — SCREW BNE 2.7X18MM VA T8 STARDRV RECESS LOK: Type: IMPLANTABLE DEVICE | Site: ANKLE | Status: FUNCTIONAL

## 2024-09-30 DEVICE — SCREW BNE 2.7X14MM VA T8 STARDRV RECESS LOK: Type: IMPLANTABLE DEVICE | Site: ANKLE | Status: FUNCTIONAL

## 2024-09-30 RX ORDER — SODIUM CHLORIDE, SODIUM LACTATE, POTASSIUM CHLORIDE, CALCIUM CHLORIDE 600; 310; 30; 20 MG/100ML; MG/100ML; MG/100ML; MG/100ML
INJECTION, SOLUTION INTRAVENOUS CONTINUOUS
Status: DISCONTINUED | OUTPATIENT
Start: 2024-09-30 | End: 2024-09-30 | Stop reason: HOSPADM

## 2024-09-30 RX ORDER — LABETALOL HYDROCHLORIDE 5 MG/ML
5 INJECTION, SOLUTION INTRAVENOUS EVERY 5 MIN PRN
Status: DISCONTINUED | OUTPATIENT
Start: 2024-09-30 | End: 2024-09-30 | Stop reason: HOSPADM

## 2024-09-30 RX ORDER — DEXAMETHASONE SODIUM PHOSPHATE 4 MG/ML
VIAL (ML) INJECTION AS NEEDED
Status: DISCONTINUED | OUTPATIENT
Start: 2024-09-30 | End: 2024-09-30 | Stop reason: SURG

## 2024-09-30 RX ORDER — GLYCOPYRROLATE 0.2 MG/ML
INJECTION, SOLUTION INTRAMUSCULAR; INTRAVENOUS AS NEEDED
Status: DISCONTINUED | OUTPATIENT
Start: 2024-09-30 | End: 2024-09-30 | Stop reason: SURG

## 2024-09-30 RX ORDER — MIDAZOLAM HYDROCHLORIDE 1 MG/ML
INJECTION INTRAMUSCULAR; INTRAVENOUS
Status: COMPLETED
Start: 2024-09-30 | End: 2024-09-30

## 2024-09-30 RX ORDER — ACETAMINOPHEN 500 MG
1000 TABLET ORAL ONCE AS NEEDED
Status: DISCONTINUED | OUTPATIENT
Start: 2024-09-30 | End: 2024-09-30 | Stop reason: HOSPADM

## 2024-09-30 RX ORDER — MIDAZOLAM HYDROCHLORIDE 1 MG/ML
0.5 INJECTION INTRAMUSCULAR; INTRAVENOUS EVERY 5 MIN PRN
Status: DISCONTINUED | OUTPATIENT
Start: 2024-09-30 | End: 2024-09-30 | Stop reason: HOSPADM

## 2024-09-30 RX ORDER — HYDROCODONE BITARTRATE AND ACETAMINOPHEN 5; 325 MG/1; MG/1
1 TABLET ORAL ONCE AS NEEDED
Status: DISCONTINUED | OUTPATIENT
Start: 2024-09-30 | End: 2024-09-30 | Stop reason: HOSPADM

## 2024-09-30 RX ORDER — HYDROMORPHONE HYDROCHLORIDE 1 MG/ML
0.2 INJECTION, SOLUTION INTRAMUSCULAR; INTRAVENOUS; SUBCUTANEOUS EVERY 5 MIN PRN
Status: DISCONTINUED | OUTPATIENT
Start: 2024-09-30 | End: 2024-09-30 | Stop reason: HOSPADM

## 2024-09-30 RX ORDER — CEFAZOLIN SODIUM 1 G/3ML
INJECTION, POWDER, FOR SOLUTION INTRAMUSCULAR; INTRAVENOUS AS NEEDED
Status: DISCONTINUED | OUTPATIENT
Start: 2024-09-30 | End: 2024-09-30 | Stop reason: SURG

## 2024-09-30 RX ORDER — LIDOCAINE HYDROCHLORIDE 10 MG/ML
INJECTION, SOLUTION EPIDURAL; INFILTRATION; INTRACAUDAL; PERINEURAL AS NEEDED
Status: DISCONTINUED | OUTPATIENT
Start: 2024-09-30 | End: 2024-09-30 | Stop reason: SURG

## 2024-09-30 RX ORDER — HYDROMORPHONE HYDROCHLORIDE 1 MG/ML
0.6 INJECTION, SOLUTION INTRAMUSCULAR; INTRAVENOUS; SUBCUTANEOUS EVERY 5 MIN PRN
Status: DISCONTINUED | OUTPATIENT
Start: 2024-09-30 | End: 2024-09-30 | Stop reason: HOSPADM

## 2024-09-30 RX ORDER — NEOSTIGMINE METHYLSULFATE 1 MG/ML
INJECTION INTRAVENOUS AS NEEDED
Status: DISCONTINUED | OUTPATIENT
Start: 2024-09-30 | End: 2024-09-30 | Stop reason: SURG

## 2024-09-30 RX ORDER — HYDROMORPHONE HYDROCHLORIDE 1 MG/ML
0.4 INJECTION, SOLUTION INTRAMUSCULAR; INTRAVENOUS; SUBCUTANEOUS EVERY 5 MIN PRN
Status: DISCONTINUED | OUTPATIENT
Start: 2024-09-30 | End: 2024-09-30 | Stop reason: HOSPADM

## 2024-09-30 RX ORDER — SODIUM CHLORIDE, SODIUM LACTATE, POTASSIUM CHLORIDE, CALCIUM CHLORIDE 600; 310; 30; 20 MG/100ML; MG/100ML; MG/100ML; MG/100ML
INJECTION, SOLUTION INTRAVENOUS CONTINUOUS PRN
Status: DISCONTINUED | OUTPATIENT
Start: 2024-09-30 | End: 2024-09-30 | Stop reason: SURG

## 2024-09-30 RX ORDER — PHENYLEPHRINE HCL 10 MG/ML
VIAL (ML) INJECTION AS NEEDED
Status: DISCONTINUED | OUTPATIENT
Start: 2024-09-30 | End: 2024-09-30 | Stop reason: SURG

## 2024-09-30 RX ORDER — HYDROCODONE BITARTRATE AND ACETAMINOPHEN 5; 325 MG/1; MG/1
2 TABLET ORAL ONCE AS NEEDED
Status: DISCONTINUED | OUTPATIENT
Start: 2024-09-30 | End: 2024-09-30 | Stop reason: HOSPADM

## 2024-09-30 RX ORDER — POTASSIUM CHLORIDE 14.9 MG/ML
20 INJECTION INTRAVENOUS ONCE
Status: COMPLETED | OUTPATIENT
Start: 2024-09-30 | End: 2024-09-30

## 2024-09-30 RX ORDER — ROCURONIUM BROMIDE 10 MG/ML
INJECTION, SOLUTION INTRAVENOUS AS NEEDED
Status: DISCONTINUED | OUTPATIENT
Start: 2024-09-30 | End: 2024-09-30 | Stop reason: SURG

## 2024-09-30 RX ORDER — ONDANSETRON 2 MG/ML
INJECTION INTRAMUSCULAR; INTRAVENOUS AS NEEDED
Status: DISCONTINUED | OUTPATIENT
Start: 2024-09-30 | End: 2024-09-30 | Stop reason: SURG

## 2024-09-30 RX ORDER — NALOXONE HYDROCHLORIDE 0.4 MG/ML
0.08 INJECTION, SOLUTION INTRAMUSCULAR; INTRAVENOUS; SUBCUTANEOUS AS NEEDED
Status: DISCONTINUED | OUTPATIENT
Start: 2024-09-30 | End: 2024-09-30 | Stop reason: HOSPADM

## 2024-09-30 RX ADMIN — ROCURONIUM BROMIDE 40 MG: 10 INJECTION, SOLUTION INTRAVENOUS at 11:45:00

## 2024-09-30 RX ADMIN — NEOSTIGMINE METHYLSULFATE 4 MG: 1 INJECTION INTRAVENOUS at 13:19:00

## 2024-09-30 RX ADMIN — GLYCOPYRROLATE 0.8 MG: 0.2 INJECTION, SOLUTION INTRAMUSCULAR; INTRAVENOUS at 13:19:00

## 2024-09-30 RX ADMIN — PHENYLEPHRINE HCL 100 MCG: 10 MG/ML VIAL (ML) INJECTION at 13:37:00

## 2024-09-30 RX ADMIN — POTASSIUM CHLORIDE 40 MEQ: 14.9 INJECTION INTRAVENOUS at 11:36:00

## 2024-09-30 RX ADMIN — DEXAMETHASONE SODIUM PHOSPHATE 4 MG: 4 MG/ML VIAL (ML) INJECTION at 11:45:00

## 2024-09-30 RX ADMIN — PHENYLEPHRINE HCL 100 MCG: 10 MG/ML VIAL (ML) INJECTION at 11:53:00

## 2024-09-30 RX ADMIN — CEFAZOLIN SODIUM 2 G: 1 INJECTION, POWDER, FOR SOLUTION INTRAMUSCULAR; INTRAVENOUS at 11:52:00

## 2024-09-30 RX ADMIN — SODIUM CHLORIDE: 9 INJECTION, SOLUTION INTRAVENOUS at 11:36:00

## 2024-09-30 RX ADMIN — SODIUM CHLORIDE, SODIUM LACTATE, POTASSIUM CHLORIDE, CALCIUM CHLORIDE: 600; 310; 30; 20 INJECTION, SOLUTION INTRAVENOUS at 11:53:00

## 2024-09-30 RX ADMIN — ONDANSETRON 4 MG: 2 INJECTION INTRAMUSCULAR; INTRAVENOUS at 13:19:00

## 2024-09-30 RX ADMIN — LIDOCAINE HYDROCHLORIDE 50 MG: 10 INJECTION, SOLUTION EPIDURAL; INFILTRATION; INTRACAUDAL; PERINEURAL at 11:44:00

## 2024-09-30 RX ADMIN — SODIUM CHLORIDE: 9 INJECTION, SOLUTION INTRAVENOUS at 13:56:00

## 2024-09-30 NOTE — ANESTHESIA PROCEDURE NOTES
Airway  Date/Time: 9/30/2024 11:47 AM  Urgency: elective    Airway not difficult    General Information and Staff    Patient location during procedure: OR  Anesthesiologist: Cody Bates MD  Performed: anesthesiologist   Performed by: Cody Bates MD  Authorized by: Cody Bates MD      Indications and Patient Condition  Indications for airway management: anesthesia  Sedation level: deep  Preoxygenated: yes  Patient position: sniffing  Mask difficulty assessment: 1 - vent by mask    Final Airway Details  Final airway type: endotracheal airway      Successful airway: ETT  Cuffed: yes   Successful intubation technique: Video laryngoscopy  Endotracheal tube insertion site: oral  Blade: GlideScope  Blade size: #3  ETT size (mm): 7.0    Cormack-Lehane Classification: grade I - full view of glottis  Placement verified by: capnometry   Measured from: lips  ETT to lips (cm): 19  Number of attempts at approach: 1

## 2024-09-30 NOTE — PROGRESS NOTES
[facility]   part of [facility]     Hospitalist Progress Note     Denae Kern Patient Status:  Inpatient    5/3/1930 MRN [id number]   Location [facility] 3SW-A Attending iLa Ash MD   Hosp Day # 4 PCP No primary care provider on file.     Chief Complaint: mechanical fall with left ankle fracture    Subjective:     Pt in OR    Objective:    Review of Systems:   deferred    Vital signs:  Temp:  [98.1 °F (36.7 °C)-99.2 °F (37.3 °C)] 98.5 °F (36.9 °C)  Pulse:  [66-79] 79  Resp:  [16-18] 16  BP: (103-131)/(39-69) 128/69  SpO2:  [95 %-97 %] 97 %    Physical Exam:    General: No acute distress  Respiratory: No wheezes, no rhonchi  Cardiovascular: S1, S2, regular rate and rhythm  Abdomen: Soft, Non-tender, non-distended, positive bowel sounds  Neuro: No new focal deficits.   Extremities: left ankle in cast    Diagnostic Data:    Labs:  Recent Labs   Lab 24  1403 09/26/24  2050 09/27/24  14224  0537 24  0519 24  0454 24  0459   WBC 7.6  --  8.1 8.2  --   --  11.3*   HGB 12.7  --  10.8* 10.1*  --   --  10.7*   MCV 86.3  --  87.1 87.9  --   --  86.6   .0  --  163.0 139.0*  --   --  177.0   INR  --  1.75* 1.69* 1.56* 1.33* 1.18  --        Recent Labs   Lab 24  1403 09/27/24  14224  0537 24  0524  1526 24  0454   * 132* 107*  --   --  120*   BUN 16 20 17  --   --  16   CREATSERUM 0.94 1.00 0.92  --   --  0.81   CA 9.6 9.1 9.0  --   --  9.0   ALB 4.1  --   --   --   --   --     142 143  --   --  140   K 4.2 3.8 3.8  3.8 3.3* 3.2* 2.9*    110 111  --   --  107   CO2 26.0 26.0 26.0  --   --  27.0   ALKPHO 101  --   --   --   --   --    AST 21  --   --   --   --   --    ALT 10  --   --   --   --   --    BILT 0.5  --   --   --   --   --    TP 6.5  --   --   --   --   --        Estimated Creatinine Clearance: 48.7 mL/min (based on SCr of 0.81 mg/dL).    No results for input(s): \"TROP\", \"TROPHS\", \"CK\" in the last 168  hours.    Recent Labs   Lab 09/28/24  0537 09/29/24  0519 09/30/24  0454   PTP 18.8* 16.6* 15.1*   INR 1.56* 1.33* 1.18                  Microbiology    No results found for this visit on 09/26/24.      Imaging: Reviewed in Epic.    Medications:    potassium chloride  20 mEq Intravenous Once    [Transfer Hold] ferrous sulfate  90 mg Oral BID    [Transfer Hold] mirtazapine  15 mg Oral Nightly    [Transfer Hold] aspirin  81 mg Oral Once per day on Monday Wednesday Friday    [Transfer Hold] levothyroxine  75 mcg Oral Before breakfast    [Transfer Hold] cetirizine  10 mg Oral Daily    [Transfer Hold] metoprolol tartrate  50 mg Oral 2x Daily(Beta Blocker)    [Transfer Hold] pantoprazole  20 mg Oral QAM AC    [Transfer Hold] sennosides  8.6 mg Oral BID    [Transfer Hold] losartan  100 mg Oral Daily    [Transfer Hold] torsemide  20 mg Oral QAM    [Transfer Hold] insulin aspart  1-68 Units Subcutaneous TID CC    [Transfer Hold] insulin degludec  5 Units Subcutaneous Nightly    [Transfer Hold] ofloxacin  10 drop Left Ear Daily    [Transfer Hold] insulin aspart  1-5 Units Subcutaneous TID AC and HS    [Transfer Hold] propofol  1 mg/kg Intravenous Once    [Held by provider] heparin  5,000 Units Subcutaneous Q8H BRITTANI       Assessment & Plan:      #mechanical fall with Moderately displaced and angulated fractures along the L medial malleolus and lateral malleolus with moderate subluxation of the tibiotalar joint and widening of the ankle mortise  -ortho following   -Supportive care until OR   -Hold warfarin; let INR trend down     #chronic HFpEF; compensated    #Atrial fibrillation on warfarin at home- warfarin held for possible surgery; would let INR drift down    #CAD status post PCI  #Hypertension-metoprolol, losartan  #Type 2 diabetes-monitor on insulin  #GERD  #Hypothyroidism-levothyroxine  #hx postop DVT  #Obesity, BMI 32    Lupe Leslie MD    Supplementary Documentation:     Quality:  DVT Mechanical Prophylaxis:   SCDs,     DVT Pharmacologic Prophylaxis   Medication    [Held by provider] heparin (Porcine) 5000 UNIT/ML injection 5,000 Units      DVT Pharmacologic prophylaxis: Aspirin 162 mg         Code Status: DNAR/Selective Treatment  Ramirez: External urinary catheter in place  Ramirez Duration (in days):   Central line:    CADY:     Discharge is dependent on: course  At this point Ms. Kern is expected to be discharge to: pending surgery    The 21st Century Cures Act makes medical notes like these available to patients in the interest of transparency. Please be advised this is a medical document. Medical documents are intended to carry relevant information, facts as evident, and the clinical opinion of the practitioner. The medical note is intended as peer to peer communication and may appear blunt or direct. It is written in medical language and may contain abbreviations or verbiage that are unfamiliar.        Pt seen post op- want to get up - needs to use washroom. Redirectable but confused.

## 2024-09-30 NOTE — PLAN OF CARE
Recd pt axox3.  Pt denies pain or distress.  Right lower extremity elevated on pillows.  Pt informed of NPO status.  Pt verbalizes understanding.  Denies needs at this time.  Bed in lowest position, call light within reach.

## 2024-09-30 NOTE — ANESTHESIA PROCEDURE NOTES
Regional Block    Date/Time: 9/30/2024 1:34 PM    Performed by: Cody Bates MD  Authorized by: Cody Bates MD      General Information and Staff    Start Time:  9/30/2024 1:34 PM  End Time:  9/30/2024 1:38 PM  Anesthesiologist:  Cody Bates MD  Performed by:  Anesthesiologist  Patient Location:  OR      Site Identification: real time ultrasound guided and image stored and retrievable    Block site/laterality marked before start: site marked  Reason for Block: at surgeon's request and post-op pain management    Preanesthetic Checklist: 2 patient identifers, IV checked, risks and benefits discussed, monitors and equipment checked, pre-op evaluation, timeout performed, anesthesia consent, sterile technique used, no prohibitive neurological deficits and no local skin infection at insertion site      Procedure Details    Patient Position:   Prep: ChloraPrep    Monitoring:  Cardiac monitor, continuous pulse ox and blood pressure cuff  Block Type:  Popliteal  Laterality:  Left  Injection Technique:  Single-shot    Needle    Needle Type:  Short-bevel and echogenic  Needle Length:  100 mm  Needle Localization:  Ultrasound guidance  Reason for Ultrasound Use: appropriate spread of the medication was noted in real time and no ultrasound evidence of intravascular and/or intraneural injection            Assessment    Injection Assessment:  Good spread noted, negative resistance, negative aspiration for heme, incremental injection and low pressure  Heart Rate Change: No    - Patient tolerated block procedure well without evidence of immediate block related complications.     Medications  9/30/2024 1:34 PM      Additional Comments    Medication:  Bupivacaine 0.25% 18mL

## 2024-09-30 NOTE — CM/SW NOTE
Updates sent to  [facility].  Pt long term resident there.  Pt had ORIF of L ankle today    PARRIS McginnisW  /Discharge Planner

## 2024-09-30 NOTE — PLAN OF CARE
POD 0 L ORIF.  Patient refusing oral medications (spit out), removing lines at times.  Plan to continue to monitor patient, PT eval when ready.

## 2024-09-30 NOTE — ANESTHESIA PROCEDURE NOTES
Regional Block    Date/Time: 9/30/2024 1:28 PM    Performed by: Cody Bates MD  Authorized by: oCdy Bates MD      General Information and Staff    Start Time:  9/30/2024 1:28 PM  End Time:  9/30/2024 1:34 PM  Anesthesiologist:  Cody Bates MD  Performed by:  Anesthesiologist  Patient Location:  OR    Block Placement: Post Induction  Site Identification: real time ultrasound guided and image stored and retrievable    Block site/laterality marked before start: site marked  Reason for Block: at surgeon's request and post-op pain management    Preanesthetic Checklist: 2 patient identifers, IV checked, risks and benefits discussed, monitors and equipment checked, pre-op evaluation, timeout performed, anesthesia consent, sterile technique used, no prohibitive neurological deficits and no local skin infection at insertion site      Procedure Details    Patient Position:   Prep: ChloraPrep    Monitoring:  Cardiac monitor, continuous pulse ox and blood pressure cuff  Block Type:  Saphenous  Laterality:  Left  Injection Technique:  Single-shot    Needle    Needle Type:  Short-bevel and echogenic  Needle Length:  100 mm  Needle Localization:  Ultrasound guidance  Reason for Ultrasound Use: appropriate spread of the medication was noted in real time and no ultrasound evidence of intravascular and/or intraneural injection            Assessment    Injection Assessment:  Good spread noted, negative resistance, negative aspiration for heme, incremental injection and low pressure  Heart Rate Change: No    - Patient tolerated block procedure well without evidence of immediate block related complications.     Medications  9/30/2024 1:28 PM      Additional Comments    Medication:  Bupivacaine 0.25% 10mL

## 2024-09-30 NOTE — ANESTHESIA PROCEDURE NOTES
Peripheral IV  Date/Time: 9/30/2024 11:56 AM  Inserted by: Coyd Bates MD    Placement  Needle size: 22 G  Laterality: left  Location: hand  Site prep: alcohol  Technique: anatomical landmarks  Attempts: 3

## 2024-09-30 NOTE — ANESTHESIA POSTPROCEDURE EVALUATION
[facility]    Denae Kern Patient Status:  Inpatient   Age/Gender 89 year old female MRN [id number]   Location [facility] POST ANESTHESIA CARE UNIT Attending Lia Ash MD   Hosp Day # 4 PCP No primary care provider on file.       Anesthesia Post-op Note    OPEN REDUCTION INTERNAL FIXATION LEFT ANKLE    Procedure Summary       Date: 09/30/24 Room / Location:  MAIN OR 06 / [facility] MAIN OR    Anesthesia Start: 1135 Anesthesia Stop: 1356    Procedure: OPEN REDUCTION INTERNAL FIXATION LEFT ANKLE (Left: Ankle) Diagnosis: (LEFT ANKLE FX)    Surgeons: Sukumar Kimble MD Anesthesiologist: Cody Bates MD    Anesthesia Type: general, regional ASA Status: 3            Anesthesia Type: general, regional    Vitals Value Taken Time   /63 09/30/24 1408   Temp 97.8 09/30/24 1414   Pulse 87 09/30/24 1414   Resp 18 09/30/24 1414   SpO2 99 % 09/30/24 1414   Vitals shown include unfiled device data.    Patient Location: PACU    Anesthesia Type: general    Airway Patency: patent    Postop Pain Control: adequate    Mental Status: preanesthetic baseline    Nausea/Vomiting: none    Cardiopulmonary/Hydration status: stable euvolemic    Complications: no apparent anesthesia related complications    Postop vital signs: stable    Dental Exam: Unchanged from Preop    Patient to be discharged from PACU when criteria met.

## 2024-09-30 NOTE — BRIEF OP NOTE
Orthopedic Surgery Brief Operative Note:    Patient Name: Denae Kern  Age:  89 year old  Sex: female  MRN: [id number]  : 5/3/1930  Date of Admission: 2024  Date of Surgery: 24  Primary Surgeon: Sukumar Kimble MD  Assistant(s): NONE    Preoperative Diagnosis: LEFT ANKLE FX  Postoperative Diagnosis: LEFT ANKLE FX  Procedure Name: OPEN REDUCTION INTERNAL FIXATION LEFT ANKLE FRACTURE AND SYNDESMOSIS:   Anesthesia: GETA + BLOCK  Tourniquet time:   Total Tourniquet Time Documented:  Thigh (Left) - 85 minutes  Total: Thigh (Left) - 85 minutes    Fluids: 800 mL  EBL: 25 Ml   Findings: OSTEOPENIC BONE, ANATOMICALLY REDUCED FRACTURES, UNSTABLE SYNDESMOSIS  Implants/Specimens: SYNTHES  Drapes final count correct: YES  Complications: NONE  Disposition: TO PACU THEN FLOOR    Sukumar Kimble MD  [facility]  Orthopedic Surgery,  Foot & Ankle

## 2024-10-01 LAB
ANION GAP SERPL CALC-SCNC: 2 MMOL/L (ref 0–18)
BASOPHILS # BLD AUTO: 0.03 X10(3) UL (ref 0–0.2)
BASOPHILS NFR BLD AUTO: 0.2 %
BUN BLD-MCNC: 22 MG/DL (ref 9–23)
CALCIUM BLD-MCNC: 9.1 MG/DL (ref 8.7–10.4)
CHLORIDE SERPL-SCNC: 111 MMOL/L (ref 98–112)
CO2 SERPL-SCNC: 25 MMOL/L (ref 21–32)
CREAT BLD-MCNC: 0.89 MG/DL
EGFRCR SERPLBLD CKD-EPI 2021: 60 ML/MIN/1.73M2 (ref 60–?)
EOSINOPHIL # BLD AUTO: 0.02 X10(3) UL (ref 0–0.7)
EOSINOPHIL NFR BLD AUTO: 0.1 %
ERYTHROCYTE [DISTWIDTH] IN BLOOD BY AUTOMATED COUNT: 14 %
GLUCOSE BLD-MCNC: 137 MG/DL (ref 70–99)
GLUCOSE BLD-MCNC: 141 MG/DL (ref 70–99)
GLUCOSE BLD-MCNC: 158 MG/DL (ref 70–99)
GLUCOSE BLD-MCNC: 181 MG/DL (ref 70–99)
GLUCOSE BLD-MCNC: 213 MG/DL (ref 70–99)
HCT VFR BLD AUTO: 32.9 %
HGB BLD-MCNC: 10.8 G/DL
IMM GRANULOCYTES # BLD AUTO: 0.08 X10(3) UL (ref 0–1)
IMM GRANULOCYTES NFR BLD: 0.6 %
INR BLD: 1.28 (ref 0.8–1.2)
LYMPHOCYTES # BLD AUTO: 0.72 X10(3) UL (ref 1–4)
LYMPHOCYTES NFR BLD AUTO: 5.1 %
MCH RBC QN AUTO: 29.6 PG (ref 26–34)
MCHC RBC AUTO-ENTMCNC: 32.8 G/DL (ref 31–37)
MCV RBC AUTO: 90.1 FL
MONOCYTES # BLD AUTO: 1.29 X10(3) UL (ref 0.1–1)
MONOCYTES NFR BLD AUTO: 9.1 %
NEUTROPHILS # BLD AUTO: 12.11 X10 (3) UL (ref 1.5–7.7)
NEUTROPHILS # BLD AUTO: 12.11 X10(3) UL (ref 1.5–7.7)
NEUTROPHILS NFR BLD AUTO: 84.9 %
OSMOLALITY SERPL CALC.SUM OF ELEC: 292 MOSM/KG (ref 275–295)
PLATELET # BLD AUTO: 239 10(3)UL (ref 150–450)
POTASSIUM SERPL-SCNC: 3.9 MMOL/L (ref 3.5–5.1)
PROTHROMBIN TIME: 16.2 SECONDS (ref 11.6–14.8)
RBC # BLD AUTO: 3.65 X10(6)UL
SODIUM SERPL-SCNC: 138 MMOL/L (ref 136–145)
WBC # BLD AUTO: 14.3 X10(3) UL (ref 4–11)

## 2024-10-01 PROCEDURE — 99232 SBSQ HOSP IP/OBS MODERATE 35: CPT | Performed by: STUDENT IN AN ORGANIZED HEALTH CARE EDUCATION/TRAINING PROGRAM

## 2024-10-01 RX ORDER — CETIRIZINE HYDROCHLORIDE 5 MG/1
5 TABLET ORAL DAILY
Status: DISCONTINUED | OUTPATIENT
Start: 2024-10-02 | End: 2024-10-02

## 2024-10-01 RX ORDER — WARFARIN SODIUM 1 MG/1
4 TABLET ORAL
Status: DISCONTINUED | OUTPATIENT
Start: 2024-10-01 | End: 2024-10-01

## 2024-10-01 RX ORDER — HYDROMORPHONE HYDROCHLORIDE 1 MG/ML
0.4 INJECTION, SOLUTION INTRAMUSCULAR; INTRAVENOUS; SUBCUTANEOUS EVERY 2 HOUR PRN
Status: DISCONTINUED | OUTPATIENT
Start: 2024-10-01 | End: 2024-10-02

## 2024-10-01 RX ORDER — ACETAMINOPHEN 500 MG
1000 TABLET ORAL EVERY 8 HOURS SCHEDULED
Status: DISCONTINUED | OUTPATIENT
Start: 2024-10-01 | End: 2024-10-02

## 2024-10-01 RX ORDER — HYDROMORPHONE HYDROCHLORIDE 1 MG/ML
0.2 INJECTION, SOLUTION INTRAMUSCULAR; INTRAVENOUS; SUBCUTANEOUS EVERY 2 HOUR PRN
Status: DISCONTINUED | OUTPATIENT
Start: 2024-10-01 | End: 2024-10-02

## 2024-10-01 RX ORDER — WARFARIN SODIUM 5 MG/1
5 TABLET ORAL
Status: COMPLETED | OUTPATIENT
Start: 2024-10-01 | End: 2024-10-01

## 2024-10-01 RX ORDER — OXYCODONE HYDROCHLORIDE 5 MG/1
5 TABLET ORAL EVERY 4 HOURS PRN
Status: DISCONTINUED | OUTPATIENT
Start: 2024-10-01 | End: 2024-10-02

## 2024-10-01 NOTE — PROGRESS NOTES
[facility]   part of [facility]     Hospitalist Progress Note     Denae Kern Patient Status:  Inpatient    5/3/1930 MRN [id number]   Location [facility] 3SW-A Attending Lia Ash MD   Hosp Day # 5 PCP No primary care provider on file.     Chief Complaint: mechanical fall with left ankle fracture    Subjective:     Pt having some pain this AM, confused.    Objective:    Review of Systems:   A comprehensive review of systems was completed; pertinent positive and negatives stated in subjective.    Vital signs:  Temp:  [97.4 °F (36.3 °C)-98.4 °F (36.9 °C)] 97.4 °F (36.3 °C)  Pulse:  [53-88] 60  Resp:  [13-23] 18  BP: (108-195)/() 108/44  SpO2:  [86 %-100 %] 100 %    Physical Exam:    General: No acute distress  Respiratory: No wheezes, no rhonchi  Cardiovascular: S1, S2, regular rate and rhythm  Abdomen: Soft, Non-tender, non-distended, positive bowel sounds  Neuro: No new focal deficits.   Extremities: left ankle in cast    Diagnostic Data:    Labs:  Recent Labs   Lab 24  1403 09/26/24  2050 09/27/24  1424 24  0537 24  0524  0454 24  0459   WBC 7.6  --  8.1 8.2  --   --  11.3*   HGB 12.7  --  10.8* 10.1*  --   --  10.7*   MCV 86.3  --  87.1 87.9  --   --  86.6   .0  --  163.0 139.0*  --   --  177.0   INR  --  1.75* 1.69* 1.56* 1.33* 1.18  --        Recent Labs   Lab 24  1403 24  14224  0537 24  0524  1526 24  0454 24  1744   * 132* 107*  --   --  120*  --    BUN 16 20 17  --   --  16  --    CREATSERUM 0.94 1.00 0.92  --   --  0.81  --    CA 9.6 9.1 9.0  --   --  9.0  --    ALB 4.1  --   --   --   --   --   --     142 143  --   --  140  --    K 4.2 3.8 3.8  3.8   < > 3.2* 2.9* 4.9    110 111  --   --  107  --    CO2 26.0 26.0 26.0  --   --  27.0  --    ALKPHO 101  --   --   --   --   --   --    AST 21  --   --   --   --   --   --    ALT 10  --   --   --   --   --   --    BILT 0.5   --   --   --   --   --   --    TP 6.5  --   --   --   --   --   --     < > = values in this interval not displayed.       Estimated Creatinine Clearance: 48.7 mL/min (based on SCr of 0.81 mg/dL).    No results for input(s): \"TROP\", \"TROPHS\", \"CK\" in the last 168 hours.    Recent Labs   Lab 09/28/24  0537 09/29/24  0519 09/30/24  0454   PTP 18.8* 16.6* 15.1*   INR 1.56* 1.33* 1.18                  Microbiology    No results found for this visit on 09/26/24.      Imaging: Reviewed in Epic.    Medications:    acetaminophen  1,000 mg Oral Q8H BRITTANI    ferrous sulfate  90 mg Oral BID    mirtazapine  15 mg Oral Nightly    aspirin  81 mg Oral Once per day on Monday Wednesday Friday    levothyroxine  75 mcg Oral Before breakfast    cetirizine  10 mg Oral Daily    metoprolol tartrate  50 mg Oral 2x Daily(Beta Blocker)    pantoprazole  20 mg Oral QAM AC    sennosides  8.6 mg Oral BID    losartan  100 mg Oral Daily    torsemide  20 mg Oral QAM    insulin aspart  1-68 Units Subcutaneous TID CC    insulin degludec  5 Units Subcutaneous Nightly    ofloxacin  10 drop Left Ear Daily    insulin aspart  1-5 Units Subcutaneous TID AC and HS    propofol  1 mg/kg Intravenous Once    [Held by provider] heparin  5,000 Units Subcutaneous Q8H [facility]       Assessment & Plan:      #mechanical fall with Moderately displaced and angulated fractures along the L medial malleolus and lateral malleolus with moderate subluxation of the tibiotalar joint and widening of the ankle mortise  -ortho on cs: s/p ORIF 9/30/24    #chronic HFpEF; compensated    #Atrial fibrillation on warfarin at home- warfarin held for possible surgery;resume     #CAD status post PCI  #Hypertension-metoprolol, losartan  #Type 2 diabetes-monitor on insulin  #GERD  #Hypothyroidism-levothyroxine  #hx postop DVT  #Obesity, BMI 32    Mariama Borja MD    Supplementary Documentation:     Quality:  DVT Mechanical Prophylaxis:   SCDs,    DVT Pharmacologic Prophylaxis   Medication    [Held  by provider] heparin (Porcine) 5000 UNIT/ML injection 5,000 Units      DVT Pharmacologic prophylaxis: Aspirin 162 mg         Code Status: DNAR/Selective Treatment  Ramirez: External urinary catheter in place  Ramirez Duration (in days):   Central line:    CADY: 10/1/2024    Discharge is dependent on: course  At this point Ms. Kern is expected to be discharge to: pending surgery    The 21st Century Cures Act makes medical notes like these available to patients in the interest of transparency. Please be advised this is a medical document. Medical documents are intended to carry relevant information, facts as evident, and the clinical opinion of the practitioner. The medical note is intended as peer to peer communication and may appear blunt or direct. It is written in medical language and may contain abbreviations or verbiage that are unfamiliar.              **Certification      PHYSICIAN Certification of Need for Inpatient Hospitalization - Initial Certification    Patient will require inpatient services that will reasonably be expected to span two midnight's based on the clinical documentation in H+P.   Based on patients current state of illness, I anticipate that, after discharge, patient will require TBD.

## 2024-10-01 NOTE — PLAN OF CARE
Recd pt axox2-3.  Pt drowsy. +csm to left toes.  Ace wrap and post mold intact.  Pt denies pain or distress.  Bed in lowest position, call light within reach. Mepilex intact to sacrum, pt repositioned for skin integrity

## 2024-10-01 NOTE — PHYSICAL THERAPY NOTE
PHYSICAL THERAPY EVALUATION - INPATIENT     Room Number: 357/357-A  Evaluation Date: 10/1/2024  Type of Evaluation: Initial  Physician Order: PT Eval and Treat    Presenting Problem: s/p fall left ankle fracture  Co-Morbidities : chronic HfPEF, CAD s/p PCI, afib, DM2, HTN, GERD, hypothyroidism, hx post op DVT  Reason for Therapy: Mobility Dysfunction and Discharge Planning    Procedure Name 9/30/24 Dr Kimble: OPEN REDUCTION INTERNAL FIXATION LEFT ANKLE FRACTURE AND SYNDESMOSIS:   PHYSICAL THERAPY ASSESSMENT   Patient is currently functioning below baseline with bed mobility, transfers, gait, stair negotiation, maintaining seated position, and standing prolonged periods.  Prior to admission, patient's baseline is Ankit with RW to indep w/o device.  Patient is requiring moderate assist and maximum assist as a result of the following impairments: decreased functional strength, decreased endurance/aerobic capacity, pain, impaired static and dynamic standing balance, impaired coordination, impaired motor planning, decreased muscular endurance, difficulty maintaining precautions, and limited L ankle/foot ROM. Physical Therapy will continue to follow for duration of hospitalization.      Patient will benefit from continued skilled PT Services to promote return to prior level of function and safety with continuous assistance and gradual rehabilitative therapy .    PLAN  PT Device Recommendation: Rolling walker  PT Treatment Plan: Bed mobility;Body mechanics;Coordination;Endurance;Energy conservation;Patient education;Family education;Gait training;Neuromuscular re-educate;Range of motion;Strengthening;Transfer training;Balance training  Rehab Potential : Fair  Frequency (Obs): 3-5x/week  Number of Visits to Meet Established Goals: 5    CURRENT GOALS  Goal #1 Patient is able to demonstrate supine - sit EOB @ level: supervision   Goal #2 Patient is able to demonstrate transfers Sit to/from Stand at assistance level: minimum  assistance   Goal #3 Patient is able to transfer from bed>chair via stand pivot at modA.    Goal #4    Goal #5    Goal #6    Goal Comments: Goals established on 10/1/2024    PHYSICAL THERAPY MEDICAL/SOCIAL HISTORY  History related to current admission: Patient is a 89 year old female admitted on 9/26/2024 from home for s/p fall.  Pt diagnosed with bimalleolar fracture of left ankle. Now s/p ORIF LLE.     HOME SITUATION  Type of Home: Skilled nursing facility (LTC- [facility])  Home Layout: One level                     Lives With: Staff 24 hours    Drives: No   Patient Regularly Uses: Glasses      Prior Level of [place]:   Per pt lives at [facility]. Ambulates with or without RW (depending on the day). Reports she fell at her daughter's home.    SUBJECTIVE  \"It's too hard.\"    OBJECTIVE  Precautions: Bed/chair alarm;Limb alert - right;Hard of hearing  Fall Risk: High fall risk    WEIGHT BEARING RESTRICTION  L Lower Extremity: Weight Bearing as Tolerated    PAIN ASSESSMENT  Rating: Unable to rate  Location: ankle  Management Techniques: Activity promotion;Body mechanics;Repositioning    COGNITION  Overall Cognitive Status:  WFL - within functional limits  Safety Judgement:  decreased awareness of need for assistance and decreased awareness of need for safety  Awareness of Errors:  assistance required to identify errors made, assistance required to correct errors made, and decreased awareness of errors   Awareness of Deficits:  decreased awareness of deficits    RANGE OF MOTION AND STRENGTH ASSESSMENT  Upper extremity ROM and strength are within functional limits   Lower extremity ROM is within functional limits   Lower extremity strength is within functional limits     BALANCE  Static Sitting: Fair  Dynamic Sitting: Fair -          ADDITIONAL TESTS                                    ACTIVITY TOLERANCE                         O2 WALK       NEUROLOGICAL FINDINGS                        AM-PAC '6-Clicks' INPATIENT  SHORT FORM - BASIC MOBILITY  How much difficulty does the patient currently have...  Patient Difficulty: Turning over in bed (including adjusting bedclothes, sheets and blankets)?: A Lot   Patient Difficulty: Sitting down on and standing up from a chair with arms (e.g., wheelchair, bedside commode, etc.): A Lot   Patient Difficulty: Moving from lying on back to sitting on the side of the bed?: A Lot   How much help from another person does the patient currently need...   Help from Another: Moving to and from a bed to a chair (including a wheelchair)?: A Lot   Help from Another: Need to walk in hospital room?: Total   Help from Another: Climbing 3-5 steps with a railing?: Total     AM-PAC Score:  Raw Score: 10   Approx Degree of Impairment: 76.75%   Standardized Score (AM-PAC Scale): 32.29   CMS Modifier (G-Code): CL    FUNCTIONAL ABILITY STATUS  Gait Assessment   Functional Mobility/Gait Assessment  Gait Assistance: Not tested    Skilled Therapy Provided     Bed Mobility:  Rolling: NT  Supine to sit: modA- assist at LLE to transfer up/off bed, and assist at trunk to reach static sitting EOB   Sit to supine: NT     Transfer Mobility:  Sit to stand: maxA to RW- unable to clear buttocks off bed   Stand to sit: maxA- unable to reach full stand  Gait = NT    Therapist's Comments:   Patient presents sitting up in bed. Discussed role and goal of physical therapy in hospital setting. Pt in agreement to session. Educated on NWB to LLE- pt verbalizes understanding, but needs cues for reinforcement.   Bed mobility at modA- assist at LLE to transfer up/off bed, and assist at trunk to reach static sitting EOB (even with HOB elevated). Attempted two sit to stands, but unable to complete despite maxA as pt unable to maintain NWB to LLE. Pt able to complete lateral scooting transfer to R onto drop arm bedside chair recliner.  Upright in chair at end of session with LLE elevated on blankets/pillows. RN aware.    Exercise/Education  Provided:  Bed mobility  Body mechanics  Energy conservation  Functional activity tolerated  Posture  Transfer training    Patient End of Session: Up in chair;Needs met;Call light within reach;RN aware of session/findings;All patient questions and concerns addressed;Hospital anti-slip socks;Alarm set;Discussed recommendations with /    Patient Evaluation Complexity Level:  History Moderate - 1 or 2 personal factors and/or co-morbidities   Examination of body systems Low -  addressing 1-2 elements   Clinical Presentation Low- Stable   Clinical Decision Making Low Complexity     PT Session Time: 30 minutes  Therapeutic Activity: 15 minutes

## 2024-10-01 NOTE — CM/SW NOTE
Noted PT recommending HARSHA.  Pt is long term care resident at [facility].  Updates sent to them.  Message sent to [facility] for review.  / to remain available for support and/or discharge planning.     [facility]  P:[phone]  F:[phone]    Micheline Todd [facility]  Discharge Planner  [phone]

## 2024-10-01 NOTE — PLAN OF CARE
Alert and oriented x 3, forgetful at times. Vitals stable. Denies pain. Left leg ace wrap and post mold clean, dry, and intact, elevated on pillows. Voiding without difficulty via external catheter. Last BM 9/29/24, passing gas. Tolerating regular diet. Up to chair with PT. Plan to discharge back to [facility] when cleared by all services. Plan of care discussed with patient.    Okay to resume Coumadin per Dr. Kimble.

## 2024-10-01 NOTE — OPERATIVE REPORT
[facility]    PATIENT'S NAME: MYNOR FRANCISCO   ATTENDING PHYSICIAN: Lia Ash MD   OPERATING PHYSICIAN: Sukumar Kimble MD   PATIENT ACCOUNT#:   [id number]    LOCATION:  [address] [place] [address]  MEDICAL RECORD #:   [id number]       YOB: 1930  ADMISSION DATE:       09/26/2024      OPERATION DATE:  09/30/2024    OPERATIVE REPORT      PREOPERATIVE DIAGNOSIS:    1.   Left bimalleolar ankle fracture.  2.   Left Syndesmosis disruption.    POSTOPERATIVE DIAGNOSIS:    1.   Left bimalleolar ankle fracture.  2.   Left Syndesmosis disruption.   PROCEDURE:    1.   Open treatment, bimalleolar ankle fracture, left.  (CPT code 84041)   2.   Open treatment syndesmosis disruption, left.  (CPT code 58383-60 modifier)     ASSISTANT:  None.    TOURNIQUET TIME:  87 minutes.  COMPLICATIONS:  None apparent.  ANESTHESIA:  General plus the postoperative block.  ESTIMATED BLOOD LOSS:  25 mL.  INTRAVENOUS FLUIDS:  800 mL    COMPLICATIONS:  None apparent.    INDICATIONS:  The patient sustained a closed bimalleolar ankle fracture dislocation that was closed reduced and splinted.  She was medically optimized, and we allowed for the effects of warfarin to dissipate from an INR of below 1.5.    FINDINGS:  Very osteopenic bone.  Anatomic reductions were achieved of the medial and lateral malleolar fractures, and the syndesmosis was noted to be unstable to stress testing after the fractures had been fixed.    OPERATIVE TECHNIQUE:  The patient was identified in the preoperative holding area, surgical site marked and history and physical updated.  She was then brought to the operating room, placed supine on the operative table with all bony prominences padded, and a prep and drape was performed of her left lower extremity, and a well-padded tourniquet was placed on the left proximal thigh.  The limb was elevated to exsanguinate it during the prep and drape, and once the time-out had been taken, paused, and confirmed by all members  of the surgical team, the tourniquet was inflated to 300 mmHg.    Attention turned to the lateral aspect of her ankle.  Here, a longitudinal incision in the mid axial line overlying the fibula was made and carried down through skin and subcutaneous tissues to expose the fracture.  The bone was quite osteopenic, and the cortex had been ripped off the distal fragment and was adherent in the soft tissues.  This fracture was anatomically reduced and held with K-wires, and a Synthes locking fibular plate required contouring to the bone to sit properly.  It was then fixated proximally and distally with a combination of 2.7 locking screws distally in a cluster of 4 and nonlocking and locking 3.5 cortical screw in the shaft portion of the plate to stabilize it.  One screw hole was left open for evaluation for subsequent syndesmotic screw placement.    Now attention turned medially where a longitudinal incision was made over the medial malleolus and carried down in layers to the periosteum.  There was enfolded periosteum within the fracture site, which was excised and the fracture was reduced anatomically.  It was comminuted and the bone was osteopenic.  Two partially threaded 4-0 cannulated screws were placed over guide pins and both provided excellent stabilization and compression of the fracture.    Attention now returned laterally where I performed an abduction external rotation stress test which demonstrated medial clear space widening and talar shift.  This demonstrated that the syndesmosis was unstable.  At this time, the syndesmosis was reduced, pinned with a smooth K-wire and then a syndesmotic screw was placed through the fibular plate into the tibia to the medial side where it interposed with the medial malleolar screws.  This screw was placed and then stress test was repeated and now found that there was no medial clear space widening or talar shift.  The wounds were irrigated, closed in layers.  The patient  placed in a well-molded short-leg splint with the ankle in neutral position.  She was brought to the recovery room having tolerated procedure well.  Of note, all sponge counts, needle counts correct x2.    DISPOSITION:  The patient will be returned to her hospital bed.  She will restart her anticoagulation, and she will follow up in approximately 3 weeks' time for wound inspection and suture removal.  During that time, she will remain nonweightbearing.    Dictated By Sukumar Kimble MD  d: 10/01/2024 07:42:16  t: 10/01/2024 14:27:01  [facility] [id number]  BCT/

## 2024-10-02 ENCOUNTER — EXTERNAL FACILITY (OUTPATIENT)
Dept: FAMILY MEDICINE CLINIC | Facility: CLINIC | Age: 89
End: 2024-10-02

## 2024-10-02 VITALS
HEART RATE: 86 BPM | HEIGHT: 59 IN | RESPIRATION RATE: 16 BRPM | BODY MASS INDEX: 32.25 KG/M2 | DIASTOLIC BLOOD PRESSURE: 71 MMHG | WEIGHT: 160 LBS | SYSTOLIC BLOOD PRESSURE: 159 MMHG | OXYGEN SATURATION: 94 % | TEMPERATURE: 99 F

## 2024-10-02 DIAGNOSIS — I50.32 CHRONIC HEART FAILURE WITH PRESERVED EJECTION FRACTION (HCC): ICD-10-CM

## 2024-10-02 DIAGNOSIS — Z47.89 ORTHOPEDIC AFTERCARE: Primary | ICD-10-CM

## 2024-10-02 DIAGNOSIS — I48.20 CHRONIC ATRIAL FIBRILLATION (HCC): ICD-10-CM

## 2024-10-02 DIAGNOSIS — I10 ESSENTIAL HYPERTENSION: ICD-10-CM

## 2024-10-02 DIAGNOSIS — E11.65 UNCONTROLLED TYPE 2 DIABETES MELLITUS WITH HYPERGLYCEMIA, WITH LONG-TERM CURRENT USE OF INSULIN (HCC): ICD-10-CM

## 2024-10-02 DIAGNOSIS — Z79.01 WARFARIN ANTICOAGULATION: ICD-10-CM

## 2024-10-02 DIAGNOSIS — S82.842A BIMALLEOLAR FRACTURE OF LEFT ANKLE, CLOSED, INITIAL ENCOUNTER: ICD-10-CM

## 2024-10-02 DIAGNOSIS — Z98.890 S/P ORIF (OPEN REDUCTION INTERNAL FIXATION) FRACTURE: ICD-10-CM

## 2024-10-02 DIAGNOSIS — Z87.81 S/P ORIF (OPEN REDUCTION INTERNAL FIXATION) FRACTURE: ICD-10-CM

## 2024-10-02 DIAGNOSIS — Z79.4 UNCONTROLLED TYPE 2 DIABETES MELLITUS WITH HYPERGLYCEMIA, WITH LONG-TERM CURRENT USE OF INSULIN (HCC): ICD-10-CM

## 2024-10-02 LAB
ANION GAP SERPL CALC-SCNC: 8 MMOL/L (ref 0–18)
BASOPHILS # BLD AUTO: 0.06 X10(3) UL (ref 0–0.2)
BASOPHILS NFR BLD AUTO: 0.5 %
BUN BLD-MCNC: 22 MG/DL (ref 9–23)
CALCIUM BLD-MCNC: 9.4 MG/DL (ref 8.7–10.4)
CHLORIDE SERPL-SCNC: 109 MMOL/L (ref 98–112)
CO2 SERPL-SCNC: 25 MMOL/L (ref 21–32)
CREAT BLD-MCNC: 0.91 MG/DL
EGFRCR SERPLBLD CKD-EPI 2021: 58 ML/MIN/1.73M2 (ref 60–?)
EOSINOPHIL # BLD AUTO: 0.09 X10(3) UL (ref 0–0.7)
EOSINOPHIL NFR BLD AUTO: 0.8 %
ERYTHROCYTE [DISTWIDTH] IN BLOOD BY AUTOMATED COUNT: 13.7 %
GLUCOSE BLD-MCNC: 121 MG/DL (ref 70–99)
GLUCOSE BLD-MCNC: 131 MG/DL (ref 70–99)
GLUCOSE BLD-MCNC: 135 MG/DL (ref 70–99)
HCT VFR BLD AUTO: 33.8 %
HGB BLD-MCNC: 10.8 G/DL
IMM GRANULOCYTES # BLD AUTO: 0.07 X10(3) UL (ref 0–1)
IMM GRANULOCYTES NFR BLD: 0.6 %
INR BLD: 1.28 (ref 0.8–1.2)
LYMPHOCYTES # BLD AUTO: 0.75 X10(3) UL (ref 1–4)
LYMPHOCYTES NFR BLD AUTO: 6.6 %
MCH RBC QN AUTO: 29 PG (ref 26–34)
MCHC RBC AUTO-ENTMCNC: 32 G/DL (ref 31–37)
MCV RBC AUTO: 90.9 FL
MONOCYTES # BLD AUTO: 0.92 X10(3) UL (ref 0.1–1)
MONOCYTES NFR BLD AUTO: 8.1 %
NEUTROPHILS # BLD AUTO: 9.48 X10 (3) UL (ref 1.5–7.7)
NEUTROPHILS # BLD AUTO: 9.48 X10(3) UL (ref 1.5–7.7)
NEUTROPHILS NFR BLD AUTO: 83.4 %
OSMOLALITY SERPL CALC.SUM OF ELEC: 299 MOSM/KG (ref 275–295)
PLATELET # BLD AUTO: 219 10(3)UL (ref 150–450)
POTASSIUM SERPL-SCNC: 3.8 MMOL/L (ref 3.5–5.1)
PROTHROMBIN TIME: 16.1 SECONDS (ref 11.6–14.8)
RBC # BLD AUTO: 3.72 X10(6)UL
SODIUM SERPL-SCNC: 142 MMOL/L (ref 136–145)
WBC # BLD AUTO: 11.4 X10(3) UL (ref 4–11)

## 2024-10-02 PROCEDURE — G0317 PROLNG NSG FAC E/M EA ADDL 15 MIN: HCPCS | Performed by: FAMILY MEDICINE

## 2024-10-02 PROCEDURE — 99306 1ST NF CARE HIGH MDM 50: CPT | Performed by: FAMILY MEDICINE

## 2024-10-02 PROCEDURE — 99239 HOSP IP/OBS DSCHRG MGMT >30: CPT | Performed by: INTERNAL MEDICINE

## 2024-10-02 RX ORDER — MIRTAZAPINE 15 MG/1
15 TABLET, ORALLY DISINTEGRATING ORAL NIGHTLY
Status: SHIPPED | COMMUNITY
Start: 2024-10-02

## 2024-10-02 RX ORDER — ARIPIPRAZOLE 15 MG/1
40 TABLET ORAL DAILY
Status: SHIPPED | COMMUNITY
Start: 2024-10-02

## 2024-10-02 RX ORDER — TORSEMIDE 20 MG/1
20 TABLET ORAL EVERY MORNING
Status: SHIPPED | COMMUNITY
Start: 2024-10-07

## 2024-10-02 RX ORDER — POLYETHYLENE GLYCOL 3350 17 G/17G
17 POWDER, FOR SOLUTION ORAL DAILY PRN
Status: SHIPPED | COMMUNITY
Start: 2024-10-02

## 2024-10-02 RX ORDER — POTASSIUM CHLORIDE 1500 MG/1
40 TABLET, EXTENDED RELEASE ORAL ONCE
Status: DISCONTINUED | OUTPATIENT
Start: 2024-10-02 | End: 2024-10-02

## 2024-10-02 RX ORDER — FERROUS SULFATE 15 MG/ML
90 DROPS ORAL 2 TIMES DAILY
Status: SHIPPED | COMMUNITY
Start: 2024-10-02

## 2024-10-02 RX ORDER — ACETAMINOPHEN AND CODEINE PHOSPHATE 300; 30 MG/1; MG/1
1 TABLET ORAL EVERY 6 HOURS PRN
Qty: 10 TABLET | Refills: 0 | Status: SHIPPED | OUTPATIENT
Start: 2024-10-02

## 2024-10-02 NOTE — DISCHARGE INSTRUCTIONS
Non-weight bear to left lower extremity.  KeeP LLE elevated above heart at all times.  Keep dressing clean, dry, intact.

## 2024-10-02 NOTE — CONSULTS
[facility] Pharmacy Dosing Service  Warfarin (Coumadin) Subsequent Dosing    Denae Kern is a 89 year old patient for whom pharmacy is dosing warfarin (Coumadin). Goal INR is 2-3    Recent Labs   Lab 09/28/24  0537 09/29/24  0519 09/30/24  0454 10/01/24  1058 10/02/24  0526   INR 1.56* 1.33* 1.18 1.28* 1.28*       Consulted by: Dr. Andrade  Indication:  a-fib  Potential Drug Interactions: aspirin, synthroid, ultram  Other Anticoagulants: none  Home regimen (if applicable): 5.5 mg qhs    Inpatient Dosing History:    Date 10/1 10/2       INR 1.28 1.28       Coumadin dose 5 mg 7 mg                Based on above -  1.  For today, Give warfarin (COUMADIN) 7 mg at 2100 tonight  2   PT/INR ordered daily while on warfarin  3.  Pharmacy will continue to follow.  We appreciate the opportunity to assist in the care of this patient.    Willian Rick, XimenaD  10/2/2024  12:02 PM

## 2024-10-02 NOTE — PLAN OF CARE
NURSING DISCHARGE NOTE    Discharged Rehab facility via Ambulance.  Accompanied by Support staff  Belongings Taken by patient/family.  Report called to Tasha MIJARES @ [facility].  Prescription enclosed in discharge paperwork.

## 2024-10-02 NOTE — CONGREGATE LIVING REVIEW
[facility] Authorization    The [facility] Review Committee has reviewed this case and the patient IS APPROVED for discharge to a facility for Short Term Skilled once the following procedure is followed:     - The physician discharge instructions (contained within the HEIDI note for SNF) must inlcude the below appropriate and approved COVID instructions to the facility    For questions regarding CLRC approval process, please contact the CM assigned to the case.  For questions regarding RN discharge workflow, please contact the unit Clinical Leader.

## 2024-10-02 NOTE — DISCHARGE SUMMARY
[facility] HOSPITAL[facility]  DISCHARGE SUMMARY     Denae Kern Patient Status:  Inpatient    5/3/1930 MRN [id number]   Location [facility] 3SW-A Attending Lia Ash MD   Hosp Day # 6 PCP No primary care provider on file.     Date of Admission: 2024  Date of Discharge:   10/2/2024    Discharge Disposition: Assisted Living    Discharge Diagnosis:  Mechanical fall   Moderately displaced and angulated fractures left ankle   Chronic HFpEF   Atrial fibrillation   CAD s/p PCI   Hypertension  Type II diabetes  GERD  Hypothyroidism   Hypercoagulable state with hx of prior DVT  Obesity, BMI 32    History of Present Illness:   89 year old female with fall when trying to walk up step into daughter's home.  Fell and had immediate left ankle pain.  Had recent \"lung infection\" but Is now off abx for this.  Denies any cough or sob.  Uses walker but has no chest pain or dyspnea when walking.     Brief Synopsis:   #mechanical fall with Moderately displaced and angulated fractures along the L medial malleolus and lateral malleolus with moderate subluxation of the tibiotalar joint and widening of the ankle mortise. s/p ORIF 24. Post-operative course c/b hospital delirium. Her appetite was low at discharge. Diuretic held at discharge until intake improves. She will continue warfarin.     Lace+ Score: 83  59-90 High Risk  29-58 Medium Risk  0-28   Low Risk       TCM Follow-Up Recommendation:  LACE > 58: High Risk of readmission after discharge from the hospital.      Procedures during hospitalization:   Left ankle ORIF.     Incidental or significant findings and recommendations (brief descriptions):  NA    Lab/Test results pending at Discharge:   [facility]    Consultants:  Orthopedic surgery     Discharge Medication List:     Discharge Medications        START taking these medications        Instructions Prescription details   acetaminophen-codeine 300-30 MG Tabs  Commonly known as: Tylenol #3      Take 1 tablet by mouth every  6 (six) hours as needed for Pain.   Quantity: 10 tablet  Refills: 0     Polyethylene Glycol 3350 17 g Pack  Commonly known as: MIRALAX      Take 17 g by mouth daily as needed (If no bowel movement in last 24 hours).   Refills: 0            CONTINUE taking these medications        Instructions Prescription details   Accu-Chek Susi Plus Strp      Use 3-4 times a day as directed.   Quantity: 100 strip  Refills: 1     Accu-Chek Softclix Lancets Misc      USE ONE TO THREE times a DAY   Quantity: 100 each  Refills: 3     acetaminophen 500 MG Tabs  Commonly known as: Tylenol Extra Strength      Take 1 tablet (500 mg total) by mouth every 6 (six) hours as needed for Pain.   Refills: 0     aspirin 81 MG Chew      Chew 1 tablet (81 mg total) by mouth 3 (three) times a week.   Refills: 0     Calcium Carbonate-Vitamin D 600-125 MG-UNIT Tabs      Take 1 tablet by mouth daily.   Refills: 0     Ciprofloxacin HCl 0.2 % Soln      Place 4 mL into the left ear 2 (two) times daily. 4 drops to left ear x 7 days til 10/2   Refills: 0     cyanocobalamin 1000 MCG Tabs  Commonly known as: Vitamin B12      Take 1 tablet (1,000 mcg total) by mouth daily.   Refills: 0     guaiFENesin-dextromethorphan 100-10 MG/5ML Syrp  Commonly known as: Robitussin DM      Take 10 mL by mouth every 4 (four) hours as needed for cough.   Refills: 0     Iron (Ferrous Sulfate) 75 (15 Fe) MG/ML Soln      Take 90 mg by mouth in the morning and 90 mg before bedtime. Taken as 1 tablespoon=15ml (45mg) bid after meals.   Refills: 0     levothyroxine 75 MCG Tabs  Commonly known as: Synthroid      TAKE ONE TABLET BY MOUTH DAILY BEFORE breakfast   Quantity: 90 tablet  Refills: 0     loratadine 10 MG Tabs  Commonly known as: Claritin      Take 1 tablet (10 mg total) by mouth daily.   Refills: 0     losartan 100 MG Tabs  Commonly known as: Cozaar      Take 1 tablet (100 mg total) by mouth daily.   Quantity: 90 tablet  Refills: 3     meclizine 12.5 MG Tabs  Commonly known  as: Antivert      Take 1 tablet (12.5 mg total) by mouth 3 (three) times daily as needed.   Refills: 0     metoprolol tartrate 50 MG Tabs  Commonly known as: Lopressor      Take 1 tablet (50 mg total) by mouth 2 (two) times daily.   Refills: 0     mirtazapine 7.5 MG Tabs  Commonly known as: Remeron      Take 2 tablets (15 mg total) by mouth nightly.   Refills: 0     MULTIPLE VITAMIN OR      1 by mouth daily   Refills: 0     omeprazole 20 MG Cpdr  Commonly known as: PriLOSEC      Take 10 mg by mouth every morning.   Refills: 0     potassium chloride 40 meq/30 ml (10%) Soln  Commonly known as: K-Sol      Take 30 mL (40 mEq total) by mouth daily.   Refills: 0     Semglee (yfgn) 100 UNIT/ML Sopn  Generic drug: Insulin Glargine-yfgn      Inject 5 Units into the skin nightly.   Refills: 0     sennosides 8.6 MG Tabs  Commonly known as: Senokot      Take 1 tablet (8.6 mg total) by mouth 2 (two) times daily.   Refills: 0     simethicone 125 MG Chew  Commonly known as: Mylicon      Chew 1 tablet (125 mg total) by mouth daily as needed for FLATULENCE.   Refills: 0     torsemide 20 MG Tabs  Commonly known as: Demadex      Take 1 tablet (20 mg total) by mouth every morning. PATIENT REQUESTS TO TAKE IM AM, SELF DISPENSE   Refills: 0     warfarin 4 MG Tabs  Commonly known as: Coumadin      Take 5 mg by mouth daily. 5.5 mg nightly   Refills: 0            STOP taking these medications      insulin glargine 100 UNIT/ML Soln  Commonly known as: Lantus        nystatin 405354 UNIT/GM Powd                  Where to Get Your Medications        Please  your prescriptions at the location directed by your doctor or nurse    Bring a paper prescription for each of these medications  acetaminophen-codeine 300-30 MG Tabs         [place]    Follow-up appointment:   No follow-up provider specified.  Appointments for Next 30 Days 10/2/2024 - 11/1/2024      None            Vital signs:  Temp:  [97.4 °F (36.3 °C)-98.2 °F (36.8 °C)]  98.2 °F (36.8 °C)  Pulse:  [60-74] 74  Resp:  [17-18] 17  BP: (108-147)/(44-59) 147/47  SpO2:  [90 %-100 %] 94 %    Physical Exam:    General: No acute distress   Lungs: clear to auscultation  Cardiovascular: S1, S2  Abdomen: Soft  Neuro: oriented to self.     -----------------------------------------------------------------------------------------------  PATIENT DISCHARGE INSTRUCTIONS: See electronic chart    Lia Ash MD    Total time spent on discharge plannin minutes     The  [employer] C[employer] Act makes medical notes like these available to patients in the interest of transparency. Please be advised this is a medical document. Medical documents are intended to carry relevant information, facts as evident, and the clinical opinion of the practitioner. The medical note is intended as peer to peer communication and may appear blunt or direct. It is written in medical language and may contain abbreviations or verbiage that are unfamiliar.

## 2024-10-02 NOTE — PLAN OF CARE
Alert and oriented x 3, patient can be forgetful. VSS; on room air. Pain controlled with scheduled Tylenol. LLE elevated on pillows. Voiding without difficulty. Tolerating diet. PT recommending HARSHA. POC discussed with patient. Safety precautions in place. Call light within reach.      [place] advised patient will allow Covid swab to be done.       Wilene Fothergill  06/27/21 [id number]

## 2024-10-02 NOTE — PLAN OF CARE
Post mold & ace wrap to left lower extremity clean, dry, intact.  LLE elevated on pillows.  Cap refill to Left toe < 3 seconds.  Denies numbness, tingling.  Declined medication, declined breakfast.  Physician aware.  States wants to go home.  Declined repositioning.   Discharge planning for return to [facility] today a 1 pm.   Spoke w/Jazzmine LOZANO regarding above, she is in agreement.

## 2024-10-02 NOTE — CM/SW NOTE
Spoke with pt's RN who anticipates DC to [facility] today.  Contacted [facility] and confirmed pt can be accepted for readmission today.  Ambulance transport scheduled for 1pm.  PCS form completed and available for RN to print.  Spoke with pt's Jazzmine LOZANO who is agreeable with DC plan.  No other needs at this time.  / to remain available for support and/or discharge planning.     [facility]  P:[phone]  F:[phone]    [facility]  [phone]    Micheline Todd [facility]  Discharge Planner  [phone]

## 2024-10-03 NOTE — PROGRESS NOTES
Denae Kern Author: Henrik Gunderson MD     5/3/1930 MRN [id number]   Last Hospital  Admission 24      Last Hospital Discharge 10/2/24 PCP No primary care provider on file.   Hospital of Discharge  [facility]        CC --readmitted to [facility] residence, bimalleolar fracture of the left ankle, status post ORIF    H.P.I Denae Kren is a 89 year old female resident of [facility] history of multiple medical problems including type 2 diabetes mellitus hypertension hyperlipidemia atrial fibrillation coronary artery disease congestive heart failure hypothyroidism.  She went to her daughter's place on a pass, unfortunately had a fall without any loss of consciousness, was brought into the emergency room where she could not bear weight on the left lower extremity, was found to have moderately displaced and angulated fractures along left medial malleolus and lateral malleolus with moderate subluxation of the tibiotalar joint and widening of the ankle mortise  She underwent ORIF on 2024  Postoperative course was complicated by delirium  Patient was continued on Coumadin  She was stabilized and transferred to [facility]  Discharge Diagnosis:  Mechanical fall   Moderately displaced and angulated fractures left ankle   Chronic HFpEF   Atrial fibrillation   CAD s/p PCI   Hypertension  Type II diabetes  GERD  Hypothyroidism   Hypercoagulable state with hx of prior DVT  Obesity, BMI 32    Patient seen in her room today, she came in from the hospital, about an hour ago  Complaining of pain along the left lower extremity  Denies any chest pain cough runny nose shortness of breath  Denies any abdominal pain  Her appetite is poor      Past Medical History:    AC joint arthropathy    Actinic keratosis    right anterior lower leg    Anxiety    Arthritis    At high risk for falls    Atrial fibrillation (HCC)    PERSISTENT DR IRWIN  On Coumadin. Stable.    Autonomic neuropathy    Back pain    Diana     CAD (coronary artery disease)    Cardiomegaly    mild, w/ mildly increased pulmonary vascularity, increased interstitial markings in the mid to lower lung fields, and B/L perihilar opacities is concerning for congestive failure    Chronic heart failure with preserved ejection fraction (HCC)    Class 1 obesity    Constipation    Diabetes mellitus (HCC)    Difficult intubation    Disc degeneration    has several levels of degenerative discs and several areas of stenosis, both foraminal and central canal stenosis; and hx DDD, DJD    Diverticulosis    DVT of leg (deep venous thrombosis) (HCC)    post knee replacement    Dysphagia    Easy bruising    Esophageal dilatation    Esophageal stricture    w/ variability in terms of tolerating that and tolerating meds    Fatigue    Fatty liver    Fecal impaction in rectum (HCC)    Flatulence/gas pain/belching    Frailty    Frequent urination    Gastric polyp    Gastroparesis    primarily effecting insulin treatment    GERD (gastroesophageal reflux disease)    and motility disorder    Hammertoe    Hearing impairment    HA's bilateral    Hearing loss    Heart palpitations    Hemorrhoids    Hiatal hernia    small    Hip pain    History  of basal cell carcinoma    basal cell and squamous cell    History of blood transfusion    with hysterectomy    History of colon polyps    History of Meniere's disease    History of myocardial infarction    History of PTCA    HTN (hypertension)    Hypothyroidism    IBS (irritable bowel syndrome)    Iron deficiency    Need iron infusion.    Irregular bowel habits    Mild mitral regurgitation    Nausea and vomiting in adult    Neck pain    head and neck discomfort    OA (osteoarthritis)    an hx low grade arthritis    Onychomycosis    severe, toenails, received podiatric treatment 2/29/2012 w/ Dr. Bowers    Osteoporosis    and osteopenia; 3/2009 \"Reclast infusion\"    Perirectal discomfort    perirectus discomfort    Presbyesophagus    Problems with  swallowing    Pulmonary hypertension (HCC)    Sinusitis    Squamous cell carcinoma    Stress    Thyroid nodule    right-on Carotid US    Tinnitus    Tumor, thyroid    Venous disease    w/ peripheral edema; and hx varicose veins    Vertigo    vertiginous symptoms    Wears glasses     Past Surgical History:   Procedure Laterality Date    Angioplasty (coronary)  07/14/2010    PTCA, bare-metal stent to the R coronary artery, catheterization, subtotal stenosis of the mid to distal R coronary artery    Appendectomy      Cataract      B/L    Cholecystectomy  1994    Colonoscopy      Colonoscopy      Diagnostic anoscopy      Egd      Fluor gid & loclzj ndl/cath spi dx/ther njx  02/07/2014    Procedure: SI JOINT INJECTION;  Surgeon: Willian Orellana MD;  Location: [facility]    Fluor gid & loclzj ndl/cath spi dx/ther njx  03/27/2014    Procedure: LUMBAR EPIDURAL;  Surgeon: Willian Orellana MD;  Location: [facility]    Foot/toes surgery proc unlisted  1991, 1998    bunions B/L, hammertoe B/L    Hysterectomy  1967    partial    Injection, w/wo contrast, dx/therapeutic substance, epidural/subarachnoid; lumbar/sacral  02/07/2014    Procedure: LUMBAR EPIDURAL;  Surgeon: Willian Orellana MD;  Location: [facility]    Injection, w/wo contrast, dx/therapeutic substance, epidural/subarachnoid; lumbar/sacral  03/27/2014    Procedure: LUMBAR EPIDURAL;  Surgeon: Willian Orellana MD;  Location: [facility]    Knee replacement surgery      R TKR-1995, L TKR-5/2007    [facility]thopedic surg ([facility])      kael bunions    Other surgical history      thyroid nodule; benign tumor on thyroid removed 1996; subtotal thyroidectomy 1996    Other surgical history  1974    varicose vein strip R leg    Other surgical history  01/12/2011    mid esophagus bx, gastric polyp bx    Other surgical history      esophageal dilatation    Other surgical history  02/14/2018    vulva biopsy: lichenoid  inflammation with stromal fibrosis, negative for dysplasia    Patient documented not to have experienced any of the following events  02/07/2014    Procedure: SI JOINT INJECTION;  Surgeon: Willian Orellana MD;  Location: [facility]    Patient documented not to have experienced any of the following events  03/27/2014    Procedure: LUMBAR EPIDURAL;  Surgeon: Willian Orellana MD;  Location: [facility]    Patient withough preoperative order for iv antibiotic surgical site infection prophylaxis.  02/07/2014    Procedure: SI JOINT INJECTION;  Surgeon: Willian Orellana MD;  Location: [facility]    Patient withough preoperative order for iv antibiotic surgical site infection prophylaxis.  03/27/2014    Procedure: LUMBAR EPIDURAL;  Surgeon: Willian Orellana MD;  Location: [facility]    Skin surgery  05/19/2011    SCCIS to R temple / Mohs surgery    Tonsillectomy      T&A, childhood    Total knee replacement      bilateral    Upper gi endoscopy,exam  10/12/2010    upper gi series (air contrast) w/ esophogram     Family History   Problem Relation Age of Onset    Heart Attack Father     Other (Parkinson's) Father     Other (pneumonia) Father     Heart Disorder Mother     Heart Disease Mother     Heart Attack Mother     Arthritis Mother     Circulatory Problems Mother     Other (Other) Mother     Diabetes Daughter     Suicide History Son     Seizure Disorder Son         Epilepsy    Mental Disorder Son         paranoia, OCD    Heart Attack Paternal Grandmother     Heart Disorder Paternal Grandmother     Heart Attack Paternal Grandfather     Heart Disorder Brother     Cancer Brother         lung ca    Diabetes Brother     Cancer Other         colorectal ca, skin ca, fam hx    High Blood Pressure Other         fam hx    Heart Disorder Other         heart condition, mat grandparen    Heart Attack Other         mat grandparent    Stroke Other         mat  grandparent    Other (Other) Other     Gastro-Intestinal Disorder Other         IBS, fam hx    Blood Disorder Other         blood blots, fam hx     Social History     Socioeconomic History    Marital status:    Tobacco Use    Smoking status: Never    Smokeless tobacco: Never   Vaping Use    Vaping status: Never Used   Substance and Sexual Activity    Alcohol use: No     Alcohol/week: 0.0 standard drinks of alcohol     Comment: NONE    Drug use: Never   Other Topics Concern    Caffeine Concern Yes    Exercise Yes     Comment: not much    Seat Belt Yes     Social Determinants of Health     Financial Resource Strain: Low Risk  (6/19/2024)    Financial Resource Strain     Difficulty of Paying Living Expenses: Not hard at all   Food Insecurity: No Food Insecurity (9/26/2024)    Food Insecurity     Food Insecurity: Never true   Transportation Needs: No Transportation Needs (9/26/2024)    Transportation Needs     Lack of Transportation: No   Physical Activity: Insufficiently Active (8/24/2020)    [facility]    Exercise Vital Sign     Days of Exercise per Week: 5 days     Minutes of Exercise per Session: 10 min   Housing Stability: Low Risk  (9/26/2024)    Housing Stability     Housing Instability: No       ALLERGIES:  Allergies   Allergen Reactions    Penicillins HIVES and OTHER (SEE COMMENTS)     CLASS    Rosuvastatin OTHER (SEE COMMENTS)     Reaction: muscle aches  Reaction: muscle aches    Acyclovir DIARRHEA    Allergy      BETA LACTAMS      Carbapenems UNKNOWN and OTHER (SEE COMMENTS)    Cephalosporins UNKNOWN    Codeine NAUSEA ONLY    Codeine [Opioid Analgesics] NAUSEA AND VOMITING    Demerol NAUSEA AND VOMITING    Diphenhydramine-Zinc Acetate UNKNOWN     BETA LACTAMS    Glucophage [Metformin Hydrochloride]      \"problematic\"    Meperidine NAUSEA ONLY and NAUSEA AND VOMITING    Misc. Sulfonamide Containing Compounds OTHER (SEE COMMENTS)    Other UNKNOWN     Penicillin G HIVES    Statins PAIN     Reaction: muscle aches      Sulfa Drugs Cross Reactors NAUSEA AND VOMITING     \"Sulfa\"      Cardizem RASH       CODE STATUS:  DNR    CURRENT MEDICATIONS   Current Outpatient Medications   Medication Sig Dispense Refill    potassium chloride 40 meq/30 ml (10%) Oral Solution Take 30 mL (40 mEq total) by mouth daily.      Iron, Ferrous Sulfate, 75 (15 Fe) MG/ML Oral Solution Take 90 mg by mouth in the morning and 90 mg before bedtime. Taken as 1 tablespoon=15ml (45mg) bid after meals.      mirtazapine 15 MG Oral Tablet Dispersible Take 1 tablet (15 mg total) by mouth nightly.      polyethylene glycol, PEG 3350, 17 g Oral Powd Pack Take 17 g by mouth daily as needed (If no bowel movement in last 24 hours).      acetaminophen-codeine 300-30 MG Oral Tab Take 1 tablet by mouth every 6 (six) hours as needed for Pain. 10 tablet 0    [START ON 10/7/2024] torsemide 20 MG Oral Tab Take 1 tablet (20 mg total) by mouth every morning. PLEASE HOLD UNTIL HER APPETITE/ORAL INTAKE OF FLUIDS IMPROVES      Ciprofloxacin HCl 0.2 % Otic Solution Place 4 mL into the left ear 2 (two) times daily. 4 drops to left ear x 7 days til 10/2      guaiFENesin-dextromethorphan 100-10 MG/5ML Oral Syrup Take 10 mL by mouth every 4 (four) hours as needed for cough.      SEMGLEE, YFGN, 100 UNIT/ML Subcutaneous Solution Pen-injector Inject 5 Units into the skin nightly.      Omeprazole 10 MG Oral Capsule Delayed Release Take 1 capsule (10 mg total) by mouth every morning.      simethicone 125 MG Oral Chew Tab Chew 1 tablet (125 mg total) by mouth daily as needed for FLATULENCE.      acetaminophen 500 MG Oral Tab Take 1 tablet (500 mg total) by mouth every 6 (six) hours as needed for Pain.      cyanocobalamin 1000 MCG Oral Tab Take 1 tablet (1,000 mcg total) by mouth daily.      meclizine 12.5 MG Oral Tab Take 1 tablet (12.5 mg total) by mouth 3 (three) times daily as needed.      warfarin 4 MG Oral Tab Take 5 mg by  mouth daily. 5.5 mg nightly      loratadine 10 MG Oral Tab Take 1 tablet (10 mg total) by mouth daily.      sennosides 8.6 MG Oral Tab Take 1 tablet (8.6 mg total) by mouth 2 (two) times daily.      LEVOTHYROXINE 75 MCG Oral Tab TAKE ONE TABLET BY MOUTH DAILY BEFORE breakfast 90 tablet 0    losartan 100 MG Oral Tab Take 1 tablet (100 mg total) by mouth daily. 90 tablet 3    ACCU-CHEK SOFTCLIX LANCETS Does not apply Misc USE ONE TO THREE times a  each 3    aspirin 81 MG Oral Chew Tab Chew 1 tablet (81 mg total) by mouth 3 (three) times a week.      metoprolol tartrate 50 MG Oral Tab Take 1 tablet (50 mg total) by mouth 2 (two) times daily.      MULTIPLE VITAMIN OR 1 by mouth daily      Calcium Carbonate-Vitamin D 600-125 MG-UNIT Oral Tab Take 1 tablet by mouth daily.         REVIEW OF SYSTEMS:  Review of Systems:   Constitutional: No fevers, chills, fatigue or night sweats.  ENT: No mouth pain, neck pain, running nose, headaches or swollen glands.  Skin: No rashes, pruritus or skin changes,  Respiratory: Denies cough, wheezing or shortness of breath.  CV: Denies chest pain, palpitations, orthopnea, PND or dizziness.  Musculoskeletal: As per HPI  GI: No nausea, vomiting or diarrhea. No blood in stools.  Neurologic: No seizures, tremors, weakness or numbness    VITALS: Pulse 88/min respiration 18/min temperature 98.3 blood pressure 148/60    PHYSICAL EXAM:  GENERAL: well developed, well nourished, in no apparent distress  SKIN: no rashes, no suspicious lesions  Wound; left lower extremity, ankle in cast  HEENT: atraumatic, normocephalic, r  EYES: PERRLA, EOMI, conjunctiva are clear  NECK: supple, no adenopathy, no bruits  CHEST: no chest tenderness  LUNGS: clear to auscultation  CARDIO: RRR without murmur  GI: good BS's, no masses, HSM or tenderness  : deferred  RECTAL: deferred  MUSCULOSKELETAL: back is not tender, FROM of the back  EXTREMITIES: no cyanosis, clubbing or edema  NEURO: Oriented times three,  cranial nerves are intact, motor and sensory are grossly intact      DIAGNOSTICS REVIEWED AT THIS VISIT:  Lab Results   Component Value Date     (H) 10/02/2024    BUN 22 10/02/2024    BUNCREA 14.0 08/04/2020    CREATSERUM 0.91 10/02/2024    ANIONGAP 8 10/02/2024    GFR 65 05/17/2017    GFRNAA 55 (L) 06/24/2022    GFRAA 63 06/24/2022    CA 9.4 10/02/2024    OSMOCALC 299 (H) 10/02/2024    ALKPHO 101 09/26/2024    AST 21 09/26/2024    ALT 10 09/26/2024    BILT 0.5 09/26/2024    TP 6.5 09/26/2024    ALB 4.1 09/26/2024    GLOBULIN 2.4 09/26/2024     10/02/2024    K 3.8 10/02/2024     10/02/2024    CO2 25.0 10/02/2024       Lab Results   Component Value Date    WBC 11.4 (H) 10/02/2024    RBC 3.72 (L) 10/02/2024    HGB 10.8 (L) 10/02/2024    HCT 33.8 (L) 10/02/2024    .0 10/02/2024    MPV 10.2 (H) 03/31/2011    MCV 90.9 10/02/2024    MCH 29.0 10/02/2024    MCHC 32.0 10/02/2024    RDW 13.7 10/02/2024    NEPRELIM 9.48 (H) 10/02/2024    NEPERCENT 83.4 10/02/2024    LYPERCENT 6.6 10/02/2024    MOPERCENT 8.1 10/02/2024    EOPERCENT 0.8 10/02/2024    BAPERCENT 0.5 10/02/2024    NE 9.48 (H) 10/02/2024    LYMABS 0.75 (L) 10/02/2024    MOABSO 0.92 10/02/2024    EOABSO 0.09 10/02/2024    BAABSO 0.06 10/02/2024     ASSESSMENT AND PLAN:      Diagnoses and all orders for this visit:    Orthopedic aftercare  Bimalleolar fracture of left ankle, closed, initial encounter  S/P ORIF (open reduction internal fixation) fracture  Pain control with Norco and Tylenol  - PT to work on ambulation, gait, balance, strength, endurance, transfers, safety  - OT to work on fine motor skills, ADLs, hygiene, toileting, transfers, safety  Chronic heart failure with preserved ejection fraction (HCC)  Daily weights  Torsemide 20 mg daily  Potassium daily  Chronic atrial fibrillation (HCC)  Rate controlled at this time  Continue metoprolol  Anticoagulated on Coumadin  Uncontrolled type 2 diabetes mellitus with hyperglycemia, with  long-term current use of insulin (HCC)  On Semglee 5 units nightly  Essential hypertension  Under control  Check blood pressures every shift  Continue metoprolol, losartan, torsemide  Warfarin anticoagulation  Check PT/INR          - 24h nursing for medication administration, bowel/bladder care, pain/sleep assessment  - Physician supervision for multiple medical comorbidities, fall risk, DVT risk, infection risk, pain management  - Psych for adjustment to disability and cognitive deficits  - Social work/: for discharge planning needs and access to community resources as needed     -Hospital medications reviewed reconciled and restarted   Check the labs in the morning, CBC CMP  PT/INR    Time spent at appointment today is 95 minutes including preparing to see patient, reviewing test results, performing medically appropriate examination and evaluation and coordinating care, counseling and educating patient/family, ordering medications and testing, and documenting clinical information in EMR.       Henrik Gunderson MD   [facility]  [address], [place]. [address] [place] [id number]    Electronically signed      This dictation was performed with a verbal recognition program (DRAGON) and it was checked for errors. It is possible that there are still dictated errors within this office note. If so, please bring any errors to my attention for an addendum. All efforts were made to ensure that this office note is accurate

## 2024-10-08 ENCOUNTER — MED REC SCAN ONLY (OUTPATIENT)
Dept: FAMILY MEDICINE CLINIC | Facility: CLINIC | Age: 89
End: 2024-10-08

## 2024-11-14 ENCOUNTER — TELEPHONE (OUTPATIENT)
Dept: FAMILY MEDICINE CLINIC | Facility: CLINIC | Age: 89
End: 2024-11-14

## 2024-11-15 ENCOUNTER — MED REC SCAN ONLY (OUTPATIENT)
Dept: FAMILY MEDICINE CLINIC | Facility: CLINIC | Age: 89
End: 2024-11-15

## 2024-11-15 NOTE — TELEPHONE ENCOUNTER
Dr. Gunderson signed/completed the death certificate.  The completed death certificate was faxed to fax #[phone] and a fax confirmation was received.  I spoke to Germain at the  Home and faxed the death certificate to [facility] to fax #[phone] and received a fax confirmation.

## 2025-07-08 NOTE — PROGRESS NOTES
Instructed nurse to begin Tamiflu tonight. Patient did have flu shot earlier. Go ahead with the liquid Z-Ron fever not improving let me know. pt BIBA from home after getting into argument on the phone and becoming anxious  pt private aid states pt has early dementia

## (undated) DEVICE — 3M™ RED DOT™ MONITORING ELECTRODE WITH FOAM TAPE AND STICKY GEL, 50/BAG, 20/CASE, 72/PLT 2570: Brand: RED DOT™

## (undated) DEVICE — FILTERLINE NASAL ADULT O2/CO2

## (undated) DEVICE — #15 STERILE SAFETY SCALPEL: Brand: DISPOSABLE SAFETY SCALPEL

## (undated) DEVICE — PADDING CAST W4INXL4YD ST COHESIVE LP

## (undated) DEVICE — SLEEVE COMPR MD KNEE LEN SGL USE KENDALL SCD

## (undated) DEVICE — ANTIBACTERIAL UNDYED BRAIDED (POLYGLACTIN 910), SYNTHETIC ABSORBABLE SUTURE: Brand: COATED VICRYL

## (undated) DEVICE — BANDAGE,COHESIVE,TAN,4X5YD,LF,STRL: Brand: MEDLINE

## (undated) DEVICE — ENDOSCOPY PACK UPPER: Brand: MEDLINE INDUSTRIES, INC.

## (undated) DEVICE — Device

## (undated) DEVICE — C-ARM: Brand: UNBRANDED

## (undated) DEVICE — MEDI-VAC NON-CONDUCTIVE SUCTION TUBING: Brand: CARDINAL HEALTH

## (undated) DEVICE — PADDING CAST 6INX4YD 100% COT ABSRB HIGHLY

## (undated) DEVICE — SUT ETHLN 3-0 18IN PS-1 NABSRB BLK 24MM 3/8 C

## (undated) DEVICE — Device: Brand: DEFENDO AIR/WATER/SUCTION AND BIOPSY VALVE

## (undated) DEVICE — ZZ-DISC-SUB-422511-SUT VCRL 2-0 27IN FS-1 ABSRB UD L24MM 3/8 CIR

## (undated) DEVICE — BIT DRL 2.5X110MM G QC FOR LOK COMPR

## (undated) DEVICE — LOWER EXTREMITY CDS-LF: Brand: MEDLINE INDUSTRIES, INC.

## (undated) DEVICE — DRESSING ADAPTIC L16XW3IN OIL ADH

## (undated) DEVICE — SOLUTION IRRIG 1000ML 0.9% NACL USP BTL

## (undated) DEVICE — MEDI-VAC SUCTION HANDLE REGULAR CAPACITY: Brand: CARDINAL HEALTH

## (undated) DEVICE — DISPOSABLE TOURNIQUET CUFF SINGLE BLADDER, DUAL PORT AND QUICK CONNECT CONNECTOR: Brand: COLOR CUFF

## (undated) DEVICE — GLOVE SUR 8.5 SENSICARE PI PIP CRM PWD F

## (undated) DEVICE — 1200CC GUARDIAN II: Brand: GUARDIAN

## (undated) DEVICE — FORCEP BIOPSY RJ4 LG CAP W/ND

## (undated) DEVICE — BANDAGE CAST 5X30IN HT PLSTR X FAST SET

## (undated) DEVICE — GLOVE SUR 8.5 SENSICARE PI PIP GRN PWD F

## (undated) NOTE — Clinical Note
Please set her up for B12 IM repletion protocol.  She did mention to me that her daughter may be willing to assist.    Dr. Marianna Guerrero

## (undated) NOTE — Clinical Note
Pt is coming for hospital follow up this morning. Tried to call pt for TCM, pt did not answer. Just wanted to let you know TCM was set up in case enough MDM at office visit, otherwise okay for regular HFU. Thank you.

## (undated) NOTE — LETTER
[address]  [address]  Authorization for Surgical Operation and Procedure     Date:___________                                                                                                         Time:__________  I hereby authorize Surgeon(s):  Sukumar Kimble MD, my physician and his/her assistants (if applicable), which may include medical students, residents, and/or fellows, to perform the following surgical operation/ procedure and administer such anesthesia as may be determined necessary by my physician:  Operation/Procedure name (s) Procedure(s):  OPEN REDUCTION INTERNAL FIXATION LEFT ANKLE on Denae Kern   2.   I recognize that during the surgical operation/procedure, unforeseen conditions may necessitate additional or different procedures than those listed above.  I, therefore, further authorize and request that the above-named surgeon, assistants, or designees perform such procedures as are, in their judgment, necessary and desirable.    3.   My surgeon/physician has discussed prior to my surgery the potential benefits, risks and side effects of this procedure; the likelihood of achieving goals; and potential problems that might occur during recuperation.  They also discussed reasonable alternatives to the procedure, including risks, benefits, and side effects related to the alternatives and risks related to not receiving this procedure.  I have had all my questions answered and I acknowledge that no guarantee has been made as to the result that may be obtained.    4.   Should the need arise during my operation/procedure, which includes change of level of care prior to discharge, I also consent to the administration of blood and/or blood products.  Further, I understand that despite careful testing and screening of blood or blood products by collecting agencies, I may still be subject to ill effects as a result of receiving a blood transfusion and/or blood  products.  The following are some, but not all, of the potential risks that can occur: fever and allergic reactions, hemolytic reactions, transmission of diseases such as Hepatitis, AIDS and Cytomegalovirus (CMV) and fluid overload.  In the event that I wish to have an autologous transfusion of my own blood, or a directed donor transfusion, I will discuss this with my physician.  Check only if Refusing Blood or Blood Products  I understand refusal of blood or blood products as deemed necessary by my physician may have serious consequences to my condition to include possible death. I hereby assume responsibility for my refusal and release the hospital, its personnel, and my physicians from any responsibility for the consequences of my refusal.          o  Refuse      5.   I authorize the use of any specimen, organs, tissues, body parts or foreign objects that may be removed from my body during the operation/procedure for diagnosis, research or teaching purposes and their subsequent disposal by hospital authorities.  I also authorize the release of specimen test results and/or written reports to my treating physician on the hospital medical staff or other referring or consulting physicians involved in my care, at the discretion of the Pathologist or my treating physician.    6.   I consent to the photographing or videotaping of the operations or procedures to be performed, including appropriate portions of my body for medical, scientific, or educational purposes, provided my identity is not revealed by the pictures or by descriptive texts accompanying them.  If the procedure has been photographed/videotaped, the surgeon will obtain the original picture, image, videotape or CD.  The hospital will not be responsible for storage, release or maintenance of the picture, image, tape or CD.    7.   I consent to the presence of a  or observers in the operating room as deemed necessary by my physician or  their designees.    8.   I recognize that in the event my procedure results in extended X-Ray/fluoroscopy time, I may develop a skin reaction.    9. If I have a Do Not Attempt Resuscitation (DNAR) order in place, that status will be suspended while in the operating room, procedural suite, and during the recovery period unless otherwise explicitly stated by me (or a person authorized to consent on my behalf). The surgeon or my attending physician will determine when the applicable recovery period ends for purposes of reinstating the DNAR order.  10. Patients having a sterilization procedure: I understand that if the procedure is successful the results will be permanent and it will therefore be impossible for me to inseminate, conceive, or bear children.  I also understand that the procedure is intended to result in sterility, although the result has not been guaranteed.   11. I acknowledge that my physician has explained sedation/analgesia administration to me including the risk and benefits I consent to the administration of sedation/analgesia as may be necessary or desirable in the judgment of my physician.    I CERTIFY THAT I HAVE READ AND FULLY UNDERSTAND THE ABOVE CONSENT TO OPERATION and/or OTHER PROCEDURE.    _________________________________________  __________________________________  Signature of Patient     Signature of Responsible Person         ___________________________________         Printed Name of Responsible Person           _________________________________                 Relationship to Patient  _________________________________________  ______________________________  Signature of Witness          Date  Time      Patient Name: Denae Kern     : 5/3/1930                 Printed: 2024     Medical Record #: [id number]                     Page 1 of [address] [place] [address]  [id number]    Consent for Anesthesia    I,  Denae Kern agree to be cared for by an anesthesiologist, who is specially trained to monitor me and give me medicine to put me to sleep or keep me comfortable during my procedure    I understand that my anesthesiologist is not an employee or agent of [facility] [employer]. He or she works for [employer].    As the patient asking for anesthesia services, I agree to:  Allow the anesthesiologist (anesthesia doctor) to give me medicine and do additional procedures as necessary. Some examples are: Starting or using an “IV” to give me medicine, fluids or blood during my procedure, and having a breathing tube placed to help me breathe when I’m asleep (intubation). In the event that my heart stops working properly, I understand that my anesthesiologist will make every effort to sustain my life, unless otherwise directed by [facility] Do Not Resuscitate documents.  Tell my anesthesia doctor before my procedure:  If I am pregnant.  The last time that I ate or drank.  All of the medicines I take (including prescriptions, herbal supplements, and pills I can buy without a prescription (including street drugs/illegal medications). Failure to inform my anesthesiologist about these medicines may increase my risk of anesthetic complications.  If I am allergic to anything or have had a reaction to anesthesia before.  I understand how the anesthesia medicine will help me (benefits).  I understand that with any type of anesthesia medicine there are risks:  The most common risks are: nausea, vomiting, sore throat, muscle soreness, damage to my eyes, mouth, or teeth (from breathing tube placement).  Rare risks include: remembering what happened during my procedure, allergic reactions to medications, injury to my airway, heart, lungs, vision, nerves, or muscles and in extremely rare instances death.  My doctor has explained to me other choices available to me for my care  (alternatives).  Pregnant Patients (“epidural”):  I understand that the risks of having an epidural (medicine given into my back to help control pain during labor), include itching, low blood pressure, difficulty urinating, headache or slowing of the baby’s heart. Very rare risks include infection, bleeding, seizure, irregular heart rhythms and nerve injury.  Regional Anesthesia (“spinal”, “epidural”, & “nerve blocks”):  I understand that rare but potential complications include headache, bleeding, infection, seizure, irregular heart rhythms, and nerve injury.    I can change my mind about having anesthesia services at any time before I get the medicine.    _____________________________________________________________________________  Patient (or Representative) Signature/Relationship to Patient  Date   Time    _____________________________________________________________________________   Name (if used)    Language/Organization   Time    _____________________________________________________________________________  Anesthesiologist Signature     Date   Time  I have discussed the procedure and information above with the patient (or patient’s representative) and answered their questions. The patient or their representative has agreed to have anesthesia services.    _____________________________________________________________________________  Witness        Date   Time  I have verified that the signature is that of the patient or patient’s representative, and that it was signed before the procedure  Patient Name: Denae Kern     : 5/3/1930                 Printed: 2024     Medical Record #: [id number]                     Page 2 of 2

## (undated) NOTE — Clinical Note
The patient told me the other day that her daughter named Ana Reveles is her healthcare power of . Marijo Romberg mentioned that her daughter may want a copy of her medical record. Please touch base with Denae for clarification and potentially her daughter Alfredo Grewal about any specific needs. Thanks.     Dr. Santo Hernandez

## (undated) NOTE — LETTER
[address], [address]  [address]  [phone]          Date: 7/20/2021    Patient Name: Naveen Ej        To Whom it may concern:       I am writing on behalf of my patie

## (undated) NOTE — MR AVS SNAPSHOT
After Visit Summary   12/13/2018    Norberto Stack    MRN: [id number]           Visit Information     Date & Time  12/13/2018  2:00 PM Provider  Marlin Benavides MD Department  [place] [address], [facility]  Phone  [phone]- Simethicone (GAS-X EXTRA STRENGTH OR) Take 1 tablet by mouth as needed. insulin glargine (LANTUS) 100 UNIT/ML Subcutaneous Solution Inject 18 Units into the skin every morning. digoxin 0.125 MG Oral Tab 125 mcg 3 (three) times a week.  Monday[facility] TET[facility]IPHTHERIA, PRESERVE FREE H1350115 CPT(R)]     Future Labs/Procedures Expected by Expires    INTRINSIC FACTOR ABS, SERUM [9790044 CUSTOM]  12/13/2018 (Approximate) 12/13/2019    METHYLMALONIC ACID, SERUM [4736615 CUSTOM]  12/13/2018 (Approximate few hours.            Treatment for mild   illness or injury that does   not require immediate attention   VIDEO VISITS  Average cost  $35*    e-VISITS  Average cost  $35*      [address]

## (undated) NOTE — LETTER
Date: 2019  Patient Name:  Cheryle Richter             Address: [address] [place] [address]    : 5/3/1930      Dear Katja Carson,      I hope this letter finds you well.      The biopsies obtained of your esophagus at the time of your rec

## (undated) NOTE — LETTER
05/15/20        [place]  [id number]      Dear Desma Alpers,    Our records indicate that you have outstanding lab work and or testing that was ordered for you and has not yet been completed:  Orders Placed This Enc

## (undated) NOTE — LETTER
Patient Name: Valdemar Reyez  YOB: 1930          MRN number:  [id number]  Date:  5/30/2018  Referring Physician:  Billy Rich     Progress/Discharge Summary  Dx:  Vertigo, Imbalance, Gait Disturbance            Authorized # of Visits:  10 ( without report of notable CV fatigue. This will allow her to return confidently to walking for exercise.  Functionally, able to rise from a chair and bed without dizziness or assist, able to forward bend to retrieve items from the floor (limited only by hebert without LOB to improve ability to perform household tasks. - MET  5. Patient will demonstrate ability to ambulate at least 1000 ft with LRAD IND to return to walking for exercise. - MET  6.  Patient will be independent and compliant in HEP to maintain progr